# Patient Record
Sex: MALE | Race: BLACK OR AFRICAN AMERICAN | NOT HISPANIC OR LATINO | Employment: OTHER | URBAN - METROPOLITAN AREA
[De-identification: names, ages, dates, MRNs, and addresses within clinical notes are randomized per-mention and may not be internally consistent; named-entity substitution may affect disease eponyms.]

---

## 2019-06-13 ENCOUNTER — TELEPHONE (OUTPATIENT)
Dept: TRANSPLANT | Facility: CLINIC | Age: 69
End: 2019-06-13

## 2019-06-13 DIAGNOSIS — I50.9 CONGESTIVE HEART FAILURE, UNSPECIFIED HF CHRONICITY, UNSPECIFIED HEART FAILURE TYPE: Primary | ICD-10-CM

## 2019-06-13 NOTE — TELEPHONE ENCOUNTER
Records from referring provider scanned into [employer] and sent to be scanned into Epic. Called to schedule consult appointment. No answer. Unable to leave message as mailbox is full.

## 2019-07-11 NOTE — TELEPHONE ENCOUNTER
REFERRAL NOTE:    Ishmael Thrasher has been referred to the pre-heart transplant office for consideration for orthotopic heart transplantation by Gilmar Rowell. Patient's appointments have been scheduled for 08/01/19 as per patient request due to other previously scheduled appointments. Information was provided and questions were answered. AHF/ Transplant Handout, appointment letter and campus map was mailed to the patient. Pleasant. My contact information was given. Call PRN.

## 2019-07-30 ENCOUNTER — TELEPHONE (OUTPATIENT)
Dept: TRANSPLANT | Facility: CLINIC | Age: 69
End: 2019-07-30

## 2019-08-13 ENCOUNTER — TELEPHONE (OUTPATIENT)
Dept: TRANSPLANT | Facility: CLINIC | Age: 69
End: 2019-08-13

## 2019-08-16 ENCOUNTER — CLINICAL SUPPORT (OUTPATIENT)
Dept: TRANSPLANT | Facility: CLINIC | Age: 69
End: 2019-08-16
Payer: MEDICARE

## 2019-08-16 ENCOUNTER — DOCUMENTATION ONLY (OUTPATIENT)
Dept: TRANSPLANT | Facility: CLINIC | Age: 69
End: 2019-08-16

## 2019-08-16 ENCOUNTER — HOSPITAL ENCOUNTER (OUTPATIENT)
Dept: CARDIOLOGY | Facility: CLINIC | Age: 69
Discharge: HOME | End: 2019-08-16
Attending: INTERNAL MEDICINE
Payer: MEDICARE

## 2019-08-16 ENCOUNTER — INITIAL CONSULT (OUTPATIENT)
Dept: TRANSPLANT | Facility: CLINIC | Age: 69
End: 2019-08-16
Payer: MEDICARE

## 2019-08-16 ENCOUNTER — HOSPITAL ENCOUNTER (OUTPATIENT)
Dept: PULMONOLOGY | Facility: CLINIC | Age: 69
Discharge: HOME | End: 2019-08-16
Payer: MEDICARE

## 2019-08-16 VITALS
HEART RATE: 72 BPM | HEIGHT: 76 IN | BODY MASS INDEX: 31.66 KG/M2 | SYSTOLIC BLOOD PRESSURE: 122 MMHG | DIASTOLIC BLOOD PRESSURE: 80 MMHG | WEIGHT: 260 LBS

## 2019-08-16 VITALS
DIASTOLIC BLOOD PRESSURE: 8 MMHG | HEART RATE: 78 BPM | BODY MASS INDEX: 32.57 KG/M2 | SYSTOLIC BLOOD PRESSURE: 148 MMHG | WEIGHT: 267.44 LBS | HEIGHT: 76 IN

## 2019-08-16 VITALS — WEIGHT: 260 LBS | BODY MASS INDEX: 31.66 KG/M2 | HEIGHT: 76 IN

## 2019-08-16 DIAGNOSIS — I50.9 CONGESTIVE HEART FAILURE, UNSPECIFIED HF CHRONICITY, UNSPECIFIED HEART FAILURE TYPE: ICD-10-CM

## 2019-08-16 DIAGNOSIS — I50.9 CONGESTIVE HEART FAILURE, UNSPECIFIED HF CHRONICITY, UNSPECIFIED HEART FAILURE TYPE: Primary | ICD-10-CM

## 2019-08-16 LAB
ASCENDING AORTA: 3.58 CM
AV INDEX (PROSTH): 0.3
AV MEAN GRADIENT: 12 MMHG
AV PEAK GRADIENT: 20 MMHG
AV VALVE AREA: 1.4 CM2
AV VELOCITY RATIO: 0.31
BSA FOR ECHO PROCEDURE: 2.51 M2
CV ECHO LV RWT: 0.25 CM
DOP CALC AO PEAK VEL: 2.22 M/S
DOP CALC AO VTI: 37.65 CM
DOP CALC LVOT AREA: 4.7 CM2
DOP CALC LVOT DIAMETER: 2.44 CM
DOP CALC LVOT PEAK VEL: 0.69 M/S
DOP CALC LVOT STROKE VOLUME: 52.62 CM3
DOP CALCLVOT PEAK VEL VTI: 11.26 CM
E WAVE DECELERATION TIME: 212.97 MSEC
E/A RATIO: 0.38
E/E' RATIO: 10.57 M/S
ECHO LV POSTERIOR WALL: 0.97 CM (ref 0.6–1.1)
FRACTIONAL SHORTENING: 11 % (ref 28–44)
INTERVENTRICULAR SEPTUM: 1.04 CM (ref 0.6–1.1)
LA MAJOR: 7.6 CM
LA MINOR: 7.2 CM
LA WIDTH: 4.59 CM
LEFT ATRIUM SIZE: 5.16 CM
LEFT ATRIUM VOLUME INDEX: 60.1 ML/M2
LEFT ATRIUM VOLUME: 148.87 CM3
LEFT INTERNAL DIMENSION IN SYSTOLE: 6.94 CM (ref 2.1–4)
LEFT VENTRICLE DIASTOLIC VOLUME INDEX: 131.15 ML/M2
LEFT VENTRICLE DIASTOLIC VOLUME: 324.85 ML
LEFT VENTRICLE MASS INDEX: 158 G/M2
LEFT VENTRICLE SYSTOLIC VOLUME INDEX: 101.1 ML/M2
LEFT VENTRICLE SYSTOLIC VOLUME: 250.34 ML
LEFT VENTRICULAR INTERNAL DIMENSION IN DIASTOLE: 7.79 CM (ref 3.5–6)
LEFT VENTRICULAR MASS: 390.36 G
LV LATERAL E/E' RATIO: 9.25 M/S
LV SEPTAL E/E' RATIO: 12.33 M/S
MV PEAK A VEL: 0.98 M/S
MV PEAK E VEL: 0.37 M/S
PISA TR MAX VEL: 2.05 M/S
RA MAJOR: 5.39 CM
RA PRESSURE: 3 MMHG
RA WIDTH: 3.5 CM
RIGHT VENTRICULAR END-DIASTOLIC DIMENSION: 3.54 CM
SINUS: 3.42 CM
STJ: 3.26 CM
TDI LATERAL: 0.04 M/S
TDI SEPTAL: 0.03 M/S
TDI: 0.04 M/S
TR MAX PG: 17 MMHG
TRICUSPID ANNULAR PLANE SYSTOLIC EXCURSION: 1.31 CM
TV REST PULMONARY ARTERY PRESSURE: 20 MMHG

## 2019-08-16 PROCEDURE — 1101F PT FALLS ASSESS-DOCD LE1/YR: CPT | Mod: CPTII,S$GLB,TXP, | Performed by: INTERNAL MEDICINE

## 2019-08-16 PROCEDURE — 99499 RISK ADDL DX/OHS AUDIT: ICD-10-PCS | Mod: S$GLB,TXP,, | Performed by: INTERNAL MEDICINE

## 2019-08-16 PROCEDURE — 1101F PR PT FALLS ASSESS DOC 0-1 FALLS W/OUT INJ PAST YR: ICD-10-PCS | Mod: CPTII,S$GLB,TXP, | Performed by: INTERNAL MEDICINE

## 2019-08-16 PROCEDURE — 93306 TTE W/DOPPLER COMPLETE: CPT | Mod: NTX,S$GLB,, | Performed by: INTERNAL MEDICINE

## 2019-08-16 PROCEDURE — 93306 TRANSTHORACIC ECHO (TTE) COMPLETE (CUPID ONLY): ICD-10-PCS | Mod: NTX,S$GLB,, | Performed by: INTERNAL MEDICINE

## 2019-08-16 PROCEDURE — 99999 PR PBB SHADOW E&M-EST. PATIENT-LVL III: ICD-10-PCS | Mod: PBBFAC,TXP,, | Performed by: INTERNAL MEDICINE

## 2019-08-16 PROCEDURE — 99999 PR PBB SHADOW E&M-EST. PATIENT-LVL III: CPT | Mod: PBBFAC,TXP,, | Performed by: INTERNAL MEDICINE

## 2019-08-16 PROCEDURE — 94618 PULMONARY STRESS TESTING: CPT | Mod: NTX,S$GLB,, | Performed by: INTERNAL MEDICINE

## 2019-08-16 PROCEDURE — 99203 PR OFFICE/OUTPT VISIT, NEW, LEVL III, 30-44 MIN: ICD-10-PCS | Mod: S$GLB,TXP,, | Performed by: INTERNAL MEDICINE

## 2019-08-16 PROCEDURE — 99499 UNLISTED E&M SERVICE: CPT | Mod: S$GLB,TXP,, | Performed by: INTERNAL MEDICINE

## 2019-08-16 PROCEDURE — 99203 OFFICE O/P NEW LOW 30 MIN: CPT | Mod: S$GLB,TXP,, | Performed by: INTERNAL MEDICINE

## 2019-08-16 PROCEDURE — 94618 PULMONARY STRESS TESTING: ICD-10-PCS | Mod: NTX,S$GLB,, | Performed by: INTERNAL MEDICINE

## 2019-08-16 RX ORDER — TAMSULOSIN HYDROCHLORIDE 0.4 MG/1
0.4 CAPSULE ORAL DAILY
COMMUNITY
End: 2020-02-28

## 2019-08-16 RX ORDER — HYDRALAZINE HYDROCHLORIDE 25 MG/1
25 TABLET, FILM COATED ORAL EVERY 8 HOURS
COMMUNITY
End: 2020-02-28

## 2019-08-16 RX ORDER — ISOSORBIDE MONONITRATE 30 MG/1
30 TABLET, EXTENDED RELEASE ORAL DAILY
COMMUNITY
End: 2020-02-28 | Stop reason: ALTCHOICE

## 2019-08-16 RX ORDER — ALLOPURINOL 300 MG/1
150 TABLET ORAL DAILY
Status: ON HOLD | COMMUNITY
End: 2023-01-01

## 2019-08-16 RX ORDER — METOPROLOL SUCCINATE 100 MG/1
100 TABLET, EXTENDED RELEASE ORAL DAILY
Status: ON HOLD | COMMUNITY
End: 2021-03-20 | Stop reason: HOSPADM

## 2019-08-16 RX ORDER — GLIPIZIDE 5 MG/1
5 TABLET ORAL
Status: ON HOLD | COMMUNITY
End: 2023-01-01 | Stop reason: HOSPADM

## 2019-08-16 RX ORDER — MELATONIN 5 MG
CAPSULE ORAL
Status: ON HOLD | COMMUNITY
End: 2023-01-01 | Stop reason: HOSPADM

## 2019-08-16 RX ORDER — SERTRALINE HYDROCHLORIDE 50 MG/1
50 TABLET, FILM COATED ORAL DAILY
Status: ON HOLD | COMMUNITY
End: 2023-01-01 | Stop reason: HOSPADM

## 2019-08-16 RX ORDER — LANOLIN ALCOHOL/MO/W.PET/CERES
400 CREAM (GRAM) TOPICAL
Status: ON HOLD | COMMUNITY
End: 2021-03-20 | Stop reason: HOSPADM

## 2019-08-16 RX ORDER — ATORVASTATIN CALCIUM 80 MG/1
80 TABLET, FILM COATED ORAL DAILY
Status: ON HOLD | COMMUNITY
End: 2023-01-01 | Stop reason: SDUPTHER

## 2019-08-16 RX ORDER — RISPERIDONE 3 MG/1
TABLET ORAL
Status: ON HOLD | COMMUNITY
End: 2023-01-01 | Stop reason: HOSPADM

## 2019-08-16 RX ORDER — MIRTAZAPINE 15 MG/1
15 TABLET, FILM COATED ORAL
COMMUNITY
End: 2020-04-03

## 2019-08-16 RX ORDER — GLUCOSAMINE HCL 500 MG
1 TABLET ORAL
Status: ON HOLD | COMMUNITY
End: 2023-01-01 | Stop reason: HOSPADM

## 2019-08-16 RX ORDER — SPIRONOLACTONE 25 MG/1
0.5 TABLET ORAL DAILY
Status: ON HOLD | COMMUNITY
End: 2021-03-20 | Stop reason: HOSPADM

## 2019-08-16 RX ORDER — QUETIAPINE FUMARATE 400 MG/1
400 TABLET, FILM COATED ORAL NIGHTLY
COMMUNITY
End: 2020-04-03

## 2019-08-16 RX ORDER — ASPIRIN 81 MG/1
81 TABLET ORAL
Status: ON HOLD | COMMUNITY
End: 2023-01-01 | Stop reason: HOSPADM

## 2019-08-16 NOTE — NURSING
Patient identified by 2 identifiers. Denies previous reactions to blood transfusions, allergies reviewed & procedure explained.  22 g IV placed to Lt AC, flushed w/ 10cc NS pre & post contrast administration.  3cc Optison administered per echo tech, echo images obtained.  Pt tolerated procedure well.  IV D/C'ed, preasure dsg applied.  Pt D/C'ed to home.

## 2019-08-16 NOTE — PROGRESS NOTES
Subjective:   Initial evaluation of heart transplant candidacy.    HPI:  Mr. Thrasher is a 69 y.o. year old Black or  male who has presents to be considered for advanced surgical options (LVAD/OHT).  Patient is a retired vet, and already has an evaluation that is pending at [place], prior to this appointment being given. He was turned down for OHT from [place] when evaluated, due to some psych history, concerns for delusion. However is being actively evaluated currently in [facility], therefore states he fulfilled seeing us due to the referral being sent but is not planning on returning if things go well at [facility]. Does have cool extremities, gets leg swelling, for which he is being treated by Dr. Rowell and the [facility] team. Seems NYHA FC IIIb.     TTE 08/16/19:  · Severely decreased left ventricular systolic function. The estimated ejection fraction is 18%  · Eccentric left ventricular hypertrophy.  · Local segmental wall motion abnormalities.  · Grade I (mild) left ventricular diastolic dysfunction consistent with impaired relaxation.  · Normal left atrial pressure.  · Severe left atrial enlargement.  · Severe left ventricular enlargement.  · Low normal right ventricular systolic function.  · Mild right atrial enlargement.  · Moderate aortic valve stenosis.  · Aortic valve area is 1.40 cm2; peak velocity is 2.22 m/s; mean gradient is 12 mmHg.  · Mild mitral sclerosis.  · Mild-to-moderate mitral regurgitation.  · Mild to moderate tricuspid regurgitation.  · Normal central venous pressure (3 mm Hg).  · The estimated PA systolic pressure is 20 mm Hg    Past Medical History:   Diagnosis Date    CAD (coronary artery disease)     Cardiomyopathy     CHF (congestive heart failure)     Edema     GERD (gastroesophageal reflux disease)     HTN (hypertension)     Hyperlipemia      Past Surgical History:   Procedure Laterality Date    CARDIAC DEFIBRILLATOR PLACEMENT      CATHETERIZATION OF  "BOTH LEFT AND RIGHT HEART Bilateral 08/20/2018    CORONARY ARTERY BYPASS GRAFT         History reviewed. No pertinent family history.    Review of Systems   Constitution: Positive for malaise/fatigue and weight gain. Negative for chills, decreased appetite, diaphoresis, fever and weight loss.   HENT: Negative for congestion.    Eyes: Negative for blurred vision and visual disturbance.   Cardiovascular: Positive for dyspnea on exertion, leg swelling, orthopnea and paroxysmal nocturnal dyspnea. Negative for chest pain, irregular heartbeat, near-syncope, palpitations and syncope.   Respiratory: Positive for shortness of breath. Negative for cough, sleep disturbances due to breathing, snoring and wheezing.    Hematologic/Lymphatic: Negative for bleeding problem. Does not bruise/bleed easily.   Skin: Negative for poor wound healing and rash.   Musculoskeletal: Negative for arthritis, joint pain and muscle weakness.   Gastrointestinal: Negative for bloating, abdominal pain, anorexia, constipation, diarrhea, hematemesis, hematochezia, melena, nausea and vomiting.   Genitourinary: Negative for frequency and urgency.   Neurological: Negative for difficulty with concentration, excessive daytime sleepiness, dizziness, headaches, light-headedness and weakness.   Psychiatric/Behavioral: Negative for depression. The patient does not have insomnia and is not nervous/anxious.        Objective:   Blood pressure (!) 148/8, pulse 78, height 6' 4" (1.93 m), weight 121.3 kg (267 lb 6.7 oz).body mass index is 32.55 kg/m².    Physical Exam   Constitutional: He is oriented to person, place, and time. He appears well-developed and well-nourished. No distress.   HENT:   Head: Normocephalic and atraumatic.   Mouth/Throat: Oropharynx is clear and moist.   Eyes: Conjunctivae and EOM are normal. Right eye exhibits no discharge. Left eye exhibits no discharge.   Neck: Normal range of motion. Neck supple. No JVD present.   Cardiovascular: Normal " rate, regular rhythm and intact distal pulses. Exam reveals no gallop and no friction rub.   No murmur heard.  Cap refill sluggish, cool LE extremities knees down   Pulmonary/Chest: Effort normal and breath sounds normal. He has no wheezes. He has no rales. He exhibits no tenderness.   Abdominal: Soft. Bowel sounds are normal. He exhibits no distension and no mass. There is no tenderness. There is no rebound and no guarding.   Musculoskeletal: Normal range of motion. He exhibits edema (1+ pitting edema bilateral lower extremities). He exhibits no tenderness.   Neurological: He is alert and oriented to person, place, and time.   Skin: Skin is warm and dry. No rash noted. He is not diaphoretic. No erythema.   Psychiatric: He has a normal mood and affect. His behavior is normal. Judgment and thought content normal.   Nursing note and vitals reviewed.      Labs:      Chemistry        Component Value Date/Time     08/16/2019 1225    K 4.4 08/16/2019 1225     08/16/2019 1225    CO2 27 08/16/2019 1225    BUN 27 (H) 08/16/2019 1225    CREATININE 1.8 (H) 08/16/2019 1225     08/16/2019 1225        Component Value Date/Time    CALCIUM 9.3 08/16/2019 1225    ALKPHOS 104 08/16/2019 1225    AST 15 08/16/2019 1225    ALT 19 08/16/2019 1225    BILITOT 0.7 08/16/2019 1225    ESTGFRAFRICA 43.7 (A) 08/16/2019 1225    EGFRNONAA 37.8 (A) 08/16/2019 1225          No results found for: MG  Lab Results   Component Value Date    WBC 9.70 08/16/2019    HGB 14.1 08/16/2019    HCT 45.5 08/16/2019    MCV 84 08/16/2019     08/16/2019     BNP   Date Value Ref Range Status   08/16/2019 313 (H) 0 - 99 pg/mL Final     Comment:     Values of less than 100 pg/ml are consistent with non-CHF populations.     No results found for this or any previous visit.    Labs were reviewed with the patient.    Assessment:      1. Congestive heart failure, unspecified HF chronicity, unspecified heart failure type        Plan:   Patient is  not planning on returning at this time, as he is waiting to hear form [facility] next steps for his evaluation process and he [facility]. Will return for further discussion and care if he is not accepted for MCS/OHT there, but he states it is proceeding well. Thank you for allowing us to participate in the cardiovascular management of this patient. Please contact us with any questions or concerns you may have regarding this patient.     Patient is now NYHA III ACC stage D  Recommend 2 gram sodium restriction and 1500cc fluid restriction.  Encourage physical activity with graded exercise program.  Requested patient to weigh themselves daily, and to notify us if their weight increases by more than 3 lbs in 1 day or 5 lbs in 1 week.     Brianda Navas MD

## 2019-08-16 NOTE — LETTER
August 27, 2019        Gilmar Rowell  [address] [place] [address]  Phone: [phone]  Fax: [phone]             [facility] [address] [place] [address]  Phone: [phone]   Patient: Ishmael Thrasher   MR Number: [id number]   YOB: 1950   Date of Visit: 8/16/2019       Dear Dr. Gilmar Rowell    Thank you for referring Ishmael Thrasher to me for evaluation. Attached you will find relevant portions of my assessment and plan of care.    If you have questions, please do not hesitate to call me. I look forward to following Ishmael Thrasher along with you.    Sincerely,    Brianda Navas MD    Enclosure    If you would like to receive this communication electronically, please contact [email or web address] or [phone] to request [email or web address] Link access.    [email or web address] Link is a tool which provides read-only access to select patient information with whom you have a relationship. Its easy to use and provides real time access to review your patients record including encounter summaries, notes, results, and demographic information.    If you feel you have received this communication in error or would no longer like to receive these types of communications, please e-mail [email or web address]

## 2019-08-16 NOTE — PROGRESS NOTES
Per Dr. Navas, hold on initiating VAD education until further told by Patricia, pre heart coordinator. Will follow up with pt when appropriate.

## 2019-08-17 NOTE — PROCEDURES
Ishmael Thrasher is a 69 y.o.  male patient, who presents for a 6 minute walk test ordered by Mg Orosco MD.  The diagnosis is Congestive Heart Failure; Cardiomyopathy.  The patient's BMI is 31.7 kg/m2.  Predicted distance (lower limit of normal) is 337.24 meters.      Test Results:    The test was completed without stopping.  The total time walked was 360 seconds.  During walking, the patient reported:  No complaints.  The patient used no assistive devices during testing.     08/16/2019---------Distance: 365.76 meters (1200 feet)     O2 Sat % Supplemental Oxygen Heart Rate Blood Pressure Antonio Scale   Pre-exercise  (Resting) 96 % Room Air 51 bpm 82/52 mmHg 0   During Exercise 97 % Room Air 97 bpm 134/58 mmHg 0   Post-exercise  (Recovery) 97 % Room Air  42 bpm 112/72 mmHg      Recovery Time:  116 seconds    Performing nurse/tech:  Reagan DAVIS      PREVIOUS STUDY:   The patient has not had a previous study.      CLINICAL INTERPRETATION:  Six minute walk distance is 365.76 meters (1200 feet) with no dyspnea.  During exercise, there was no desaturation while breathing room air.  Both blood pressure and heart rate increased significantly with walking.  Bradycardia and Hypotension were present prior to exercise.  The patient did not report non-pulmonary symptoms during exercise.  No previous study performed.  Based upon age and body mass index, exercise capacity is normal.

## 2019-08-19 ENCOUNTER — DOCUMENTATION ONLY (OUTPATIENT)
Dept: TRANSPLANT | Facility: CLINIC | Age: 69
End: 2019-08-19

## 2020-02-11 ENCOUNTER — TELEPHONE (OUTPATIENT)
Dept: TRANSPLANT | Facility: CLINIC | Age: 70
End: 2020-02-11

## 2020-02-13 ENCOUNTER — TELEPHONE (OUTPATIENT)
Dept: TRANSPLANT | Facility: CLINIC | Age: 70
End: 2020-02-13

## 2020-02-13 DIAGNOSIS — I50.9 CONGESTIVE HEART FAILURE, UNSPECIFIED HF CHRONICITY, UNSPECIFIED HEART FAILURE TYPE: Primary | ICD-10-CM

## 2020-02-13 NOTE — TELEPHONE ENCOUNTER
Spoke to patient. Informed of labs scheduled do be done prior to appointment on 02/28/2020. Understanding verbalized. Call PRN. Appointment letter placed in mail.

## 2020-02-28 ENCOUNTER — OFFICE VISIT (OUTPATIENT)
Dept: TRANSPLANT | Facility: CLINIC | Age: 70
End: 2020-02-28
Payer: MEDICARE

## 2020-02-28 ENCOUNTER — LAB VISIT (OUTPATIENT)
Dept: LAB | Facility: HOSPITAL | Age: 70
End: 2020-02-28
Attending: INTERNAL MEDICINE
Payer: MEDICARE

## 2020-02-28 VITALS
HEIGHT: 76 IN | HEART RATE: 85 BPM | BODY MASS INDEX: 34.98 KG/M2 | SYSTOLIC BLOOD PRESSURE: 112 MMHG | WEIGHT: 287.25 LBS | DIASTOLIC BLOOD PRESSURE: 59 MMHG

## 2020-02-28 DIAGNOSIS — I50.40 COMBINED SYSTOLIC AND DIASTOLIC CONGESTIVE HEART FAILURE, UNSPECIFIED HF CHRONICITY: Primary | ICD-10-CM

## 2020-02-28 DIAGNOSIS — I50.9 CONGESTIVE HEART FAILURE, UNSPECIFIED HF CHRONICITY, UNSPECIFIED HEART FAILURE TYPE: ICD-10-CM

## 2020-02-28 DIAGNOSIS — I50.43 ACUTE ON CHRONIC COMBINED SYSTOLIC AND DIASTOLIC HEART FAILURE: Primary | ICD-10-CM

## 2020-02-28 LAB
ALBUMIN SERPL BCP-MCNC: 3.7 G/DL (ref 3.5–5.2)
ALP SERPL-CCNC: 112 U/L (ref 55–135)
ALT SERPL W/O P-5'-P-CCNC: 13 U/L (ref 10–44)
ANION GAP SERPL CALC-SCNC: 11 MMOL/L (ref 8–16)
AST SERPL-CCNC: 14 U/L (ref 10–40)
BILIRUB SERPL-MCNC: 1 MG/DL (ref 0.1–1)
BNP SERPL-MCNC: 399 PG/ML (ref 0–99)
BUN SERPL-MCNC: 22 MG/DL (ref 8–23)
CALCIUM SERPL-MCNC: 9.3 MG/DL (ref 8.7–10.5)
CHLORIDE SERPL-SCNC: 104 MMOL/L (ref 95–110)
CO2 SERPL-SCNC: 28 MMOL/L (ref 23–29)
CREAT SERPL-MCNC: 1.9 MG/DL (ref 0.5–1.4)
EST. GFR  (AFRICAN AMERICAN): 40.7 ML/MIN/1.73 M^2
EST. GFR  (NON AFRICAN AMERICAN): 35.2 ML/MIN/1.73 M^2
GLUCOSE SERPL-MCNC: 129 MG/DL (ref 70–110)
POTASSIUM SERPL-SCNC: 3.9 MMOL/L (ref 3.5–5.1)
PROT SERPL-MCNC: 7.4 G/DL (ref 6–8.4)
SODIUM SERPL-SCNC: 143 MMOL/L (ref 136–145)

## 2020-02-28 PROCEDURE — 99214 PR OFFICE/OUTPT VISIT, EST, LEVL IV, 30-39 MIN: ICD-10-PCS | Mod: NTX,S$GLB,, | Performed by: INTERNAL MEDICINE

## 2020-02-28 PROCEDURE — 1159F PR MEDICATION LIST DOCUMENTED IN MEDICAL RECORD: ICD-10-PCS | Mod: NTX,S$GLB,, | Performed by: INTERNAL MEDICINE

## 2020-02-28 PROCEDURE — 1101F PR PT FALLS ASSESS DOC 0-1 FALLS W/OUT INJ PAST YR: ICD-10-PCS | Mod: CPTII,NTX,S$GLB, | Performed by: INTERNAL MEDICINE

## 2020-02-28 PROCEDURE — 83880 ASSAY OF NATRIURETIC PEPTIDE: CPT | Mod: TXP

## 2020-02-28 PROCEDURE — 36415 COLL VENOUS BLD VENIPUNCTURE: CPT | Mod: NTX

## 2020-02-28 PROCEDURE — 1159F MED LIST DOCD IN RCRD: CPT | Mod: NTX,S$GLB,, | Performed by: INTERNAL MEDICINE

## 2020-02-28 PROCEDURE — 99999 PR PBB SHADOW E&M-EST. PATIENT-LVL IV: ICD-10-PCS | Mod: PBBFAC,TXP,, | Performed by: INTERNAL MEDICINE

## 2020-02-28 PROCEDURE — 99999 PR PBB SHADOW E&M-EST. PATIENT-LVL IV: CPT | Mod: PBBFAC,TXP,, | Performed by: INTERNAL MEDICINE

## 2020-02-28 PROCEDURE — 1101F PT FALLS ASSESS-DOCD LE1/YR: CPT | Mod: CPTII,NTX,S$GLB, | Performed by: INTERNAL MEDICINE

## 2020-02-28 PROCEDURE — 99214 OFFICE O/P EST MOD 30 MIN: CPT | Mod: NTX,S$GLB,, | Performed by: INTERNAL MEDICINE

## 2020-02-28 PROCEDURE — 80053 COMPREHEN METABOLIC PANEL: CPT | Mod: TXP

## 2020-02-28 RX ORDER — TORSEMIDE 20 MG/1
20 TABLET ORAL
Status: ON HOLD | COMMUNITY
Start: 2019-12-30 | End: 2021-03-20 | Stop reason: HOSPADM

## 2020-02-28 RX ORDER — HYDRALAZINE HYDROCHLORIDE 10 MG/1
10 TABLET, FILM COATED ORAL 3 TIMES DAILY
Qty: 90 TABLET | Refills: 11 | Status: CANCELLED | OUTPATIENT
Start: 2020-02-28 | End: 2021-02-27

## 2020-02-28 RX ORDER — ISOSORBIDE DINITRATE 10 MG/1
10 TABLET ORAL 3 TIMES DAILY
Qty: 90 TABLET | Refills: 11 | Status: SHIPPED | OUTPATIENT
Start: 2020-02-28 | End: 2020-07-06 | Stop reason: SDUPTHER

## 2020-02-28 RX ORDER — AA/PROT/LYSINE/METHIO/VIT C/B6 50-12.5 MG
200 TABLET ORAL DAILY
Status: ON HOLD | COMMUNITY
End: 2023-01-01 | Stop reason: HOSPADM

## 2020-02-28 RX ORDER — TRAMADOL HYDROCHLORIDE 50 MG/1
50 TABLET ORAL DAILY
COMMUNITY
End: 2020-04-03

## 2020-02-28 RX ORDER — POTASSIUM CHLORIDE 750 MG/1
TABLET, EXTENDED RELEASE ORAL
Status: ON HOLD | COMMUNITY
End: 2021-03-20 | Stop reason: HOSPADM

## 2020-02-28 RX ORDER — HYDRALAZINE HYDROCHLORIDE 10 MG/1
10 TABLET, FILM COATED ORAL 3 TIMES DAILY
Qty: 90 TABLET | Refills: 11 | Status: SHIPPED | OUTPATIENT
Start: 2020-02-28 | End: 2020-07-06 | Stop reason: SDUPTHER

## 2020-02-28 RX ORDER — BUMETANIDE 1 MG/1
1 TABLET ORAL DAILY
COMMUNITY
End: 2020-02-28

## 2020-02-28 RX ORDER — ISOSORBIDE DINITRATE 10 MG/1
10 TABLET ORAL 3 TIMES DAILY
Qty: 90 TABLET | Refills: 11 | Status: CANCELLED | OUTPATIENT
Start: 2020-02-28 | End: 2021-02-27

## 2020-02-28 NOTE — LETTER
February 28, 2020        Gilmar Rowell  [address] [place] [address]  Phone: [phone]  Fax: [phone]             [facility] [address] [place] [address]  Phone: [phone]   Patient: Ishmael Thrasher   MR Number: [id number]   YOB: 1950   Date of Visit: 2/28/2020       Dear Dr. Gilmar Rowell    Thank you for referring Ishmael Thrasher to me for evaluation. Attached you will find relevant portions of my assessment and plan of care.    If you have questions, please do not hesitate to call me. I look forward to following Ishmael Thrasher along with you.    Sincerely,    Brianda Navas MD    Enclosure    If you would like to receive this communication electronically, please contact [email or web address] or [phone] to request [employer] Link access.    [employer] Link is a tool which provides read-only access to select patient information with whom you have a relationship. Its easy to use and provides real time access to review your patients record including encounter summaries, notes, results, and demographic information.    If you feel you have received this communication in error or would no longer like to receive these types of communications, please e-mail [email or web address]

## 2020-02-28 NOTE — LETTER
"   [facility]  [address]  [facility] [address]  Phone: [phone]       3/3/20    Re:  Ishmael Thrasher (:  52)      To whom it may concern:      Please be advised that Mr. Ishmael Thrasher (Lee), was seen in the advanced heart failure and transplant clinic for a follow up appointment on 20, by myself and physician fellow, Errol Ramirez MD.    This letter is being sent at the request of and with permission by Mr. Thrasher.    Please feel free to contact our office at [phone], should you have any questions.  Thank you.          Brianda Navas MD  Medical Director, Mechanical Assist Device/Heart Transplant Program  [facility]          "

## 2020-02-28 NOTE — PROGRESS NOTES
Subjective:   Initial evaluation of heart transplant candidacy.    HPI:  Mr. Thrasher is a 70 y.o. year old Black or  male who has presents to be considered for advanced surgical options (LVAD/OHT). Mr. Thrasher has heart failure with reduced ejection fraction in the settings of ischemic cardiomyopathy presents to the clinic today for regular follow up. Patient was turned down for OHT from [place] due to some psych history. He is being actively evaluated in [facility], however, today he states he is not on evaluation in [place] and he would like to transfer his care here at [facility]. From heart failure standpoint, he is doing well. He denies chest pain, sob or palpitations. Denies nausea, vomiting, diarrhea or constipation. No fever, chills or recent infections.        TTE 08/16/19:  · Severely decreased left ventricular systolic function. The estimated ejection fraction is 18%  · Eccentric left ventricular hypertrophy.  · Local segmental wall motion abnormalities.  · Grade I (mild) left ventricular diastolic dysfunction consistent with impaired relaxation.  · Normal left atrial pressure.  · Severe left atrial enlargement.  · Severe left ventricular enlargement.  · Low normal right ventricular systolic function.  · Mild right atrial enlargement.  · Moderate aortic valve stenosis.  · Aortic valve area is 1.40 cm2; peak velocity is 2.22 m/s; mean gradient is 12 mmHg.  · Mild mitral sclerosis.  · Mild-to-moderate mitral regurgitation.  · Mild to moderate tricuspid regurgitation.  · Normal central venous pressure (3 mm Hg).  · The estimated PA systolic pressure is 20 mm Hg    Past Medical History:   Diagnosis Date    CAD (coronary artery disease)     Cardiomyopathy     CHF (congestive heart failure)     Edema     GERD (gastroesophageal reflux disease)     HTN (hypertension)     Hyperlipemia      Past Surgical History:   Procedure Laterality Date    CARDIAC DEFIBRILLATOR PLACEMENT       "CATHETERIZATION OF BOTH LEFT AND RIGHT HEART Bilateral 08/20/2018    CORONARY ARTERY BYPASS GRAFT         History reviewed. No pertinent family history.    Review of Systems   Constitution: Negative for chills, decreased appetite, diaphoresis, fever, malaise/fatigue, weight gain and weight loss.   HENT: Negative for congestion.    Eyes: Negative for blurred vision and visual disturbance.   Cardiovascular: Negative for chest pain, dyspnea on exertion, irregular heartbeat, leg swelling, near-syncope, orthopnea, palpitations, paroxysmal nocturnal dyspnea and syncope.   Respiratory: Negative for cough, shortness of breath, sleep disturbances due to breathing, snoring and wheezing.    Hematologic/Lymphatic: Negative for bleeding problem. Does not bruise/bleed easily.   Skin: Negative for poor wound healing and rash.   Musculoskeletal: Negative for arthritis, joint pain and muscle weakness.   Gastrointestinal: Negative for bloating, abdominal pain, anorexia, constipation, diarrhea, hematemesis, hematochezia, melena, nausea and vomiting.   Genitourinary: Negative for frequency and urgency.   Neurological: Negative for difficulty with concentration, excessive daytime sleepiness, dizziness, headaches, light-headedness and weakness.   Psychiatric/Behavioral: Negative for depression. The patient does not have insomnia and is not nervous/anxious.      Objective:   Blood pressure (!) 112/59, pulse 85, height 6' 4" (1.93 m), weight 130.3 kg (287 lb 4.2 oz).body mass index is 34.97 kg/m².    Physical Exam   Constitutional: He is oriented to person, place, and time. He appears well-developed and well-nourished. No distress.   HENT:   Head: Normocephalic and atraumatic.   Mouth/Throat: Oropharynx is clear and moist.   Eyes: Conjunctivae and EOM are normal. Right eye exhibits no discharge. Left eye exhibits no discharge.   Neck: Normal range of motion. Neck supple. No JVD present.   Cardiovascular: Normal rate, regular rhythm and " intact distal pulses. PMI is displaced. Exam reveals no gallop and no friction rub.   Murmur heard.   Systolic murmur is present with a grade of 2/6.  Pulses:       Carotid pulses are 2+ on the right side, and 2+ on the left side.       Radial pulses are 2+ on the right side, and 2+ on the left side.        Femoral pulses are 2+ on the right side, and 2+ on the left side.       Popliteal pulses are 2+ on the right side, and 2+ on the left side.        Dorsalis pedis pulses are 2+ on the right side, and 2+ on the left side.        Posterior tibial pulses are 2+ on the right side, and 2+ on the left side.   Pulmonary/Chest: Effort normal and breath sounds normal. He has no wheezes. He has no rales. He exhibits no tenderness.   Abdominal: Soft. Bowel sounds are normal. He exhibits no distension and no mass. There is no tenderness. There is no rebound and no guarding.   Musculoskeletal: Normal range of motion. He exhibits edema (1+ pitting edema bilateral lower extremities). He exhibits no tenderness.   Neurological: He is alert and oriented to person, place, and time.   Skin: Skin is warm and dry. No rash noted. He is not diaphoretic. No erythema.   Psychiatric: He has a normal mood and affect. His behavior is normal. Judgment and thought content normal.   Nursing note and vitals reviewed.    Labs:      Chemistry        Component Value Date/Time     02/28/2020 1605    K 3.9 02/28/2020 1605     02/28/2020 1605    CO2 28 02/28/2020 1605    BUN 22 02/28/2020 1605    CREATININE 1.9 (H) 02/28/2020 1605     (H) 02/28/2020 1605        Component Value Date/Time    CALCIUM 9.3 02/28/2020 1605    ALKPHOS 112 02/28/2020 1605    AST 14 02/28/2020 1605    ALT 13 02/28/2020 1605    BILITOT 1.0 02/28/2020 1605    ESTGFRAFRICA 40.7 (A) 02/28/2020 1605    EGFRNONAA 35.2 (A) 02/28/2020 1605          No results found for: MG  Lab Results   Component Value Date    WBC 9.70 08/16/2019    HGB 14.1 08/16/2019    HCT 45.5  08/16/2019    MCV 84 08/16/2019     08/16/2019     BNP   Date Value Ref Range Status   02/28/2020 399 (H) 0 - 99 pg/mL Final     Comment:     Values of less than 100 pg/ml are consistent with non-CHF populations.   08/16/2019 313 (H) 0 - 99 pg/mL Final     Comment:     Values of less than 100 pg/ml are consistent with non-CHF populations.     No results found for this or any previous visit.    Labs were reviewed with the patient.    Assessment:      1. Combined systolic and diastolic congestive heart failure, unspecified HF chronicity        Plan:   1.- Heart failure with reduced ejection fraction  10% in the settings of ischemic cardiomyopathy / CAD s/p CABG in the past. Currently he is doing well. On physical exam, he is euvolemic and well perfused. Inconsistency with medication listed and our records since last visit. Patient states his medications has been changes form different doctors. I discussed about further plan for him. Patient agreed.   - Will continue on ARNI's / Entresto high-dose 97/103 mg.   - Continue on current dose of beta-blockers. Toprol  mg PO QD.   - Added small dose of hydralazine 10 mg and Isosorbide dinitrate 10 mg. Plan to continue optimizing dose in his next visit if blood pressure allow us.   - Continue spironolactone 25 mg PO QD.  - Continue on Torsemide 20 mg TID.  - Patient will benefit of risk stratification. Will perform right heart cath in the next weeks. Ordered for CPX given today. Patient will receive a call from the office to be scheduled.    - Patient is now NYHA III ACC stage D  - Recommend 2 gram sodium restriction and 1500cc fluid restriction.  - Encourage physical activity with graded exercise program.  - Requested patient to weigh themselves daily, and to notify us if their weight increases by more than 3 lbs in 1 day or 5 lbs in 1 week.     Errol Ramirez MD

## 2020-02-28 NOTE — PATIENT INSTRUCTIONS
Start hydralazine 10mg one tablet three times a day.  Start isordil 10mg one tablet three times a day.

## 2020-03-03 ENCOUNTER — TELEPHONE (OUTPATIENT)
Dept: TRANSPLANT | Facility: CLINIC | Age: 70
End: 2020-03-03

## 2020-03-03 NOTE — TELEPHONE ENCOUNTER
Requested letter was faxed to number provided per pt at his request to confirm that he saw a physician in our clinic on 2/28/20.  SHARON was faxed to medical records/main campus as previously signed per pt and left on my desk.  Fax confirmations received.

## 2020-03-04 ENCOUNTER — TELEPHONE (OUTPATIENT)
Dept: TRANSPLANT | Facility: CLINIC | Age: 70
End: 2020-03-04

## 2020-03-04 NOTE — TELEPHONE ENCOUNTER
----- Message from Brianda Navas MD sent at 3/4/2020 11:50 AM CST -----  Sure that's fine with me, thank you,  Brianda    ----- Message -----  From: Patricia Sutton RN  Sent: 3/2/2020   3:29 PM CST  To: Brianda Navas MD, Patricia Sutton RN    Good afternoon,  You saw this pt on the 28th; he is scheduled for CPX and RHC on 3/19/20.  According to the chart, he takes Eliquis 5 mg po bid.  I asked Uriel about holding Eliquis.  His recommendation was holding on morning of procedure as well as morning and night before.  Is this sufficient?  Bina is doing RHC.    Patricia

## 2020-03-04 NOTE — TELEPHONE ENCOUNTER
Spoke with patient and provided instruction to hold Eliquis on the morning and evening prior to RHC on 3/19/20 as well as the morning of 3/19/20 prior to procedure.  Pt advised that he will resume on evening of 3/19/20 after RHC unless otherwise directed.      Pt [facility] on 2/28/20); this was completed and fax confirmation was received.

## 2020-03-12 ENCOUNTER — TELEPHONE (OUTPATIENT)
Dept: NEPHROLOGY | Facility: CLINIC | Age: 70
End: 2020-03-12

## 2020-03-12 NOTE — TELEPHONE ENCOUNTER
----- Message from Mathew Carvalho sent at 3/12/2020  4:26 PM CDT -----  Contact: Patient  Patient called in and wanted to speak with the office regarding an appointment and would like a call back from the office. She can be reached at    [phone]

## 2020-03-13 ENCOUNTER — TELEPHONE (OUTPATIENT)
Dept: NEPHROLOGY | Facility: CLINIC | Age: 70
End: 2020-03-13

## 2020-03-13 ENCOUNTER — DOCUMENTATION ONLY (OUTPATIENT)
Dept: TRANSPLANT | Facility: CLINIC | Age: 70
End: 2020-03-13

## 2020-03-13 NOTE — PROGRESS NOTES
Most recent clinic note from 3/6/20 [facility] through Epic fax (third transmission; first 2 were sent per fax machine with confirmations received.)

## 2020-03-18 ENCOUNTER — TELEPHONE (OUTPATIENT)
Dept: TRANSPLANT | Facility: CLINIC | Age: 70
End: 2020-03-18

## 2020-03-18 NOTE — TELEPHONE ENCOUNTER
Decision made to postpone RHC as pt has been stable clinically and risk of patient rainer Covid-19 by coming to hospital outweighs needs to perform the procedure at this time. I feel the procedure can be safely delayed by at least 30 days and will be re-scheduled after the coronavirus outbreak ends.

## 2020-03-23 ENCOUNTER — TELEPHONE (OUTPATIENT)
Dept: NEPHROLOGY | Facility: CLINIC | Age: 70
End: 2020-03-23

## 2020-03-26 ENCOUNTER — TELEPHONE (OUTPATIENT)
Dept: NEUROSURGERY | Facility: CLINIC | Age: 70
End: 2020-03-26

## 2020-04-01 ENCOUNTER — TELEPHONE (OUTPATIENT)
Dept: NEUROSURGERY | Facility: CLINIC | Age: 70
End: 2020-04-01

## 2020-04-01 ENCOUNTER — OFFICE VISIT (OUTPATIENT)
Dept: NEUROSURGERY | Facility: CLINIC | Age: 70
End: 2020-04-01
Payer: COMMERCIAL

## 2020-04-01 DIAGNOSIS — M47.816 LUMBAR SPONDYLOSIS: Primary | ICD-10-CM

## 2020-04-01 DIAGNOSIS — M48.062 SPINAL STENOSIS OF LUMBAR REGION WITH NEUROGENIC CLAUDICATION: ICD-10-CM

## 2020-04-01 PROCEDURE — 99204 PR OFFICE/OUTPT VISIT, NEW, LEVL IV, 45-59 MIN: ICD-10-PCS | Mod: 95,NTX,, | Performed by: NEUROLOGICAL SURGERY

## 2020-04-01 PROCEDURE — 99204 OFFICE O/P NEW MOD 45 MIN: CPT | Mod: 95,NTX,, | Performed by: NEUROLOGICAL SURGERY

## 2020-04-01 NOTE — PATIENT INSTRUCTIONS
I have personally reviewed the CT of c/a/p with the pt which shows severe degenerative changes at L4-5 and L5-S1 with associated DDD, lumbar stenosis and spondylosis.  Pt is not a candidate for back surgery due to his cardiac hx. Will refer the pt to pain management to resume conservative treatment. Will also refer to physical therapy.    Due to there being some concern of metastases, will order a PET scan and contact the pt to discuss the results.

## 2020-04-01 NOTE — TELEPHONE ENCOUNTER
Called pt. Told him I faxed the PT order to [facility] in [place] (5 mins from his house) and the referral for Pain mgmt to [facility].    ----- Message from Sonia Hanna sent at 4/1/2020  1:36 PM CDT -----  Good Afternoon,     This pt would like to do therapy closer to home in [place], [place]. Can you send orders to a facility closer to him?    Thanks!

## 2020-04-01 NOTE — PROGRESS NOTES
Subjective:   I, Armando Wolff, attest that this documentation has been prepared under the direction and in the presence of Heber Luna MD.     Patient ID: Ishmael Thrasher is a 70 y.o. male     Chief Complaint: No chief complaint on file.  The patient location is: Home  The chief complaint leading to consultation is: r/o mets  Visit type: Virtual visit with synchronous audio and video  Total time spent with patient: 15 minutes  Each patient to whom he or she provides medical services by telemedicine is:  (1) informed of the relationship between the physician and patient and the respective role of any other health care provider with respect to management of the patient; and (2) notified that he or she may decline to receive medical services by telemedicine and may withdraw from such care at any time.        HPI  Mr. Ishmael Thrasher is a pleasant 70 y.o. gentleman with a hx of systolic/diastolic CHF (with EF of 18%), CAD,  s/p CABG and defibrillator placement, HTN, who presents today for consultation. He states he has a several-year hx of back pan that dates back to an injury he sustained while in the . He has worked with pain management and has had ESIs in the past. At this point, pt states he is unable to walk/stand for more than 5 minutes at a time secondary to leg numbness and his leg giving out. He has been treated for sciatica for a long time. He has participated in PT for his knee/ankle which has been postponed due to the coronavirus outbreak. Pt has no known hx of CA. He is currently being worked up for a heart transplant.    Review of Systems   Constitutional: Positive for activity change (Decreased 2/2 back pain). Negative for appetite change, fatigue, fever and unexpected weight change.   HENT: Negative for facial swelling.    Eyes: Negative.    Respiratory: Negative.    Cardiovascular: Negative.    Gastrointestinal: Negative for diarrhea, nausea and vomiting.   Endocrine: Negative.     Genitourinary: Negative.    Musculoskeletal: Positive for back pain (chronic). Negative for joint swelling, myalgias and neck pain.   Neurological: Positive for numbness (intermittent in the BLE). Negative for dizziness, seizures, weakness and headaches.   Psychiatric/Behavioral: Negative.       Past Medical History:   Diagnosis Date    CAD (coronary artery disease)     Cardiomyopathy     CHF (congestive heart failure)     Edema     GERD (gastroesophageal reflux disease)     HTN (hypertension)     Hyperlipemia        Objective:    There were no vitals filed for this visit.   Physical Exam   Constitutional: He is oriented to person, place, and time. He appears well-nourished.   HENT:   Head: Normocephalic and atraumatic.   Neurological: He is alert and oriented to person, place, and time.   Gait: Pt able to stand from sitting position, able to march in place.          IMAGING:  CT C/A/P (outside disc) shows severe degenerative changes at L4-5 and L5-S1 with associated DDD, lumbar stenosis and spondylosis.    I have personally reviewed the images with the pt.      I, Dr. Heber Luna, personally performed the services described in this documentation. All medical record entries made by the scribe, Armando Wolff, were at my direction and in my presence.  I have reviewed the chart and agree that the record reflects my personal performance and is accurate and complete. Heber Luna MD. 04/01/2020    Assessment:       1. Lumbar spondylosis    2. Spinal stenosis of lumbar region with neurogenic claudication         Plan:   I have personally reviewed the CT of c/a/p with the pt which shows severe degenerative changes at L4-5 and L5-S1 with associated DDD, lumbar stenosis and spondylosis.  Pt is not a candidate for back surgery due to his cardiac hx. Will refer the pt to pain management to resume conservative treatment. Will also refer to physical therapy.    Due to there being some concern of metastases, will order a  PET scan and contact the pt to discuss the results.

## 2020-04-02 ENCOUNTER — TELEPHONE (OUTPATIENT)
Dept: NEPHROLOGY | Facility: CLINIC | Age: 70
End: 2020-04-02

## 2020-04-03 ENCOUNTER — OFFICE VISIT (OUTPATIENT)
Dept: PAIN MEDICINE | Facility: CLINIC | Age: 70
End: 2020-04-03
Attending: NEUROLOGICAL SURGERY
Payer: COMMERCIAL

## 2020-04-03 ENCOUNTER — TELEPHONE (OUTPATIENT)
Dept: NEPHROLOGY | Facility: CLINIC | Age: 70
End: 2020-04-03

## 2020-04-03 ENCOUNTER — DOCUMENTATION ONLY (OUTPATIENT)
Dept: NEPHROLOGY | Facility: CLINIC | Age: 70
End: 2020-04-03

## 2020-04-03 DIAGNOSIS — I50.9 CHRONIC CONGESTIVE HEART FAILURE, UNSPECIFIED HEART FAILURE TYPE: ICD-10-CM

## 2020-04-03 DIAGNOSIS — M48.062 SPINAL STENOSIS OF LUMBAR REGION WITH NEUROGENIC CLAUDICATION: ICD-10-CM

## 2020-04-03 DIAGNOSIS — G89.4 CHRONIC PAIN DISORDER: Primary | ICD-10-CM

## 2020-04-03 DIAGNOSIS — M47.816 LUMBAR SPONDYLOSIS: ICD-10-CM

## 2020-04-03 DIAGNOSIS — Z79.01 CHRONIC ANTICOAGULATION: ICD-10-CM

## 2020-04-03 DIAGNOSIS — M54.16 LUMBAR RADICULOPATHY: ICD-10-CM

## 2020-04-03 PROCEDURE — 99204 OFFICE O/P NEW MOD 45 MIN: CPT | Mod: 95,,, | Performed by: ANESTHESIOLOGY

## 2020-04-03 PROCEDURE — 99204 PR OFFICE/OUTPT VISIT, NEW, LEVL IV, 45-59 MIN: ICD-10-PCS | Mod: 95,,, | Performed by: ANESTHESIOLOGY

## 2020-04-03 RX ORDER — TRAMADOL HYDROCHLORIDE 50 MG/1
50 TABLET ORAL 2 TIMES DAILY PRN
Qty: 60 TABLET | Refills: 1 | Status: SHIPPED | OUTPATIENT
Start: 2020-04-03 | End: 2020-06-02

## 2020-04-03 NOTE — TELEPHONE ENCOUNTER
----- Message from Kassy Allen sent at 4/3/2020  4:23 PM CDT -----  Contact: pt  Pt calling    Waited for virtual    Never did get a connect   Pt said he had other virtuals today   Everything thing worked fine      Pt said he did pre check for all the appointments     Please call to reschedule    Ph [phone]    Thanks     Pt called back and apologized and wanted to be rescheduled

## 2020-04-03 NOTE — PROGRESS NOTES
"New patient visit was scheduled for 3 p.m. Today. But pt did not "enter" the virtual clinic room, there was no pre e-check in. Message sent to staff re: this as until patient can check in, unable to start the virtual visit.  "

## 2020-04-03 NOTE — LETTER
April 6, 2020      Heber Luna MD  [address]  [facility] [address]           [facility]  [address]  [place] [address]  Phone: [phone]  Fax: [phone]          Patient: Ishmael Thrasher   MR Number: [id number]   YOB: 1950   Date of Visit: 4/3/2020       Dear Dr. Heber Luna:    Thank you for referring Ishmael Thrasher to me for evaluation. Attached you will find relevant portions of my assessment and plan of care.    If you have questions, please do not hesitate to call me. I look forward to following Ishmael Thrasher along with you.    Sincerely,    Melissa Barbosa MD    Enclosure  CC:  No Recipients    If you would like to receive this communication electronically, please contact [email or web address] or [phone] to request more information on [employer] access.    For providers and/or their staff who would like to refer a patient to [facility], please contact us through our one-stop-shop provider referral line, [facility], at [phone].    If you feel you have received this communication in error or would no longer like to receive these types of communications, please e-mail [email or web address]

## 2020-04-03 NOTE — TELEPHONE ENCOUNTER
----- Message from Ann Mckeon MD sent at 4/3/2020  4:12 PM CDT -----  Nabeel, I logged into the daniel several times there was no activity. He did not pre check in. Without him e-check in first I cannot start visit on my side.  Please let him know. If he wants to reschedule, then would be happy but he will need to e-check in just like he would do for any physical visit.    I kept checking until just a few minutes ago.    If he was really checked in I would get an instant notification which I have not.     ----- Message -----  From: Wilmar Kim MA  Sent: 4/3/2020   4:01 PM CDT  To: Ann Mckeon MD    Good afternoon   This pt asking what happen to the virtual visit at 3pm today.   Please advise   Thanks   ----- Message -----  From: Lata Singleton LPN  Sent: 4/3/2020   3:58 PM CDT  To: Wilmar Kim MA        ----- Message -----  From: Judah Gilmore  Sent: 4/3/2020   3:20 PM CDT  To: [facility] Staff    Pt wants to know about 3 pm virtual vist    [phone]

## 2020-04-03 NOTE — PROGRESS NOTES
Subjective:     Patient ID: Ishmael Thrasher is a 70 y.o. male.    Chief Complaint: Pain    Consulted by: Dr. Luna     Disclaimer: This note was generated using voice recognition software.  There may be typographical errors that were missed during proofreading    The patient location is: Home   The chief complaint leading to consultation is: Low back pain   Visit type: Virtual visit with synchronous audio and video   Total time spent with patient: 55 minuntes   Each patient to whom he or she provides medical services by telemedicine is: (1) informed of the relationship between the physician and patient and the respective role of any other health care provider with respect to management of the patient; and (2) notified that he or she may decline to receive medical services by telemedicine and may withdraw from such care at any time.     HPI:    Ishmael Thrasher is a 70 y.o. male with a history of CHF (EF 10%) who presents today with chronic low back pain. This pain started in 1971 as a result of and injury in the .  He describes a constant, right sided pain that starts in his low back and radiates down the posterior aspect of his right leg to the bottom of his foot.  This is associated with a numbness, tingling sensation.  The pain occurs when he is standing still for longer than 5 minutes.  The pain does not occur when walking unless he is standing first.  He denies heaviness and weakness.  This pain is described in detail below.    Aggravating factors: Standing in place for 5 minutes    Mitigating factors: Walking, medication, rest    Previously seeing: Dr. Davide Wang ([place])    Physical Therapy: Has done for his heart, but not for his back    Non-pharmacologic Treatment:     · Ice/Heat: Heat helps  · TENS: Not tried  · Massage: Not tried  · Chiropractic care: Not tried  · Acupuncture: Helpful  · Other: No brace         Pain Medications:         · Currently taking:  Zoloft 50 mg daily,  risperidone 3  mg QHS, melatonin    · Has tried in the past:    · Opioids: Norco 10/325 mg daily (took himself off), Tramadol 50 mg (sparing use, helpful)  · NSAIDS: Avoids due to Eliquis and CAD  · Tylenol: Excedrin (no help)  · Muscle relaxants:  Cyclobenzaprine (side effects), Baclofen 10 mg 3 times daily,    · TCAs:  Amitriptyline 50 mg  · SNRIs: Not tried  · Anticonvulsants: Gabapentin 800 mg (>15 years ago) no benefit  · topical creams: Back balm helps  · Other: Seroquel 400 mg QHS, Remeron 15 mg,    Blood thinners:  Eliquis 5 mg    Interventional Therapies:   · Bilateral ankle steroid injections Q3 months, last 1.5 months ago: Helpful  · Bilateral knee steroid injections Q3 months, last 1.5 months ago: helpful  · Epidural steroid injection x 2 2018: Helpful for 1-2 months    Relevant Surgeries:   · None, he reports that he couldn't get surgery on his knees, back, and ankle due to his heart    Affecting sleep? It can    Affecting daily activities? Yes    Depressive symptoms? Yes          · SI/HI? No    Work status: Not working    Pain Scale:  Best: 3/10  Worst: 10/10  Average: 5-6/10  Today: 7/10    Prescription Monitoring Program database:  Reviewed and consistent with medication use as prescribed.    No flowsheet data found.    GENERAL:  No weight loss, malaise or fevers.  HEENT:   No recent changes in vision or hearing  NECK:  Negative for lumps, no difficulty with swallowing.  RESPIRATORY:  Negative for cough, wheezing or shortness of breath, patient denies any recent URI.  CARDIOVASCULAR: s/p CABG, AICD on ASA and Eliquis, EF <20%  Negative for chest pain, leg swelling or palpitations.  GI:  Negative for abdominal discomfort, blood in stools or black stools or change in bowel habits.  MUSCULOSKELETAL:  See HPI.  SKIN:  Negative for lesions, rash, and itching.  PSYCH:  Positive for depression, anxiety.    HEMATOLOGY/LYMPHOLOGY: On ASA and Eliquis  Negative for prolonged bleeding, bruising easily or swollen nodes.     ENDO: No history of diabetes or thyroid dysfunction  NEURO:   No history of headaches, syncope, paralysis, seizures or tremors.  All other reviewed and negative other than HPI.          Past Medical History:   Diagnosis Date    CAD (coronary artery disease)     Cardiomyopathy     CHF (congestive heart failure)     Edema     GERD (gastroesophageal reflux disease)     HTN (hypertension)     Hyperlipemia        Past Surgical History:   Procedure Laterality Date    CARDIAC DEFIBRILLATOR PLACEMENT      CATHETERIZATION OF BOTH LEFT AND RIGHT HEART Bilateral 08/20/2018    CORONARY ARTERY BYPASS GRAFT         Review of patient's allergies indicates:   Allergen Reactions    Cyclobenzaprine      Other reaction(s): Swelling  Hallucinations according to patient.      Enalapril maleate      Other reaction(s): Swelling       Current Outpatient Medications   Medication Sig Dispense Refill    allopurinol (ZYLOPRIM) 300 MG tablet Take 300 mg by mouth once daily.      apixaban (ELIQUIS) 5 mg Tab Take 5 mg by mouth 2 (two) times daily.      aspirin (ECOTRIN) 81 MG EC tablet Take 81 mg by mouth.      atorvastatin (LIPITOR) 80 MG tablet Take 80 mg by mouth once daily.      cholecalciferol, vitamin D3, 3,000 unit Tab Take 1 tablet by mouth.      coenzyme Q10 10 mg capsule Take 200 mg by mouth once daily.      glipiZIDE (GLUCOTROL) 5 MG tablet Take 5 mg by mouth 2 (two) times daily before meals.      hydrALAZINE (APRESOLINE) 10 MG tablet Take 1 tablet (10 mg total) by mouth 3 (three) times daily. 90 tablet 11    isosorbide dinitrate (ISORDIL) 10 MG tablet Take 1 tablet (10 mg total) by mouth 3 (three) times daily. 90 tablet 11    magnesium oxide (MAG-OX) 400 mg (241.3 mg magnesium) tablet Take 400 mg by mouth.      melatonin 5 mg Cap Take by mouth.      metoprolol succinate (TOPROL-XL) 100 MG 24 hr tablet Take 100 mg by mouth once daily.       potassium chloride SA (K-DUR,KLOR-CON) 10 MEQ tablet potassium  chloride ER 10 mEq tablet,extended release(part/cryst)      risperiDONE (RISPERDAL) 3 MG Tab Take by mouth.      sacubitril-valsartan (ENTRESTO)  mg per tablet Take 1 tablet by mouth 2 (two) times daily.       sertraline (ZOLOFT) 50 MG tablet Take 50 mg by mouth once daily. TAKE THREE TABLETS BY MOUTH EVERY MORNING TO IMPROVE MOOD       spironolactone (ALDACTONE) 25 MG tablet Take 0.5 mg by mouth once daily.      torsemide (DEMADEX) 20 MG Tab Take 20 mg by mouth. Take 3 tab by mouth every day      traMADoL (ULTRAM) 50 mg tablet Take 1 tablet (50 mg total) by mouth 2 (two) times daily as needed for Pain. 60 tablet 1     No current facility-administered medications for this visit.        No family history on file.    Social History     Socioeconomic History    Marital status:      Spouse name: Not on file    Number of children: Not on file    Years of education: Not on file    Highest education level: Not on file   Occupational History    Not on file   Social Needs    Financial resource strain: Not on file    Food insecurity:     Worry: Not on file     Inability: Not on file    Transportation needs:     Medical: Not on file     Non-medical: Not on file   Tobacco Use    Smoking status: Never Smoker    Smokeless tobacco: Never Used   Substance and Sexual Activity    Alcohol use: Not Currently    Drug use: Not on file    Sexual activity: Not on file   Lifestyle    Physical activity:     Days per week: Not on file     Minutes per session: Not on file    Stress: Not on file   Relationships    Social connections:     Talks on phone: Not on file     Gets together: Not on file     Attends Sikh service: Not on file     Active member of club or organization: Not on file     Attends meetings of clubs or organizations: Not on file     Relationship status: Not on file   Other Topics Concern    Not on file   Social History Narrative    Not on file       Objective:     There were no vitals  filed for this visit.      GEN:  Well developed, well nourished.  No acute distress. No pain behavior.  HEENT:  No trauma.  Mucous membranes moist.  Nares patent bilaterally.  PSYCH: Normal affect. Thought content appropriate.  CHEST:  Breathing symmetric.  No audible wheezing.  SKIN:  Warm, pink, dry.  No rash on exposed areas.    NEURO/MUSCULOSKELETAL:  Fully alert, oriented, and appropriate. Speech normal linh. No cranial nerve deficits.   L-Spine:  Decreased ROM with pain on flexion, extension, contralateral bending causes right sided pain.   Left side:  Negative pain with axial/facet loading.    Right side:  Positive pain with axial/facet loading.    Palpation:  Right side: TTP over lumbar paraspinals        Imaging:      CT chest/abd/pelvis CT reviewed by Dr. Luna, which he reports shows severe degenerative changes at L4-5 and L5-S1 with associated DDD, lumbar stenosis and spondylosis    Assessment:     Encounter Diagnoses   Name Primary?    Chronic pain disorder Yes    Lumbar spondylosis     Spinal stenosis of lumbar region with neurogenic claudication     Lumbar radiculopathy     Chronic congestive heart failure, unspecified heart failure type     Chronic anticoagulation        Plan:     Diagnoses and all orders for this visit:    Chronic pain disorder  -     traMADoL (ULTRAM) 50 mg tablet; Take 1 tablet (50 mg total) by mouth 2 (two) times daily as needed for Pain.  -     Ambulatory referral/consult to Internal Medicine; Future    Lumbar spondylosis  -     Ambulatory referral/consult to Pain Clinic  -     traMADoL (ULTRAM) 50 mg tablet; Take 1 tablet (50 mg total) by mouth 2 (two) times daily as needed for Pain.  -     Ambulatory referral/consult to Internal Medicine; Future    Spinal stenosis of lumbar region with neurogenic claudication  -     Ambulatory referral/consult to Pain Clinic  -     traMADoL (ULTRAM) 50 mg tablet; Take 1 tablet (50 mg total) by mouth 2 (two) times daily as needed for  Pain.  -     Ambulatory referral/consult to Internal Medicine; Future    Lumbar radiculopathy  -     traMADoL (ULTRAM) 50 mg tablet; Take 1 tablet (50 mg total) by mouth 2 (two) times daily as needed for Pain.  -     Ambulatory referral/consult to Internal Medicine; Future    Chronic congestive heart failure, unspecified heart failure type  -     Ambulatory referral/consult to Internal Medicine; Future    Chronic anticoagulation  -     Ambulatory referral/consult to Internal Medicine; Future         Lower back and RLE radicular pain pain is consistent with the above.    We discussed the assessment and recommendations.  All available images were reviewed. We discussed the disease process, prognosis, treatment plan, and risks and benefits. The patient is aware of the risks and benefits of the medications being prescribed, common side effects, and proper usage. The following is the plan we agreed on:     1. Once Restrictions from COVID-19 are lfited can consider Lumbar PÉREZ's vs. Lumbar MBB as patient has had relief with previous injection. Will have to get clearance to hold Eliquis  2. Trial Tramadol 50mg BID PRN. Stay at most comfortable dose.  Patient warned about side effects of opioid medications.  reviewed and consistent with patient history (Previously on Tramadol 50mg PO daily with no side effects).  3. Will not prescribed Gabapentin at this time as patient has EF <20% and fluid overload could result in dangerous cardiovascular side effects.  4. Recommend using Tylenol 1000 mg every 8 hours as needed for pain.  Do not take this with any other medications containing acetaminophen.  Do not exceed 3000 mg of acetaminophen in 24 hours.  5. Continue Baclofen 10mg PO TID PRN  6. Once COVID-19 restrictions are lifted, will refer to PT for chronic lower back pain with claudication symptoms  7. Continue follow up with neurosurgery. Recommend conservative mesaures at this time  8. Information sent to PCP to see if PCP  can prescribe medications as patient lives in [place] and would not have to drive to [place] for refills.  9. Continue Lumbar brace for support.  10. My Chart Video follow up in 4 weeks.      Thank you for allowing me to participate in the care of this patient.   Please do not hesitate to call me at [phone] with any questions or concerns.    Mike Yousif MD     I have seen the patient with the fellow physician.  I have performed my own history and physical exam and we have come up with the above plan.  The patient is in agreement with our plan. I agree with the above note which I have edited where appropriate.     Melissa Barbosa MD  04/03/2020     The above plan and management options were discussed at length with patient. Patient is in agreement with the above and verbalized understanding. It will be communicated with the referring physician via electronic record, fax, or mail.

## 2020-04-07 ENCOUNTER — OFFICE VISIT (OUTPATIENT)
Dept: NEPHROLOGY | Facility: CLINIC | Age: 70
End: 2020-04-07
Payer: MEDICARE

## 2020-04-07 DIAGNOSIS — N28.1 KIDNEY CYST, ACQUIRED: ICD-10-CM

## 2020-04-07 DIAGNOSIS — N18.30 CKD (CHRONIC KIDNEY DISEASE), STAGE III: ICD-10-CM

## 2020-04-07 DIAGNOSIS — I25.5 ISCHEMIC CARDIOMYOPATHY: ICD-10-CM

## 2020-04-07 DIAGNOSIS — I13.0 CARDIORENAL SYNDROME WITH RENAL FAILURE, STAGE 1-4 OR UNSPECIFIED CHRONIC KIDNEY DISEASE, WITH HEART FAILURE: ICD-10-CM

## 2020-04-07 PROBLEM — I13.10 CARDIORENAL SYNDROME: Status: ACTIVE | Noted: 2020-04-07

## 2020-04-07 PROCEDURE — 99205 PR OFFICE/OUTPT VISIT, NEW, LEVL V, 60-74 MIN: ICD-10-PCS | Mod: 95,,, | Performed by: INTERNAL MEDICINE

## 2020-04-07 PROCEDURE — 99205 OFFICE O/P NEW HI 60 MIN: CPT | Mod: 95,,, | Performed by: INTERNAL MEDICINE

## 2020-04-08 NOTE — PROGRESS NOTES
Subjective:   Patient ID: Ishmael Thrasher is a 70 y.o. Black or  male who presents for new evaluation of Chronic Kidney Disease      HPI   was seen in virtual clinic today for new patient evaluation of CKD. Since this was his first visit with me I reviewed available medical chart including documents under media, scanned from outside clinics/ hospitalizations.    The patient location is: home in [place]   The chief complaint leading to consultation is: CKD  Visit type: Virtual visit with synchronous audio and video  Total time spent with patient: total 30 minutes, video interrupted multiple times, audio call continued.   Each patient to whom he or she provides medical services by telemedicine is:  (1) informed of the relationship between the physician and patient and the respective role of any other health care provider with respect to management of the patient; and (2) notified that he or she may decline to receive medical services by telemedicine and may withdraw from such care at any time.    He has ischemic cardiomyopathy, s/p CABG, AICD, hypertension, prior chronic use of NSAID, CKD, prior episodes of PRABHJOT in 2018, hyperlipidemia, spinal stenosis, GERD, BPH and other medical problems. He had hypertension diagnosed since mid 1970's. He has severe ischemic cardiomyopathy. He is being evaluated for advanced options of heart failure including heart transplant.     He has been a non tobacco smoker.     He has been on Entresto on off per his report. He feels since he was placed back on it in mid 2019 he has noticed further drop in GFR. He had prior long term use of NSAID like meloxicam. He has stopped use for a while now. He has been on diuretic torsemide, multiple antihypertensives and Entresto, spironolactone. In 2018 he had PRABHJOT superimposed on CKD. Per outside notes he was treated with vancomycin back then. Not much details found in available outside records. He describes he had a CT scan  in the past which reported one of the kidneys having around 2.5 cm cyst.     Most recently he does not have any acute illness. He denies decreased oral intake/ decreased urine output/ flank pain/ dysuria/ cloudy urine/ dizziness.    His cardiology team has been trying to titrate the dose of Entresto to the maximum his BP can tolerate. He has been also started on isosorbide and hydralazine for afterload reduction. He did not report any recent episode of hypotension. His diuretic regimen is being managed by his cardiologist.     Renal Function:  Lab Results   Component Value Date     (H) 02/28/2020     08/16/2019     02/28/2020     08/16/2019    K 3.9 02/28/2020    K 4.4 08/16/2019     02/28/2020     08/16/2019    CO2 28 02/28/2020    CO2 27 08/16/2019    BUN 22 02/28/2020    BUN 27 (H) 08/16/2019    CALCIUM 9.3 02/28/2020    CALCIUM 9.3 08/16/2019    CREATININE 1.9 (H) 02/28/2020    CREATININE 1.8 (H) 08/16/2019    ALBUMIN 3.7 02/28/2020    ALBUMIN 3.7 08/16/2019    ESTGFRAFRICA 40.7 (A) 02/28/2020    ESTGFRAFRICA 43.7 (A) 08/16/2019    EGFRNONAA 35.2 (A) 02/28/2020    EGFRNONAA 37.8 (A) 08/16/2019       Urinalysis:  No results found for: APPEARANCEUA, PHUR, SPECGRAV, PROTEINUA, GLUCUA, OCCULTUA, NITRITE, LEUKOCYTESUR    Protein/Creatinine Ratio:  No results found for: PROTEINURINE, CREATRANDUR, UTPCR    CBC:  Lab Results   Component Value Date    WBC 9.70 08/16/2019    HGB 14.1 08/16/2019    HCT 45.5 08/16/2019       PTH:  No results found for: PTH    Review of Systems   Constitutional: Negative for activity change, appetite change, chills, fatigue and fever.   HENT: Negative for facial swelling, hearing loss and sore throat.    Eyes: Negative for visual disturbance.   Respiratory: Negative for cough, shortness of breath and wheezing.    Cardiovascular: Positive for leg swelling. Negative for chest pain.   Gastrointestinal: Negative for abdominal pain, diarrhea, nausea and  vomiting.   Endocrine: Negative for polydipsia and polyuria.   Genitourinary: Negative for dysuria, flank pain and frequency.   Musculoskeletal: Positive for arthralgias and back pain.   Skin: Negative for pallor and rash.   Neurological: Negative for dizziness, light-headedness and headaches.   Psychiatric/Behavioral: The patient is not nervous/anxious.        Objective:   Physical Exam   Constitutional: He appears well-developed. No distress.   Eyes: Right eye exhibits no discharge. Left eye exhibits no discharge.   Skin: He is not diaphoretic.       Assessment:     1. CKD (chronic kidney disease), stage III    2. Cardiorenal syndrome with renal failure, stage 1-4 or unspecified chronic kidney disease, with heart failure    3. Ischemic cardiomyopathy    4. Kidney cyst, acquired        Plan:       Problem List Items Addressed This Visit        Cardiac/Vascular    Cardiorenal syndrome    Relevant Orders    CBC auto differential    Hepatitis C Antibody    Hepatitis B Surface Antigen    Immunofixation electrophoresis    Immunoglobulin free LT chains blood    Protein electrophoresis, serum    PTH, intact    Renal function panel    Magnesium    Uric acid    Vitamin D    Urinalysis    Protein / creatinine ratio, urine    US Retroperitoneal Complete (Kidney and    Ischemic cardiomyopathy       Renal/    CKD (chronic kidney disease), stage III    Relevant Orders    CBC auto differential    Hepatitis C Antibody    Hepatitis B Surface Antigen    Immunofixation electrophoresis    Immunoglobulin free LT chains blood    Protein electrophoresis, serum    PTH, intact    Renal function panel    Magnesium    Uric acid    Vitamin D    Urinalysis    Protein / creatinine ratio, urine    US Retroperitoneal Complete (Kidney and    Kidney cyst, acquired    Relevant Orders    US Retroperitoneal Complete (Kidney and        Mr. Thrasher has CKD III, prior episodes of PRABHJOT likely in the context of infection/ nephrotoxic antibiotic use. 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    He has very advanced cardiomyopathy. His risk of worsening of CKD/ RPABHJOT is higher. I had a detailed discussion with patient about his CKD diagnosis, CKD staging, interpretation of serial labs pertaining to his kidneys, potential risk of progression of CKD. Possible etiology of his CKD, need for improved volume status, strict low salt diet, avoiding any NSAID, having periodic monitoring of renal labs to assess and treat any electrolyte disturbance, acid base disorder, to follow progress of CKD with creatinine and eGFR. I stressed to have BP monitoring at home and to bring readings for review with his future visits with MD.     His main concern has been using Entresto which he perceives is causing kidney problem to get worse. I counseled him that Valsartan component is beneficial for RAAS blockade and for CKD management. But cautioned him and advised to discuss any dose related concerns with his cardiologist. Explained that from cardiac standpoint this is likely offering him benefit by lowering mortality and encouraged him to keep follow up in cardiology clinic so that further work up can be undertaken for advanced heart failure options. Explained to him with combination of various medicines he has been on he has higher risk of hypotension which can precipitate an episode of ischemic ATN causing PRABHJOT and he should be protected from severe lowering of BP below his baseline. Also d/w him in detail risk of hyperkalemia with combination of Entresto, K sparing diuretic and CKD along with potential risk of PRABHJOT. He has not been taking K supplements. Would encourage prn correction of hypokalemia if that develops.     Diuretic regimen, antihypertensive regimen otherwise as per his cardiologist.      - periodically monitor renal panel for electrolytes, acid base status, eGFR  -  CKD staging, diagnosis, onset of CKD, potential risk of CKD progression, potential risk of PRABHJOT due to volume depletion/ PAPI/ ATN due to hemodynamic changes d/w patient  - stress to follow low salt diet, fluid restriction, follow low K diet in case of any dyskalemia, nutritional changes d/w patient   - trend PTH levels, vit D, phos, corrected Ca and treat as necessary   - trend urine studies for proteinuria  - obtain screening labs for hep C/B, labs to rule out paraproteinemia   - obtain US kidney for kidney size, rule out mass/ cyst  - avoid NSAID/ bactrim/ IV contrast/ gadolinium/ aminoglycoside/ fleet enema/ PPI where possible  - Pt encouraged to monitor BP at home, goal BP level d/w patient  - trend H/H periodically if CKD stage progresses      Plan, labs, recommendations were discussed with patient, his questions were answered to his satisfaction.   Total time spent was 60 minutes with >50% time spent in face to face evaluation, counseling about possible etiology, work up, monitoring, natural course of illness, recommendations.       RTC 3 months

## 2020-04-28 ENCOUNTER — TELEPHONE (OUTPATIENT)
Dept: PAIN MEDICINE | Facility: CLINIC | Age: 70
End: 2020-04-28

## 2020-04-28 NOTE — TELEPHONE ENCOUNTER
Contacted patient regarding appointment on 05/07/20 with Dr. Barbosa.     Patient was informed  Dr. Barbosa will not be in clinic on 05/07/20 and appointment will have to be rescheduled.     Patient was offered to reschedule to 05/08/2020 via virtual video visit, or he may be seen in person starting on 05/18/2020.     Patient agreed and rescheduled to 05/08/20 via video visit.  Patient acknowledged information given and expressed understanding.

## 2020-04-30 ENCOUNTER — TELEPHONE (OUTPATIENT)
Dept: PULMONOLOGY | Facility: CLINIC | Age: 70
End: 2020-04-30

## 2020-04-30 NOTE — TELEPHONE ENCOUNTER
I spoke to patient to offer a virtual visit with Dr Anderson for appointment scheduled on 5/7/20 at 2:30pm. Patient accepted and appointment changed to virtual visit. Patient verbalized understanding.

## 2020-05-07 ENCOUNTER — TELEPHONE (OUTPATIENT)
Dept: TRANSPLANT | Facility: CLINIC | Age: 70
End: 2020-05-07

## 2020-05-07 DIAGNOSIS — I50.43 ACUTE ON CHRONIC COMBINED SYSTOLIC AND DIASTOLIC HEART FAILURE: Primary | ICD-10-CM

## 2020-05-19 ENCOUNTER — TELEPHONE (OUTPATIENT)
Dept: NEPHROLOGY | Facility: CLINIC | Age: 70
End: 2020-05-19

## 2020-05-19 ENCOUNTER — TELEPHONE (OUTPATIENT)
Dept: TRANSPLANT | Facility: CLINIC | Age: 70
End: 2020-05-19

## 2020-05-19 DIAGNOSIS — I50.40 COMBINED SYSTOLIC AND DIASTOLIC CONGESTIVE HEART FAILURE, UNSPECIFIED HF CHRONICITY: Primary | ICD-10-CM

## 2020-05-19 NOTE — TELEPHONE ENCOUNTER
"Noted that pt was scheduled incorrectly for covid screen prior to RHC/CPX on 6/3    Spoke with pt. He requested to go to [facility] on [address] for his drive thru test.   Spoke with that center and they are unable to schedule past 5/31/20. Spoke with [place] urgent [facility] but they are unable to guarantee results in 48hours "most take 2-3 days".     Per management, OK to have pt do rapid screen day of procedure in [facility].  Stat order entered.  Pt informed.   "

## 2020-05-22 ENCOUNTER — TELEPHONE (OUTPATIENT)
Dept: TRANSPLANT | Facility: CLINIC | Age: 70
End: 2020-05-22

## 2020-05-22 NOTE — TELEPHONE ENCOUNTER
Spoke with pt to inform that covid screen is required again for his cpx.  Local [facility] can not guarantee results within 48hours.   Pt agrees to come to [place] 2 days ahead of time to have his covid screen done at [facility] drive thru.     appt scheduled.  New appt slip mailed.

## 2020-05-29 ENCOUNTER — TELEPHONE (OUTPATIENT)
Dept: TRANSPLANT | Facility: CLINIC | Age: 70
End: 2020-05-29

## 2020-05-29 DIAGNOSIS — I50.40 COMBINED SYSTOLIC AND DIASTOLIC CONGESTIVE HEART FAILURE, UNSPECIFIED HF CHRONICITY: Primary | ICD-10-CM

## 2020-05-29 NOTE — TELEPHONE ENCOUNTER
I called pt to discuss his appts next week.  He says he is not coming because he cannot walk due to gout, and ankles and knees are hurting.  He follows at the [facility].  Needs to R/S all his appts to end of June or July, after his nephrology appts.  I told him we will be in touch with updated schedule.

## 2020-06-19 ENCOUNTER — HOSPITAL ENCOUNTER (OUTPATIENT)
Dept: RADIOLOGY | Facility: HOSPITAL | Age: 70
Discharge: HOME | End: 2020-06-19
Attending: INTERNAL MEDICINE
Payer: COMMERCIAL

## 2020-06-19 ENCOUNTER — LAB VISIT (OUTPATIENT)
Dept: LAB | Facility: HOSPITAL | Age: 70
End: 2020-06-19
Attending: INTERNAL MEDICINE
Payer: MEDICARE

## 2020-06-19 DIAGNOSIS — N18.30 CKD (CHRONIC KIDNEY DISEASE), STAGE III: ICD-10-CM

## 2020-06-19 DIAGNOSIS — I13.0 CARDIORENAL SYNDROME WITH RENAL FAILURE, STAGE 1-4 OR UNSPECIFIED CHRONIC KIDNEY DISEASE, WITH HEART FAILURE: ICD-10-CM

## 2020-06-19 DIAGNOSIS — N28.1 KIDNEY CYST, ACQUIRED: ICD-10-CM

## 2020-06-19 LAB
BILIRUB UR QL STRIP: NEGATIVE
CLARITY UR REFRACT.AUTO: CLEAR
COLOR UR AUTO: YELLOW
GLUCOSE UR QL STRIP: NEGATIVE
HGB UR QL STRIP: NEGATIVE
KETONES UR QL STRIP: NEGATIVE
LEUKOCYTE ESTERASE UR QL STRIP: NEGATIVE
NITRITE UR QL STRIP: NEGATIVE
PH UR STRIP: 5 [PH] (ref 5–8)
PROT UR QL STRIP: NEGATIVE
SP GR UR STRIP: 1.02 (ref 1–1.03)
URN SPEC COLLECT METH UR: NORMAL

## 2020-06-19 PROCEDURE — 84156 ASSAY OF PROTEIN URINE: CPT | Mod: TXP

## 2020-06-19 PROCEDURE — 76770 US EXAM ABDO BACK WALL COMP: CPT | Mod: 26,NTX,, | Performed by: RADIOLOGY

## 2020-06-19 PROCEDURE — 76770 US EXAM ABDO BACK WALL COMP: CPT | Mod: TC,NTX

## 2020-06-19 PROCEDURE — 76770 US RETROPERITONEAL COMPLETE: ICD-10-PCS | Mod: 26,NTX,, | Performed by: RADIOLOGY

## 2020-06-19 PROCEDURE — 81003 URINALYSIS AUTO W/O SCOPE: CPT | Mod: NTX

## 2020-06-20 LAB
CREAT UR-MCNC: 191 MG/DL (ref 23–375)
PROT UR-MCNC: 14 MG/DL (ref 0–15)
PROT/CREAT UR: 0.07 MG/G{CREAT} (ref 0–0.2)

## 2020-06-22 ENCOUNTER — TELEPHONE (OUTPATIENT)
Dept: NEPHROLOGY | Facility: CLINIC | Age: 70
End: 2020-06-22

## 2020-06-22 NOTE — TELEPHONE ENCOUNTER
----- Message from Ann Mckeon MD sent at 6/21/2020  7:42 PM CDT -----  Please inform him  Kidney function close to baseline, no acute decline. He has vit D deficiency, ensure taking vitamin D supplements regularly. Also uric acid levels elevated, please confirm he has been taking allopurinol regularly.

## 2020-06-22 NOTE — TELEPHONE ENCOUNTER
----- Message from Ann Mckeon MD sent at 6/21/2020  7:43 PM CDT -----  Sonogram of kidney shows a cyst on left kidney, continue to follow with urology clinic locally. Thanks.

## 2020-07-02 ENCOUNTER — OFFICE VISIT (OUTPATIENT)
Dept: PAIN MEDICINE | Facility: CLINIC | Age: 70
End: 2020-07-02
Attending: ANESTHESIOLOGY
Payer: COMMERCIAL

## 2020-07-02 ENCOUNTER — OFFICE VISIT (OUTPATIENT)
Dept: NEPHROLOGY | Facility: CLINIC | Age: 70
End: 2020-07-02
Payer: COMMERCIAL

## 2020-07-02 VITALS
DIASTOLIC BLOOD PRESSURE: 100 MMHG | TEMPERATURE: 99 F | WEIGHT: 284.38 LBS | SYSTOLIC BLOOD PRESSURE: 143 MMHG | HEART RATE: 85 BPM | RESPIRATION RATE: 18 BRPM | HEIGHT: 76 IN | BODY MASS INDEX: 34.63 KG/M2

## 2020-07-02 VITALS
HEART RATE: 87 BPM | HEIGHT: 76 IN | OXYGEN SATURATION: 96 % | WEIGHT: 285.06 LBS | BODY MASS INDEX: 34.71 KG/M2 | SYSTOLIC BLOOD PRESSURE: 142 MMHG | DIASTOLIC BLOOD PRESSURE: 82 MMHG

## 2020-07-02 DIAGNOSIS — E79.0 HYPERURICEMIA: ICD-10-CM

## 2020-07-02 DIAGNOSIS — G89.4 CHRONIC PAIN DISORDER: ICD-10-CM

## 2020-07-02 DIAGNOSIS — R16.0 ENLARGED LIVER: ICD-10-CM

## 2020-07-02 DIAGNOSIS — I13.10 CARDIORENAL SYNDROME WITH RENAL FAILURE, STAGE 1-4 OR UNSPECIFIED CHRONIC KIDNEY DISEASE, WITHOUT HEART FAILURE: ICD-10-CM

## 2020-07-02 DIAGNOSIS — I25.5 ISCHEMIC CARDIOMYOPATHY: ICD-10-CM

## 2020-07-02 DIAGNOSIS — N28.1 KIDNEY CYST, ACQUIRED: ICD-10-CM

## 2020-07-02 DIAGNOSIS — M54.16 LUMBAR RADICULOPATHY: Primary | ICD-10-CM

## 2020-07-02 DIAGNOSIS — N18.30 CKD (CHRONIC KIDNEY DISEASE), STAGE III: Primary | ICD-10-CM

## 2020-07-02 DIAGNOSIS — M47.816 LUMBAR SPONDYLOSIS: ICD-10-CM

## 2020-07-02 DIAGNOSIS — M48.062 SPINAL STENOSIS OF LUMBAR REGION WITH NEUROGENIC CLAUDICATION: ICD-10-CM

## 2020-07-02 PROCEDURE — 99999 PR PBB SHADOW E&M-EST. PATIENT-LVL V: ICD-10-PCS | Mod: PBBFAC,,, | Performed by: INTERNAL MEDICINE

## 2020-07-02 PROCEDURE — 99215 PR OFFICE/OUTPT VISIT, EST, LEVL V, 40-54 MIN: ICD-10-PCS | Mod: S$PBB,,, | Performed by: INTERNAL MEDICINE

## 2020-07-02 PROCEDURE — 99215 OFFICE O/P EST HI 40 MIN: CPT | Mod: PBBFAC | Performed by: INTERNAL MEDICINE

## 2020-07-02 PROCEDURE — 99215 OFFICE O/P EST HI 40 MIN: CPT | Mod: S$PBB,,, | Performed by: INTERNAL MEDICINE

## 2020-07-02 PROCEDURE — 99999 PR PBB SHADOW E&M-EST. PATIENT-LVL V: CPT | Mod: PBBFAC,,, | Performed by: INTERNAL MEDICINE

## 2020-07-02 PROCEDURE — 99999 PR PBB SHADOW E&M-EST. PATIENT-LVL IV: ICD-10-PCS | Mod: PBBFAC,,, | Performed by: NURSE PRACTITIONER

## 2020-07-02 PROCEDURE — 99214 OFFICE O/P EST MOD 30 MIN: CPT | Mod: PBBFAC,27 | Performed by: NURSE PRACTITIONER

## 2020-07-02 PROCEDURE — 99213 OFFICE O/P EST LOW 20 MIN: CPT | Mod: S$PBB,,, | Performed by: NURSE PRACTITIONER

## 2020-07-02 PROCEDURE — 99213 PR OFFICE/OUTPT VISIT, EST, LEVL III, 20-29 MIN: ICD-10-PCS | Mod: S$PBB,,, | Performed by: NURSE PRACTITIONER

## 2020-07-02 PROCEDURE — 99999 PR PBB SHADOW E&M-EST. PATIENT-LVL IV: CPT | Mod: PBBFAC,,, | Performed by: NURSE PRACTITIONER

## 2020-07-02 RX ORDER — FEBUXOSTAT 40 MG/1
40 TABLET, FILM COATED ORAL DAILY
Qty: 30 TABLET | Refills: 6 | Status: ON HOLD | OUTPATIENT
Start: 2020-07-02 | End: 2023-01-01 | Stop reason: HOSPADM

## 2020-07-02 NOTE — PROGRESS NOTES
Subjective:     Patient ID: Ishmael Thrasher is a 70 y.o. male.    Chief Complaint: Pain    Consulted by: Dr. Luna     Disclaimer: This note was generated using voice recognition software.  There may be typographical errors that were missed during proofreading      HPI:    Ishmael Thrasher is a 70 y.o. male with a history of CHF (EF 10%) who presents today with chronic low back pain. This pain started in 1971 as a result of and injury in the .  He describes a constant, right sided pain that starts in his low back and radiates down the posterior aspect of his right leg to the bottom of his foot.  This is associated with a numbness, tingling sensation.  The pain occurs when he is standing still for longer than 5 minutes.  The pain does not occur when walking unless he is standing first.  He denies heaviness and weakness.  This pain is described in detail below.    Interval History (7/2/2020):  The patient returns to clinic today for follow up of back pain. He continues to report low back pain that radiates down the posterior aspect of his right leg to the bottom of his foot. He denies any left leg pain. His pain is worse with prolonged standing. He has had injections in the past without relief. He is currently taking Tramadol with limited relief. He denies any other health changes. His pain today is 10/10.    Aggravating factors: Standing in place for 5 minutes    Mitigating factors: Walking, medication, rest    Previously seeing: Dr. Davide Wang ([place])    Physical Therapy: Has done for his heart, but not for his back    Non-pharmacologic Treatment:     · Ice/Heat: Heat helps  · TENS: Not tried  · Massage: Not tried  · Chiropractic care: Not tried  · Acupuncture: Helpful  · Other: No brace         Pain Medications:         · Currently taking:  Zoloft 50 mg daily,  risperidone 3 mg QHS, melatonin    · Has tried in the past:    · Opioids: Norco 10/325 mg daily (took himself off), Tramadol 50 mg (sparing  use, helpful)  · NSAIDS: Avoids due to Eliquis and CAD  · Tylenol: Excedrin (no help)  · Muscle relaxants:  Cyclobenzaprine (side effects), Baclofen 10 mg 3 times daily,    · TCAs:  Amitriptyline 50 mg  · SNRIs: Not tried  · Anticonvulsants: Gabapentin 800 mg (>15 years ago) no benefit  · topical creams: Back balm helps  · Other: Seroquel 400 mg QHS, Remeron 15 mg,    Blood thinners:  Eliquis 5 mg    Interventional Therapies:   · Bilateral ankle steroid injections Q3 months, last 1.5 months ago: Helpful  · Bilateral knee steroid injections Q3 months, last 1.5 months ago: helpful  · Epidural steroid injection x 2 2018: Helpful for 1-2 months    Relevant Surgeries:   · None, he reports that he couldn't get surgery on his knees, back, and ankle due to his heart    Affecting sleep? It can    Affecting daily activities? Yes    Depressive symptoms? Yes          · SI/HI? No    Work status: Not working    Pain Scale:  Best: 3/10  Worst: 10/10  Average: 5-6/10  Today: 7/10    Prescription Monitoring Program database:  Reviewed and consistent with medication use as prescribed.    Last 3 PDI Scores 7/2/2020   Pain Disability Index (PDI) 70       GENERAL:  No weight loss, malaise or fevers.  HEENT:   No recent changes in vision or hearing  NECK:  Negative for lumps, no difficulty with swallowing.  RESPIRATORY:  Negative for cough, wheezing or shortness of breath, patient denies any recent URI.  CARDIOVASCULAR: s/p CABG, AICD on ASA and Eliquis, EF <20%  Negative for chest pain, leg swelling or palpitations.  GI:  Negative for abdominal discomfort, blood in stools or black stools or change in bowel habits.  MUSCULOSKELETAL:  See HPI.  SKIN:  Negative for lesions, rash, and itching.  PSYCH:  Positive for depression, anxiety.    HEMATOLOGY/LYMPHOLOGY: On ASA and Eliquis  Negative for prolonged bleeding, bruising easily or swollen nodes.    ENDO: No history of diabetes or thyroid dysfunction  NEURO:   No history of headaches,  syncope, paralysis, seizures or tremors.  All other reviewed and negative other than HPI.          Past Medical History:   Diagnosis Date    CAD (coronary artery disease)     Cardiomyopathy     CHF (congestive heart failure)     Edema     GERD (gastroesophageal reflux disease)     HTN (hypertension)     Hyperlipemia        Past Surgical History:   Procedure Laterality Date    CARDIAC DEFIBRILLATOR PLACEMENT      CATHETERIZATION OF BOTH LEFT AND RIGHT HEART Bilateral 08/20/2018    CORONARY ARTERY BYPASS GRAFT         Review of patient's allergies indicates:   Allergen Reactions    Cyclobenzaprine      Other reaction(s): Swelling  Hallucinations according to patient.      Enalapril maleate      Other reaction(s): Swelling       Current Outpatient Medications   Medication Sig Dispense Refill    allopurinol (ZYLOPRIM) 300 MG tablet Take 300 mg by mouth once daily.      apixaban (ELIQUIS) 5 mg Tab Take 5 mg by mouth 2 (two) times daily.      aspirin (ECOTRIN) 81 MG EC tablet Take 81 mg by mouth.      atorvastatin (LIPITOR) 80 MG tablet Take 80 mg by mouth once daily.      cholecalciferol, vitamin D3, 3,000 unit Tab Take 1 tablet by mouth.      coenzyme Q10 10 mg capsule Take 200 mg by mouth once daily.      colchicine (COLCRYS) 0.6 mg tablet TK 1 T PO BID PRF GOUT      febuxostat (ULORIC) 40 mg Tab Take 1 tablet (40 mg total) by mouth once daily. 30 tablet 6    glipiZIDE (GLUCOTROL) 5 MG tablet Take 5 mg by mouth 2 (two) times daily before meals.      hydrALAZINE (APRESOLINE) 10 MG tablet Take 1 tablet (10 mg total) by mouth 3 (three) times daily. 90 tablet 11    isosorbide dinitrate (ISORDIL) 10 MG tablet Take 1 tablet (10 mg total) by mouth 3 (three) times daily. 90 tablet 11    magnesium oxide (MAG-OX) 400 mg (241.3 mg magnesium) tablet Take 400 mg by mouth.      melatonin 5 mg Cap Take by mouth.      metoprolol succinate (TOPROL-XL) 100 MG 24 hr tablet Take 100 mg by mouth once daily.        potassium chloride SA (K-DUR,KLOR-CON) 10 MEQ tablet potassium chloride ER 10 mEq tablet,extended release(part/cryst)      risperiDONE (RISPERDAL) 3 MG Tab Take by mouth.      sacubitril-valsartan (ENTRESTO)  mg per tablet Take 1 tablet by mouth 2 (two) times daily.       sertraline (ZOLOFT) 50 MG tablet Take 50 mg by mouth once daily. TAKE THREE TABLETS BY MOUTH EVERY MORNING TO IMPROVE MOOD       spironolactone (ALDACTONE) 25 MG tablet Take 0.5 mg by mouth once daily.      torsemide (DEMADEX) 20 MG Tab Take 20 mg by mouth. Take 3 tab by mouth every day      traMADoL (ULTRAM) 50 mg tablet TK 1 T PO BID PRF PAIN       No current facility-administered medications for this visit.        History reviewed. No pertinent family history.    Social History     Socioeconomic History    Marital status:      Spouse name: Not on file    Number of children: Not on file    Years of education: Not on file    Highest education level: Not on file   Occupational History    Not on file   Social Needs    Financial resource strain: Not on file    Food insecurity     Worry: Not on file     Inability: Not on file    Transportation needs     Medical: Not on file     Non-medical: Not on file   Tobacco Use    Smoking status: Never Smoker    Smokeless tobacco: Never Used   Substance and Sexual Activity    Alcohol use: Not Currently    Drug use: Not on file    Sexual activity: Not on file   Lifestyle    Physical activity     Days per week: Not on file     Minutes per session: Not on file    Stress: Not on file   Relationships    Social connections     Talks on phone: Not on file     Gets together: Not on file     Attends Zoroastrian service: Not on file     Active member of club or organization: Not on file     Attends meetings of clubs or organizations: Not on file     Relationship status: Not on file   Other Topics Concern    Not on file   Social History Narrative    Not on file       Objective:     Vitals:  "   07/02/20 1517   BP: (!) 143/100   Pulse: 85   Resp: 18   Temp: 98.5 °F (36.9 °C)   TempSrc: Oral   Weight: 129 kg (284 lb 6.3 oz)   Height: 6' 4" (1.93 m)   PainSc: 10-Worst pain ever         GEN:  Well developed, well nourished.  No acute distress.   HEENT:  No trauma.  Mucous membranes moist.  Nares patent bilaterally.  PSYCH: Normal affect. Thought content appropriate.  CHEST:  Breathing symmetric.  No audible wheezing.  ABD: Soft, non-distended.  SKIN:  Warm, pink, dry.  No rash on exposed areas.    EXT:  No cyanosis, clubbing, or edema.  No color change or changes in nail or hair growth.  NEURO/MUSCULOSKELETAL:  Fully alert, oriented, and appropriate. Speech normal linh. No cranial nerve deficits.   Gait: Antalgic- ambulates without assistance.   Motor Strength: 5/5 motor strength throughout lower extremities.   Sensory: No sensory deficit in the lower extremities.   Reflexes:  2+ and symmetric throughout.  Downgoing Babinski's bilaterally.  No clonus or spasticity.  L-Spine:  Limited ROM with pain on flexion and extension. Negative SLR bilaterally. Positive facet loading bilaterally.  Left side:  Negative LISS. Negative FADIR  Right side:  Negative LISS. Negative FADIR  Palpation:  Left side: Positive TTP over lumbar paraspinals, lumbar facet joint, and SI joint. No TTP over hip, piriformis muscle, or GTB.    Right side: Positive TTP over lumbar paraspinals, lumbar facet joints, and SI joint. No TTP over hip, piriformis muscle, or GTB.            Imaging:      CT chest/abd/pelvis CT reviewed by Dr. Luna, which he reports shows severe degenerative changes at L4-5 and L5-S1 with associated DDD, lumbar stenosis and spondylosis    Assessment:     Encounter Diagnoses   Name Primary?    Lumbar radiculopathy Yes    Spinal stenosis of lumbar region with neurogenic claudication     Lumbar spondylosis     Chronic pain disorder        Plan:     Ishmael was seen today for low-back pain.    Diagnoses and all " orders for this visit:    Lumbar radiculopathy  -     X-Ray Lumbar Complete With Flex And Ext; Future  -     CT Lumbar Spine Without Contrast; Future    Spinal stenosis of lumbar region with neurogenic claudication  -     X-Ray Lumbar Complete With Flex And Ext; Future  -     CT Lumbar Spine Without Contrast; Future    Lumbar spondylosis  -     X-Ray Lumbar Complete With Flex And Ext; Future  -     CT Lumbar Spine Without Contrast; Future    Chronic pain disorder  -     X-Ray Lumbar Complete With Flex And Ext; Future  -     CT Lumbar Spine Without Contrast; Future         Lower back and RLE radicular pain pain is consistent with the above.    We discussed the assessment and recommendations.  All available images were reviewed. We discussed the disease process, prognosis, treatment plan, and risks and benefits. The patient is aware of the risks and benefits of the medications being prescribed, common side effects, and proper usage. The following is the plan we agreed on:     1. Obtain xray and CT of lumbar spine.   2. Consider PÉREZ versus MBB in the future.  3. [facility]   4. Continue Tramadol 50 mg BID PRN.   5. At next visit, will need to obtain pain contract and UDS.   6. Will not prescribed Gabapentin at this time as patient has EF <20% and fluid overload could result in dangerous cardiovascular side effects.  7. Recommend using Tylenol 1000 mg every 8 hours as needed for pain.  Do not take this with any other medications containing acetaminophen.  Do not exceed 3000 mg of acetaminophen in 24 hours.  8. Continue Baclofen 10mg PO TID PRN  9. Consider formal physical therapy.   10. Continue follow up with neurosurgery. Recommend conservative mesaures at this time  11. Continue Lumbar brace for support.  12. RTC after above imaging. He will see Dr. Parsons to established care, as Dr. Barbosa has left [facility].     The above plan and management options were discussed at length with patient.  Patient is in agreement with the above and verbalized understanding.     Aye Milner NP  07/02/2020

## 2020-07-02 NOTE — LETTER
July 2, 2020      [facility] - Nephrology  [address]  [facility] [address]  Phone: [phone]  Fax: [phone]       Patient: Ishmael Thrasher   YOB: 1950  Date of Visit: 07/02/2020    To Whom It May Concern:    Nguyễn Thrasher  was at [facility] on 07/02/2020. He may return to work on 7/3/20 with no restrictions. If you have any questions or concerns, or if I can be of further assistance, please do not hesitate to contact me.    Sincerely,    Ryanne Marie MA

## 2020-07-02 NOTE — PROGRESS NOTES
Subjective:   Patient ID: Ishmael Thrasher is a 70 y.o. Black or  male who presents for follow up evaluation of Chronic Kidney Disease      HPI   was seen in virtual clinic today for follow up patient evaluation of CKD. He was evaluated in new patient evaluation on 4/7/20 during virtual visit.    Interim, he reports he has been experiencing abdominal distention which is causing discomfort. He has been aware of enlarged liver as seen on one of the prior abdominal imaging studies. He reports he recently saw a liver doctor locally in [place] however he was told he does not have an enlarged liver. He expressed he is continuing to feel discomfort and distention and he states he has read one of his prior imaging studies results stating he has enlarged liver. He has been on Entresto and diuretic regimen per his cardiologist. He denies decreased oral intake/ decreased urine output/ flank pain/ dysuria/ cloudy urine/ dizziness.    He is known to have a cyst on kidney and has been under follow up of a local urologist. He is requesting he gets evaluated by hepatology at [facility].    He has been noted to have hyperuricemia. Medication list includes allopurinol however he reports he takes it only as needed for gout flare as he is worried it can affect his liver also.    He has ischemic cardiomyopathy, s/p CABG, AICD, hypertension, prior chronic use of NSAID, CKD, prior episodes of PRABHJOT in 2018, hyperlipidemia, spinal stenosis, GERD, BPH and other medical problems. He had hypertension diagnosed since mid 1970's. He has severe ischemic cardiomyopathy. He is being evaluated for advanced options of heart failure including heart transplant. He has been a non tobacco smoker.     He has been on Entresto on off per his report. He feels since he was placed back on it in mid 2019 he has noticed further drop in GFR. He had prior long term use of NSAID like meloxicam. He has stopped use for a while now. He has been on  diuretic torsemide, multiple antihypertensives and Entresto, spironolactone. In 2018 he had PRABHJOT superimposed on CKD. Per outside notes he was treated with vancomycin back then. Not much details found in available outside records. He describes he had a CT scan in the past which reported one of the kidneys having around 2.5 cm cyst.     His cardiology team has been trying to titrate the dose of Entresto to the maximum his BP can tolerate. He has been also started on isosorbide and hydralazine for afterload reduction. He did not report any recent episode of hypotension. His diuretic regimen is being managed by his cardiologist.     Renal Function:  Lab Results   Component Value Date     (H) 06/19/2020     (H) 02/28/2020     06/19/2020     02/28/2020    K 3.4 (L) 06/19/2020    K 3.9 02/28/2020     06/19/2020     02/28/2020    CO2 28 06/19/2020    CO2 28 02/28/2020    BUN 23 06/19/2020    BUN 22 02/28/2020    CALCIUM 9.0 06/19/2020    CALCIUM 9.3 02/28/2020    CREATININE 1.5 (H) 06/19/2020    CREATININE 1.9 (H) 02/28/2020    ALBUMIN 3.9 06/19/2020    ALBUMIN 3.7 02/28/2020    PHOS 3.6 06/19/2020    ESTGFRAFRICA 54.1 (A) 06/19/2020    ESTGFRAFRICA 40.7 (A) 02/28/2020    EGFRNONAA 46.8 (A) 06/19/2020    EGFRNONAA 35.2 (A) 02/28/2020       Urinalysis:  Lab Results   Component Value Date    APPEARANCEUA Clear 06/19/2020    PHUR 5.0 06/19/2020    SPECGRAV 1.020 06/19/2020    PROTEINUA Negative 06/19/2020    GLUCUA Negative 06/19/2020    OCCULTUA Negative 06/19/2020    NITRITE Negative 06/19/2020    LEUKOCYTESUR Negative 06/19/2020       Protein/Creatinine Ratio:  Lab Results   Component Value Date    PROTEINURINE 14 06/19/2020    CREATRANDUR 191.0 06/19/2020    UTPCR 0.07 06/19/2020       CBC:  Lab Results   Component Value Date    WBC 9.51 06/19/2020    HGB 13.6 (L) 06/19/2020    HCT 44.4 06/19/2020       PTH:  Lab Results   Component Value Date    .0 (H) 06/19/2020     Vit D  17  Uric acid 11.2  SPEP/ ANAHY negative for paraprotein  Hep C/B screen negative     US kidneys 10.9 and 11.5 cm kidneys, left sided simple kidney cyst     Review of Systems   Constitutional: Negative for activity change, appetite change, chills, fatigue and fever.   HENT: Negative for facial swelling, hearing loss and sore throat.    Eyes: Negative for visual disturbance.   Respiratory: Negative for cough, shortness of breath and wheezing.    Cardiovascular: Positive for leg swelling. Negative for chest pain.   Gastrointestinal: Negative for abdominal pain, diarrhea, nausea and vomiting. positive for abdominal distention  Endocrine: Negative for polydipsia and polyuria.   Genitourinary: Negative for dysuria, flank pain and frequency.   Musculoskeletal: Positive for arthralgias and back pain.   Skin: Negative for pallor and rash.   Neurological: Negative for dizziness, light-headedness and headaches.   Psychiatric/Behavioral: The patient is not nervous/anxious.        Objective:     Physical Exam   Constitutional: he is oriented to person, place, and time. he appears well-developed and well-nourished.   HENT:   Head: Normocephalic.   Neck: supple.  Cardiovascular: Normal rate, regular rhythm.  Pulmonary/Chest: Effort normal and breath sounds normal. No wheezing.  Abdominal: distention +. Abdominal obesity.   Musculoskeletal: edema +. Pitting, bilateral in LE.   Neurological: he is alert and oriented to person, place, and time.   Skin: Skin is warm and dry.         Assessment:     1. CKD (chronic kidney disease), stage III    2. Kidney cyst, acquired    3. Ischemic cardiomyopathy    4. Cardiorenal syndrome with renal failure, stage 1-4 or unspecified chronic kidney disease, without heart failure    5. Enlarged liver    6. Hyperuricemia        Plan:       Problem List Items Addressed This Visit        Cardiac/Vascular    Cardiorenal syndrome    Relevant Orders    Ambulatory referral/consult to Hepatology    CBC auto  differential    Renal function panel    PTH, intact    Vitamin D    Uric acid    Urinalysis    Protein / creatinine ratio, urine    US Retroperitoneal Complete (Kidney and    Ischemic cardiomyopathy       Renal/    CKD (chronic kidney disease), stage III - Primary    Relevant Orders    Ambulatory referral/consult to Hepatology    CBC auto differential    Renal function panel    PTH, intact    Vitamin D    Uric acid    Urinalysis    Protein / creatinine ratio, urine    US Retroperitoneal Complete (Kidney and    Kidney cyst, acquired    Relevant Orders    Renal function panel    US Retroperitoneal Complete (Kidney and       GI    Enlarged liver    Relevant Orders    Ambulatory referral/consult to Hepatology       Orthopedic    Hyperuricemia    Relevant Orders    CBC auto differential    Renal function panel    PTH, intact    Vitamin D    Uric acid    Urinalysis    Protein / creatinine ratio, urine    US Retroperitoneal Complete (Kidney and        Mr. Thrasher has CKD III, prior episodes of PRABHJOT likely in the context of infection/ nephrotoxic antibiotic use. 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    He has very advanced cardiomyopathy. His risk of worsening of CKD/ PRABHJOT is higher. I had a detailed discussion with patient about his CKD diagnosis, CKD staging, interpretation of serial labs pertaining to his kidneys, potential risk of progression of CKD. Possible etiology of his CKD, need for improved volume status, strict low salt diet, avoiding any NSAID, having periodic monitoring of renal labs to assess and treat any electrolyte disturbance, acid base disorder, to follow progress of CKD with creatinine and eGFR. I stressed to have BP monitoring at home and to bring readings for review with his future visits with MD.     His main concern has been using Entresto which he perceives is causing kidney problem  to get worse. I counseled him that Valsartan component is beneficial for RAAS blockade and for CKD management. But cautioned him and advised to discuss any dose related concerns with his cardiologist. Explained that from cardiac standpoint this is likely offering him benefit by lowering mortality and encouraged him to keep follow up in cardiology clinic so that further work up can be undertaken for advanced heart failure options. Explained to him with combination of various medicines he has been on he has higher risk of hypotension which can precipitate an episode of ischemic ATN causing PRABHJOT and he should be protected from severe lowering of BP below his baseline. Also d/w him in detail risk of hyperkalemia with combination of Entresto, K sparing diuretic and CKD along with potential risk of PRABHJOT. He has not been taking K supplements. Would encourage prn correction of hypokalemia if that develops.     Diuretic regimen, antihypertensive regimen otherwise as per his cardiologist.    Given his concerns over, enlarged liver, refer to hepatology at [facility]. He feels reluctant to take allopurinol on a regular basis due to potential for liver toxicity. Hence will start on Uloric. He requested [facility]       - periodically monitor renal panel for electrolytes, acid base status, eGFR  - CKD staging, diagnosis, onset of CKD, potential risk of CKD progression, potential risk of PRABHJOT due to volume depletion/ PAPI/ ATN due to hemodynamic changes d/w patient  - stress to follow low salt diet, fluid restriction, follow low K diet in case of any dyskalemia, nutritional changes d/w patient   - trend PTH levels, vit D, phos, corrected Ca and treat as necessary. Continue oral vit D3 supplements he has been taking  - trend urine studies for proteinuria  - continue follow up with urologist for kidney cyst   - avoid NSAID/ bactrim/ IV contrast/ gadolinium/ aminoglycoside/ fleet enema/ PPI where  possible  - Pt encouraged to monitor BP at home, goal BP level d/w patient  - trend H/H periodically if CKD stage progresses      Plan, labs, recommendations were discussed with patient, his questions were answered to his satisfaction.   Total time spent was 45 minutes with >50% time spent in face to face evaluation, counseling about possible etiology, work up, monitoring, natural course of illness, recommendations.       RTC 3 to 4 months

## 2020-07-06 ENCOUNTER — LAB VISIT (OUTPATIENT)
Dept: LAB | Facility: HOSPITAL | Age: 70
End: 2020-07-06
Payer: COMMERCIAL

## 2020-07-06 ENCOUNTER — OFFICE VISIT (OUTPATIENT)
Dept: TRANSPLANT | Facility: CLINIC | Age: 70
End: 2020-07-06
Payer: MEDICARE

## 2020-07-06 ENCOUNTER — TELEPHONE (OUTPATIENT)
Dept: TRANSPLANT | Facility: CLINIC | Age: 70
End: 2020-07-06

## 2020-07-06 ENCOUNTER — TELEPHONE (OUTPATIENT)
Dept: NEPHROLOGY | Facility: CLINIC | Age: 70
End: 2020-07-06

## 2020-07-06 VITALS
BODY MASS INDEX: 35.22 KG/M2 | DIASTOLIC BLOOD PRESSURE: 94 MMHG | HEIGHT: 76 IN | SYSTOLIC BLOOD PRESSURE: 133 MMHG | HEART RATE: 88 BPM | WEIGHT: 289.25 LBS

## 2020-07-06 DIAGNOSIS — I25.5 ISCHEMIC CARDIOMYOPATHY: ICD-10-CM

## 2020-07-06 DIAGNOSIS — I50.43 ACUTE ON CHRONIC COMBINED SYSTOLIC AND DIASTOLIC HEART FAILURE: ICD-10-CM

## 2020-07-06 DIAGNOSIS — I50.40 COMBINED SYSTOLIC AND DIASTOLIC CONGESTIVE HEART FAILURE, UNSPECIFIED HF CHRONICITY: ICD-10-CM

## 2020-07-06 DIAGNOSIS — R14.0 ABDOMINAL BLOATING: Primary | ICD-10-CM

## 2020-07-06 DIAGNOSIS — N18.30 CKD (CHRONIC KIDNEY DISEASE), STAGE III: ICD-10-CM

## 2020-07-06 DIAGNOSIS — M48.062 SPINAL STENOSIS OF LUMBAR REGION WITH NEUROGENIC CLAUDICATION: ICD-10-CM

## 2020-07-06 DIAGNOSIS — M54.16 LUMBAR RADICULOPATHY: Primary | ICD-10-CM

## 2020-07-06 DIAGNOSIS — G89.4 CHRONIC PAIN DISORDER: ICD-10-CM

## 2020-07-06 DIAGNOSIS — M47.816 LUMBAR SPONDYLOSIS: ICD-10-CM

## 2020-07-06 DIAGNOSIS — I50.40 COMBINED SYSTOLIC AND DIASTOLIC CONGESTIVE HEART FAILURE, UNSPECIFIED HF CHRONICITY: Primary | ICD-10-CM

## 2020-07-06 DIAGNOSIS — R16.0 ENLARGED LIVER: ICD-10-CM

## 2020-07-06 LAB
ALBUMIN SERPL BCP-MCNC: 3.6 G/DL (ref 3.5–5.2)
ALP SERPL-CCNC: 138 U/L (ref 55–135)
ALT SERPL W/O P-5'-P-CCNC: 14 U/L (ref 10–44)
ANION GAP SERPL CALC-SCNC: 9 MMOL/L (ref 8–16)
AST SERPL-CCNC: 16 U/L (ref 10–40)
BILIRUB SERPL-MCNC: 0.9 MG/DL (ref 0.1–1)
BNP SERPL-MCNC: 964 PG/ML (ref 0–99)
BUN SERPL-MCNC: 14 MG/DL (ref 8–23)
CALCIUM SERPL-MCNC: 8.9 MG/DL (ref 8.7–10.5)
CHLORIDE SERPL-SCNC: 105 MMOL/L (ref 95–110)
CO2 SERPL-SCNC: 29 MMOL/L (ref 23–29)
CREAT SERPL-MCNC: 1.5 MG/DL (ref 0.5–1.4)
ERYTHROCYTE [DISTWIDTH] IN BLOOD BY AUTOMATED COUNT: 15.8 % (ref 11.5–14.5)
EST. GFR  (AFRICAN AMERICAN): 54.1 ML/MIN/1.73 M^2
EST. GFR  (NON AFRICAN AMERICAN): 46.8 ML/MIN/1.73 M^2
GLUCOSE SERPL-MCNC: 195 MG/DL (ref 70–110)
HCT VFR BLD AUTO: 41.1 % (ref 40–54)
HGB BLD-MCNC: 13.1 G/DL (ref 14–18)
MCH RBC QN AUTO: 26.8 PG (ref 27–31)
MCHC RBC AUTO-ENTMCNC: 31.9 G/DL (ref 32–36)
MCV RBC AUTO: 84 FL (ref 82–98)
PLATELET # BLD AUTO: 205 K/UL (ref 150–350)
PMV BLD AUTO: 10.5 FL (ref 9.2–12.9)
POTASSIUM SERPL-SCNC: 4.2 MMOL/L (ref 3.5–5.1)
PROT SERPL-MCNC: 7 G/DL (ref 6–8.4)
RBC # BLD AUTO: 4.89 M/UL (ref 4.6–6.2)
SODIUM SERPL-SCNC: 143 MMOL/L (ref 136–145)
WBC # BLD AUTO: 8.71 K/UL (ref 3.9–12.7)

## 2020-07-06 PROCEDURE — 85027 COMPLETE CBC AUTOMATED: CPT | Mod: TXP

## 2020-07-06 PROCEDURE — 83880 ASSAY OF NATRIURETIC PEPTIDE: CPT | Mod: TXP

## 2020-07-06 PROCEDURE — 99214 PR OFFICE/OUTPT VISIT, EST, LEVL IV, 30-39 MIN: ICD-10-PCS | Mod: NTX,S$GLB,, | Performed by: INTERNAL MEDICINE

## 2020-07-06 PROCEDURE — 99215 OFFICE O/P EST HI 40 MIN: CPT | Mod: PBBFAC,TXP | Performed by: INTERNAL MEDICINE

## 2020-07-06 PROCEDURE — 80053 COMPREHEN METABOLIC PANEL: CPT | Mod: TXP

## 2020-07-06 PROCEDURE — 36415 COLL VENOUS BLD VENIPUNCTURE: CPT | Mod: NTX

## 2020-07-06 PROCEDURE — 99999 PR PBB SHADOW E&M-EST. PATIENT-LVL V: ICD-10-PCS | Mod: PBBFAC,TXP,, | Performed by: INTERNAL MEDICINE

## 2020-07-06 PROCEDURE — 99214 OFFICE O/P EST MOD 30 MIN: CPT | Mod: NTX,S$GLB,, | Performed by: INTERNAL MEDICINE

## 2020-07-06 PROCEDURE — 99999 PR PBB SHADOW E&M-EST. PATIENT-LVL V: CPT | Mod: PBBFAC,TXP,, | Performed by: INTERNAL MEDICINE

## 2020-07-06 RX ORDER — TRAMADOL HYDROCHLORIDE 50 MG/1
50 TABLET ORAL EVERY 12 HOURS PRN
Qty: 60 TABLET | Refills: 0 | Status: SHIPPED | OUTPATIENT
Start: 2020-07-06 | End: 2020-08-05

## 2020-07-06 RX ORDER — ISOSORBIDE DINITRATE 20 MG/1
20 TABLET ORAL 3 TIMES DAILY
Qty: 90 TABLET | Refills: 11 | Status: SHIPPED | OUTPATIENT
Start: 2020-07-06 | End: 2022-06-16

## 2020-07-06 NOTE — PROGRESS NOTES
Subjective:   Initial evaluation of heart transplant candidacy.    HPI:  Mr. Thrasher is a 70 y.o. year old Black or  male who has presents to be considered for advanced surgical options (LVAD/OHT). Mr. Thrasher has heart failure with reduced ejection fraction in the settings of ischemic cardiomyopathy presents to the clinic today for regular follow up. Patient was turned down for OHT from [place] due to some psych history. Patient is exclusively going to be seen here at [facility] now. States he feels he is not doing well and asked to be seen today, however on further ROS does not have increased heart failure symptoms but feels he is retaining fluid on his abdomen, is worried his liver is not working properly, feels his treatment teams are not taking his concerns and acting quickly enough. Difficult to assess how short of breath he feels or if he feels worse from heart failure standpoint, does have joint pain that has been worsening.       TTE 08/16/19:  · Severely decreased left ventricular systolic function. The estimated ejection fraction is 18%  · Eccentric left ventricular hypertrophy.  · Local segmental wall motion abnormalities.  · Grade I (mild) left ventricular diastolic dysfunction consistent with impaired relaxation.  · Normal left atrial pressure.  · Severe left atrial enlargement.  · Severe left ventricular enlargement.  · Low normal right ventricular systolic function.  · Mild right atrial enlargement.  · Moderate aortic valve stenosis.  · Aortic valve area is 1.40 cm2; peak velocity is 2.22 m/s; mean gradient is 12 mmHg.  · Mild mitral sclerosis.  · Mild-to-moderate mitral regurgitation.  · Mild to moderate tricuspid regurgitation.  · Normal central venous pressure (3 mm Hg).  · The estimated PA systolic pressure is 20 mm Hg    Past Medical History:   Diagnosis Date    Brain damage     traumatic    CAD (coronary artery disease)     Cardiomyopathy     CHF (congestive heart failure)      "Diabetes mellitus     type 2    Edema     Erectile dysfunction     GERD (gastroesophageal reflux disease)     HTN (hypertension)     Hyperlipemia     Sciatic nerve pain, right     leg     Past Surgical History:   Procedure Laterality Date    CARDIAC DEFIBRILLATOR PLACEMENT      CATHETERIZATION OF BOTH LEFT AND RIGHT HEART Bilateral 08/20/2018    CORONARY ARTERY BYPASS GRAFT      ILIAC ARTERY STENT         History reviewed. No pertinent family history.    Review of Systems   Constitution: Positive for weight gain. Negative for chills, decreased appetite, diaphoresis, fever, malaise/fatigue and weight loss.   HENT: Negative for congestion.    Eyes: Negative for blurred vision and visual disturbance.   Cardiovascular: Positive for dyspnea on exertion. Negative for chest pain, irregular heartbeat, leg swelling, near-syncope, orthopnea, palpitations, paroxysmal nocturnal dyspnea and syncope.   Respiratory: Positive for sleep disturbances due to breathing. Negative for cough, shortness of breath, snoring and wheezing.    Hematologic/Lymphatic: Negative for bleeding problem. Does not bruise/bleed easily.   Skin: Negative for poor wound healing and rash.   Musculoskeletal: Negative for arthritis, joint pain and muscle weakness.   Gastrointestinal: Positive for bloating. Negative for abdominal pain, anorexia, constipation, diarrhea, hematemesis, hematochezia, melena, nausea and vomiting.   Genitourinary: Negative for frequency and urgency.   Neurological: Negative for difficulty with concentration, excessive daytime sleepiness, dizziness, headaches, light-headedness and weakness.   Psychiatric/Behavioral: Negative for depression. The patient does not have insomnia and is not nervous/anxious.      Objective:   Blood pressure (!) 133/94, pulse 88, height 6' 4" (1.93 m), weight 131.2 kg (289 lb 3.9 oz).body mass index is 35.21 kg/m².    Physical Exam  Vitals signs and nursing note reviewed.   Constitutional:       " General: He is not in acute distress.     Appearance: He is well-developed. He is not diaphoretic.   HENT:      Head: Normocephalic and atraumatic.   Eyes:      General:         Right eye: No discharge.         Left eye: No discharge.      Conjunctiva/sclera: Conjunctivae normal.   Neck:      Musculoskeletal: Normal range of motion and neck supple.      Vascular: No JVD.   Cardiovascular:      Rate and Rhythm: Normal rate and regular rhythm.      Chest Wall: PMI is displaced.      Pulses:           Carotid pulses are 2+ on the right side and 2+ on the left side.       Radial pulses are 2+ on the right side and 2+ on the left side.        Femoral pulses are 2+ on the right side and 2+ on the left side.       Popliteal pulses are 2+ on the right side and 2+ on the left side.        Dorsalis pedis pulses are 2+ on the right side and 2+ on the left side.        Posterior tibial pulses are 2+ on the right side and 2+ on the left side.      Heart sounds: Murmur present. Systolic murmur present with a grade of 2/6. No friction rub. No gallop.    Pulmonary:      Effort: Pulmonary effort is normal.      Breath sounds: Normal breath sounds. No wheezing or rales.   Chest:      Chest wall: No tenderness.   Abdominal:      General: Bowel sounds are normal. There is no distension.      Palpations: Abdomen is soft. There is no mass.      Tenderness: There is no abdominal tenderness. There is no guarding or rebound.   Musculoskeletal: Normal range of motion.         General: No tenderness.   Skin:     General: Skin is warm and dry.      Findings: No erythema or rash.   Neurological:      Mental Status: He is alert and oriented to person, place, and time.   Psychiatric:         Behavior: Behavior normal.         Thought Content: Thought content normal.         Judgment: Judgment normal.       Labs:      Chemistry        Component Value Date/Time     07/06/2020 1052    K 4.2 07/06/2020 1052     07/06/2020 1052    CO2 29  07/06/2020 1052    BUN 14 07/06/2020 1052    CREATININE 1.5 (H) 07/06/2020 1052     (H) 07/06/2020 1052        Component Value Date/Time    CALCIUM 8.9 07/06/2020 1052    ALKPHOS 138 (H) 07/06/2020 1052    AST 16 07/06/2020 1052    ALT 14 07/06/2020 1052    BILITOT 0.9 07/06/2020 1052    ESTGFRAFRICA 54.1 (A) 07/06/2020 1052    EGFRNONAA 46.8 (A) 07/06/2020 1052          Magnesium   Date Value Ref Range Status   06/19/2020 1.8 1.6 - 2.6 mg/dL Final     Lab Results   Component Value Date    WBC 8.71 07/06/2020    HGB 13.1 (L) 07/06/2020    HCT 41.1 07/06/2020    MCV 84 07/06/2020     07/06/2020     BNP   Date Value Ref Range Status   07/06/2020 964 (H) 0 - 99 pg/mL Final     Comment:     Values of less than 100 pg/ml are consistent with non-CHF populations.   02/28/2020 399 (H) 0 - 99 pg/mL Final     Comment:     Values of less than 100 pg/ml are consistent with non-CHF populations.   08/16/2019 313 (H) 0 - 99 pg/mL Final     Comment:     Values of less than 100 pg/ml are consistent with non-CHF populations.     No results found for this or any previous visit.    Labs were reviewed with the patient.    Assessment:      1. Abdominal bloating    2. Acute on chronic combined systolic and diastolic heart failure    3. Ischemic cardiomyopathy    4. CKD (chronic kidney disease), stage III    5. Enlarged liver        Plan:   Patient is really appearing to be euvolemic however he does not believe me about his exam or how he is feeling.   Will increase hydralazine to 25mg po TID.  Isordil 10mg po TID.   Patient appears to perhaps not have taken his medications the way it was meant to be taken previously, confusing. Continue all the other medications.   Lastly patient adamant his abdomen has fluid and is swollen. Will go ahead with RHC and CPX, however have concerns about his psych history.  Additionally will proceed with liver/abd u/s today as he had some question of his liver being an issue on previous CT scan.      - Patient is now NYHA III ACC stage D  - Recommend 2 gram sodium restriction and 1500cc fluid restriction.  - Encourage physical activity with graded exercise program.  - Requested patient to weigh themselves daily, and to notify us if their weight increases by more than 3 lbs in 1 day or 5 lbs in 1 week.     Brianda Navas MD

## 2020-07-06 NOTE — PATIENT INSTRUCTIONS
Change your hydralazine and isordil to the following doses:    Hydralazine 25mg (one tablet) three times a day    Isordil 20mg (one tablet) three times a day    Please  your medications from the pharmacy near home.

## 2020-07-06 NOTE — TELEPHONE ENCOUNTER
I spoke with Mr Price, he stated that he saw Hepatology, who said he was normal.  Pt stayed over in [facility] to see if we could see him today.  Dr Navas will see pt, and is scheduled this morning.

## 2020-07-06 NOTE — LETTER
July 14, 2020        Gilmar Rowell  [address] [place] [id number]  Phone: [phone]  Fax: [phone]             [facility] [address] [place] [address]  Phone: [phone]   Patient: Ishmael Thrasher   MR Number: [id number]   YOB: 1950   Date of Visit: 7/6/2020       Dear Dr. Gilmar Rowell    Thank you for referring Ishmael Thrasher to me for evaluation. Attached you will find relevant portions of my assessment and plan of care.    If you have questions, please do not hesitate to call me. I look forward to following Ishmael Thrasher along with you.    Sincerely,    Brianda Navas MD    Enclosure    If you would like to receive this communication electronically, please contact [email or web address] or [phone] to request [employer] access.    [employer] is a tool which provides read-only access to select patient information with whom you have a relationship. Its easy to use and provides real time access to review your patients record including encounter summaries, notes, results, and demographic information.    If you feel you have received this communication in error or would no longer like to receive these types of communications, please e-mail [email or web address]

## 2020-07-06 NOTE — TELEPHONE ENCOUNTER
----- Message from Kaylene Alas MA sent at 7/6/2020  8:07 AM CDT -----  The patient would like to talk to the nurse about his visit with Nephrology and the fluid in his stomach.Please call [phone] .Thank you.

## 2020-07-06 NOTE — TELEPHONE ENCOUNTER
----- Message from Em Smith sent at 7/6/2020  9:49 AM CDT -----  Regarding: pt appointment  Pt called to reschedule appointment that was canceled please advise pt    Pt can be reached at [phone]

## 2020-07-10 PROBLEM — N52.9 ERECTILE DYSFUNCTION: Status: ACTIVE | Noted: 2020-07-10

## 2020-07-15 ENCOUNTER — TELEPHONE (OUTPATIENT)
Dept: HEPATOLOGY | Facility: CLINIC | Age: 70
End: 2020-07-15

## 2020-07-15 ENCOUNTER — DOCUMENTATION ONLY (OUTPATIENT)
Dept: TRANSPLANT | Facility: CLINIC | Age: 70
End: 2020-07-15

## 2020-07-15 NOTE — TELEPHONE ENCOUNTER
----- Message from Randa Gorman sent at 7/15/2020  9:01 AM CDT -----  Pt records reviewed.   Pt will be referred to Hepatology.    Initial referral received  from the workque.   Referring Provider/diagnosis  Bettina Mckeon MD    Enlarged liver       Referral letter sent to patient.

## 2020-07-15 NOTE — PROGRESS NOTES
Pt records reviewed.   Pt will be referred to Hepatology.    Initial referral received  from the workque.   Referring Provider/diagnosis  Bettina Mckeon MD    Enlarged liver       Referral letter sent to patient.

## 2020-07-28 ENCOUNTER — TELEPHONE (OUTPATIENT)
Dept: NEPHROLOGY | Facility: CLINIC | Age: 70
End: 2020-07-28

## 2020-08-04 ENCOUNTER — TELEPHONE (OUTPATIENT)
Dept: HEPATOLOGY | Facility: CLINIC | Age: 70
End: 2020-08-04

## 2020-08-04 NOTE — TELEPHONE ENCOUNTER
----- Message from Eden Hobson sent at 8/4/2020  1:13 PM CDT -----  Regarding: HEPATOLOGY referral  Contact: preservice  I received a call from Lynne with the [phone] requesting the name of the providers at [facility] that  Erika Sr NP bill under, also if the provider is an [employer] provider. This information is needed to determine if the HEPATOLOGY referral will be covered. You can reply back to this message and I will contact Lynne.

## 2020-08-06 ENCOUNTER — HOSPITAL ENCOUNTER (OUTPATIENT)
Dept: RADIOLOGY | Facility: OTHER | Age: 70
Discharge: HOME | End: 2020-08-06
Attending: ANESTHESIOLOGY
Payer: COMMERCIAL

## 2020-08-06 ENCOUNTER — OFFICE VISIT (OUTPATIENT)
Dept: PAIN MEDICINE | Facility: CLINIC | Age: 70
End: 2020-08-06
Payer: COMMERCIAL

## 2020-08-06 ENCOUNTER — OFFICE VISIT (OUTPATIENT)
Dept: PULMONOLOGY | Facility: CLINIC | Age: 70
End: 2020-08-06
Payer: COMMERCIAL

## 2020-08-06 ENCOUNTER — TELEPHONE (OUTPATIENT)
Dept: PAIN MEDICINE | Facility: CLINIC | Age: 70
End: 2020-08-06

## 2020-08-06 ENCOUNTER — OFFICE VISIT (OUTPATIENT)
Dept: HEPATOLOGY | Facility: CLINIC | Age: 70
End: 2020-08-06
Payer: COMMERCIAL

## 2020-08-06 VITALS
RESPIRATION RATE: 20 BRPM | SYSTOLIC BLOOD PRESSURE: 125 MMHG | DIASTOLIC BLOOD PRESSURE: 77 MMHG | BODY MASS INDEX: 34.07 KG/M2 | OXYGEN SATURATION: 98 % | HEART RATE: 77 BPM | WEIGHT: 279.75 LBS | HEIGHT: 76 IN

## 2020-08-06 VITALS
WEIGHT: 277 LBS | BODY MASS INDEX: 33.73 KG/M2 | HEIGHT: 76 IN | DIASTOLIC BLOOD PRESSURE: 69 MMHG | SYSTOLIC BLOOD PRESSURE: 91 MMHG | HEART RATE: 78 BPM

## 2020-08-06 DIAGNOSIS — N18.30 CKD (CHRONIC KIDNEY DISEASE), STAGE III: ICD-10-CM

## 2020-08-06 DIAGNOSIS — Z01.818 ENCOUNTER FOR PRE-TRANSPLANT EVALUATION FOR HEART TRANSPLANT: ICD-10-CM

## 2020-08-06 DIAGNOSIS — I13.10 CARDIORENAL SYNDROME WITH RENAL FAILURE, STAGE 1-4 OR UNSPECIFIED CHRONIC KIDNEY DISEASE, WITHOUT HEART FAILURE: ICD-10-CM

## 2020-08-06 DIAGNOSIS — R91.1 SOLITARY PULMONARY NODULE: ICD-10-CM

## 2020-08-06 DIAGNOSIS — R16.0 ENLARGED LIVER: ICD-10-CM

## 2020-08-06 DIAGNOSIS — G89.4 CHRONIC PAIN DISORDER: ICD-10-CM

## 2020-08-06 DIAGNOSIS — F11.90 CHRONIC, CONTINUOUS USE OF OPIOIDS: ICD-10-CM

## 2020-08-06 DIAGNOSIS — M17.0 PRIMARY OSTEOARTHRITIS OF BOTH KNEES: ICD-10-CM

## 2020-08-06 DIAGNOSIS — I11.0 CARDIOMYOPATHY DUE TO HYPERTENSION, WITH HEART FAILURE: ICD-10-CM

## 2020-08-06 DIAGNOSIS — I43 CARDIOMYOPATHY DUE TO HYPERTENSION, WITH HEART FAILURE: ICD-10-CM

## 2020-08-06 DIAGNOSIS — R91.8 ABNORMAL CT SCAN, LUNG: Primary | ICD-10-CM

## 2020-08-06 DIAGNOSIS — M54.16 LUMBAR RADICULOPATHY: Primary | ICD-10-CM

## 2020-08-06 DIAGNOSIS — Z79.891 ENCOUNTER FOR LONG-TERM OPIATE ANALGESIC USE: ICD-10-CM

## 2020-08-06 DIAGNOSIS — R16.0 HEPATOMEGALY: Primary | ICD-10-CM

## 2020-08-06 PROCEDURE — 99999 PR PBB SHADOW E&M-EST. PATIENT-LVL V: CPT | Mod: PBBFAC,TXP,, | Performed by: NURSE PRACTITIONER

## 2020-08-06 PROCEDURE — 99999 PR PBB SHADOW E&M-EST. PATIENT-LVL I: CPT | Mod: PBBFAC,TXP,, | Performed by: INTERNAL MEDICINE

## 2020-08-06 PROCEDURE — 99214 OFFICE O/P EST MOD 30 MIN: CPT | Mod: S$PBB,,, | Performed by: ANESTHESIOLOGY

## 2020-08-06 PROCEDURE — 99215 OFFICE O/P EST HI 40 MIN: CPT | Mod: PBBFAC,TXP | Performed by: NURSE PRACTITIONER

## 2020-08-06 PROCEDURE — 73562 XR KNEE 3 VIEW BILATERAL: ICD-10-PCS | Mod: 26,50,TXP, | Performed by: RADIOLOGY

## 2020-08-06 PROCEDURE — 99999 PR PBB SHADOW E&M-EST. PATIENT-LVL I: ICD-10-PCS | Mod: PBBFAC,TXP,, | Performed by: INTERNAL MEDICINE

## 2020-08-06 PROCEDURE — 99999 PR PBB SHADOW E&M-EST. PATIENT-LVL IV: ICD-10-PCS | Mod: PBBFAC,,, | Performed by: ANESTHESIOLOGY

## 2020-08-06 PROCEDURE — 99204 OFFICE O/P NEW MOD 45 MIN: CPT | Mod: S$PBB,NTX,, | Performed by: INTERNAL MEDICINE

## 2020-08-06 PROCEDURE — 73562 X-RAY EXAM OF KNEE 3: CPT | Mod: TC,50,FY,NTX

## 2020-08-06 PROCEDURE — 99204 OFFICE O/P NEW MOD 45 MIN: CPT | Mod: S$PBB,NTX,, | Performed by: NURSE PRACTITIONER

## 2020-08-06 PROCEDURE — 80307 DRUG TEST PRSMV CHEM ANLYZR: CPT | Mod: NTX

## 2020-08-06 PROCEDURE — 99211 OFF/OP EST MAY X REQ PHY/QHP: CPT | Mod: PBBFAC,27,NTX,25 | Performed by: INTERNAL MEDICINE

## 2020-08-06 PROCEDURE — 99999 PR PBB SHADOW E&M-EST. PATIENT-LVL IV: CPT | Mod: PBBFAC,,, | Performed by: ANESTHESIOLOGY

## 2020-08-06 PROCEDURE — 73562 X-RAY EXAM OF KNEE 3: CPT | Mod: 26,50,TXP, | Performed by: RADIOLOGY

## 2020-08-06 PROCEDURE — 99214 PR OFFICE/OUTPT VISIT, EST, LEVL IV, 30-39 MIN: ICD-10-PCS | Mod: S$PBB,,, | Performed by: ANESTHESIOLOGY

## 2020-08-06 PROCEDURE — 99204 PR OFFICE/OUTPT VISIT, NEW, LEVL IV, 45-59 MIN: ICD-10-PCS | Mod: S$PBB,NTX,, | Performed by: INTERNAL MEDICINE

## 2020-08-06 PROCEDURE — 99999 PR PBB SHADOW E&M-EST. PATIENT-LVL V: ICD-10-PCS | Mod: PBBFAC,TXP,, | Performed by: NURSE PRACTITIONER

## 2020-08-06 PROCEDURE — 99214 OFFICE O/P EST MOD 30 MIN: CPT | Mod: PBBFAC,25,27,NTX | Performed by: ANESTHESIOLOGY

## 2020-08-06 PROCEDURE — 99204 PR OFFICE/OUTPT VISIT, NEW, LEVL IV, 45-59 MIN: ICD-10-PCS | Mod: S$PBB,NTX,, | Performed by: NURSE PRACTITIONER

## 2020-08-06 RX ORDER — HYDROCODONE BITARTRATE AND ACETAMINOPHEN 5; 325 MG/1; MG/1
1 TABLET ORAL EVERY 12 HOURS PRN
Qty: 60 TABLET | Refills: 0 | Status: SHIPPED | OUTPATIENT
Start: 2020-08-06 | End: 2020-09-05

## 2020-08-06 NOTE — LETTER
August 6, 2020      [facility]  [address] [place] [address]           [facility]  [address]  [facility] [address]  Phone: [phone]          Patient: Ishmael Thrasher   MR Number: [id number]   YOB: 1950   Date of Visit: 8/6/2020       Dear [facility]:    Thank you for referring Ishmael Thrasher to me for evaluation. Attached you will find relevant portions of my assessment and plan of care.    If you have questions, please do not hesitate to call me. I look forward to following Ishmael Thrasher along with you.    Sincerely,    Davide Richard MD    Enclosure  CC:  No Recipients    If you would like to receive this communication electronically, please contact [email or web address] or [phone] to request more information on [employer] Link access.    For providers and/or their staff who would like to refer a patient to [facility], please contact us through our one-stop-shop provider referral line, [facility], at [phone].    If you feel you have received this communication in error or would no longer like to receive these types of communications, please e-mail [email or web address]

## 2020-08-06 NOTE — TELEPHONE ENCOUNTER
Good morning,afternoon Mr./Mrs.    My name is Malathi I am contacting you from [facility] regarding your appointment scheduled for,08/06/20 with, Austin Parsons just confirming you will be able to make it.        Staff left voicemail reminding patient of their appointment

## 2020-08-06 NOTE — LETTER
August 6, 2020      Ann Mckeon MD  [address]  [facility] [address]           [facility] - Hepatology  [address]  [facility] [address]  Phone: [phone]  Fax: [phone]          Patient: Ishmael Thrasher   MR Number: [id number]   YOB: 1950   Date of Visit: 8/6/2020       Dear Dr. Ann Mckeon:    Thank you for referring Ishmael Thrasher to me for evaluation. Attached you will find relevant portions of my assessment and plan of care.    If you have questions, please do not hesitate to call me. I look forward to following Ishmael Thrasher along with you.    Sincerely,    Erika Sr, PRAVIN    Enclosure  CC:  No Recipients    If you would like to receive this communication electronically, please contact [email or web address] or [phone] to request more information on [facility] access.    For providers and/or their staff who would like to refer a patient to [facility], please contact us through our one-stop-shop provider referral line, [facility], at [phone].    If you feel you have received this communication in error or would no longer like to receive these types of communications, please e-mail [email or web address]

## 2020-08-06 NOTE — H&P (VIEW-ONLY)
Chronic patient Established Note (Follow up visit)    Ishmael Thrasher is a 70 y.o. male with a history of CHF (EF 10%) who presents today with chronic low back pain. This pain started in 1971 as a result of and injury in the .  He describes a constant, right sided pain that starts in his low back and radiates down the posterior aspect of his right leg to the bottom of his foot.  This is associated with a numbness, tingling sensation.  The pain occurs when he is standing still for longer than 5 minutes.  The pain does not occur when walking unless he is standing first.  He denies heaviness and weakness.  This pain is described in detail below.     Interval History (7/2/2020):  The patient returns to clinic today for follow up of back pain. He continues to report low back pain that radiates down the posterior aspect of his right leg to the bottom of his foot. He denies any left leg pain. His pain is worse with prolonged standing. He has had injections in the past without relief. He is currently taking Tramadol with limited relief. He denies any other health changes. His pain today is 10/10.     Aggravating factors: Standing in place for 5 minutes     Mitigating factors: Walking, medication, rest     Previously seeing: Dr. Davide Wang ([place])     Physical Therapy: Has done for his heart, but not for his back     Non-pharmacologic Treatment:      · Ice/Heat: Heat helps  · TENS: Not tried  · Massage: Not tried  · Chiropractic care: Not tried  · Acupuncture: Helpful  · Other: No brace         Pain Medications:         · Currently taking:  Zoloft 50 mg daily,  risperidone 3 mg QHS, melatonin     · Has tried in the past:    ? Opioids: Norco 10/325 mg daily (took himself off), Tramadol 50 mg (sparing use, helpful)  ? NSAIDS: Avoids due to Eliquis and CAD  ? Tylenol: Excedrin (no help)  ? Muscle relaxants:  Cyclobenzaprine (side effects), Baclofen 10 mg 3 times daily,    ? TCAs:  Amitriptyline 50 mg  ? SNRIs:  Not tried  ? Anticonvulsants: Gabapentin 800 mg (>15 years ago) no benefit  ? topical creams: Back balm helps  ? Other: Seroquel 400 mg QHS, Remeron 15 mg,     Blood thinners:  Eliquis 5 mg     Interventional Therapies:   · Bilateral ankle steroid injections Q3 months, last 1.5 months ago: Helpful  · Bilateral knee steroid injections Q3 months, last 1.5 months ago: helpful  · Epidural steroid injection x 2 2018: Helpful for 1-2 months     Relevant Surgeries:   · None, he reports that he couldn't get surgery on his knees, back, and ankle due to his heart      Interval History 8/6/2020:    Ishmael Thrasher presents to the clinic for a follow-up appointment for bilateral leg pain. Since the last visit, Ishmael Thrasher states the pain has been worsening. Current pain intensity is 10/10.    Pain Disability Index Review:  Last 3 PDI Scores 8/6/2020 7/2/2020   Pain Disability Index (PDI) 70 70       Pain Medications:    - Opioids: Ultram (Tramadol HCL)    Opioid Contract: yes     report:  Reviewed and consistent with medication use as prescribed.      Physical Therapy/Home Exercise: yes    Imaging:   Narrative & Impression     EXAMINATION:  XR LUMBAR SPINE 5 VIEW WITH FLEX AND EXT     CLINICAL HISTORY:  Back pain or radiculopathy, > 6 wks;  Radiculopathy, lumbar region     TECHNIQUE:  AP, lateral, oblique views of the lumbar spine with spot view of the lumbosacral region and lateral views in flexion and extension.     COMPARISON:  None     FINDINGS:  There is no collapse or malalignment in the lumbar spine.  There is no significant change in alignment between flexion and extension.     There is reduced of the intervertebral disc spaces at L4-L5 and L5-S1 with disc vacuum phenomena and anterior osteophyte formation.  There are mild neural foraminal stenosis at these levels.     There is mild bilateral neural foraminal stenosis.     There are extensive vascular calcifications in the abdominal aorta.  The soft tissues are  otherwise unremarkable.     Impression:     1. Moderate to severe spondylosis and neural foraminal stenosis at L4-L5 and L5-S1.  2. Straightening of lumbar lordosis  3. No changes in alignment in flexion and extension     Electronically signed by resident: Marko Lamas  Date:                                            07/07/2020  Time:                                           15:05     Electronically signed by: Roly Qiu  Date:                                            07/07/2020  Time:                                           15:21        Narrative & Impression     EXAMINATION:  CT LUMBAR SPINE WITHOUT CONTRAST     CLINICAL HISTORY:  Back pain or radiculopathy, > 6 wks;.     COMPARISON:  None.     FINDINGS:  Decrease height of vertebral bodies L4-L5 predominant at L5.  Degenerative changes in the endplates with decrease height intervertebral space L4-L5.  Vacuum phenomenon at L5-S1 indicating remarkable decrease.  Facet hypertrophy and degenerative changes in facet joints L3-L4, bilaterally.     Prominent aortic calcifications.     Left pleural effusion and atelectasis     Impression:     Prominent degenerative changes in the lumbar spine at L4, L5 and S1     Decrease height of intervertebral space L4-L5 with degenerative process and decrease height of vertebral bodies L4-L5 predominant at L5     Remarkable disc disease at L5-S1     Atherosclerosis     Left pleural effusion and atelectasis.  Please see CT of the chest        Electronically signed by: Roly Qiu  Date:                                            07/08/2020  Time:                                           16:00           Allergies:   Review of patient's allergies indicates:   Allergen Reactions    Cyclobenzaprine      Other reaction(s): Swelling  Hallucinations according to patient.         Current Medications:   Current Outpatient Medications   Medication Sig Dispense Refill    allopurinol (ZYLOPRIM) 300 MG tablet Take  300 mg by mouth once daily.      apixaban (ELIQUIS) 5 mg Tab Take 5 mg by mouth 2 (two) times daily.      aspirin (ECOTRIN) 81 MG EC tablet Take 81 mg by mouth.      atorvastatin (LIPITOR) 80 MG tablet Take 80 mg by mouth once daily.      cholecalciferol, vitamin D3, 3,000 unit Tab Take 1 tablet by mouth.      coenzyme Q10 10 mg capsule Take 200 mg by mouth once daily.      colchicine (COLCRYS) 0.6 mg tablet TK 1 T PO BID PRF GOUT      febuxostat (ULORIC) 40 mg Tab Take 1 tablet (40 mg total) by mouth once daily. 30 tablet 6    glipiZIDE (GLUCOTROL) 5 MG tablet Take 5 mg by mouth 2 (two) times daily before meals.      hydrALAZINE (APRESOLINE) 25 MG Tab Take 1 tablet (25 mg total) by mouth 3 (three) times daily. 90 tablet 11    isosorbide dinitrate (ISORDIL) 20 MG tablet Take 1 tablet (20 mg total) by mouth 3 (three) times daily. 90 tablet 11    magnesium oxide (MAG-OX) 400 mg (241.3 mg magnesium) tablet Take 400 mg by mouth.      melatonin 5 mg Cap Take by mouth.      metoprolol succinate (TOPROL-XL) 100 MG 24 hr tablet Take 100 mg by mouth once daily.       potassium chloride SA (K-DUR,KLOR-CON) 10 MEQ tablet potassium chloride ER 10 mEq tablet,extended release(part/cryst)      risperiDONE (RISPERDAL) 3 MG Tab Take by mouth.      sacubitril-valsartan (ENTRESTO)  mg per tablet Take 1 tablet by mouth 2 (two) times daily.       sertraline (ZOLOFT) 50 MG tablet Take 50 mg by mouth once daily. TAKE THREE TABLETS BY MOUTH EVERY MORNING TO IMPROVE MOOD       spironolactone (ALDACTONE) 25 MG tablet Take 0.5 mg by mouth once daily.      torsemide (DEMADEX) 20 MG Tab Take 20 mg by mouth. Take 3 tab by mouth every day      HYDROcodone-acetaminophen (NORCO) 5-325 mg per tablet Take 1 tablet by mouth every 12 (twelve) hours as needed for Pain. 60 tablet 0     No current facility-administered medications for this visit.        REVIEW OF SYSTEMS:    GENERAL:  No weight loss, malaise or fevers.  HEENT:   Negative for frequent or significant headaches.  NECK:  Negative for lumps, goiter, pain and significant neck swelling.  RESPIRATORY:  Negative for cough, wheezing or shortness of breath.  CARDIOVASCULAR:  Negative for chest pain, leg swelling or palpitations. +CAD  GI:  Negative for abdominal discomfort, blood in stools or black stools or change in bowel habits.  MUSCULOSKELETAL:  + Lower back and leg  SKIN:  Negative for lesions, rash, and itching.  PSYCH:  Negative for  mood disorder and recent psychosocial stressors. +sleep disturbance  HEMATOLOGY/LYMPHOLOGY:  Negative for prolonged bleeding, bruising easily or swollen nodes.  NEURO:   No history of headaches, syncope, paralysis, seizures or tremors.  All other reviewed and negative other than HPI.    Past Medical History:  Past Medical History:   Diagnosis Date    Brain damage     traumatic    CAD (coronary artery disease)     Cardiomyopathy     CHF (congestive heart failure)     Diabetes mellitus     type 2    Edema     Erectile dysfunction     GERD (gastroesophageal reflux disease)     HTN (hypertension)     Hyperlipemia     Sciatic nerve pain, right     leg       Past Surgical History:  Past Surgical History:   Procedure Laterality Date    CARDIAC DEFIBRILLATOR PLACEMENT      CATHETERIZATION OF BOTH LEFT AND RIGHT HEART Bilateral 08/20/2018    CORONARY ARTERY BYPASS GRAFT      ILIAC ARTERY STENT         Family History:  No family history on file.    Social History:  Social History     Socioeconomic History    Marital status:      Spouse name: Not on file    Number of children: Not on file    Years of education: Not on file    Highest education level: Not on file   Occupational History    Not on file   Social Needs    Financial resource strain: Not on file    Food insecurity     Worry: Not on file     Inability: Not on file    Transportation needs     Medical: Not on file     Non-medical: Not on file   Tobacco Use    Smoking  "status: Never Smoker    Smokeless tobacco: Never Used   Substance and Sexual Activity    Alcohol use: Not Currently    Drug use: Not Currently    Sexual activity: Not on file   Lifestyle    Physical activity     Days per week: Not on file     Minutes per session: Not on file    Stress: Not on file   Relationships    Social connections     Talks on phone: Not on file     Gets together: Not on file     Attends Christianity service: Not on file     Active member of club or organization: Not on file     Attends meetings of clubs or organizations: Not on file     Relationship status: Not on file   Other Topics Concern    Not on file   Social History Narrative    Not on file       OBJECTIVE:    BP 91/69   Pulse 78   Ht 6' 4" (1.93 m)   Wt 125.6 kg (277 lb)   BMI 33.72 kg/m²     PHYSICAL EXAMINATION:    General appearance: Well appearing, in no acute distress, alert and oriented x3.  Psych:  Mood and affect appropriate.  Skin: Skin color, texture, turgor normal, no rashes or lesions, in both upper and lower body.  Head/face:  Atraumatic, normocephalic. No palpable lymph nodes  Neck: No pain to palpation over the cervical paraspinous muscles. Spurling Negative. No pain with neck flexion, extension, or lateral flexion. .  Cor: RRR  Pulm: CTA  GI: Abdomen soft and non-tender.  Back: Straight leg raising in the sitting and supine positions is negative to radicular pain.  Tenderness to palpation over lumbar paraspinal muscles right more than left.  Limited range of motion of lumbar spine.  Extremities: Peripheral joint ROM is full and pain free without obvious instability or laxity in all four extremities. No deformities, edema, or skin discoloration. Good capillary refill.  Musculoskeletal: Shoulder, hip, sacroiliac and knee provocative maneuvers are negative. Bilateral upper and lower extremity strength is normal and symmetric.  No atrophy or tone abnormalities are noted.  Neuro: Bilateral upper and lower extremity " coordination and muscle stretch reflexes are physiologic and symmetric.  Plantar response are downgoing. No loss of sensation is noted.  Gait: Antalgic.    ASSESSMENT: 70 y.o. year old male with chronic lower back pain consistent with lumbar radiculopathy with past medical history CAD status post heart transplant currently on Xarelto.  Patient is currently on tramadol 50 mg t.i.d. for pain control with minimal relief.  Will discontinue this medication and start him on Norco 5/325 mg b.i.d. as needed.  UDS is ordered today and we will sign opioid agreement.  We will also schedule him for right lumbar transforaminal epidural steroid injection at L4/L5 and L5/S1.  We will need to hold his Xarelto prior to procedure after obtaining approval from his prescribing physician.  He also reports bilateral knee pain consistent with osteoarthritis.  We will order knee x-ray to further evaluate the etiology of his pain.  He has failed intra-articular steroid injection in the past.  We will consider genicular nerve block and possible cooled radiofrequency ablation in the future.      1. Lumbar radiculopathy     2. Primary osteoarthritis of both knees  X-Ray Knee 3 View Bilateral   3. Chronic, continuous use of opioids  [facility] Drug Screen   4. Encounter for long-term opiate analgesic use  Pain Clinic Drug Screen   5. Chronic pain disorder           PLAN:     - I have stressed the importance of physical activity and a home exercise plan to help with pain and improve health.  - Scheduled for right lumbar transforaminal epidural steroid injection at L4/L5 and L5/S1.  - Discontinue Tramadol.  - Start Norco 5/325 mg b.i.d. as needed.  - UDS ordered today and opioid agreement will be side  - Ordered bilateral knee x-ray to further evaluate the etiology of his knee pain.  - We will consider bilateral genicular nerve block and possible cooled radiofrequency ablation in the future.  - RTC in 2 weeks after the procedure.  - Counseled  patient regarding the importance of activity modification and physical therapy.    The above plan and management options were discussed at length with patient. Patient is in agreement with the above and verbalized understanding.    Austin Parsons  08/06/2020

## 2020-08-06 NOTE — PROGRESS NOTES
[facility] [place] [facility] NEW PT NOTE    REFERRING PROVIDER:  Dr. Ann Mckeon    CHIEF COMPLAINT: Hepatomegaly    HPI: Mr. Thrasher is a 70 y.o.  male with PMH noted below, presenting for evaluation of hepatomegaly, noted on recent ultrasound. Per imaging report, the liver is mildly enlarged (measuring 15.1 cm),  with heterogeneity and echotexture, which would be consistent with a history of medical congestive heart failure. There was no ascites present. LFT's have remained normal since at least 2019. Most recent labs in July show normal synthetic function, and normal liver enzymes. Per review of prior clinical notes, he has a history of heart failure and reduced EF, in the setting of ischemic cardiomyopathy, and is currently in the initial stages of be considered for advanced surgical options (LVAD/OHT). He was turned down for OHT from [facility] due to some psych history. Risk factors for the development of fatty liver disease include prior cardiac history (HF, HTN, HLD), DM and obesity (BMI 33).  Today in clinic he is well appearing and denies jaundice, dark urine, abdominal distention, hematemesis, melena, slowed mentation. No abnormal skin rashes or itching.      Review of patient's allergies indicates:   Allergen Reactions    Cyclobenzaprine      Other reaction(s): Swelling  Hallucinations according to patient.       Current Outpatient Medications on File Prior to Visit   Medication Sig Dispense Refill    allopurinol (ZYLOPRIM) 300 MG tablet Take 300 mg by mouth once daily.      apixaban (ELIQUIS) 5 mg Tab Take 5 mg by mouth 2 (two) times daily.      aspirin (ECOTRIN) 81 MG EC tablet Take 81 mg by mouth.      atorvastatin (LIPITOR) 80 MG tablet Take 80 mg by mouth once daily.      cholecalciferol, vitamin D3, 3,000 unit Tab Take 1 tablet by mouth.      coenzyme Q10 10 mg capsule Take 200 mg by mouth once daily.      colchicine (COLCRYS) 0.6 mg tablet TK 1 T PO BID PRF GOUT       febuxostat (ULORIC) 40 mg Tab Take 1 tablet (40 mg total) by mouth once daily. 30 tablet 6    glipiZIDE (GLUCOTROL) 5 MG tablet Take 5 mg by mouth 2 (two) times daily before meals.      hydrALAZINE (APRESOLINE) 25 MG Tab Take 1 tablet (25 mg total) by mouth 3 (three) times daily. 90 tablet 11    isosorbide dinitrate (ISORDIL) 20 MG tablet Take 1 tablet (20 mg total) by mouth 3 (three) times daily. 90 tablet 11    magnesium oxide (MAG-OX) 400 mg (241.3 mg magnesium) tablet Take 400 mg by mouth.      melatonin 5 mg Cap Take by mouth.      metoprolol succinate (TOPROL-XL) 100 MG 24 hr tablet Take 100 mg by mouth once daily.       potassium chloride SA (K-DUR,KLOR-CON) 10 MEQ tablet potassium chloride ER 10 mEq tablet,extended release(part/cryst)      risperiDONE (RISPERDAL) 3 MG Tab Take by mouth.      sacubitril-valsartan (ENTRESTO)  mg per tablet Take 1 tablet by mouth 2 (two) times daily.       sertraline (ZOLOFT) 50 MG tablet Take 50 mg by mouth once daily. TAKE THREE TABLETS BY MOUTH EVERY MORNING TO IMPROVE MOOD       spironolactone (ALDACTONE) 25 MG tablet Take 0.5 mg by mouth once daily.      torsemide (DEMADEX) 20 MG Tab Take 20 mg by mouth. Take 3 tab by mouth every day      [] traMADoL (ULTRAM) 50 mg tablet Take 1 tablet (50 mg total) by mouth every 12 (twelve) hours as needed for Pain. 60 tablet 0     No current facility-administered medications on file prior to visit.      PMHX:  has a past medical history of Brain damage, CAD (coronary artery disease), Cardiomyopathy, CHF (congestive heart failure), Diabetes mellitus, Edema, Erectile dysfunction, GERD (gastroesophageal reflux disease), HTN (hypertension), Hyperlipemia, and Sciatic nerve pain, right.    PSHX:  has a past surgical history that includes Coronary artery bypass graft; Catheterization of both left and right heart (Bilateral, 2018); Cardiac defibrillator placement; and Iliac artery stent.    FAMILY HISTORY:  "Negative for liver disease, reviewed in EPIC    SOCIAL HISTORY:   Social History     Tobacco Use   Smoking Status Never Smoker   Smokeless Tobacco Never Used       Social History     Substance and Sexual Activity   Alcohol Use Not Currently       Social History     Substance and Sexual Activity   Drug Use Not Currently     ROS:   GENERAL: Denies fever, chills, weight loss/gain, fatigue  HEENT: Denies headaches, dizziness, vision/hearing changes  CARDIOVASCULAR: Denies chest pain, palpitations, or edema  RESPIRATORY: Denies dyspnea, cough  GI: Denies abdominal pain, rectal bleeding, nausea, vomiting. No change in bowel pattern or color  : Denies dysuria, hematuria   SKIN: Denies rash, itching   NEURO: Denies confusion, memory loss, or mood changes  PSYCH: Denies depression or anxiety  HEME/LYMPH: Denies easy bruising or bleeding    PHYSICAL EXAM:   Friendly Black or  male, in no acute distress; alert and oriented to person, place and time  VITALS: /77 (BP Location: Right arm, Patient Position: Sitting, BP Method: Large (Automatic))   Pulse 77   Resp 20   Ht 6' 4" (1.93 m)   Wt 126.9 kg (279 lb 12.2 oz)   SpO2 98%   BMI 34.05 kg/m²   HENT: Normocephalic, without obvious abnormality. Oral mucosa pink and moist. Dentition good.  EYES: Sclerae anicteric. No conjunctival pallor.   NECK: Supple. No masses or cervical adenopathy.  CARDIOVASCULAR: Regular rate and rhythm. No murmurs.  RESPIRATORY: Normal respiratory effort. BBS CTA. No wheezes or crackles.  GI: Soft, non-tender, non-distended. No hepatosplenomegaly. No masses palpable. No ascites.  EXTREMITIES:  No clubbing, cyanosis or edema.  SKIN: Warm and dry. No jaundice. No rashes noted to exposed skin. No telangectasias noted. No palmar erythema.  NEURO:  Normal gait. No asterixis.  PSYCH:  Memory intact. Thought and speech pattern appropriate. Behavior normal. No depression or anxiety noted.    DIAGNOSTIC STUDIES:    US LIVER WITH " DOPPLER 7/10/2020:    FINDINGS:  The liver is is on the higher end of normal in terms of size, measuring 15.1 cm in the craniocaudal dimension. The hepatic parenchyma is of heterogeneous in echogenicity and normal contour there is no sonographically evident focal lesion. No intrahepatic biliary dilatation is seen. The main portal vein demonstrates normal hepatopetal flow with a measured velocity of 49.2 cm/s. No perihepatic free fluid is appreciated.     The gallbladder surgically removed.     The common bile duct is 6.3 mm in diameter. No intraluminal abnormality is identified.     The pancreas demonstrates normal echogenicity. No pancreatic duct dilatation is appreciated. No mass or parenchymal calcifications are evident and there is no suggestion of a peripancreatic fluid collection.     Limited views of the right kidney do not show hydronephrosis.  4.1 x 10.85 x 7.26 cm with a volume of 170 mL     No free fluid is seen within the abdomen.     Doppler study:     Main portal vein: Good color-flow. Hepatopetal blood flow. Velocity of 49.2 cm/second.     Right portal vein: Good color flow. Normal hepatopetal blood flow. Velocity 10.4 cm/second.     Left portal vein: Good color-flow. Normal direction of blood flow. Velocity 13 cm/second.     Left hepatic vein: Good color flow. Normal direction of blood flow. Normal waveform.     Middle hepatic vein: Good color-flow. Normal direction of blood flow. Normal waveform.     Right hepatic vein: Good color-flow. Normal waveform. Normal direction of blood flow.     Hepatic artery: Normal waveform. Peak systolic velocity 24.4 cm/second.     Impression:     1. Liver is mildly enlarged with heterogeneity and echotexture, which would be consistent with a history of medical congestive heart failure.  2. No ascites.      ASSESSMENT/EDUCATION:  70 y.o. Black or  male with hepatomegaly and risk factors for the development of fatty liver disease. Reassuringly, his  LFT's have remained normal and ultrasound in July did not show any evidence of advanced fibrosis/cirrhosis. We discussed the manifestations of non-alcoholic fatty liver disease. At this time, there are no FDA approved therapy for non-alcoholic fatty liver disease.The patient and I discussed the importance of diet, exercise, and weight loss for management of NAFLD. We discussed a low fat, low carb/sugar, high fiber diet and a goal of 30 minutes of exercise 5 times per week. We also discussed that fatty liver can progress to steatohepatitis and possibly to cirrhosis. I recommend that he continue to avoid alcohol consumption, as it is known that heavy alcohol use is associated with disease progression among patients with fatty liver disease. Information is unknown at this time of the effects on the liver with alcohol use in moderation.    PLAN:    1. Labs today to monitor liver function and screen for immunity to Hepatitis A and B.   2. Schedule ultrasound elastography to stage liver disease.  3. Recommend aerobic exercise (walking) 30 minutes most days of the week.  4. Recommend good control of your blood sugars, blood pressure and lipids.  5. Recommend weight loss - 25-30 pounds.   6. Return to clinic to be determined based on lab and ultrasound results.     Thank you for allowing me to participate in the care of Haydensameerajuan Price       Hepatology Nurse Practitioner  [facility]  8/6/2020 @ 0106    CC'ed note to:   Ann Mckeon MD  [facility]

## 2020-08-06 NOTE — PROGRESS NOTES
Chronic patient Established Note (Follow up visit)    Ishmael Thrasher is a 70 y.o. male with a history of CHF (EF 10%) who presents today with chronic low back pain. This pain started in 1971 as a result of and injury in the .  He describes a constant, right sided pain that starts in his low back and radiates down the posterior aspect of his right leg to the bottom of his foot.  This is associated with a numbness, tingling sensation.  The pain occurs when he is standing still for longer than 5 minutes.  The pain does not occur when walking unless he is standing first.  He denies heaviness and weakness.  This pain is described in detail below.     Interval History (7/2/2020):  The patient returns to clinic today for follow up of back pain. He continues to report low back pain that radiates down the posterior aspect of his right leg to the bottom of his foot. He denies any left leg pain. His pain is worse with prolonged standing. He has had injections in the past without relief. He is currently taking Tramadol with limited relief. He denies any other health changes. His pain today is 10/10.     Aggravating factors: Standing in place for 5 minutes     Mitigating factors: Walking, medication, rest     Previously seeing: Dr. Davide Wang ([place])     Physical Therapy: Has done for his heart, but not for his back     Non-pharmacologic Treatment:      · Ice/Heat: Heat helps  · TENS: Not tried  · Massage: Not tried  · Chiropractic care: Not tried  · Acupuncture: Helpful  · Other: No brace         Pain Medications:         · Currently taking:  Zoloft 50 mg daily,  risperidone 3 mg QHS, melatonin     · Has tried in the past:    ? Opioids: Norco 10/325 mg daily (took himself off), Tramadol 50 mg (sparing use, helpful)  ? NSAIDS: Avoids due to Eliquis and CAD  ? Tylenol: Excedrin (no help)  ? Muscle relaxants:  Cyclobenzaprine (side effects), Baclofen 10 mg 3 times daily,    ? TCAs:  Amitriptyline 50 mg  ? SNRIs:  Not tried  ? Anticonvulsants: Gabapentin 800 mg (>15 years ago) no benefit  ? topical creams: Back balm helps  ? Other: Seroquel 400 mg QHS, Remeron 15 mg,     Blood thinners:  Eliquis 5 mg     Interventional Therapies:   · Bilateral ankle steroid injections Q3 months, last 1.5 months ago: Helpful  · Bilateral knee steroid injections Q3 months, last 1.5 months ago: helpful  · Epidural steroid injection x 2 2018: Helpful for 1-2 months     Relevant Surgeries:   · None, he reports that he couldn't get surgery on his knees, back, and ankle due to his heart      Interval History 8/6/2020:    Ishmael Thrasher presents to the clinic for a follow-up appointment for bilateral leg pain. Since the last visit, Ishmael Thrasher states the pain has been worsening. Current pain intensity is 10/10.    Pain Disability Index Review:  Last 3 PDI Scores 8/6/2020 7/2/2020   Pain Disability Index (PDI) 70 70       Pain Medications:    - Opioids: Ultram (Tramadol HCL)    Opioid Contract: yes     report:  Reviewed and consistent with medication use as prescribed.      Physical Therapy/Home Exercise: yes    Imaging:   Narrative & Impression     EXAMINATION:  XR LUMBAR SPINE 5 VIEW WITH FLEX AND EXT     CLINICAL HISTORY:  Back pain or radiculopathy, > 6 wks;  Radiculopathy, lumbar region     TECHNIQUE:  AP, lateral, oblique views of the lumbar spine with spot view of the lumbosacral region and lateral views in flexion and extension.     COMPARISON:  None     FINDINGS:  There is no collapse or malalignment in the lumbar spine.  There is no significant change in alignment between flexion and extension.     There is reduced of the intervertebral disc spaces at L4-L5 and L5-S1 with disc vacuum phenomena and anterior osteophyte formation.  There are mild neural foraminal stenosis at these levels.     There is mild bilateral neural foraminal stenosis.     There are extensive vascular calcifications in the abdominal aorta.  The soft tissues are  otherwise unremarkable.     Impression:     1. Moderate to severe spondylosis and neural foraminal stenosis at L4-L5 and L5-S1.  2. Straightening of lumbar lordosis  3. No changes in alignment in flexion and extension     Electronically signed by resident: Marko Lamas  Date:                                            07/07/2020  Time:                                           15:05     Electronically signed by: Roly Qiu  Date:                                            07/07/2020  Time:                                           15:21        Narrative & Impression     EXAMINATION:  CT LUMBAR SPINE WITHOUT CONTRAST     CLINICAL HISTORY:  Back pain or radiculopathy, > 6 wks;.     COMPARISON:  None.     FINDINGS:  Decrease height of vertebral bodies L4-L5 predominant at L5.  Degenerative changes in the endplates with decrease height intervertebral space L4-L5.  Vacuum phenomenon at L5-S1 indicating remarkable decrease.  Facet hypertrophy and degenerative changes in facet joints L3-L4, bilaterally.     Prominent aortic calcifications.     Left pleural effusion and atelectasis     Impression:     Prominent degenerative changes in the lumbar spine at L4, L5 and S1     Decrease height of intervertebral space L4-L5 with degenerative process and decrease height of vertebral bodies L4-L5 predominant at L5     Remarkable disc disease at L5-S1     Atherosclerosis     Left pleural effusion and atelectasis.  Please see CT of the chest        Electronically signed by: Roly Qiu  Date:                                            07/08/2020  Time:                                           16:00           Allergies:   Review of patient's allergies indicates:   Allergen Reactions    Cyclobenzaprine      Other reaction(s): Swelling  Hallucinations according to patient.         Current Medications:   Current Outpatient Medications   Medication Sig Dispense Refill    allopurinol (ZYLOPRIM) 300 MG tablet Take  300 mg by mouth once daily.      apixaban (ELIQUIS) 5 mg Tab Take 5 mg by mouth 2 (two) times daily.      aspirin (ECOTRIN) 81 MG EC tablet Take 81 mg by mouth.      atorvastatin (LIPITOR) 80 MG tablet Take 80 mg by mouth once daily.      cholecalciferol, vitamin D3, 3,000 unit Tab Take 1 tablet by mouth.      coenzyme Q10 10 mg capsule Take 200 mg by mouth once daily.      colchicine (COLCRYS) 0.6 mg tablet TK 1 T PO BID PRF GOUT      febuxostat (ULORIC) 40 mg Tab Take 1 tablet (40 mg total) by mouth once daily. 30 tablet 6    glipiZIDE (GLUCOTROL) 5 MG tablet Take 5 mg by mouth 2 (two) times daily before meals.      hydrALAZINE (APRESOLINE) 25 MG Tab Take 1 tablet (25 mg total) by mouth 3 (three) times daily. 90 tablet 11    isosorbide dinitrate (ISORDIL) 20 MG tablet Take 1 tablet (20 mg total) by mouth 3 (three) times daily. 90 tablet 11    magnesium oxide (MAG-OX) 400 mg (241.3 mg magnesium) tablet Take 400 mg by mouth.      melatonin 5 mg Cap Take by mouth.      metoprolol succinate (TOPROL-XL) 100 MG 24 hr tablet Take 100 mg by mouth once daily.       potassium chloride SA (K-DUR,KLOR-CON) 10 MEQ tablet potassium chloride ER 10 mEq tablet,extended release(part/cryst)      risperiDONE (RISPERDAL) 3 MG Tab Take by mouth.      sacubitril-valsartan (ENTRESTO)  mg per tablet Take 1 tablet by mouth 2 (two) times daily.       sertraline (ZOLOFT) 50 MG tablet Take 50 mg by mouth once daily. TAKE THREE TABLETS BY MOUTH EVERY MORNING TO IMPROVE MOOD       spironolactone (ALDACTONE) 25 MG tablet Take 0.5 mg by mouth once daily.      torsemide (DEMADEX) 20 MG Tab Take 20 mg by mouth. Take 3 tab by mouth every day      HYDROcodone-acetaminophen (NORCO) 5-325 mg per tablet Take 1 tablet by mouth every 12 (twelve) hours as needed for Pain. 60 tablet 0     No current facility-administered medications for this visit.        REVIEW OF SYSTEMS:    GENERAL:  No weight loss, malaise or fevers.  HEENT:   Negative for frequent or significant headaches.  NECK:  Negative for lumps, goiter, pain and significant neck swelling.  RESPIRATORY:  Negative for cough, wheezing or shortness of breath.  CARDIOVASCULAR:  Negative for chest pain, leg swelling or palpitations. +CAD  GI:  Negative for abdominal discomfort, blood in stools or black stools or change in bowel habits.  MUSCULOSKELETAL:  + Lower back and leg  SKIN:  Negative for lesions, rash, and itching.  PSYCH:  Negative for  mood disorder and recent psychosocial stressors. +sleep disturbance  HEMATOLOGY/LYMPHOLOGY:  Negative for prolonged bleeding, bruising easily or swollen nodes.  NEURO:   No history of headaches, syncope, paralysis, seizures or tremors.  All other reviewed and negative other than HPI.    Past Medical History:  Past Medical History:   Diagnosis Date    Brain damage     traumatic    CAD (coronary artery disease)     Cardiomyopathy     CHF (congestive heart failure)     Diabetes mellitus     type 2    Edema     Erectile dysfunction     GERD (gastroesophageal reflux disease)     HTN (hypertension)     Hyperlipemia     Sciatic nerve pain, right     leg       Past Surgical History:  Past Surgical History:   Procedure Laterality Date    CARDIAC DEFIBRILLATOR PLACEMENT      CATHETERIZATION OF BOTH LEFT AND RIGHT HEART Bilateral 08/20/2018    CORONARY ARTERY BYPASS GRAFT      ILIAC ARTERY STENT         Family History:  No family history on file.    Social History:  Social History     Socioeconomic History    Marital status:      Spouse name: Not on file    Number of children: Not on file    Years of education: Not on file    Highest education level: Not on file   Occupational History    Not on file   Social Needs    Financial resource strain: Not on file    Food insecurity     Worry: Not on file     Inability: Not on file    Transportation needs     Medical: Not on file     Non-medical: Not on file   Tobacco Use    Smoking  "status: Never Smoker    Smokeless tobacco: Never Used   Substance and Sexual Activity    Alcohol use: Not Currently    Drug use: Not Currently    Sexual activity: Not on file   Lifestyle    Physical activity     Days per week: Not on file     Minutes per session: Not on file    Stress: Not on file   Relationships    Social connections     Talks on phone: Not on file     Gets together: Not on file     Attends Sikhism service: Not on file     Active member of club or organization: Not on file     Attends meetings of clubs or organizations: Not on file     Relationship status: Not on file   Other Topics Concern    Not on file   Social History Narrative    Not on file       OBJECTIVE:    BP 91/69   Pulse 78   Ht 6' 4" (1.93 m)   Wt 125.6 kg (277 lb)   BMI 33.72 kg/m²     PHYSICAL EXAMINATION:    General appearance: Well appearing, in no acute distress, alert and oriented x3.  Psych:  Mood and affect appropriate.  Skin: Skin color, texture, turgor normal, no rashes or lesions, in both upper and lower body.  Head/face:  Atraumatic, normocephalic. No palpable lymph nodes  Neck: No pain to palpation over the cervical paraspinous muscles. Spurling Negative. No pain with neck flexion, extension, or lateral flexion. .  Cor: RRR  Pulm: CTA  GI: Abdomen soft and non-tender.  Back: Straight leg raising in the sitting and supine positions is negative to radicular pain.  Tenderness to palpation over lumbar paraspinal muscles right more than left.  Limited range of motion of lumbar spine.  Extremities: Peripheral joint ROM is full and pain free without obvious instability or laxity in all four extremities. No deformities, edema, or skin discoloration. Good capillary refill.  Musculoskeletal: Shoulder, hip, sacroiliac and knee provocative maneuvers are negative. Bilateral upper and lower extremity strength is normal and symmetric.  No atrophy or tone abnormalities are noted.  Neuro: Bilateral upper and lower extremity " coordination and muscle stretch reflexes are physiologic and symmetric.  Plantar response are downgoing. No loss of sensation is noted.  Gait: Antalgic.    ASSESSMENT: 70 y.o. year old male with chronic lower back pain consistent with lumbar radiculopathy with past medical history CAD status post heart transplant currently on Xarelto.  Patient is currently on tramadol 50 mg t.i.d. for pain control with minimal relief.  Will discontinue this medication and start him on Norco 5/325 mg b.i.d. as needed.  UDS is ordered today and we will sign opioid agreement.  We will also schedule him for right lumbar transforaminal epidural steroid injection at L4/L5 and L5/S1.  We will need to hold his Xarelto prior to procedure after obtaining approval from his prescribing physician.  He also reports bilateral knee pain consistent with osteoarthritis.  We will order knee x-ray to further evaluate the etiology of his pain.  He has failed intra-articular steroid injection in the past.  We will consider genicular nerve block and possible cooled radiofrequency ablation in the future.      1. Lumbar radiculopathy     2. Primary osteoarthritis of both knees  X-Ray Knee 3 View Bilateral   3. Chronic, continuous use of opioids  Pain Clinic Drug Screen   4. Encounter for long-term opiate analgesic use  Pain Clinic Drug Screen   5. Chronic pain disorder           PLAN:     - I have stressed the importance of physical activity and a home exercise plan to help with pain and improve health.  - Scheduled for right lumbar transforaminal epidural steroid injection at L4/L5 and L5/S1.  - Discontinue Tramadol.  - Start Norco 5/325 mg b.i.d. as needed.  - UDS ordered today and opioid agreement will be side  - Ordered bilateral knee x-ray to further evaluate the etiology of his knee pain.  - We will consider bilateral genicular nerve block and possible cooled radiofrequency ablation in the future.  - RTC in 2 weeks after the procedure.  - Counseled  patient regarding the importance of activity modification and physical therapy.    The above plan and management options were discussed at length with patient. Patient is in agreement with the above and verbalized understanding.    Austin Parsons  08/06/2020

## 2020-08-06 NOTE — PROGRESS NOTES
"Subjective:      Patient ID: Ishmael Thrasher is a 70 y.o. male.    Chief Complaint: Abnormal Ct Scan    HPI  71 yo male non smoker from [facility].  with hx of severe CAD and cardiomyopathy is referred for assessment of a "lung nodule" in the left lower lobe. [facility] [place], has had multiple cardiac stents and has  an implanted defibrillator  He  Has no pulmonary complaints today; No cough, chest wall pain or exertional dyspnea. Served in the [employer] one year in [place] and one year in [place].    He has in his possession 3 disc One from 2018, A CTA on which the density is noted incidentially and the CT of the chest dated 2/3/20   The process on the CT  appears to be a small pleural effusion, and loculated fluid against the chest wall and some acinar filling  defect  Wall. His Peak flow is normal at 550 l/min, no adventious breath sounds and Sa02:99%!!      Have arranged for a CT later today after his appointment in the pain clinic at [facility]. Will call him with the results.   Review of Systems   Constitutional: Negative.    HENT: Negative.    Eyes: Negative.    Respiratory: Negative.         Abnormal Chest CT  [facility]   Cardiovascular: Negative.         S?P CABG   ,multiple stents and defibrillator   Genitourinary: Negative.    Endocrine: Type II diabetes   Musculoskeletal: Negative.    Skin: Negative.    Gastrointestinal: Negative.         Being seen in GI clinic today regarding liver test abnormalities   Neurological: Negative.    Psychiatric/Behavioral: Negative.      Objective:     Physical Exam  Constitutional:       Appearance: He is well-developed.      Comments: Obese: BMI:34   HENT:      Head: Normocephalic and atraumatic.      Right Ear: External ear normal.      Left Ear: External ear normal.   Eyes:      Conjunctiva/sclera: Conjunctivae normal.      Pupils: Pupils are equal, round, and reactive to light.   Neck:      Musculoskeletal: Normal range of motion and neck " supple.   Cardiovascular:      Rate and Rhythm: Normal rate and regular rhythm.      Heart sounds: Normal heart sounds.   Pulmonary:      Effort: Pulmonary effort is normal. No respiratory distress.      Breath sounds: Normal breath sounds. No wheezing or rales.      Comments: Clear without localized wheezes rales or pleural rub.  Chest:      Chest wall: No tenderness.   Abdominal:      General: Bowel sounds are normal.      Palpations: Abdomen is soft.   Musculoskeletal: Normal range of motion.   Skin:     General: Skin is warm and dry.   Neurological:      Mental Status: He is alert and oriented to person, place, and time.      Deep Tendon Reflexes: Reflexes are normal and symmetric.   Psychiatric:         Behavior: Behavior normal.         Thought Content: Thought content normal.         Judgment: Judgment normal.         Assessment:     1. Abnormal CT scan, lung      Outpatient Encounter Medications as of 8/6/2020   Medication Sig Dispense Refill    allopurinol (ZYLOPRIM) 300 MG tablet Take 300 mg by mouth once daily.      apixaban (ELIQUIS) 5 mg Tab Take 5 mg by mouth 2 (two) times daily.      aspirin (ECOTRIN) 81 MG EC tablet Take 81 mg by mouth.      atorvastatin (LIPITOR) 80 MG tablet Take 80 mg by mouth once daily.      cholecalciferol, vitamin D3, 3,000 unit Tab Take 1 tablet by mouth.      coenzyme Q10 10 mg capsule Take 200 mg by mouth once daily.      colchicine (COLCRYS) 0.6 mg tablet TK 1 T PO BID PRF GOUT      febuxostat (ULORIC) 40 mg Tab Take 1 tablet (40 mg total) by mouth once daily. 30 tablet 6    glipiZIDE (GLUCOTROL) 5 MG tablet Take 5 mg by mouth 2 (two) times daily before meals.      hydrALAZINE (APRESOLINE) 25 MG Tab Take 1 tablet (25 mg total) by mouth 3 (three) times daily. 90 tablet 11    isosorbide dinitrate (ISORDIL) 20 MG tablet Take 1 tablet (20 mg total) by mouth 3 (three) times daily. 90 tablet 11    magnesium oxide (MAG-OX) 400 mg (241.3 mg magnesium) tablet Take 400  mg by mouth.      melatonin 5 mg Cap Take by mouth.      metoprolol succinate (TOPROL-XL) 100 MG 24 hr tablet Take 100 mg by mouth once daily.       potassium chloride SA (K-DUR,KLOR-CON) 10 MEQ tablet potassium chloride ER 10 mEq tablet,extended release(part/cryst)      risperiDONE (RISPERDAL) 3 MG Tab Take by mouth.      sacubitril-valsartan (ENTRESTO)  mg per tablet Take 1 tablet by mouth 2 (two) times daily.       sertraline (ZOLOFT) 50 MG tablet Take 50 mg by mouth once daily. TAKE THREE TABLETS BY MOUTH EVERY MORNING TO IMPROVE MOOD       spironolactone (ALDACTONE) 25 MG tablet Take 0.5 mg by mouth once daily.      torsemide (DEMADEX) 20 MG Tab Take 20 mg by mouth. Take 3 tab by mouth every day      [] traMADoL (ULTRAM) 50 mg tablet Take 1 tablet (50 mg total) by mouth every 12 (twelve) hours as needed for Pain. 60 tablet 0     No facility-administered encounter medications on file as of 2020.      No orders of the defined types were placed in this encounter.    Plan:    Get a chest CT later today and then determine the relevance of the prior findings on the CT of 2/3  His insurance would not approve a CT at our facility. His primary service to arrange [facility] and have [facility] to me for review. I would suggest the process in the left lower lobe is better.  Problem List Items Addressed This Visit     None      Visit Diagnoses     Abnormal CT scan, lung    -  Primary

## 2020-08-06 NOTE — PATIENT INSTRUCTIONS
1. Labs today to monitor liver function and screen for immunity to Hepatitis A and B.   2. Schedule ultrasound elastography to stage liver disease.  3. Recommend aerobic exercise (walking) 30 minutes most days of the week.  4. Recommend good control of your blood sugars, blood pressure and lipids.  5. Recommend weight loss - 25-30 pounds.   6. Return to clinic to be determined based on lab and ultrasound results.

## 2020-08-10 LAB
6MAM UR QL: NOT DETECTED
6MAM UR QL: NOT DETECTED
7AMINOCLONAZEPAM UR QL: NOT DETECTED
7AMINOCLONAZEPAM UR QL: NOT DETECTED
A-OH ALPRAZ UR QL: NOT DETECTED
A-OH ALPRAZ UR QL: NOT DETECTED
ALPRAZ UR QL: NOT DETECTED
ALPRAZ UR QL: NOT DETECTED
AMPHET UR QL SCN: NOT DETECTED
AMPHET UR QL SCN: NOT DETECTED
ANNOTATION COMMENT IMP: NORMAL
BARBITURATES UR QL: NOT DETECTED
BARBITURATES UR QL: NOT DETECTED
BUPRENORPHINE UR QL: NOT DETECTED
BUPRENORPHINE UR QL: NOT DETECTED
BZE UR QL: NOT DETECTED
BZE UR QL: NOT DETECTED
CARBOXYTHC UR QL: NOT DETECTED
CARBOXYTHC UR QL: NOT DETECTED
CARISOPRODOL UR QL: NOT DETECTED
CARISOPRODOL UR QL: NOT DETECTED
CLONAZEPAM UR QL: NOT DETECTED
CLONAZEPAM UR QL: NOT DETECTED
CODEINE UR QL: NOT DETECTED
CODEINE UR QL: NOT DETECTED
CREAT UR-MCNC: 255.9 MG/DL (ref 20–400)
CREAT UR-MCNC: 255.9 MG/DL (ref 20–400)
DIAZEPAM UR QL: NOT DETECTED
DIAZEPAM UR QL: NOT DETECTED
ETHYL GLUCURONIDE UR QL: NOT DETECTED
ETHYL GLUCURONIDE UR QL: NOT DETECTED
FENTANYL UR QL: NOT DETECTED
FENTANYL UR QL: NOT DETECTED
HYDROCODONE UR QL: NOT DETECTED
HYDROCODONE UR QL: NOT DETECTED
HYDROMORPHONE UR QL: NOT DETECTED
HYDROMORPHONE UR QL: NOT DETECTED
LORAZEPAM UR QL: NOT DETECTED
LORAZEPAM UR QL: NOT DETECTED
MDA UR QL: NOT DETECTED
MDA UR QL: NOT DETECTED
MDEA UR QL: NOT DETECTED
MDEA UR QL: NOT DETECTED
MDMA UR QL: NOT DETECTED
MDMA UR QL: NOT DETECTED
ME-PHENIDATE UR QL: NOT DETECTED
ME-PHENIDATE UR QL: NOT DETECTED
MEPERIDINE UR QL: NOT DETECTED
MEPERIDINE UR QL: NOT DETECTED
METHADONE UR QL: NOT DETECTED
METHADONE UR QL: NOT DETECTED
METHAMPHET UR QL: NOT DETECTED
METHAMPHET UR QL: NOT DETECTED
MIDAZOLAM UR QL SCN: NOT DETECTED
MIDAZOLAM UR QL SCN: NOT DETECTED
MORPHINE UR QL: NOT DETECTED
MORPHINE UR QL: NOT DETECTED
NORBUPRENORPHINE UR QL CFM: NOT DETECTED
NORBUPRENORPHINE UR QL CFM: NOT DETECTED
NORDIAZEPAM UR QL: NOT DETECTED
NORDIAZEPAM UR QL: NOT DETECTED
NORFENTANYL UR QL: NOT DETECTED
NORFENTANYL UR QL: NOT DETECTED
NORHYDROCODONE UR QL CFM: NOT DETECTED
NORHYDROCODONE UR QL CFM: NOT DETECTED
NOROXYCODONE UR QL CFM: NOT DETECTED
NOROXYCODONE UR QL CFM: NOT DETECTED
NOROXYMORPHONE: NOT DETECTED
NOROXYMORPHONE: NOT DETECTED
OXAZEPAM UR QL: NOT DETECTED
OXAZEPAM UR QL: NOT DETECTED
OXYCODONE UR QL: NOT DETECTED
OXYCODONE UR QL: NOT DETECTED
OXYMORPHONE UR QL: NOT DETECTED
OXYMORPHONE UR QL: NOT DETECTED
PATHOLOGY STUDY: NORMAL
PATHOLOGY STUDY: NORMAL
PCP UR QL: NOT DETECTED
PCP UR QL: NOT DETECTED
PHENTERMINE UR QL: NOT DETECTED
PHENTERMINE UR QL: NOT DETECTED
PROPOXYPH UR QL: NOT DETECTED
PROPOXYPH UR QL: NOT DETECTED
SERVICE CMNT-IMP: NORMAL
SERVICE CMNT-IMP: NORMAL
TAPENTADOL UR QL SCN: NOT DETECTED
TAPENTADOL UR QL SCN: NOT DETECTED
TAPENTADOL-O-SULF: NOT DETECTED
TAPENTADOL-O-SULF: NOT DETECTED
TEMAZEPAM UR QL: NOT DETECTED
TEMAZEPAM UR QL: NOT DETECTED
TRAMADOL UR QL: PRESENT
TRAMADOL UR QL: PRESENT
ZOLPIDEM UR QL: NOT DETECTED
ZOLPIDEM UR QL: NOT DETECTED

## 2020-08-17 ENCOUNTER — HOSPITAL ENCOUNTER (OUTPATIENT)
Facility: HOSPITAL | Age: 70
Discharge: HOME | End: 2020-08-17
Attending: INTERNAL MEDICINE | Admitting: INTERNAL MEDICINE
Payer: COMMERCIAL

## 2020-08-17 VITALS
SYSTOLIC BLOOD PRESSURE: 118 MMHG | HEART RATE: 72 BPM | BODY MASS INDEX: 33.5 KG/M2 | WEIGHT: 275.13 LBS | OXYGEN SATURATION: 97 % | RESPIRATION RATE: 16 BRPM | HEIGHT: 76 IN | DIASTOLIC BLOOD PRESSURE: 69 MMHG | TEMPERATURE: 97 F

## 2020-08-17 DIAGNOSIS — I50.40 COMBINED SYSTOLIC AND DIASTOLIC CONGESTIVE HEART FAILURE, UNSPECIFIED HF CHRONICITY: ICD-10-CM

## 2020-08-17 LAB — POCT GLUCOSE: 226 MG/DL (ref 70–110)

## 2020-08-17 PROCEDURE — 82962 GLUCOSE BLOOD TEST: CPT | Mod: TXP | Performed by: INTERNAL MEDICINE

## 2020-08-17 PROCEDURE — C1751 CATH, INF, PER/CENT/MIDLINE: HCPCS | Mod: TXP | Performed by: INTERNAL MEDICINE

## 2020-08-17 PROCEDURE — C1894 INTRO/SHEATH, NON-LASER: HCPCS | Mod: NTX | Performed by: INTERNAL MEDICINE

## 2020-08-17 PROCEDURE — 93451 PR RIGHT HEART CATH O2 SATURATION & CARDIAC OUTPUT: ICD-10-PCS | Mod: 26,NTX,, | Performed by: INTERNAL MEDICINE

## 2020-08-17 PROCEDURE — 25000003 PHARM REV CODE 250: Mod: TXP | Performed by: INTERNAL MEDICINE

## 2020-08-17 PROCEDURE — 93451 RIGHT HEART CATH: CPT | Mod: 26,NTX,, | Performed by: INTERNAL MEDICINE

## 2020-08-17 PROCEDURE — 93451 RIGHT HEART CATH: CPT | Mod: NTX | Performed by: INTERNAL MEDICINE

## 2020-08-17 RX ORDER — SODIUM CHLORIDE 0.9 G/100ML
IRRIGANT IRRIGATION
Status: DISCONTINUED | OUTPATIENT
Start: 2020-08-17 | End: 2020-08-17 | Stop reason: HOSPADM

## 2020-08-17 RX ORDER — LIDOCAINE HYDROCHLORIDE AND EPINEPHRINE 20; 10 MG/ML; UG/ML
INJECTION, SOLUTION INFILTRATION; PERINEURAL
Status: DISCONTINUED | OUTPATIENT
Start: 2020-08-17 | End: 2020-08-17 | Stop reason: HOSPADM

## 2020-08-17 NOTE — CARE UPDATE
Patient discharged per MD orders. Instructions given on medications, wound care, activity, signs of infection, when to call MD, and follow up appointments. Pt verbalized understanding.  Patient AAOx4, VSS, no c/o pain or discomfort at this time. Patient left unit via wheelchair with transport.

## 2020-08-17 NOTE — CARE UPDATE
Patient arrived to room. Admit assessment completed. Plan of care discussed with patient. Will monitor.

## 2020-08-17 NOTE — Clinical Note
The PA catheter is repositioned to the pulmonary wedge. Hemodynamics performed. O2 saturation measured at 69%.

## 2020-08-17 NOTE — Clinical Note
The PA catheter is repositioned to the pulmonary wedge. Hemodynamics performed. O2 saturation measured at 88%.

## 2020-08-17 NOTE — INTERVAL H&P NOTE
The patient has been examined and the H&P has been reviewed:    I concur with the findings and no changes have occurred since H&P was written.    Anesthesia risks, benefits and alternative options discussed and understood by patient/family. Patient presents for RHC. Did not do cpx for some reason. No significant changes have occurred since this note.     Active Hospital Problems    Diagnosis  POA    Congestive heart failure [I50.9]  Yes      Resolved Hospital Problems   No resolved problems to display.

## 2020-08-17 NOTE — DISCHARGE INSTRUCTIONS

## 2020-08-17 NOTE — CARE UPDATE
Received report from Elisabeth WHITE. Patient s/p [facility], AAOx4. VSS, no c/o pain or discomfort at this time, resp even and unlabored. Gauze/tegaderm dressing to R neck is CDI. No active bleeding. No hematoma noted. Post procedure protocol reviewed with patient. Understanding verbalized. Nurse call bell within reach. Will continue to monitor per post procedure protocol.

## 2020-08-20 ENCOUNTER — TELEPHONE (OUTPATIENT)
Dept: ADMINISTRATIVE | Facility: OTHER | Age: 70
End: 2020-08-20

## 2020-08-21 ENCOUNTER — TELEPHONE (OUTPATIENT)
Dept: HEPATOLOGY | Facility: CLINIC | Age: 70
End: 2020-08-21

## 2020-09-11 ENCOUNTER — HOSPITAL ENCOUNTER (OUTPATIENT)
Facility: OTHER | Age: 70
Discharge: HOME | End: 2020-09-11
Attending: ANESTHESIOLOGY | Admitting: ANESTHESIOLOGY
Payer: COMMERCIAL

## 2020-09-11 VITALS
OXYGEN SATURATION: 95 % | TEMPERATURE: 98 F | HEART RATE: 80 BPM | WEIGHT: 275 LBS | HEIGHT: 76 IN | DIASTOLIC BLOOD PRESSURE: 89 MMHG | BODY MASS INDEX: 33.49 KG/M2 | RESPIRATION RATE: 16 BRPM | SYSTOLIC BLOOD PRESSURE: 151 MMHG

## 2020-09-11 DIAGNOSIS — G89.29 CHRONIC PAIN: ICD-10-CM

## 2020-09-11 DIAGNOSIS — M54.16 LUMBAR RADICULOPATHY: Primary | ICD-10-CM

## 2020-09-11 DIAGNOSIS — M48.062 SPINAL STENOSIS OF LUMBAR REGION WITH NEUROGENIC CLAUDICATION: ICD-10-CM

## 2020-09-11 LAB — POCT GLUCOSE: 94 MG/DL (ref 70–110)

## 2020-09-11 PROCEDURE — 64484 NJX AA&/STRD TFRM EPI L/S EA: CPT | Mod: RT | Performed by: ANESTHESIOLOGY

## 2020-09-11 PROCEDURE — 63600175 PHARM REV CODE 636 W HCPCS: Performed by: ANESTHESIOLOGY

## 2020-09-11 PROCEDURE — 64483 PR EPIDURAL INJ, ANES/STEROID, TRANSFORAMINAL, LUMB/SACR, SNGL LEVL: ICD-10-PCS | Mod: RT,,, | Performed by: ANESTHESIOLOGY

## 2020-09-11 PROCEDURE — 64484 PRA INJECT ANES/STEROID FORAMEN LUMBAR/SACRAL W IMG GUIDE ,EA ADD LEVEL: ICD-10-PCS | Mod: RT,,, | Performed by: ANESTHESIOLOGY

## 2020-09-11 PROCEDURE — 25000003 PHARM REV CODE 250: Performed by: ANESTHESIOLOGY

## 2020-09-11 PROCEDURE — 64483 NJX AA&/STRD TFRM EPI L/S 1: CPT | Mod: RT | Performed by: ANESTHESIOLOGY

## 2020-09-11 PROCEDURE — 64483 NJX AA&/STRD TFRM EPI L/S 1: CPT | Mod: RT,,, | Performed by: ANESTHESIOLOGY

## 2020-09-11 PROCEDURE — 25500020 PHARM REV CODE 255: Performed by: ANESTHESIOLOGY

## 2020-09-11 PROCEDURE — 64484 NJX AA&/STRD TFRM EPI L/S EA: CPT | Mod: RT,,, | Performed by: ANESTHESIOLOGY

## 2020-09-11 RX ORDER — FENTANYL CITRATE 50 UG/ML
INJECTION, SOLUTION INTRAMUSCULAR; INTRAVENOUS
Status: DISCONTINUED | OUTPATIENT
Start: 2020-09-11 | End: 2020-09-11 | Stop reason: HOSPADM

## 2020-09-11 RX ORDER — MIDAZOLAM HYDROCHLORIDE 1 MG/ML
INJECTION INTRAMUSCULAR; INTRAVENOUS
Status: DISCONTINUED | OUTPATIENT
Start: 2020-09-11 | End: 2020-09-11 | Stop reason: HOSPADM

## 2020-09-11 RX ORDER — SODIUM CHLORIDE 9 MG/ML
500 INJECTION, SOLUTION INTRAVENOUS CONTINUOUS
Status: DISCONTINUED | OUTPATIENT
Start: 2020-09-11 | End: 2020-09-11 | Stop reason: HOSPADM

## 2020-09-11 RX ORDER — LIDOCAINE HYDROCHLORIDE 10 MG/ML
INJECTION INFILTRATION; PERINEURAL
Status: DISCONTINUED | OUTPATIENT
Start: 2020-09-11 | End: 2020-09-11 | Stop reason: HOSPADM

## 2020-09-11 RX ORDER — DEXAMETHASONE SODIUM PHOSPHATE 10 MG/ML
INJECTION INTRAMUSCULAR; INTRAVENOUS
Status: DISCONTINUED | OUTPATIENT
Start: 2020-09-11 | End: 2020-09-11 | Stop reason: HOSPADM

## 2020-09-11 RX ORDER — LIDOCAINE HYDROCHLORIDE 5 MG/ML
INJECTION, SOLUTION INFILTRATION; INTRAVENOUS
Status: DISCONTINUED | OUTPATIENT
Start: 2020-09-11 | End: 2020-09-11 | Stop reason: HOSPADM

## 2020-09-11 RX ADMIN — SODIUM CHLORIDE 500 ML: 0.9 INJECTION, SOLUTION INTRAVENOUS at 02:09

## 2020-09-11 NOTE — DISCHARGE SUMMARY
Discharge Note  Short Stay      SUMMARY     Admit Date: 9/11/2020    Attending Physician: Pamela Anglin      Discharge Physician: Pamela Anglin      Discharge Date: 9/11/2020 3:32 PM    Procedure(s) (LRB):  INJECTION, STEROID, EPIDURAL, TRANSFORAMINAL APPROACH, L4-L5 AND L5-S1 clear to hold Eliquis 3 days (Right)    Final Diagnosis: Lumbar radiculopathy [M54.16]    Disposition: Home or self care    Patient Instructions:   Current Discharge Medication List      CONTINUE these medications which have NOT CHANGED    Details   allopurinol (ZYLOPRIM) 300 MG tablet Take 300 mg by mouth once daily.      apixaban (ELIQUIS) 5 mg Tab Take 5 mg by mouth 2 (two) times daily.      aspirin (ECOTRIN) 81 MG EC tablet Take 81 mg by mouth.      atorvastatin (LIPITOR) 80 MG tablet Take 80 mg by mouth once daily.      cholecalciferol, vitamin D3, 3,000 unit Tab Take 1 tablet by mouth.      coenzyme Q10 10 mg capsule Take 200 mg by mouth once daily.      colchicine (COLCRYS) 0.6 mg tablet TK 1 T PO BID PRF GOUT      febuxostat (ULORIC) 40 mg Tab Take 1 tablet (40 mg total) by mouth once daily.  Qty: 30 tablet, Refills: 6    Comments: Underlying enlarged liver, high dose allopurinol not advisable. Please dispense Uloric to control hyperuricemia.      glipiZIDE (GLUCOTROL) 5 MG tablet Take 5 mg by mouth 2 (two) times daily before meals.      hydrALAZINE (APRESOLINE) 25 MG Tab Take 1 tablet (25 mg total) by mouth 3 (three) times daily.  Qty: 90 tablet, Refills: 11    Comments: .  Associated Diagnoses: Acute on chronic combined systolic and diastolic heart failure      isosorbide dinitrate (ISORDIL) 20 MG tablet Take 1 tablet (20 mg total) by mouth 3 (three) times daily.  Qty: 90 tablet, Refills: 11    Associated Diagnoses: Acute on chronic combined systolic and diastolic heart failure      magnesium oxide (MAG-OX) 400 mg (241.3 mg magnesium) tablet Take 400 mg by mouth.      melatonin 5 mg Cap Take by mouth.      metoprolol  succinate (TOPROL-XL) 100 MG 24 hr tablet Take 100 mg by mouth once daily.       potassium chloride SA (K-DUR,KLOR-CON) 10 MEQ tablet potassium chloride ER 10 mEq tablet,extended release(part/cryst)      risperiDONE (RISPERDAL) 3 MG Tab Take by mouth.      sacubitril-valsartan (ENTRESTO)  mg per tablet Take 1 tablet by mouth 2 (two) times daily.       sertraline (ZOLOFT) 50 MG tablet Take 50 mg by mouth once daily. TAKE THREE TABLETS BY MOUTH EVERY MORNING TO IMPROVE MOOD       spironolactone (ALDACTONE) 25 MG tablet Take 0.5 mg by mouth once daily.      torsemide (DEMADEX) 20 MG Tab Take 20 mg by mouth. Take 3 tab by mouth every day                 Discharge Diagnosis: Lumbar radiculopathy [M54.16]  Condition on Discharge: Stable with no complications to procedure   Diet on Discharge: Same as before.  Activity: as per instruction sheet.  Discharge to: Home with a responsible adult.  Follow up: 2-4 weeks       Please call my office or pager at [phone] if experienced any weakness or loss of sensation, fever > 101.5, pain uncontrolled with oral medications, persistent nausea/vomiting/or diarrhea, redness or drainage from the incisions, or any other worrisome concerns. If physician on call was not reached or could not communicate with our office for any reason please go to the nearest emergency department

## 2020-09-11 NOTE — DISCHARGE INSTRUCTIONS

## 2020-09-11 NOTE — OP NOTE
Patient Name: Ishmael Thrasher  MRN: [id number]    INFORMED CONSENT: The procedure, risks, benefits and options were discussed with patient. There are no contraindications to the procedure. The patient expressed understanding and agreed to proceed. The personnel performing the procedure was discussed. I verify that I personally obtained Ishmael's consent prior to the start of the procedure and the signed consent can be found on the patient's chart.    Procedure Date: 09/11/2020    Anesthesia: Topical    Pre Procedure diagnosis: Lumbar radiculopathy [M54.16]  1. Lumbar radiculopathy    2. Spinal stenosis of lumbar region with neurogenic claudication    3. Chronic pain      Post-Procedure diagnosis: SAME      Sedation: Yes - Fentanyl 50 mcg and Midazolam 2 mg  Sedation time: Less than 15 minutes  Conscious sedation ordered by MD. Patient reevaluated and sedation administered by MD and monitored by RN.      PROCEDURE:Right L4/L5 and L5/S1   TRANSFORAMINAL EPIDURAL STEROID INJECTION        DESCRIPTION OF PROCEDURE: The patient was brought to the procedure room. After performing time out IV access was obtained prior to the procedure. The patient was positioned prone on the fluoroscopy table. Continuous hemodynamic monitoring was initiated including blood pressure, EKG, and pulse oximetry. . The skin was prepped with chlorhexidine three times and draped in a sterile fashion. Skin anesthesia was achieved using 3 mL of lidocaine 1% over the respective injection site.     An oblique fluoroscopic view was obtained, with the superior articular process of the inferior vertebral body aligned with the pedicle. The tip of a 22-gauge 5-inch Quincke-type spinal needle was advanced toward the 6 oclock position of the L4 and L5 pedicle under intermittent fluoroscopic guidance. Confirmation of proper needle position was made with AP, oblique, and lateral fluoroscopic views. Negative aspiration for blood or CSF was confirmed. 2 mL of  Omnipaque 300 was injected. Live fluoroscopic imaging revealed a clear outline of the spinal nerve with proximal spread of agent through the neural foramen into the anterior epidural space. A total combination of 3 mL of Bupivacaine 0.25% and 10 mg decadron was injected at each level. Contrast spread was noted from L4 to L5 level. There was no pain on injection. The needle was removed and bleeding was nil.  A sterile dressing was applied. Ishmael was taken back to the recovery room for further observation.     Blood Loss: Nill  Specimen: None    Austin Parsons MD

## 2020-09-11 NOTE — H&P
"HPI  Patient presenting for Procedure(s) (LRB):  INJECTION, STEROID, EPIDURAL, TRANSFORAMINAL APPROACH, L4-L5 AND L5-S1 clear to hold Eliquis 3 days (Right)     Patient on Anti-coagulation Yes Eliquis, held since 9/6/2020 PM    No health changes since previous encounter    Past Medical History:   Diagnosis Date    Brain damage     traumatic    CAD (coronary artery disease)     Cardiomyopathy     CHF (congestive heart failure)     Diabetes mellitus     type 2    Edema     Erectile dysfunction     GERD (gastroesophageal reflux disease)     HTN (hypertension)     Hyperlipemia     Sciatic nerve pain, right     leg     Past Surgical History:   Procedure Laterality Date    CARDIAC DEFIBRILLATOR PLACEMENT      CATHETERIZATION OF BOTH LEFT AND RIGHT HEART Bilateral 08/20/2018    CORONARY ARTERY BYPASS GRAFT      ILIAC ARTERY STENT      RIGHT HEART CATHETERIZATION Right 8/17/2020    Procedure: INSERTION, CATHETER, RIGHT HEART;  Surgeon: Brianda Navas MD;  Location: [facility] CATH LAB;  Service: Cardiology;  Laterality: Right;     Review of patient's allergies indicates:   Allergen Reactions    Vasotec [enalaprilat] Swelling     Neck swelling    Cyclobenzaprine      Other reaction(s): Swelling  Hallucinations according to patient.        Current Facility-Administered Medications   Medication    0.9%  NaCl infusion       PMHx, PSHx, Allergies, Medications reviewed in epic    ROS negative except pain complaints in HPI    OBJECTIVE:    /83   Pulse 82   Temp 98.2 °F (36.8 °C) (Oral)   Resp 16   Ht 6' 4" (1.93 m)   Wt 124.7 kg (275 lb)   SpO2 97%   BMI 33.47 kg/m²     PHYSICAL EXAMINATION:    GENERAL: Well appearing, in no acute distress, alert and oriented x3.  PSYCH:  Mood and affect appropriate.  SKIN: Skin color, texture, turgor normal, no rashes or lesions which will impact the procedure.  CV: RRR with palpation of the radial artery.  PULM: No evidence of respiratory difficulty, symmetric chest " rise. Clear to auscultation.  NEURO: Cranial nerves grossly intact.    Plan:    Proceed with procedure as planned Procedure(s) (LRB):  INJECTION, STEROID, EPIDURAL, TRANSFORAMINAL APPROACH, L4-L5 AND L5-S1 clear to hold Eliquis 3 days (Right)    Pamela Anglin  09/11/2020

## 2020-09-14 DIAGNOSIS — M48.062 SPINAL STENOSIS OF LUMBAR REGION WITH NEUROGENIC CLAUDICATION: ICD-10-CM

## 2020-09-14 DIAGNOSIS — G89.4 CHRONIC PAIN SYNDROME: ICD-10-CM

## 2020-09-14 DIAGNOSIS — M54.16 LUMBAR RADICULOPATHY: Primary | ICD-10-CM

## 2020-09-14 RX ORDER — HYDROCODONE BITARTRATE AND ACETAMINOPHEN 5; 325 MG/1; MG/1
1 TABLET ORAL EVERY 12 HOURS PRN
Qty: 60 TABLET | Refills: 0 | Status: SHIPPED | OUTPATIENT
Start: 2020-09-14 | End: 2020-10-07 | Stop reason: SDUPTHER

## 2020-09-14 NOTE — TELEPHONE ENCOUNTER
----- Message from Matthew Hernandez sent at 9/14/2020 11:19 AM CDT -----  Regarding: Refill  Contact: ALLIE TOLEDO [[id number]]      Can the clinic reply in [facility]: no      Please refill the medication(s) listed below. Please call the patient when the prescription(s) is ready for  at this phone number   [phone]      Medication #1 HYDROcodone-acetaminophen (NORCO) 5-325 mg per tablet (requesting an increase in strength)    [id number] [place] [facility] [address] AT [facility]

## 2020-09-14 NOTE — TELEPHONE ENCOUNTER
Patient requesting refill on norco  Last office visit 08/06/20   shows last refill on 08/06/20  Patient does have a pain contract on file with [facility]  Patient last UDS 08/06/20was consistent with current therapy

## 2020-09-15 ENCOUNTER — TELEPHONE (OUTPATIENT)
Dept: NEPHROLOGY | Facility: CLINIC | Age: 70
End: 2020-09-15

## 2020-09-15 ENCOUNTER — TELEPHONE (OUTPATIENT)
Dept: PAIN MEDICINE | Facility: CLINIC | Age: 70
End: 2020-09-15

## 2020-09-15 NOTE — TELEPHONE ENCOUNTER
----- Message from Gilmar Mac sent at 9/15/2020 10:58 AM CDT -----  Name of Who is Calling: ALLIE TOLEDO  What is the request in detail: Patient is requesting some pain medications due to him still being in pain since his injection procedure, patient would like to speak to someone in the office in regards to this. Please advise  Can the clinic reply by [facility]: No  What Number to Call Back if not in [facility]: [phone]

## 2020-09-15 NOTE — TELEPHONE ENCOUNTER
Open scape expression.   Spoke to two representative whom both stated that no note was left in pt chart in regards to anything.also stated that ericka ochoa is w/ [facility] not Angi FRAUSTO  could not reach.     -will wait for c/b                       ----- Message from Cole Romero sent at 9/15/2020  8:17 AM CDT -----  Contact: Gay merritt/Angi FRAUSTO  Calling to speak with nurse regarding pt appt     Call back: [phone]

## 2020-09-15 NOTE — DISCHARGE SUMMARY
Patient tolerated the procedure well. Please see complete RHC procedure note for full details and results. Stable to return from cath lab to their hospital room.  Procedure: Right heart catheterization   Procedure date: 08/17/20    Discharge date: 08/17/20  Estimated blood loss: less than 10 cc  Complications: none  Condition at discharge: stable  Discharge instructions: no heavy lifting greater than 5 lbs and no bearing down/coughing without holding pressure over incision site.  Activity: as tolerated after 24 hours.

## 2020-09-15 NOTE — TELEPHONE ENCOUNTER
Staff contacted patient informing him medication was sent to pharmacy listed     Patient verbalized understanding and confirmed

## 2020-09-18 ENCOUNTER — TELEPHONE (OUTPATIENT)
Dept: PAIN MEDICINE | Facility: CLINIC | Age: 70
End: 2020-09-18

## 2020-09-18 NOTE — TELEPHONE ENCOUNTER
Staff contacted patient for clarification regarding note from visit      Patient informed staff he needed clearance note that states he was seen be doctor on 09/11/20 for surgery

## 2020-09-18 NOTE — TELEPHONE ENCOUNTER
----- Message from Raya Christianson sent at 9/18/2020  8:58 AM CDT -----  Contact: ALLIE TOLEDO [[id number]]  Name of Who is Calling: ALLIE TOLEDO [[id number]]      What is the request in detail: [phone]      Can the clinic reply by [facility]:no      What Number to Call Back if not in [facility]: [phone] (home)

## 2020-09-21 ENCOUNTER — TELEPHONE (OUTPATIENT)
Dept: NEPHROLOGY | Facility: CLINIC | Age: 70
End: 2020-09-21

## 2020-09-21 NOTE — TELEPHONE ENCOUNTER
Spoke to daughter. She called to report that mother was going to go out of the house and was very short of breath. Denies any weight gain or edema. She called PCP office and has not heard back yet. She wanted to know if she could get albuterol that  gave her many years ago. Advised that I can ask, however, she needs to be seen and her lungs listened to in order to see if this is appropriate. Offered to be seen tomorrow- wanted to know if  could see her today. Advised I would forward message to Mel to check. Apt tentatively made for tomorrow at 4     Spoke to pt informed that schd has not open for provider.  Also called saige FRAUSTO/nurse Mcelroy informed of news as well. Lilibeth v/arnold            ----- Message from Renetta Gay sent at 9/21/2020 12:34 PM CDT -----  Regarding: Appt  Contact: Nya FRAUSTO/nurse Mcelroy  Reason: Calling to schedule f/u with Dr    Communication: [phone] Lilibeth

## 2020-09-28 ENCOUNTER — OFFICE VISIT (OUTPATIENT)
Dept: PAIN MEDICINE | Facility: CLINIC | Age: 70
End: 2020-09-28
Payer: COMMERCIAL

## 2020-09-28 VITALS
DIASTOLIC BLOOD PRESSURE: 71 MMHG | HEIGHT: 76 IN | WEIGHT: 276.44 LBS | BODY MASS INDEX: 33.66 KG/M2 | TEMPERATURE: 98 F | HEART RATE: 74 BPM | SYSTOLIC BLOOD PRESSURE: 107 MMHG

## 2020-09-28 DIAGNOSIS — Z79.891 ENCOUNTER FOR LONG-TERM OPIATE ANALGESIC USE: ICD-10-CM

## 2020-09-28 DIAGNOSIS — M47.816 LUMBAR SPONDYLOSIS: ICD-10-CM

## 2020-09-28 DIAGNOSIS — M54.16 LUMBAR RADICULOPATHY: Primary | ICD-10-CM

## 2020-09-28 DIAGNOSIS — M48.062 SPINAL STENOSIS OF LUMBAR REGION WITH NEUROGENIC CLAUDICATION: ICD-10-CM

## 2020-09-28 DIAGNOSIS — G89.4 CHRONIC PAIN DISORDER: ICD-10-CM

## 2020-09-28 DIAGNOSIS — I50.9 CHRONIC CONGESTIVE HEART FAILURE, UNSPECIFIED HEART FAILURE TYPE: ICD-10-CM

## 2020-09-28 PROCEDURE — 99213 OFFICE O/P EST LOW 20 MIN: CPT | Mod: S$PBB,,, | Performed by: NURSE PRACTITIONER

## 2020-09-28 PROCEDURE — 99213 PR OFFICE/OUTPT VISIT, EST, LEVL III, 20-29 MIN: ICD-10-PCS | Mod: S$PBB,,, | Performed by: NURSE PRACTITIONER

## 2020-09-28 PROCEDURE — 99999 PR PBB SHADOW E&M-EST. PATIENT-LVL IV: CPT | Mod: PBBFAC,,, | Performed by: NURSE PRACTITIONER

## 2020-09-28 PROCEDURE — 99999 PR PBB SHADOW E&M-EST. PATIENT-LVL IV: ICD-10-PCS | Mod: PBBFAC,,, | Performed by: NURSE PRACTITIONER

## 2020-09-28 PROCEDURE — 80307 DRUG TEST PRSMV CHEM ANLYZR: CPT | Mod: NTX

## 2020-09-28 PROCEDURE — 99214 OFFICE O/P EST MOD 30 MIN: CPT | Mod: PBBFAC | Performed by: NURSE PRACTITIONER

## 2020-09-28 NOTE — PROGRESS NOTES
Chronic patient Established Note (Follow up visit)    SRINIVAS Thrasher is a 70 y.o. male with a history of CHF (EF 10%) who presents today with chronic low back pain. This pain started in 1971 as a result of and injury in the .  He describes a constant, right sided pain that starts in his low back and radiates down the posterior aspect of his right leg to the bottom of his foot.  This is associated with a numbness, tingling sensation.  The pain occurs when he is standing still for longer than 5 minutes.  The pain does not occur when walking unless he is standing first.  He denies heaviness and weakness.  This pain is described in detail below.    Aggravating factors: Standing in place for 5 minutes     Mitigating factors: Walking, medication, rest     Previously seeing: Dr. Davide Wang ([place])     Physical Therapy: Has done for his heart, but not for his back     Interval History (7/2/2020):  The patient returns to clinic today for follow up of back pain. He continues to report low back pain that radiates down the posterior aspect of his right leg to the bottom of his foot. He denies any left leg pain. His pain is worse with prolonged standing. He has had injections in the past without relief. He is currently taking Tramadol with limited relief. He denies any other health changes. His pain today is 10/10.    Interval History 8/6/2020:   Ishmael Thrasher presents to the clinic for a follow-up appointment for bilateral leg pain. Since the last visit, Ishmael Thrasher states the pain has been worsening. Current pain intensity is 10/10.    Interval History 9/28/2020:  The patient returns to clinic today for follow up of low back and leg pain. He is s/p right L4/5 and L5/S1 TF PÉREZ on 9/11/2020. He reports no significant relief of his back and leg pain. He continues to report low back pain that radiates down the posterior aspect of his right leg to under his foot. He denies any left leg pain. His pain is  worse with prolonged standing and walking. He is currently taking Norco with limited benefit. He reports that this decreases his pain but does not last. He denies any other health changes. His pain today is 10/10.     Non-pharmacologic Treatment:      · Ice/Heat: Heat helps  · TENS: Not tried  · Massage: Not tried  · Chiropractic care: Not tried  · Acupuncture: Helpful  · Other: No brace         Pain Medications:         · Currently taking:  Zoloft 50 mg daily,  risperidone 3 mg QHS, melatonin, Norco      · Has tried in the past:    ? Opioids: Norco 10/325 mg daily (took himself off), Tramadol 50 mg (sparing use, helpful)  ? NSAIDS: Avoids due to Eliquis and CAD  ? Tylenol: Excedrin (no help)  ? Muscle relaxants:  Cyclobenzaprine (side effects), Baclofen 10 mg 3 times daily,    ? TCAs:  Amitriptyline 50 mg  ? SNRIs: Not tried  ? Anticonvulsants: Gabapentin 800 mg (>15 years ago) no benefit  ? topical creams: Back balm helps  ? Other: Seroquel 400 mg QHS, Remeron 15 mg,     Blood thinners:  Eliquis 5 mg     Interventional Therapies:   · Bilateral ankle steroid injections Q3 months, last 1.5 months ago: Helpful  · Bilateral knee steroid injections Q3 months, last 1.5 months ago: helpful  · Epidural steroid injection x 2 2018: Helpful for 1-2 months     Relevant Surgeries:   · None, he reports that he couldn't get surgery on his knees, back, and ankle due to his heart      Pain Disability Index Review:  Last 3 PDI Scores 9/28/2020 8/6/2020 7/2/2020   Pain Disability Index (PDI) 69 70 70       Pain Medications:  Norco     Opioid Contract: yes     report:  Reviewed and consistent with medication use as prescribed.      Physical Therapy/Home Exercise: yes    Imaging:   Narrative & Impression     EXAMINATION:  XR LUMBAR SPINE 5 VIEW WITH FLEX AND EXT     CLINICAL HISTORY:  Back pain or radiculopathy, > 6 wks;  Radiculopathy, lumbar region     TECHNIQUE:  AP, lateral, oblique views of the lumbar spine with spot view of  the lumbosacral region and lateral views in flexion and extension.     COMPARISON:  None     FINDINGS:  There is no collapse or malalignment in the lumbar spine.  There is no significant change in alignment between flexion and extension.     There is reduced of the intervertebral disc spaces at L4-L5 and L5-S1 with disc vacuum phenomena and anterior osteophyte formation.  There are mild neural foraminal stenosis at these levels.     There is mild bilateral neural foraminal stenosis.     There are extensive vascular calcifications in the abdominal aorta.  The soft tissues are otherwise unremarkable.     Impression:     1. Moderate to severe spondylosis and neural foraminal stenosis at L4-L5 and L5-S1.  2. Straightening of lumbar lordosis  3. No changes in alignment in flexion and extension     Electronically signed by resident: Marko Lamas  Date:                                            07/07/2020  Time:                                           15:05     Electronically signed by: Roly Qiu  Date:                                            07/07/2020  Time:                                           15:21        Narrative & Impression     EXAMINATION:  CT LUMBAR SPINE WITHOUT CONTRAST     CLINICAL HISTORY:  Back pain or radiculopathy, > 6 wks;.     COMPARISON:  None.     FINDINGS:  Decrease height of vertebral bodies L4-L5 predominant at L5.  Degenerative changes in the endplates with decrease height intervertebral space L4-L5.  Vacuum phenomenon at L5-S1 indicating remarkable decrease.  Facet hypertrophy and degenerative changes in facet joints L3-L4, bilaterally.     Prominent aortic calcifications.     Left pleural effusion and atelectasis     Impression:     Prominent degenerative changes in the lumbar spine at L4, L5 and S1     Decrease height of intervertebral space L4-L5 with degenerative process and decrease height of vertebral bodies L4-L5 predominant at L5     Remarkable disc disease at  L5-S1     Atherosclerosis     Left pleural effusion and atelectasis.  Please see CT of the chest        Electronically signed by: Roly Qiu  Date:                                            07/08/2020  Time:                                           16:00           Allergies:   Review of patient's allergies indicates:   Allergen Reactions    Vasotec [enalaprilat] Swelling     Neck swelling    Cyclobenzaprine      Other reaction(s): Swelling  Hallucinations according to patient.         Current Medications:   Current Outpatient Medications   Medication Sig Dispense Refill    allopurinol (ZYLOPRIM) 300 MG tablet Take 300 mg by mouth once daily.      apixaban (ELIQUIS) 5 mg Tab Take 5 mg by mouth 2 (two) times daily.      aspirin (ECOTRIN) 81 MG EC tablet Take 81 mg by mouth.      atorvastatin (LIPITOR) 80 MG tablet Take 80 mg by mouth once daily.      cholecalciferol, vitamin D3, 3,000 unit Tab Take 1 tablet by mouth.      coenzyme Q10 10 mg capsule Take 200 mg by mouth once daily.      colchicine (COLCRYS) 0.6 mg tablet TK 1 T PO BID PRF GOUT      febuxostat (ULORIC) 40 mg Tab Take 1 tablet (40 mg total) by mouth once daily. 30 tablet 6    glipiZIDE (GLUCOTROL) 5 MG tablet Take 5 mg by mouth 2 (two) times daily before meals.      hydrALAZINE (APRESOLINE) 25 MG Tab Take 1 tablet (25 mg total) by mouth 3 (three) times daily. 90 tablet 11    HYDROcodone-acetaminophen (NORCO) 5-325 mg per tablet Take 1 tablet by mouth every 12 (twelve) hours as needed for Pain. 60 tablet 0    isosorbide dinitrate (ISORDIL) 20 MG tablet Take 1 tablet (20 mg total) by mouth 3 (three) times daily. 90 tablet 11    magnesium oxide (MAG-OX) 400 mg (241.3 mg magnesium) tablet Take 400 mg by mouth.      melatonin 5 mg Cap Take by mouth.      metoprolol succinate (TOPROL-XL) 100 MG 24 hr tablet Take 100 mg by mouth once daily.       potassium chloride SA (K-DUR,KLOR-CON) 10 MEQ tablet potassium chloride ER 10 mEq  tablet,extended release(part/cryst)      risperiDONE (RISPERDAL) 3 MG Tab Take by mouth.      sacubitril-valsartan (ENTRESTO)  mg per tablet Take 1 tablet by mouth 2 (two) times daily.       sertraline (ZOLOFT) 50 MG tablet Take 50 mg by mouth once daily. TAKE THREE TABLETS BY MOUTH EVERY MORNING TO IMPROVE MOOD       spironolactone (ALDACTONE) 25 MG tablet Take 0.5 mg by mouth once daily.      torsemide (DEMADEX) 20 MG Tab Take 20 mg by mouth. Take 3 tab by mouth every day       No current facility-administered medications for this visit.        REVIEW OF SYSTEMS:    GENERAL:  No weight loss, malaise or fevers.  HEENT:  Negative for frequent or significant headaches.  NECK:  Negative for lumps, goiter, pain and significant neck swelling.  RESPIRATORY:  Negative for cough, wheezing or shortness of breath.  CARDIOVASCULAR:  Negative for chest pain, leg swelling or palpitations. +CAD  GI:  Negative for abdominal discomfort, blood in stools or black stools or change in bowel habits.  MUSCULOSKELETAL:  See HPI  SKIN:  Negative for lesions, rash, and itching.  PSYCH:  Negative for  mood disorder and recent psychosocial stressors. +sleep disturbance  HEMATOLOGY/LYMPHOLOGY:  Negative for prolonged bleeding, bruising easily or swollen nodes.  NEURO:   No history of headaches, syncope, paralysis, seizures or tremors.  All other reviewed and negative other than HPI.    Past Medical History:  Past Medical History:   Diagnosis Date    Brain damage     traumatic    CAD (coronary artery disease)     Cardiomyopathy     CHF (congestive heart failure)     Diabetes mellitus     type 2    Edema     Erectile dysfunction     GERD (gastroesophageal reflux disease)     HTN (hypertension)     Hyperlipemia     Sciatic nerve pain, right     leg       Past Surgical History:  Past Surgical History:   Procedure Laterality Date    CARDIAC DEFIBRILLATOR PLACEMENT      CATHETERIZATION OF BOTH LEFT AND RIGHT HEART Bilateral  "08/20/2018    CORONARY ARTERY BYPASS GRAFT      ILIAC ARTERY STENT      RIGHT HEART CATHETERIZATION Right 8/17/2020    Procedure: INSERTION, CATHETER, RIGHT HEART;  Surgeon: Brianda Navas MD;  [facility];  Service: Cardiology;  Laterality: Right;    TRANSFORAMINAL EPIDURAL INJECTION OF STEROID Right 9/11/2020    Procedure: INJECTION, STEROID, EPIDURAL, TRANSFORAMINAL APPROACH, L4-L5 AND L5-S1 clear to hold Eliquis 3 days;  Surgeon: Austin Parsons MD;  Location: [facility] PAIN MGT;  Service: Pain Management;  Laterality: Right;       Family History:  History reviewed. No pertinent family history.    Social History:  Social History     Socioeconomic History    Marital status:      Spouse name: Not on file    Number of children: Not on file    Years of education: Not on file    Highest education level: Not on file   Occupational History    Not on file   Social Needs    Financial resource strain: Not on file    Food insecurity     Worry: Not on file     Inability: Not on file    Transportation needs     Medical: Not on file     Non-medical: Not on file   Tobacco Use    Smoking status: Never Smoker    Smokeless tobacco: Never Used   Substance and Sexual Activity    Alcohol use: Not Currently    Drug use: Not Currently    Sexual activity: Not on file   Lifestyle    Physical activity     Days per week: Not on file     Minutes per session: Not on file    Stress: Not on file   Relationships    Social connections     Talks on phone: Not on file     Gets together: Not on file     Attends [employer] service: Not on file     Active member of club or organization: Not on file     Attends meetings of clubs or organizations: Not on file     Relationship status: Not on file   Other Topics Concern    Not on file   Social History Narrative    Not on file       OBJECTIVE:    /71   Pulse 74   Temp 97.8 °F (36.6 °C)   Ht 6' 4" (1.93 m)   Wt 125.4 kg (276 lb 7.3 oz)   BMI 33.65 kg/m² "     PHYSICAL EXAMINATION:    General appearance: Well appearing, in no acute distress, alert and oriented x3.  Psych:  Mood and affect appropriate.  Skin: Skin color, texture, turgor normal, no rashes or lesions, in both upper and lower body.  Head/face:  Atraumatic, normocephalic. No palpable lymph nodes  Cor: RRR  Pulm: CTA  GI: Abdomen soft and non-tender.  Back: Straight leg raising in the sitting position is positive to radicular pain on the right. There is pain to palpation over lumbar paraspinal muscles, right more than left. Limited ROM with pain on flexion and extension. Positive facet loading bilaterally.   Extremities: Peripheral joint ROM is full and pain free without obvious instability or laxity in all four extremities. No deformities, edema, or skin discoloration. Good capillary refill.  Musculoskeletal: Shoulder, hip, sacroiliac and knee provocative maneuvers are negative. Mild pain with palpation over bilateral SI joints. FABERs is negative bilaterally. Bilateral lower extremity strength is normal and symmetric.  No atrophy or tone abnormalities are noted.  Neuro: Bilateral lower extremity coordination and muscle stretch reflexes are physiologic and symmetric.  Plantar response are downgoing. No loss of sensation is noted.  Gait: Antalgic- ambulates without assistance.     ASSESSMENT: 70 y.o. year old male with chronic lower back pain, consistent with the followin. Lumbar radiculopathy     2. Spinal stenosis of lumbar region with neurogenic claudication     3. Lumbar spondylosis     4. Chronic pain disorder     5. Chronic congestive heart failure, unspecified heart failure type     6. Encounter for long-term opiate analgesic use  Pain Clinic Drug Screen         PLAN:     - Previous imaging was reviewed and discussed with the patient today.    - Schedule for right L5/S1 and S1 TF PÉREZ.     - Consider genicular nerve blocks to proceed with RFA for his knee.     - Pain contract signed  8/6/2020.     - Continue Norco 5/325 mg BID PRN.     - The patient is here today for a refill of current pain medications and they believe these provide effective pain control and improvements in quality of life.  The patient notes no serious side effects, and feels the benefits outweigh the risks.  The patient was reminded of the pain contract that they signed previously as well as the risks and benefits of the medication including possible death.  The updated [place] Board  Pharmacy prescription monitoring program was reviewed, and the patient has been found to be compliant with current treatment plan.    - UDS done today.     - I have stressed the importance of physical activity and a home exercise plan to help with pain and improve health.    - RTC in 2 weeks after the procedure.    - Counseled patient regarding the importance of activity modification and physical therapy.    - Dr. Parsons was consulted on the patient and agrees with this plan.    The above plan and management options were discussed at length with patient. Patient is in agreement with the above and verbalized understanding.    Aye Milner NP  09/28/2020

## 2020-09-28 NOTE — H&P (VIEW-ONLY)
Chronic patient Established Note (Follow up visit)    SRINIVAS Thrasher is a 70 y.o. male with a history of CHF (EF 10%) who presents today with chronic low back pain. This pain started in 1971 as a result of and injury in the .  He describes a constant, right sided pain that starts in his low back and radiates down the posterior aspect of his right leg to the bottom of his foot.  This is associated with a numbness, tingling sensation.  The pain occurs when he is standing still for longer than 5 minutes.  The pain does not occur when walking unless he is standing first.  He denies heaviness and weakness.  This pain is described in detail below.    Aggravating factors: Standing in place for 5 minutes     Mitigating factors: Walking, medication, rest     Previously seeing: Dr. Davide Wang ([place])     Physical Therapy: Has done for his heart, but not for his back     Interval History (7/2/2020):  The patient returns to clinic today for follow up of back pain. He continues to report low back pain that radiates down the posterior aspect of his right leg to the bottom of his foot. He denies any left leg pain. His pain is worse with prolonged standing. He has had injections in the past without relief. He is currently taking Tramadol with limited relief. He denies any other health changes. His pain today is 10/10.    Interval History 8/6/2020:   Ishmael Thrasher presents to the clinic for a follow-up appointment for bilateral leg pain. Since the last visit, Ishmael Thrasher states the pain has been worsening. Current pain intensity is 10/10.    Interval History 9/28/2020:  The patient returns to clinic today for follow up of low back and leg pain. He is s/p right L4/5 and L5/S1 TF PÉREZ on 9/11/2020. He reports no significant relief of his back and leg pain. He continues to report low back pain that radiates down the posterior aspect of his right leg to under his foot. He denies any left leg pain. His pain is  worse with prolonged standing and walking. He is currently taking Norco with limited benefit. He reports that this decreases his pain but does not last. He denies any other health changes. His pain today is 10/10.     Non-pharmacologic Treatment:      · Ice/Heat: Heat helps  · TENS: Not tried  · Massage: Not tried  · Chiropractic care: Not tried  · Acupuncture: Helpful  · Other: No brace         Pain Medications:         · Currently taking:  Zoloft 50 mg daily,  risperidone 3 mg QHS, melatonin, Norco      · Has tried in the past:    ? Opioids: Norco 10/325 mg daily (took himself off), Tramadol 50 mg (sparing use, helpful)  ? NSAIDS: Avoids due to Eliquis and CAD  ? Tylenol: Excedrin (no help)  ? Muscle relaxants:  Cyclobenzaprine (side effects), Baclofen 10 mg 3 times daily,    ? TCAs:  Amitriptyline 50 mg  ? SNRIs: Not tried  ? Anticonvulsants: Gabapentin 800 mg (>15 years ago) no benefit  ? topical creams: Back balm helps  ? Other: Seroquel 400 mg QHS, Remeron 15 mg,     Blood thinners:  Eliquis 5 mg     Interventional Therapies:   · Bilateral ankle steroid injections Q3 months, last 1.5 months ago: Helpful  · Bilateral knee steroid injections Q3 months, last 1.5 months ago: helpful  · Epidural steroid injection x 2 2018: Helpful for 1-2 months     Relevant Surgeries:   · None, he reports that he couldn't get surgery on his knees, back, and ankle due to his heart      Pain Disability Index Review:  Last 3 PDI Scores 9/28/2020 8/6/2020 7/2/2020   Pain Disability Index (PDI) 69 70 70       Pain Medications:  Norco     Opioid Contract: yes     report:  Reviewed and consistent with medication use as prescribed.      Physical Therapy/Home Exercise: yes    Imaging:   Narrative & Impression     EXAMINATION:  XR LUMBAR SPINE 5 VIEW WITH FLEX AND EXT     CLINICAL HISTORY:  Back pain or radiculopathy, > 6 wks;  Radiculopathy, lumbar region     TECHNIQUE:  AP, lateral, oblique views of the lumbar spine with spot view of  the lumbosacral region and lateral views in flexion and extension.     COMPARISON:  None     FINDINGS:  There is no collapse or malalignment in the lumbar spine.  There is no significant change in alignment between flexion and extension.     There is reduced of the intervertebral disc spaces at L4-L5 and L5-S1 with disc vacuum phenomena and anterior osteophyte formation.  There are mild neural foraminal stenosis at these levels.     There is mild bilateral neural foraminal stenosis.     There are extensive vascular calcifications in the abdominal aorta.  The soft tissues are otherwise unremarkable.     Impression:     1. Moderate to severe spondylosis and neural foraminal stenosis at L4-L5 and L5-S1.  2. Straightening of lumbar lordosis  3. No changes in alignment in flexion and extension     Electronically signed by resident: Marko Lamas  Date:                                            07/07/2020  Time:                                           15:05     Electronically signed by: Roly Qiu  Date:                                            07/07/2020  Time:                                           15:21        Narrative & Impression     EXAMINATION:  CT LUMBAR SPINE WITHOUT CONTRAST     CLINICAL HISTORY:  Back pain or radiculopathy, > 6 wks;.     COMPARISON:  None.     FINDINGS:  Decrease height of vertebral bodies L4-L5 predominant at L5.  Degenerative changes in the endplates with decrease height intervertebral space L4-L5.  Vacuum phenomenon at L5-S1 indicating remarkable decrease.  Facet hypertrophy and degenerative changes in facet joints L3-L4, bilaterally.     Prominent aortic calcifications.     Left pleural effusion and atelectasis     Impression:     Prominent degenerative changes in the lumbar spine at L4, L5 and S1     Decrease height of intervertebral space L4-L5 with degenerative process and decrease height of vertebral bodies L4-L5 predominant at L5     Remarkable disc disease at  L5-S1     Atherosclerosis     Left pleural effusion and atelectasis.  Please see CT of the chest        Electronically signed by: Roly Qiu  Date:                                            07/08/2020  Time:                                           16:00           Allergies:   Review of patient's allergies indicates:   Allergen Reactions    Vasotec [enalaprilat] Swelling     Neck swelling    Cyclobenzaprine      Other reaction(s): Swelling  Hallucinations according to patient.         Current Medications:   Current Outpatient Medications   Medication Sig Dispense Refill    allopurinol (ZYLOPRIM) 300 MG tablet Take 300 mg by mouth once daily.      apixaban (ELIQUIS) 5 mg Tab Take 5 mg by mouth 2 (two) times daily.      aspirin (ECOTRIN) 81 MG EC tablet Take 81 mg by mouth.      atorvastatin (LIPITOR) 80 MG tablet Take 80 mg by mouth once daily.      cholecalciferol, vitamin D3, 3,000 unit Tab Take 1 tablet by mouth.      coenzyme Q10 10 mg capsule Take 200 mg by mouth once daily.      colchicine (COLCRYS) 0.6 mg tablet TK 1 T PO BID PRF GOUT      febuxostat (ULORIC) 40 mg Tab Take 1 tablet (40 mg total) by mouth once daily. 30 tablet 6    glipiZIDE (GLUCOTROL) 5 MG tablet Take 5 mg by mouth 2 (two) times daily before meals.      hydrALAZINE (APRESOLINE) 25 MG Tab Take 1 tablet (25 mg total) by mouth 3 (three) times daily. 90 tablet 11    HYDROcodone-acetaminophen (NORCO) 5-325 mg per tablet Take 1 tablet by mouth every 12 (twelve) hours as needed for Pain. 60 tablet 0    isosorbide dinitrate (ISORDIL) 20 MG tablet Take 1 tablet (20 mg total) by mouth 3 (three) times daily. 90 tablet 11    magnesium oxide (MAG-OX) 400 mg (241.3 mg magnesium) tablet Take 400 mg by mouth.      melatonin 5 mg Cap Take by mouth.      metoprolol succinate (TOPROL-XL) 100 MG 24 hr tablet Take 100 mg by mouth once daily.       potassium chloride SA (K-DUR,KLOR-CON) 10 MEQ tablet potassium chloride ER 10 mEq  tablet,extended release(part/cryst)      risperiDONE (RISPERDAL) 3 MG Tab Take by mouth.      sacubitril-valsartan (ENTRESTO)  mg per tablet Take 1 tablet by mouth 2 (two) times daily.       sertraline (ZOLOFT) 50 MG tablet Take 50 mg by mouth once daily. TAKE THREE TABLETS BY MOUTH EVERY MORNING TO IMPROVE MOOD       spironolactone (ALDACTONE) 25 MG tablet Take 0.5 mg by mouth once daily.      torsemide (DEMADEX) 20 MG Tab Take 20 mg by mouth. Take 3 tab by mouth every day       No current facility-administered medications for this visit.        REVIEW OF SYSTEMS:    GENERAL:  No weight loss, malaise or fevers.  HEENT:  Negative for frequent or significant headaches.  NECK:  Negative for lumps, goiter, pain and significant neck swelling.  RESPIRATORY:  Negative for cough, wheezing or shortness of breath.  CARDIOVASCULAR:  Negative for chest pain, leg swelling or palpitations. +CAD  GI:  Negative for abdominal discomfort, blood in stools or black stools or change in bowel habits.  MUSCULOSKELETAL:  See HPI  SKIN:  Negative for lesions, rash, and itching.  PSYCH:  Negative for  mood disorder and recent psychosocial stressors. +sleep disturbance  HEMATOLOGY/LYMPHOLOGY:  Negative for prolonged bleeding, bruising easily or swollen nodes.  NEURO:   No history of headaches, syncope, paralysis, seizures or tremors.  All other reviewed and negative other than HPI.    Past Medical History:  Past Medical History:   Diagnosis Date    Brain damage     traumatic    CAD (coronary artery disease)     Cardiomyopathy     CHF (congestive heart failure)     Diabetes mellitus     type 2    Edema     Erectile dysfunction     GERD (gastroesophageal reflux disease)     HTN (hypertension)     Hyperlipemia     Sciatic nerve pain, right     leg       Past Surgical History:  Past Surgical History:   Procedure Laterality Date    CARDIAC DEFIBRILLATOR PLACEMENT      CATHETERIZATION OF BOTH LEFT AND RIGHT HEART Bilateral  "08/20/2018    CORONARY ARTERY BYPASS GRAFT      ILIAC ARTERY STENT      RIGHT HEART CATHETERIZATION Right 8/17/2020    Procedure: INSERTION, CATHETER, RIGHT HEART;  Surgeon: Brianda Navas MD;  [facility] C[facility] LAB;  Service: Cardiology;  Laterality: Right;    TRANSFORAMINAL EPIDURAL INJECTION OF STEROID Right 9/11/2020    Procedure: INJECTION, STEROID, EPIDURAL, TRANSFORAMINAL APPROACH, L4-L5 AND L5-S1 clear to hold Eliquis 3 days;  Surgeon: Austin Parsons MD;  Location: [facility] PAIN MGT;  Service: Pain Management;  Laterality: Right;       Family History:  History reviewed. No pertinent family history.    Social History:  Social History     Socioeconomic History    Marital status:      Spouse name: Not on file    Number of children: Not on file    Years of education: Not on file    Highest education level: Not on file   Occupational History    Not on file   Social Needs    Financial resource strain: Not on file    Food insecurity     Worry: Not on file     Inability: Not on file    Transportation needs     Medical: Not on file     Non-medical: Not on file   Tobacco Use    Smoking status: Never Smoker    Smokeless tobacco: Never Used   Substance and Sexual Activity    Alcohol use: Not Currently    Drug use: Not Currently    Sexual activity: Not on file   Lifestyle    Physical activity     Days per week: Not on file     Minutes per session: Not on file    Stress: Not on file   Relationships    Social connections     Talks on phone: Not on file     Gets together: Not on file     Attends Spiritism service: Not on file     Active member of club or organization: Not on file     Attends meetings of clubs or organizations: Not on file     Relationship status: Not on file   Other Topics Concern    Not on file   Social History Narrative    Not on file       OBJECTIVE:    /71   Pulse 74   Temp 97.8 °F (36.6 °C)   Ht 6' 4" (1.93 m)   Wt 125.4 kg (276 lb 7.3 oz)   BMI 33.65 kg/m² "     PHYSICAL EXAMINATION:    General appearance: Well appearing, in no acute distress, alert and oriented x3.  Psych:  Mood and affect appropriate.  Skin: Skin color, texture, turgor normal, no rashes or lesions, in both upper and lower body.  Head/face:  Atraumatic, normocephalic. No palpable lymph nodes  Cor: RRR  Pulm: CTA  GI: Abdomen soft and non-tender.  Back: Straight leg raising in the sitting position is positive to radicular pain on the right. There is pain to palpation over lumbar paraspinal muscles, right more than left. Limited ROM with pain on flexion and extension. Positive facet loading bilaterally.   Extremities: Peripheral joint ROM is full and pain free without obvious instability or laxity in all four extremities. No deformities, edema, or skin discoloration. Good capillary refill.  Musculoskeletal: Shoulder, hip, sacroiliac and knee provocative maneuvers are negative. Mild pain with palpation over bilateral SI joints. FABERs is negative bilaterally. Bilateral lower extremity strength is normal and symmetric.  No atrophy or tone abnormalities are noted.  Neuro: Bilateral lower extremity coordination and muscle stretch reflexes are physiologic and symmetric.  Plantar response are downgoing. No loss of sensation is noted.  Gait: Antalgic- ambulates without assistance.     ASSESSMENT: 70 y.o. year old male with chronic lower back pain, consistent with the followin. Lumbar radiculopathy     2. Spinal stenosis of lumbar region with neurogenic claudication     3. Lumbar spondylosis     4. Chronic pain disorder     5. Chronic congestive heart failure, unspecified heart failure type     6. Encounter for long-term opiate analgesic use  Pain Clinic Drug Screen         PLAN:     - Previous imaging was reviewed and discussed with the patient today.    - Schedule for right L5/S1 and S1 TF PÉREZ.     - Consider genicular nerve blocks to proceed with RFA for his knee.     - Pain contract signed  8/6/2020.     - Continue Norco 5/325 mg BID PRN.     - The patient is here today for a refill of current pain medications and they believe these provide effective pain control and improvements in quality of life.  The patient notes no serious side effects, and feels the benefits outweigh the risks.  The patient was reminded of the pain contract that they signed previously as well as the risks and benefits of the medication including possible death.  The updated [place] Board  Pharmacy prescription monitoring program was reviewed, and the patient has been found to be compliant with current treatment plan.    - UDS done today.     - I have stressed the importance of physical activity and a home exercise plan to help with pain and improve health.    - RTC in 2 weeks after the procedure.    - Counseled patient regarding the importance of activity modification and physical therapy.    - Dr. Parsons was consulted on the patient and agrees with this plan.    The above plan and management options were discussed at length with patient. Patient is in agreement with the above and verbalized understanding.    Aye Milner NP  09/28/2020

## 2020-09-30 ENCOUNTER — TELEPHONE (OUTPATIENT)
Dept: PAIN MEDICINE | Facility: CLINIC | Age: 70
End: 2020-09-30

## 2020-09-30 NOTE — TELEPHONE ENCOUNTER
----- Message from Waleska Wilson sent at 9/30/2020  9:20 AM CDT -----  Regarding: Call back  Name of Who is Calling: ALLIE TOLEDO  What is the request in detail: pt is requesting a call back concerning he should have an appt for two weeks from now he was seen on Monday and also had  missed call from pain management  Can the clinic reply by [facility]: NO  What Number to Call Back if not in [facility]: [phone]

## 2020-10-02 LAB
6MAM UR QL: NOT DETECTED
7AMINOCLONAZEPAM UR QL: NOT DETECTED
A-OH ALPRAZ UR QL: NOT DETECTED
ALPRAZ UR QL: NOT DETECTED
AMPHET UR QL SCN: NOT DETECTED
ANNOTATION COMMENT IMP: NORMAL
ANNOTATION COMMENT IMP: NORMAL
BARBITURATES UR QL: NOT DETECTED
BUPRENORPHINE UR QL: NOT DETECTED
BZE UR QL: NOT DETECTED
CARBOXYTHC UR QL: NOT DETECTED
CARISOPRODOL UR QL: NOT DETECTED
CLONAZEPAM UR QL: NOT DETECTED
CODEINE UR QL: NOT DETECTED
CREAT UR-MCNC: 103.3 MG/DL (ref 20–400)
DIAZEPAM UR QL: NOT DETECTED
ETHYL GLUCURONIDE UR QL: NOT DETECTED
FENTANYL UR QL: NOT DETECTED
HYDROCODONE UR QL: PRESENT
HYDROMORPHONE UR QL: NOT DETECTED
LORAZEPAM UR QL: NOT DETECTED
MDA UR QL: NOT DETECTED
MDEA UR QL: NOT DETECTED
MDMA UR QL: NOT DETECTED
ME-PHENIDATE UR QL: NOT DETECTED
MEPERIDINE UR QL: NOT DETECTED
METHADONE UR QL: NOT DETECTED
METHAMPHET UR QL: NOT DETECTED
MIDAZOLAM UR QL SCN: NOT DETECTED
MORPHINE UR QL: NOT DETECTED
NORBUPRENORPHINE UR QL CFM: NOT DETECTED
NORDIAZEPAM UR QL: NOT DETECTED
NORFENTANYL UR QL: NOT DETECTED
NORHYDROCODONE UR QL CFM: PRESENT
NOROXYCODONE UR QL CFM: NOT DETECTED
NOROXYMORPHONE: NOT DETECTED
OXAZEPAM UR QL: NOT DETECTED
OXYCODONE UR QL: NOT DETECTED
OXYMORPHONE UR QL: NOT DETECTED
PATHOLOGY STUDY: NORMAL
PCP UR QL: NOT DETECTED
PHENTERMINE UR QL: NOT DETECTED
PROPOXYPH UR QL: NOT DETECTED
SERVICE CMNT-IMP: NORMAL
TAPENTADOL UR QL SCN: NOT DETECTED
TAPENTADOL-O-SULF: NOT DETECTED
TEMAZEPAM UR QL: NOT DETECTED
TRAMADOL UR QL: PRESENT
ZOLPIDEM UR QL: NOT DETECTED

## 2020-10-05 ENCOUNTER — PATIENT MESSAGE (OUTPATIENT)
Dept: RESEARCH | Facility: OTHER | Age: 70
End: 2020-10-05

## 2020-10-07 DIAGNOSIS — G89.4 CHRONIC PAIN SYNDROME: ICD-10-CM

## 2020-10-07 DIAGNOSIS — M54.16 LUMBAR RADICULOPATHY: ICD-10-CM

## 2020-10-07 DIAGNOSIS — M48.062 SPINAL STENOSIS OF LUMBAR REGION WITH NEUROGENIC CLAUDICATION: ICD-10-CM

## 2020-10-07 RX ORDER — HYDROCODONE BITARTRATE AND ACETAMINOPHEN 5; 325 MG/1; MG/1
1 TABLET ORAL EVERY 12 HOURS PRN
Qty: 60 TABLET | Refills: 0 | Status: SHIPPED | OUTPATIENT
Start: 2020-10-07 | End: 2020-11-16 | Stop reason: SDUPTHER

## 2020-10-07 NOTE — TELEPHONE ENCOUNTER
Staff contacted patient regarding pain medication refill     Staff informed patient medication refill is to soon to fill last filled date was 09/14/20    Patient stated on his last visit he informed NP pain medication wasn't helping with his pain and requested a stronger dosage.    Staff informed patient his request will be addressed with doctor but there isn't a gauratnee medication will be increased     Patient  verbalized understanding and confirmed

## 2020-10-07 NOTE — TELEPHONE ENCOUNTER
----- Message from Enedelia Pedraza, Patient Care Assistant sent at 10/7/2020 11:56 AM CDT -----  Can the clinic reply in [facility]: No    Please refill the medication(s) listed below. The patient can be reached at this phone number once it is called into the pharmacy.[phone]    Medication #1HYDROcodone-acetaminophen (NORCO) 5-325 mg per tablet    Medication #2    Preferred Pharmacy: [id number]  [address] AT [facility]  Requesting to up dosage stating medication is not helping pain. Patient states pain has not stop after the procedure.

## 2020-10-07 NOTE — TELEPHONE ENCOUNTER
Phoned patient and dicussed his pain.     We discussed that Dr. Parsons does not wish to increase his medication. We will send a refill with the appropriate date for the current instructions. He verbalized understanding.     He asked if his procedure could be sooner. I will forward this message to the schedulers to see if this is an option. He verbalized understanding.

## 2020-10-08 ENCOUNTER — TELEPHONE (OUTPATIENT)
Dept: PAIN MEDICINE | Facility: CLINIC | Age: 70
End: 2020-10-08

## 2020-10-23 ENCOUNTER — HOSPITAL ENCOUNTER (OUTPATIENT)
Facility: OTHER | Age: 70
Discharge: HOME | End: 2020-10-23
Attending: ANESTHESIOLOGY | Admitting: ANESTHESIOLOGY
Payer: MEDICARE

## 2020-10-23 VITALS
HEART RATE: 68 BPM | TEMPERATURE: 99 F | HEIGHT: 76 IN | BODY MASS INDEX: 33.36 KG/M2 | SYSTOLIC BLOOD PRESSURE: 124 MMHG | RESPIRATION RATE: 16 BRPM | WEIGHT: 274 LBS | DIASTOLIC BLOOD PRESSURE: 103 MMHG | OXYGEN SATURATION: 96 %

## 2020-10-23 DIAGNOSIS — G89.29 CHRONIC PAIN: ICD-10-CM

## 2020-10-23 DIAGNOSIS — M54.16 LUMBAR RADICULOPATHY: Primary | ICD-10-CM

## 2020-10-23 LAB — POCT GLUCOSE: 115 MG/DL (ref 70–110)

## 2020-10-23 PROCEDURE — 64483 NJX AA&/STRD TFRM EPI L/S 1: CPT | Mod: RT | Performed by: ANESTHESIOLOGY

## 2020-10-23 PROCEDURE — 64484 NJX AA&/STRD TFRM EPI L/S EA: CPT | Mod: RT | Performed by: ANESTHESIOLOGY

## 2020-10-23 PROCEDURE — 63600175 PHARM REV CODE 636 W HCPCS: Performed by: ANESTHESIOLOGY

## 2020-10-23 PROCEDURE — 64484 PRA INJECT ANES/STEROID FORAMEN LUMBAR/SACRAL W IMG GUIDE ,EA ADD LEVEL: ICD-10-PCS | Mod: RT,,, | Performed by: ANESTHESIOLOGY

## 2020-10-23 PROCEDURE — 99152 MOD SED SAME PHYS/QHP 5/>YRS: CPT | Mod: ,,, | Performed by: ANESTHESIOLOGY

## 2020-10-23 PROCEDURE — 99152 PR MOD CONSCIOUS SEDATION, SAME PHYS, 5+ YRS, FIRST 15 MIN: ICD-10-PCS | Mod: ,,, | Performed by: ANESTHESIOLOGY

## 2020-10-23 PROCEDURE — 64484 NJX AA&/STRD TFRM EPI L/S EA: CPT | Mod: RT,,, | Performed by: ANESTHESIOLOGY

## 2020-10-23 PROCEDURE — 64483 PR EPIDURAL INJ, ANES/STEROID, TRANSFORAMINAL, LUMB/SACR, SNGL LEVL: ICD-10-PCS | Mod: RT,,, | Performed by: ANESTHESIOLOGY

## 2020-10-23 PROCEDURE — 25500020 PHARM REV CODE 255: Performed by: ANESTHESIOLOGY

## 2020-10-23 PROCEDURE — 99152 MOD SED SAME PHYS/QHP 5/>YRS: CPT | Performed by: ANESTHESIOLOGY

## 2020-10-23 PROCEDURE — 64483 NJX AA&/STRD TFRM EPI L/S 1: CPT | Mod: RT,,, | Performed by: ANESTHESIOLOGY

## 2020-10-23 PROCEDURE — 25000003 PHARM REV CODE 250: Performed by: ANESTHESIOLOGY

## 2020-10-23 RX ORDER — DEXAMETHASONE SODIUM PHOSPHATE 10 MG/ML
INJECTION INTRAMUSCULAR; INTRAVENOUS
Status: DISCONTINUED | OUTPATIENT
Start: 2020-10-23 | End: 2020-10-23 | Stop reason: HOSPADM

## 2020-10-23 RX ORDER — FENTANYL CITRATE 50 UG/ML
INJECTION, SOLUTION INTRAMUSCULAR; INTRAVENOUS
Status: DISCONTINUED | OUTPATIENT
Start: 2020-10-23 | End: 2020-10-23 | Stop reason: HOSPADM

## 2020-10-23 RX ORDER — MIDAZOLAM HYDROCHLORIDE 1 MG/ML
INJECTION INTRAMUSCULAR; INTRAVENOUS
Status: DISCONTINUED | OUTPATIENT
Start: 2020-10-23 | End: 2020-10-23 | Stop reason: HOSPADM

## 2020-10-23 RX ORDER — LIDOCAINE HYDROCHLORIDE 10 MG/ML
INJECTION INFILTRATION; PERINEURAL
Status: DISCONTINUED | OUTPATIENT
Start: 2020-10-23 | End: 2020-10-23 | Stop reason: HOSPADM

## 2020-10-23 RX ORDER — SODIUM CHLORIDE 9 MG/ML
500 INJECTION, SOLUTION INTRAVENOUS CONTINUOUS
Status: DISCONTINUED | OUTPATIENT
Start: 2020-10-23 | End: 2020-10-23 | Stop reason: HOSPADM

## 2020-10-23 RX ORDER — LIDOCAINE HYDROCHLORIDE 5 MG/ML
INJECTION, SOLUTION INFILTRATION; INTRAVENOUS
Status: DISCONTINUED | OUTPATIENT
Start: 2020-10-23 | End: 2020-10-23 | Stop reason: HOSPADM

## 2020-10-23 RX ADMIN — SODIUM CHLORIDE 500 ML: 0.9 INJECTION, SOLUTION INTRAVENOUS at 01:10

## 2020-10-23 NOTE — INTERVAL H&P NOTE
The patient has been examined and the H&P has been reviewed:    I concur with the findings and no changes have occurred since H&P was written.     Patient comes in today for TFESI R L5/S1 and S1.     Anesthesia/Surgery risks, benefits and alternative options discussed and understood by patient/family.          Active Hospital Problems    Diagnosis  POA    Chronic pain [G89.29]  Yes      Resolved Hospital Problems   No resolved problems to display.

## 2020-10-23 NOTE — DISCHARGE INSTRUCTIONS

## 2020-10-23 NOTE — DISCHARGE SUMMARY
Discharge Note  Short Stay      SUMMARY     Admit Date: 10/23/2020    Attending Physician: Austin Parsons      Discharge Physician: Austin Parsons      Discharge Date: 10/23/2020 2:18 PM    Procedure(s) (LRB):  INJECTION, STEROID, EPIDURAL, TRANSFORAMINAL APPROACH (Right)    Final Diagnosis: Lumbar radiculopathy [M54.16]  DDD (degenerative disc disease), lumbar [M51.36]    Disposition: Home or self care    Patient Instructions:   Current Discharge Medication List      CONTINUE these medications which have NOT CHANGED    Details   allopurinol (ZYLOPRIM) 300 MG tablet Take 300 mg by mouth once daily.      apixaban (ELIQUIS) 5 mg Tab Take 5 mg by mouth 2 (two) times daily.      aspirin (ECOTRIN) 81 MG EC tablet Take 81 mg by mouth.      atorvastatin (LIPITOR) 80 MG tablet Take 80 mg by mouth once daily.      cholecalciferol, vitamin D3, 3,000 unit Tab Take 1 tablet by mouth.      coenzyme Q10 10 mg capsule Take 200 mg by mouth once daily.      colchicine (COLCRYS) 0.6 mg tablet TK 1 T PO BID PRF GOUT      febuxostat (ULORIC) 40 mg Tab Take 1 tablet (40 mg total) by mouth once daily.  Qty: 30 tablet, Refills: 6    Comments: Underlying enlarged liver, high dose allopurinol not advisable. Please dispense Uloric to control hyperuricemia.      glipiZIDE (GLUCOTROL) 5 MG tablet Take 5 mg by mouth 2 (two) times daily before meals.      hydrALAZINE (APRESOLINE) 25 MG Tab Take 1 tablet (25 mg total) by mouth 3 (three) times daily.  Qty: 90 tablet, Refills: 11    Comments: .  Associated Diagnoses: Acute on chronic combined systolic and diastolic heart failure      HYDROcodone-acetaminophen (NORCO) 5-325 mg per tablet Take 1 tablet by mouth every 12 (twelve) hours as needed for Pain.  Qty: 60 tablet, Refills: 0    Comments: Quantity prescribed more than 7 day supply? Yes, quantity medically necessary  Associated Diagnoses: Lumbar radiculopathy; Chronic pain syndrome; Spinal stenosis of lumbar region with neurogenic claudication       isosorbide dinitrate (ISORDIL) 20 MG tablet Take 1 tablet (20 mg total) by mouth 3 (three) times daily.  Qty: 90 tablet, Refills: 11    Associated Diagnoses: Acute on chronic combined systolic and diastolic heart failure      magnesium oxide (MAG-OX) 400 mg (241.3 mg magnesium) tablet Take 400 mg by mouth.      melatonin 5 mg Cap Take by mouth.      metoprolol succinate (TOPROL-XL) 100 MG 24 hr tablet Take 100 mg by mouth once daily.       potassium chloride SA (K-DUR,KLOR-CON) 10 MEQ tablet potassium chloride ER 10 mEq tablet,extended release(part/cryst)      risperiDONE (RISPERDAL) 3 MG Tab Take by mouth.      sacubitril-valsartan (ENTRESTO)  mg per tablet Take 1 tablet by mouth 2 (two) times daily.       sertraline (ZOLOFT) 50 MG tablet Take 50 mg by mouth once daily. TAKE THREE TABLETS BY MOUTH EVERY MORNING TO IMPROVE MOOD       spironolactone (ALDACTONE) 25 MG tablet Take 0.5 mg by mouth once daily.      torsemide (DEMADEX) 20 MG Tab Take 20 mg by mouth. Take 3 tab by mouth every day                 Discharge Diagnosis: Lumbar radiculopathy [M54.16]  DDD (degenerative disc disease), lumbar [M51.36]  Condition on Discharge: Stable with no complications to procedure   Diet on Discharge: Same as before.  Activity: as per instruction sheet.  Discharge to: Home with a responsible adult.  Follow up: 2-4 weeks

## 2020-10-23 NOTE — OP NOTE
Patient Name: Ishmael Thrasher  MRN: [id number]    INFORMED CONSENT: The procedure, risks, benefits and options were discussed with patient. There are no contraindications to the procedure. The patient expressed understanding and agreed to proceed. The personnel performing the procedure was discussed. I verify that I personally obtained Ishmael's consent prior to the start of the procedure and the signed consent can be found on the patient's chart.    Procedure Date: 10/23/2020    Anesthesia: Topical    Pre Procedure diagnosis: Lumbar radiculopathy [M54.16]  DDD (degenerative disc disease), lumbar [M51.36]  1. Lumbar radiculopathy    2. Chronic pain      Post-Procedure diagnosis: SAME      Sedation: Yes - Fentanyl 50 mcg and Midazolam 2 mg  Conscious sedation ordered by MD. Patient reevaluated and sedation administered by MD and monitored by RN.  Sedation time: Less than 15 minutes.      PROCEDURE:Right L5/S1 and S1  TRANSFORAMINAL EPIDURAL STEROID INJECTION        DESCRIPTION OF PROCEDURE: The patient was brought to the procedure room. After performing time out IV access was obtained prior to the procedure. The patient was positioned prone on the fluoroscopy table. Continuous hemodynamic monitoring was initiated including blood pressure, EKG, and pulse oximetry. . The skin was prepped with chlorhexidine three times and draped in a sterile fashion. Skin anesthesia was achieved using 3 mL of lidocaine 1% over the respective injection site.     An oblique fluoroscopic view was obtained, with the superior articular process of the inferior vertebral body aligned with the pedicle. The tip of a 22-gauge 5-inch Quincke-type spinal needle was advanced toward the 6 oclock position of the L5 pedicle under intermittent fluoroscopic guidance. Confirmation of proper needle position was made with AP, oblique, and lateral fluoroscopic views. Negative aspiration for blood or CSF was confirmed. 2 mL of Omnipaque 300 was injected.  Live fluoroscopic imaging revealed a clear outline of the spinal nerve with proximal spread of agent through the neural foramen into the anterior epidural space. A total combination of 3 mL of Bupivacaine 0.25% and 10 mg decadron was injected at each level. Contrast spread was noted from L4 to L5 level. Then the right S1 neural foramen was identified fluoroscopically via an oblique view. The skin was anesthetized via 25-gauge 1 needle with approximately 3 cc of bicarbonated 1% lidocaine. At this point, a 22-gauge 5 spinal needle was atraumatically introduced and advanced under fluoroscopic guidance to the lateral, superior aspect of the S1 neural foramen. Needle position at approximately the 6 oclock position relative to the pedicle was confirmed in the AP view. Following negative aspiration for blood and CSF and confirming the absence of paresthesias, injection of approximately 1cc of Omnipaque 300 demonstrated excellent spread along the nerve root into the epidural space.  A total combination of 3 mL of Bupivacaine 0.25% and 10 mg decadron was injected without complication. There was no pain on injection. The needle was removed and bleeding was nil.  A sterile dressing was applied. Ishmael was taken back to the recovery room for further observation.       The patient was followed post procedure and discharged under their own power in excellent condition in the company of a responsible adult.     Blood Loss: Nill  Specimen: None    Austin Parsons MD

## 2020-11-02 ENCOUNTER — TELEPHONE (OUTPATIENT)
Dept: TRANSPLANT | Facility: CLINIC | Age: 70
End: 2020-11-02

## 2020-11-02 DIAGNOSIS — I50.20 SYSTOLIC CONGESTIVE HEART FAILURE, UNSPECIFIED HF CHRONICITY: Primary | ICD-10-CM

## 2020-11-02 NOTE — TELEPHONE ENCOUNTER
----- Message from Lennie Paz sent at 11/2/2020  9:29 AM CST -----  Regarding: Swelling Concerns  Pt says he's retaining fluid in his ankles and legs and when he lay down it fills as if its in his chest area. Please call the pt at [phone].    Thanks

## 2020-11-03 ENCOUNTER — TELEPHONE (OUTPATIENT)
Dept: NEPHROLOGY | Facility: CLINIC | Age: 70
End: 2020-11-03

## 2020-11-03 NOTE — TELEPHONE ENCOUNTER
Spoke to Lulu who Tx me to binta Allison ext. Binta did not ans. Tx back to Lulu , informed that she will have Binta give me a c/b.    -----------------------    Pt on recall for an appt in dec. Spoken to pt before on behalf.                ----- Message from Keyur Dela Cruz LPN sent at 11/3/2020  1:51 PM CST -----  Hey ma'am this pt is already a pt of Dr. Mckeon. Theres no need for referral he just needs to be scheduled for a regular visit.   Let me know if you need anything   Thanks, keyur!   ----- Message -----  From: Anali Brown RN  Sent: 11/3/2020  11:50 AM CST  To: Keyur Dela Cruz LPN      ----- Message -----  From: Delaney Aquino  Sent: 11/3/2020  11:47 AM CST  To: [facility]ph[facility] Clinical Staff    Binta Barger is calling to see is pt was scheduled. Please see referral in system Call back [phone]

## 2020-11-12 ENCOUNTER — TELEPHONE (OUTPATIENT)
Dept: NEPHROLOGY | Facility: CLINIC | Age: 70
End: 2020-11-12

## 2020-11-12 DIAGNOSIS — N18.30 STAGE 3 CHRONIC KIDNEY DISEASE, UNSPECIFIED WHETHER STAGE 3A OR 3B CKD: Primary | ICD-10-CM

## 2020-11-12 NOTE — TELEPHONE ENCOUNTER
tx to ext , no answ.        ----- Message from Teto Guzman sent at 11/12/2020  2:44 PM CST -----  Regarding: call back        The Caller(Amy with [facility]) would like a call back from the nurse to see if you have been connected to [facility] yet to be able to get the Pt scheduled for Dr. Mckeon.     Phone # [phone](ask to be transferred to Amy)       Normal for race

## 2020-11-13 ENCOUNTER — OFFICE VISIT (OUTPATIENT)
Dept: TRANSPLANT | Facility: CLINIC | Age: 70
End: 2020-11-13
Payer: COMMERCIAL

## 2020-11-13 ENCOUNTER — LAB VISIT (OUTPATIENT)
Dept: LAB | Facility: HOSPITAL | Age: 70
End: 2020-11-13
Payer: COMMERCIAL

## 2020-11-13 ENCOUNTER — TELEPHONE (OUTPATIENT)
Dept: TRANSPLANT | Facility: CLINIC | Age: 70
End: 2020-11-13

## 2020-11-13 VITALS
SYSTOLIC BLOOD PRESSURE: 134 MMHG | DIASTOLIC BLOOD PRESSURE: 80 MMHG | HEIGHT: 76 IN | WEIGHT: 278.88 LBS | BODY MASS INDEX: 33.96 KG/M2 | HEART RATE: 98 BPM

## 2020-11-13 DIAGNOSIS — I25.5 ISCHEMIC CARDIOMYOPATHY: ICD-10-CM

## 2020-11-13 DIAGNOSIS — N18.31 STAGE 3A CHRONIC KIDNEY DISEASE: ICD-10-CM

## 2020-11-13 DIAGNOSIS — I50.42 CHRONIC COMBINED SYSTOLIC AND DIASTOLIC CONGESTIVE HEART FAILURE: Primary | ICD-10-CM

## 2020-11-13 DIAGNOSIS — I25.10 CORONARY ARTERY DISEASE INVOLVING NATIVE CORONARY ARTERY OF NATIVE HEART WITHOUT ANGINA PECTORIS: ICD-10-CM

## 2020-11-13 DIAGNOSIS — I50.40 COMBINED SYSTOLIC AND DIASTOLIC CONGESTIVE HEART FAILURE, UNSPECIFIED HF CHRONICITY: ICD-10-CM

## 2020-11-13 LAB
ALBUMIN SERPL BCP-MCNC: 4 G/DL (ref 3.5–5.2)
ALP SERPL-CCNC: 121 U/L (ref 55–135)
ALT SERPL W/O P-5'-P-CCNC: 19 U/L (ref 10–44)
ANION GAP SERPL CALC-SCNC: 10 MMOL/L (ref 8–16)
AST SERPL-CCNC: 20 U/L (ref 10–40)
BILIRUB SERPL-MCNC: 1.2 MG/DL (ref 0.1–1)
BNP SERPL-MCNC: 395 PG/ML (ref 0–99)
BUN SERPL-MCNC: 24 MG/DL (ref 8–23)
CALCIUM SERPL-MCNC: 9 MG/DL (ref 8.7–10.5)
CHLORIDE SERPL-SCNC: 103 MMOL/L (ref 95–110)
CO2 SERPL-SCNC: 26 MMOL/L (ref 23–29)
CREAT SERPL-MCNC: 2 MG/DL (ref 0.5–1.4)
ERYTHROCYTE [DISTWIDTH] IN BLOOD BY AUTOMATED COUNT: 14.2 % (ref 11.5–14.5)
EST. GFR  (AFRICAN AMERICAN): 38.2 ML/MIN/1.73 M^2
EST. GFR  (NON AFRICAN AMERICAN): 33.1 ML/MIN/1.73 M^2
GLUCOSE SERPL-MCNC: 107 MG/DL (ref 70–110)
HCT VFR BLD AUTO: 40.9 % (ref 40–54)
HGB BLD-MCNC: 13.2 G/DL (ref 14–18)
MCH RBC QN AUTO: 27.6 PG (ref 27–31)
MCHC RBC AUTO-ENTMCNC: 32.3 G/DL (ref 32–36)
MCV RBC AUTO: 85 FL (ref 82–98)
PLATELET # BLD AUTO: 204 K/UL (ref 150–350)
PMV BLD AUTO: 10.3 FL (ref 9.2–12.9)
POTASSIUM SERPL-SCNC: 3.5 MMOL/L (ref 3.5–5.1)
PROT SERPL-MCNC: 7.3 G/DL (ref 6–8.4)
RBC # BLD AUTO: 4.79 M/UL (ref 4.6–6.2)
SODIUM SERPL-SCNC: 139 MMOL/L (ref 136–145)
WBC # BLD AUTO: 9.51 K/UL (ref 3.9–12.7)

## 2020-11-13 PROCEDURE — 99999 PR PBB SHADOW E&M-EST. PATIENT-LVL III: ICD-10-PCS | Mod: PBBFAC,TXP,, | Performed by: INTERNAL MEDICINE

## 2020-11-13 PROCEDURE — 80053 COMPREHEN METABOLIC PANEL: CPT | Mod: TXP

## 2020-11-13 PROCEDURE — 36415 COLL VENOUS BLD VENIPUNCTURE: CPT | Mod: NTX

## 2020-11-13 PROCEDURE — 99999 PR PBB SHADOW E&M-EST. PATIENT-LVL III: CPT | Mod: PBBFAC,TXP,, | Performed by: INTERNAL MEDICINE

## 2020-11-13 PROCEDURE — 99214 OFFICE O/P EST MOD 30 MIN: CPT | Mod: NTX,S$GLB,, | Performed by: INTERNAL MEDICINE

## 2020-11-13 PROCEDURE — 85027 COMPLETE CBC AUTOMATED: CPT | Mod: TXP

## 2020-11-13 PROCEDURE — 83880 ASSAY OF NATRIURETIC PEPTIDE: CPT | Mod: NTX

## 2020-11-13 PROCEDURE — 99214 PR OFFICE/OUTPT VISIT, EST, LEVL IV, 30-39 MIN: ICD-10-PCS | Mod: NTX,S$GLB,, | Performed by: INTERNAL MEDICINE

## 2020-11-13 NOTE — TELEPHONE ENCOUNTER
----- Message from Florencia Singleton LPN sent at 11/13/2020  1:51 PM CST -----  Regarding: FW: Appt    ----- Message -----  From: Lennie Paz  Sent: 11/13/2020  12:37 PM CST  To: [facility] Staff  Subject: Appt                                             Pt says he was called to come in for 1, but he's stuck in traffic and he's coming from [place] It will probably be around his original appt time when he gets here.    Thanks

## 2020-11-13 NOTE — LETTER
November 13, 2020        Gilmar Rowell  [address] [place] [address]  Phone: [phone]  Fax: [phone]             [facility]  [address]  [place] [address]  Phone: [phone]   Patient: Ishmael Thrasher   MR Number: [id number]   YOB: 1950   Date of Visit: 11/13/2020       Dear Dr. Gilmar Rowell    Thank you for referring Ishmael Thrasher to me for evaluation. Attached you will find relevant portions of my assessment and plan of care.    If you have questions, please do not hesitate to call me. I look forward to following Ishmael Thrasher along with you.    Sincerely,    Mg Orosco MD    Enclosure    If you would like to receive this communication electronically, please contact [email or web address] or [phone] to request ID[employer]me Link access.    [employer]me Link is a tool which provides read-only access to select patient information with whom you have a relationship. Its easy to use and provides real time access to review your patients record including encounter summaries, notes, results, and demographic information.    If you feel you have received this communication in error or would no longer like to receive these types of communications, please e-mail [email or web address]

## 2020-11-13 NOTE — TELEPHONE ENCOUNTER
Contacted patient as he was asked to come to appt early.  Per pt, he lives ~5 hrs away and is currently at least 1 hr away and advised that he should be here at original appt time.  Asked pt to come straight to clinic, foregoing lab for now, as per Dr. Orosco.  Understanding verbalized.

## 2020-11-16 ENCOUNTER — TELEPHONE (OUTPATIENT)
Dept: NEPHROLOGY | Facility: CLINIC | Age: 70
End: 2020-11-16

## 2020-11-16 ENCOUNTER — OFFICE VISIT (OUTPATIENT)
Dept: PAIN MEDICINE | Facility: CLINIC | Age: 70
End: 2020-11-16
Payer: COMMERCIAL

## 2020-11-16 VITALS
RESPIRATION RATE: 18 BRPM | DIASTOLIC BLOOD PRESSURE: 75 MMHG | SYSTOLIC BLOOD PRESSURE: 117 MMHG | OXYGEN SATURATION: 100 % | HEART RATE: 80 BPM | WEIGHT: 282.19 LBS | BODY MASS INDEX: 34.36 KG/M2 | HEIGHT: 76 IN | TEMPERATURE: 97 F

## 2020-11-16 DIAGNOSIS — M54.16 LUMBAR RADICULOPATHY: Primary | ICD-10-CM

## 2020-11-16 DIAGNOSIS — M48.062 SPINAL STENOSIS OF LUMBAR REGION WITH NEUROGENIC CLAUDICATION: ICD-10-CM

## 2020-11-16 DIAGNOSIS — I50.9 CHRONIC CONGESTIVE HEART FAILURE, UNSPECIFIED HEART FAILURE TYPE: ICD-10-CM

## 2020-11-16 DIAGNOSIS — M54.16 LUMBAR RADICULOPATHY: ICD-10-CM

## 2020-11-16 DIAGNOSIS — Z79.891 ENCOUNTER FOR LONG-TERM OPIATE ANALGESIC USE: ICD-10-CM

## 2020-11-16 DIAGNOSIS — M47.816 LUMBAR SPONDYLOSIS: ICD-10-CM

## 2020-11-16 DIAGNOSIS — G89.4 CHRONIC PAIN SYNDROME: ICD-10-CM

## 2020-11-16 DIAGNOSIS — G89.4 CHRONIC PAIN DISORDER: ICD-10-CM

## 2020-11-16 PROCEDURE — 99999 PR PBB SHADOW E&M-EST. PATIENT-LVL IV: ICD-10-PCS | Mod: PBBFAC,,, | Performed by: NURSE PRACTITIONER

## 2020-11-16 PROCEDURE — 80307 DRUG TEST PRSMV CHEM ANLYZR: CPT | Mod: NTX

## 2020-11-16 PROCEDURE — 99214 OFFICE O/P EST MOD 30 MIN: CPT | Mod: PBBFAC | Performed by: NURSE PRACTITIONER

## 2020-11-16 PROCEDURE — 99213 OFFICE O/P EST LOW 20 MIN: CPT | Mod: S$PBB,,, | Performed by: NURSE PRACTITIONER

## 2020-11-16 PROCEDURE — 99213 PR OFFICE/OUTPT VISIT, EST, LEVL III, 20-29 MIN: ICD-10-PCS | Mod: S$PBB,,, | Performed by: NURSE PRACTITIONER

## 2020-11-16 PROCEDURE — 99999 PR PBB SHADOW E&M-EST. PATIENT-LVL IV: CPT | Mod: PBBFAC,,, | Performed by: NURSE PRACTITIONER

## 2020-11-16 RX ORDER — HYDROCODONE BITARTRATE AND ACETAMINOPHEN 5; 325 MG/1; MG/1
1 TABLET ORAL EVERY 12 HOURS PRN
Qty: 60 TABLET | Refills: 0 | Status: SHIPPED | OUTPATIENT
Start: 2020-11-16 | End: 2020-12-15 | Stop reason: SDUPTHER

## 2020-11-16 NOTE — TELEPHONE ENCOUNTER
Spoke to namita, informed that pt does have an appt c his nephrologist.  namita shultz/arnold          ----- Message from Cole Romero sent at 11/16/2020 11:41 AM CST -----  Contact: Curtis merritt/[facility]  Calling to speak with nurse     Call back: [phone]

## 2020-11-16 NOTE — PROGRESS NOTES
Chronic patient Established Note (Follow up visit)    SRINIVAS Thrasher is a 70 y.o. male with a history of CHF (EF 10%) who presents today with chronic low back pain. This pain started in 1971 as a result of and injury in the .  He describes a constant, right sided pain that starts in his low back and radiates down the posterior aspect of his right leg to the bottom of his foot.  This is associated with a numbness, tingling sensation.  The pain occurs when he is standing still for longer than 5 minutes.  The pain does not occur when walking unless he is standing first.  He denies heaviness and weakness.  This pain is described in detail below.    Aggravating factors: Standing in place for 5 minutes     Mitigating factors: Walking, medication, rest     Previously seeing: Dr. Davide Wang ([place])     Physical Therapy: Has done for his heart, but not for his back     Interval History (7/2/2020):  The patient returns to clinic today for follow up of back pain. He continues to report low back pain that radiates down the posterior aspect of his right leg to the bottom of his foot. He denies any left leg pain. His pain is worse with prolonged standing. He has had injections in the past without relief. He is currently taking Tramadol with limited relief. He denies any other health changes. His pain today is 10/10.    Interval History 8/6/2020:   Ishmael Thrasher presents to the clinic for a follow-up appointment for bilateral leg pain. Since the last visit, Ishmael Thrasher states the pain has been worsening. Current pain intensity is 10/10.    Interval History 9/28/2020:  The patient returns to clinic today for follow up of low back and leg pain. He is s/p right L4/5 and L5/S1 TF PÉREZ on 9/11/2020. He reports no significant relief of his back and leg pain. He continues to report low back pain that radiates down the posterior aspect of his right leg to under his foot. He denies any left leg pain. His pain is  worse with prolonged standing and walking. He is currently taking Norco with limited benefit. He reports that this decreases his pain but does not last. He denies any other health changes. His pain today is 10/10.    Interval History 11/16/2020:  The patient returns to clinic today for follow up of low back pain. He is s/p right L5/S1 and S1 TF PÉREZ on 10/23/2020. He reports 60% relief for about a week. He continues to report low back pain that radiates into the posterior aspect of his right leg to his foot. He denies any left leg pain. His pain is worse with standing and walking. He also reports difficulty sleeping due to pain. He is frustrated with his continued pain. He continues to take Norco but reports that this does not last. He denies any other health changes. His pain today is 10/10.     Non-pharmacologic Treatment:      · Ice/Heat: Heat helps  · TENS: Not tried  · Massage: Not tried  · Chiropractic care: Not tried  · Acupuncture: Helpful  · Other: No brace         Pain Medications:         · Currently taking:  Zoloft 50 mg daily,  risperidone 3 mg QHS, melatonin, Norco      · Has tried in the past:    ? Opioids: Norco 10/325 mg daily (took himself off), Tramadol 50 mg (sparing use, helpful)  ? NSAIDS: Avoids due to Eliquis and CAD  ? Tylenol: Excedrin (no help)  ? Muscle relaxants:  Cyclobenzaprine (side effects), Baclofen 10 mg 3 times daily,    ? TCAs:  Amitriptyline 50 mg  ? SNRIs: Not tried  ? Anticonvulsants: Gabapentin 800 mg (>15 years ago) no benefit  ? topical creams: Back balm helps  ? Other: Seroquel 400 mg QHS, Remeron 15 mg,     Blood thinners:  Eliquis 5 mg     Interventional Therapies:   · Bilateral ankle steroid injections Q3 months, last 1.5 months ago: Helpful  · Bilateral knee steroid injections Q3 months, last 1.5 months ago: helpful  · Epidural steroid injection x 2 2018: Helpful for 1-2 months  · 9/11/2020- Right L4/5 and L5/S1 TF PÉREZ  · 10/23/2020- Right L5/S1 and S1 TF  PÉREZ     Relevant Surgeries:   · None, he reports that he couldn't get surgery on his knees, back, and ankle due to his heart      Pain Disability Index Review:  Last 3 PDI Scores 11/16/2020 9/28/2020 8/6/2020   Pain Disability Index (PDI) 60 69 70       Pain Medications:  Norco     Opioid Contract: yes     report:  Reviewed and consistent with medication use as prescribed.      Physical Therapy/Home Exercise: yes    Imaging:   Narrative & Impression     EXAMINATION:  XR LUMBAR SPINE 5 VIEW WITH FLEX AND EXT     CLINICAL HISTORY:  Back pain or radiculopathy, > 6 wks;  Radiculopathy, lumbar region     TECHNIQUE:  AP, lateral, oblique views of the lumbar spine with spot view of the lumbosacral region and lateral views in flexion and extension.     COMPARISON:  None     FINDINGS:  There is no collapse or malalignment in the lumbar spine.  There is no significant change in alignment between flexion and extension.     There is reduced of the intervertebral disc spaces at L4-L5 and L5-S1 with disc vacuum phenomena and anterior osteophyte formation.  There are mild neural foraminal stenosis at these levels.     There is mild bilateral neural foraminal stenosis.     There are extensive vascular calcifications in the abdominal aorta.  The soft tissues are otherwise unremarkable.     Impression:     1. Moderate to severe spondylosis and neural foraminal stenosis at L4-L5 and L5-S1.  2. Straightening of lumbar lordosis  3. No changes in alignment in flexion and extension     Electronically signed by resident: Marko Lamas  Date:                                            07/07/2020  Time:                                           15:05     Electronically signed by: Roly Qiu  Date:                                            07/07/2020  Time:                                           15:21        Narrative & Impression     EXAMINATION:  CT LUMBAR SPINE WITHOUT CONTRAST     CLINICAL HISTORY:  Back pain or  radiculopathy, > 6 wks;.     COMPARISON:  None.     FINDINGS:  Decrease height of vertebral bodies L4-L5 predominant at L5.  Degenerative changes in the endplates with decrease height intervertebral space L4-L5.  Vacuum phenomenon at L5-S1 indicating remarkable decrease.  Facet hypertrophy and degenerative changes in facet joints L3-L4, bilaterally.     Prominent aortic calcifications.     Left pleural effusion and atelectasis     Impression:     Prominent degenerative changes in the lumbar spine at L4, L5 and S1     Decrease height of intervertebral space L4-L5 with degenerative process and decrease height of vertebral bodies L4-L5 predominant at L5     Remarkable disc disease at L5-S1     Atherosclerosis     Left pleural effusion and atelectasis.  Please see CT of the chest        Electronically signed by: Roly Qiu  Date:                                            07/08/2020  Time:                                           16:00           Allergies:   Review of patient's allergies indicates:   Allergen Reactions    Vasotec [enalaprilat] Swelling     Neck swelling    Cyclobenzaprine      Other reaction(s): Swelling  Hallucinations according to patient.      Amitriptyline Hallucinations    Labetalol     Tramadol Hallucinations and Nausea Only       Current Medications:   Current Outpatient Medications   Medication Sig Dispense Refill    allopurinol (ZYLOPRIM) 300 MG tablet Take 300 mg by mouth once daily.      apixaban (ELIQUIS) 5 mg Tab Take 5 mg by mouth 2 (two) times daily.      aspirin (ECOTRIN) 81 MG EC tablet Take 81 mg by mouth.      atorvastatin (LIPITOR) 80 MG tablet Take 80 mg by mouth once daily.      cholecalciferol, vitamin D3, 3,000 unit Tab Take 1 tablet by mouth.      coenzyme Q10 10 mg capsule Take 200 mg by mouth once daily.      colchicine (COLCRYS) 0.6 mg tablet TK 1 T PO BID PRF GOUT      febuxostat (ULORIC) 40 mg Tab Take 1 tablet (40 mg total) by mouth once daily.  30 tablet 6    glipiZIDE (GLUCOTROL) 5 MG tablet Take 5 mg by mouth 2 (two) times daily before meals.      HYDROcodone-acetaminophen (NORCO) 5-325 mg per tablet Take 1 tablet by mouth every 12 (twelve) hours as needed for Pain. 60 tablet 0    isosorbide dinitrate (ISORDIL) 20 MG tablet Take 1 tablet (20 mg total) by mouth 3 (three) times daily. 90 tablet 11    magnesium oxide (MAG-OX) 400 mg (241.3 mg magnesium) tablet Take 400 mg by mouth.      melatonin 5 mg Cap Take by mouth.      metoprolol succinate (TOPROL-XL) 100 MG 24 hr tablet Take 100 mg by mouth once daily.       potassium chloride SA (K-DUR,KLOR-CON) 10 MEQ tablet potassium chloride ER 10 mEq tablet,extended release(part/cryst)      risperiDONE (RISPERDAL) 3 MG Tab Take by mouth.      sacubitril-valsartan (ENTRESTO)  mg per tablet Take 1 tablet by mouth 2 (two) times daily.       sertraline (ZOLOFT) 50 MG tablet Take 50 mg by mouth once daily. TAKE THREE TABLETS BY MOUTH EVERY MORNING TO IMPROVE MOOD       spironolactone (ALDACTONE) 25 MG tablet Take 0.5 mg by mouth once daily.      torsemide (DEMADEX) 20 MG Tab Take 20 mg by mouth. Take 3 tab by mouth every day       No current facility-administered medications for this visit.        REVIEW OF SYSTEMS:    GENERAL:  No weight loss, malaise or fevers.  HEENT:  Negative for frequent or significant headaches.  NECK:  Negative for lumps, goiter, pain and significant neck swelling.  RESPIRATORY:  Negative for cough, wheezing or shortness of breath.  CARDIOVASCULAR:  Negative for chest pain, leg swelling or palpitations. +CAD, cardiomyopathy, CHF  GI:  Negative for abdominal discomfort, blood in stools or black stools or change in bowel habits.  MUSCULOSKELETAL:  See HPI  SKIN:  Negative for lesions, rash, and itching.  PSYCH:  Negative for  mood disorder and recent psychosocial stressors. +sleep disturbance  HEMATOLOGY/LYMPHOLOGY:  Negative for prolonged bleeding, bruising easily or swollen  nodes.  NEURO:   No history of headaches, syncope, paralysis, seizures or tremors.  ENDO: Diabetes  All other reviewed and negative other than HPI.    Past Medical History:  Past Medical History:   Diagnosis Date    Brain damage     traumatic    CAD (coronary artery disease)     Cardiomyopathy     CHF (congestive heart failure)     Diabetes mellitus     type 2    Edema     Erectile dysfunction     GERD (gastroesophageal reflux disease)     HTN (hypertension)     Hyperlipemia     Sciatic nerve pain, right     leg       Past Surgical History:  Past Surgical History:   Procedure Laterality Date    CARDIAC DEFIBRILLATOR PLACEMENT      CATHETERIZATION OF BOTH LEFT AND RIGHT HEART Bilateral 08/20/2018    CORONARY ARTERY BYPASS GRAFT      ILIAC ARTERY STENT      RIGHT HEART CATHETERIZATION Right 8/17/2020    Procedure: INSERTION, CATHETER, RIGHT HEART;  Surgeon: Brianda Navas MD;  Location: [facility] CATH LAB;  Service: Cardiology;  Laterality: Right;    TRANSFORAMINAL EPIDURAL INJECTION OF STEROID Right 9/11/2020    Procedure: INJECTION, STEROID, EPIDURAL, TRANSFORAMINAL APPROACH, L4-L5 AND L5-S1 clear to hold Eliquis 3 days;  Surgeon: Austin Parsons MD;  Location: [facility] PAIN MGT;  Service: Pain Management;  Laterality: Right;    TRANSFORAMINAL EPIDURAL INJECTION OF STEROID Right 10/23/2020    Procedure: INJECTION, STEROID, EPIDURAL, TRANSFORAMINAL APPROACH;  Surgeon: Austin Parsons MD;  Location: [facility] PAIN MGT;  Service: Pain Management;  Laterality: Right;  RT RFA L5/S1 and S1  Eliquis clearance in Media       Family History:  No family history on file.    Social History:  Social History     Socioeconomic History    Marital status:      Spouse name: Not on file    Number of children: Not on file    Years of education: Not on file    Highest education level: Not on file   Occupational History    Not on file   Social Needs    Financial resource strain: Not on file    Food insecurity      "Worry: Not on file     Inability: Not on file    Transportation needs     Medical: Not on file     Non-medical: Not on file   Tobacco Use    Smoking status: Never Smoker    Smokeless tobacco: Never Used   Substance and Sexual Activity    Alcohol use: Not Currently    Drug use: Not Currently    Sexual activity: Not on file   Lifestyle    Physical activity     Days per week: Not on file     Minutes per session: Not on file    Stress: Not on file   Relationships    Social connections     Talks on phone: Not on file     Gets together: Not on file     Attends Mosque service: Not on file     Active member of club or organization: Not on file     Attends meetings of clubs or organizations: Not on file     Relationship status: Not on file   Other Topics Concern    Not on file   Social History Narrative    Not on file       OBJECTIVE:    /75   Pulse 80   Temp 97 °F (36.1 °C)   Resp 18   Ht 6' 4" (1.93 m)   Wt 128 kg (282 lb 3 oz)   SpO2 100%   BMI 34.35 kg/m²     PHYSICAL EXAMINATION:    General appearance: Well appearing, in no acute distress, alert and oriented x3.  Psych:  Mood and affect appropriate.  Skin: Skin color, texture, turgor normal, no rashes or lesions, in both upper and lower body.  Head/face:  Atraumatic, normocephalic. No palpable lymph nodes  Cor: RRR  Pulm: CTA  GI: Abdomen soft and non-tender.  Back: Straight leg raising in the sitting position is positive to radicular pain on the right, negative on the left. There is pain to palpation over lumbar paraspinal muscles bilaterally. There is pain with palpation over lumbar facet joints. Limited ROM with pain on flexion and extension. Positive facet loading bilaterally.   Extremities: Peripheral joint ROM is full and pain free without obvious instability or laxity in all four extremities. No deformities, edema, or skin discoloration. Good capillary refill.  Musculoskeletal: Shoulder, hip, sacroiliac and knee provocative maneuvers " are negative. Mild pain with palpation over bilateral SI joints. FABERs is negative bilaterally. Bilateral lower extremity strength is normal and symmetric.  No atrophy or tone abnormalities are noted.  Neuro: Bilateral lower extremity coordination and muscle stretch reflexes are physiologic and symmetric.  Plantar response are downgoing. No loss of sensation is noted.  Gait: Antalgic- ambulates without assistance.     ASSESSMENT: 70 y.o. year old male with chronic lower back pain, consistent with the followin. Lumbar radiculopathy     2. Lumbar spondylosis     3. Spinal stenosis of lumbar region with neurogenic claudication     4. Chronic congestive heart failure, unspecified heart failure type     5. Chronic pain disorder     6. Encounter for long-term opiate analgesic use  Pain Clinic Drug Screen         PLAN:     - Previous imaging was reviewed and discussed with the patient today.    - He is s/p right L5/S1 and S1 TF PÉREZ with short term relief.     - Consider lumbar medial branch blocks to target facet mediated pain. He would like to think about this.     - Consider genicular nerve blocks to proceed with RFA for his knee.     - Pain contract signed 2020.     - Continue Norco 5/325 mg BID PRN.     - The patient is here today for a refill of current pain medications and they believe these provide effective pain control and improvements in quality of life.  The patient notes no serious side effects, and feels the benefits outweigh the risks.  The patient was reminded of the pain contract that they signed previously as well as the risks and benefits of the medication including possible death.  The updated [place] Board [employer] prescription monitoring program was reviewed, and the patient has been found to be compliant with current treatment plan.    - UDS from 2020 was positive for hydrocodone and tramadol. Repeat UDS done today.       - I have stressed the importance of physical activity  and a home exercise plan to help with pain and improve health.    - RTC in 1 month.     - Counseled patient regarding the importance of activity modification and physical therapy.    - Dr. Parsons was consulted on the patient and agrees with this plan.    The above plan and management options were discussed at length with patient. Patient is in agreement with the above and verbalized understanding.    Aye Milner NP  11/16/2020

## 2020-11-20 LAB
6MAM UR QL: NOT DETECTED
7AMINOCLONAZEPAM UR QL: NOT DETECTED
A-OH ALPRAZ UR QL: NOT DETECTED
ALPRAZ UR QL: NOT DETECTED
AMPHET UR QL SCN: NOT DETECTED
ANNOTATION COMMENT IMP: NORMAL
ANNOTATION COMMENT IMP: NORMAL
BARBITURATES UR QL: NOT DETECTED
BUPRENORPHINE UR QL: NOT DETECTED
BZE UR QL: NOT DETECTED
CARBOXYTHC UR QL: NOT DETECTED
CARISOPRODOL UR QL: NOT DETECTED
CLONAZEPAM UR QL: NOT DETECTED
CODEINE UR QL: NOT DETECTED
CREAT UR-MCNC: 151.5 MG/DL (ref 20–400)
DIAZEPAM UR QL: NOT DETECTED
ETHYL GLUCURONIDE UR QL: PRESENT
FENTANYL UR QL: NOT DETECTED
HYDROCODONE UR QL: PRESENT
HYDROMORPHONE UR QL: NOT DETECTED
LORAZEPAM UR QL: NOT DETECTED
MDA UR QL: NOT DETECTED
MDEA UR QL: NOT DETECTED
MDMA UR QL: NOT DETECTED
ME-PHENIDATE UR QL: NOT DETECTED
MEPERIDINE UR QL: NOT DETECTED
METHADONE UR QL: NOT DETECTED
METHAMPHET UR QL: NOT DETECTED
MIDAZOLAM UR QL SCN: NOT DETECTED
MORPHINE UR QL: NOT DETECTED
NORBUPRENORPHINE UR QL CFM: NOT DETECTED
NORDIAZEPAM UR QL: NOT DETECTED
NORFENTANYL UR QL: NOT DETECTED
NORHYDROCODONE UR QL CFM: NOT DETECTED
NOROXYCODONE UR QL CFM: NOT DETECTED
NOROXYMORPHONE: NOT DETECTED
OXAZEPAM UR QL: NOT DETECTED
OXYCODONE UR QL: NOT DETECTED
OXYMORPHONE UR QL: NOT DETECTED
PATHOLOGY STUDY: NORMAL
PCP UR QL: NOT DETECTED
PHENTERMINE UR QL: NOT DETECTED
PROPOXYPH UR QL: NOT DETECTED
SERVICE CMNT-IMP: NORMAL
TAPENTADOL UR QL SCN: NOT DETECTED
TAPENTADOL-O-SULF: NOT DETECTED
TEMAZEPAM UR QL: NOT DETECTED
TRAMADOL UR QL: NOT DETECTED
ZOLPIDEM UR QL: NOT DETECTED

## 2020-11-24 ENCOUNTER — DOCUMENTATION ONLY (OUTPATIENT)
Dept: TRANSPLANT | Facility: CLINIC | Age: 70
End: 2020-11-24

## 2020-12-10 ENCOUNTER — OFFICE VISIT (OUTPATIENT)
Dept: NEPHROLOGY | Facility: CLINIC | Age: 70
End: 2020-12-10
Payer: COMMERCIAL

## 2020-12-10 VITALS
DIASTOLIC BLOOD PRESSURE: 72 MMHG | HEIGHT: 76 IN | SYSTOLIC BLOOD PRESSURE: 130 MMHG | HEART RATE: 85 BPM | WEIGHT: 283.75 LBS | OXYGEN SATURATION: 98 % | BODY MASS INDEX: 34.55 KG/M2

## 2020-12-10 DIAGNOSIS — I25.5 ISCHEMIC CARDIOMYOPATHY: ICD-10-CM

## 2020-12-10 DIAGNOSIS — E79.0 HYPERURICEMIA: ICD-10-CM

## 2020-12-10 DIAGNOSIS — N18.30 STAGE 3 CHRONIC KIDNEY DISEASE, UNSPECIFIED WHETHER STAGE 3A OR 3B CKD: Primary | ICD-10-CM

## 2020-12-10 DIAGNOSIS — N28.1 KIDNEY CYST, ACQUIRED: ICD-10-CM

## 2020-12-10 DIAGNOSIS — I13.10 CARDIORENAL SYNDROME WITH RENAL FAILURE, STAGE 1-4 OR UNSPECIFIED CHRONIC KIDNEY DISEASE, WITHOUT HEART FAILURE: ICD-10-CM

## 2020-12-10 PROCEDURE — 99999 PR PBB SHADOW E&M-EST. PATIENT-LVL V: ICD-10-PCS | Mod: PBBFAC,,, | Performed by: INTERNAL MEDICINE

## 2020-12-10 PROCEDURE — 99999 PR PBB SHADOW E&M-EST. PATIENT-LVL V: CPT | Mod: PBBFAC,,, | Performed by: INTERNAL MEDICINE

## 2020-12-10 PROCEDURE — 99214 OFFICE O/P EST MOD 30 MIN: CPT | Mod: S$PBB,,, | Performed by: INTERNAL MEDICINE

## 2020-12-10 PROCEDURE — 99214 PR OFFICE/OUTPT VISIT, EST, LEVL IV, 30-39 MIN: ICD-10-PCS | Mod: S$PBB,,, | Performed by: INTERNAL MEDICINE

## 2020-12-10 PROCEDURE — 99215 OFFICE O/P EST HI 40 MIN: CPT | Mod: PBBFAC | Performed by: INTERNAL MEDICINE

## 2020-12-10 NOTE — PROGRESS NOTES
Subjective:   Patient ID: Ishmael Thrasher is a 70 y.o. Black or  male who presents for follow up evaluation of Chronic Kidney Disease      HPI   was seen in virtual clinic today for follow up patient evaluation of CKD. He was evaluated in new patient evaluation on 4/7/20 during virtual visit. He was last followed on 7/2/20.     Interim he was seen by urology for ED. Pt had a follow up with cardiologist, Dr. Orosco who reduced Torsemide dose due to slight rise in creatinine to 2.0. subsequent labs showing improvement to 1.5. he was evaluated in hepatology clinic for enlarged liver, a follow up US did not show ascites. He was advised to consider weight loss of 25-30 lbs.    He reports he is still feeling some distention of abdomen. He notices leg swelling after sitting for more than couple of hours. He denies any SOB currently.     He has not been taking Uloric every day, he was under the impression he has to take it only as needed. Recent uric acid level has worsened.     He has been on Entresto and diuretic regimen per his cardiologist. He denies decreased oral intake/ decreased urine output/ flank pain/ dysuria/ cloudy urine/ dizziness.    He is known to have a cyst on kidney and has been under follow up of a local urologist.     He has ischemic cardiomyopathy, s/p CABG, AICD, hypertension, prior chronic use of NSAID, CKD, prior episodes of PRABHJOT in 2018, hyperlipidemia, spinal stenosis, GERD, BPH and other medical problems. He had hypertension diagnosed since mid 1970's. He has severe ischemic cardiomyopathy. He is being evaluated for advanced options of heart failure including heart transplant. He has been a non tobacco smoker.     He has been on Entresto on off per his report. He feels since he was placed back on it in mid 2019 he has noticed further drop in GFR. He had prior long term use of NSAID like meloxicam. He has stopped use for a while now. He has been on diuretic torsemide,  multiple antihypertensives and Entresto, spironolactone. In 2018 he had PRABHJOT superimposed on CKD. Per outside notes he was treated with vancomycin back then. Not much details found in available outside records. He describes he had a CT scan in the past which reported one of the kidneys having around 2.5 cm cyst.     His cardiology team has been trying to titrate the dose of Entresto to the maximum his BP can tolerate. He has been also started on isosorbide and hydralazine for afterload reduction. He did not report any recent episode of hypotension. His diuretic regimen is being managed by his cardiologist.     Renal Function:  Lab Results   Component Value Date     (H) 12/03/2020     (H) 12/03/2020     12/03/2020     12/03/2020    K 3.5 12/03/2020    K 3.5 12/03/2020     12/03/2020     12/03/2020    CO2 31 (H) 12/03/2020    CO2 31 (H) 12/03/2020    BUN 16 12/03/2020    BUN 16 12/03/2020    CALCIUM 8.9 12/03/2020    CALCIUM 8.9 12/03/2020    CREATININE 1.50 (H) 12/03/2020    CREATININE 1.50 (H) 12/03/2020    ALBUMIN 3.7 12/03/2020    ALBUMIN 4.0 11/13/2020    PHOS 3.0 12/03/2020    PHOS 3.6 06/19/2020    ESTGFRAFRICA 54.1 (A) 12/03/2020    ESTGFRAFRICA 54.1 (A) 12/03/2020    EGFRNONAA 46.8 (A) 12/03/2020    EGFRNONAA 46.8 (A) 12/03/2020       Urinalysis:  Lab Results   Component Value Date    APPEARANCEUA Clear 12/03/2020    PHUR 6.0 12/03/2020    SPECGRAV 1.015 12/03/2020    PROTEINUA Negative 12/03/2020    GLUCUA Negative 12/03/2020    OCCULTUA Negative 12/03/2020    NITRITE Negative 12/03/2020    LEUKOCYTESUR Negative 12/03/2020       Protein/Creatinine Ratio:  Lab Results   Component Value Date    PROTEINURINE 21 (H) 12/03/2020    CREATRANDUR 160.0 12/03/2020    UTPCR 0.13 12/03/2020       CBC:  Lab Results   Component Value Date    WBC 9.51 11/13/2020    HGB 13.2 (L) 11/13/2020    HCT 40.9 11/13/2020       PTH:  Lab Results   Component Value Date    .9 (H) 12/03/2020      Vit D 19.5  Uric acid 13.3 from 11.2  SPEP/ ANAHY negative for paraprotein  Hep C/B screen negative     US kidneys 10.9 and 11.5 cm kidneys, left sided simple kidney cyst     Review of Systems   Constitutional: Negative for activity change, appetite change, chills, fatigue and fever.   HENT: Negative for facial swelling, hearing loss and sore throat.    Eyes: Negative for visual disturbance.   Respiratory: Negative for cough, shortness of breath and wheezing.    Cardiovascular: Positive for leg swelling. Negative for chest pain.   Gastrointestinal: Negative for abdominal pain, diarrhea, nausea and vomiting. positive for abdominal distention  Endocrine: Negative for polydipsia and polyuria.   Genitourinary: Negative for dysuria, flank pain and frequency.   Musculoskeletal: Positive for arthralgias and back pain.   Skin: Negative for pallor and rash.   Neurological: Negative for dizziness, light-headedness and headaches.   Psychiatric/Behavioral: The patient is not nervous/anxious.        Objective:     Physical Exam   Constitutional: he is oriented to person, place, and time. he appears well-developed and well-nourished.   HENT:   Head: Normocephalic.   Neck: supple.  Cardiovascular: Normal rate, regular rhythm.  Pulmonary/Chest: Effort normal and breath sounds normal. No wheezing.  Abdominal: distention +. Abdominal obesity.   Musculoskeletal: edema +. Pitting, bilateral in LE.   Neurological: he is alert and oriented to person, place, and time.   Skin: Skin is warm and dry.         Assessment:     1. Stage 3 chronic kidney disease, unspecified whether stage 3a or 3b CKD    2. Kidney cyst, acquired    3. Hyperuricemia    4. Ischemic cardiomyopathy    5. Cardiorenal syndrome with renal failure, stage 1-4 or unspecified chronic kidney disease, without heart failure        Plan:       Problem List Items Addressed This Visit        Cardiac/Vascular    Cardiorenal syndrome    Relevant Orders    CBC Auto Differential     PTH, intact    Renal Function Panel    Uric Acid    Vitamin D    Magnesium    Urinalysis    Protein / creatinine ratio, urine    Ischemic cardiomyopathy       Renal/    CKD (chronic kidney disease), stage III - Primary    Relevant Orders    CBC Auto Differential    PTH, intact    Renal Function Panel    Uric Acid    Vitamin D    Magnesium    Urinalysis    Protein / creatinine ratio, urine    Kidney cyst, acquired    Relevant Orders    CBC Auto Differential    PTH, intact    Renal Function Panel    Uric Acid    Vitamin D    Magnesium    Urinalysis    Protein / creatinine ratio, urine       Orthopedic    Hyperuricemia    Relevant Orders    CBC Auto Differential    PTH, intact    Renal Function Panel    Uric Acid    Vitamin D    Magnesium    Urinalysis    Protein / creatinine ratio, urine        Mr. Thrasher has CKD III, prior episodes of PRABHJOT likely in the context of infection/ nephrotoxic antibiotic use. 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    He has very advanced cardiomyopathy. His risk of worsening of CKD/ PRABHJOT is higher. I had a detailed discussion with patient about his CKD diagnosis, CKD staging, interpretation of serial labs pertaining to his kidneys, potential risk of progression of CKD. Possible etiology of his CKD, need for improved volume status, strict low salt diet, avoiding any NSAID, having periodic monitoring of renal labs to assess and treat any electrolyte disturbance, acid base disorder, to follow progress of CKD with creatinine and eGFR. I stressed to have BP monitoring at home and to bring readings for review with his future visits with MD.     His main concern has been using Entresto which he perceives is causing kidney problem to get worse. I counseled him that Valsartan component is beneficial for RAAS blockade and for CKD management. But cautioned him and advised to discuss  any dose related concerns with his cardiologist. Explained that from cardiac standpoint this is likely offering him benefit by lowering mortality and encouraged him to keep follow up in cardiology clinic so that further work up can be undertaken for advanced heart failure options. Explained to him with combination of various medicines he has been on he has higher risk of hypotension which can precipitate an episode of ischemic ATN causing PRABHJOT and he should be protected from severe lowering of BP below his baseline. Also d/w him in detail risk of hyperkalemia with combination of Entresto, K sparing diuretic and CKD along with potential risk of PRABHJOT. He has not been taking K supplements. Would encourage prn correction of hypokalemia if that develops.     Diuretic regimen, antihypertensive regimen otherwise as per his cardiologist.    Continue to follow with hepatology for enlarged liver.  Weight loss by calorie restriction is very advisable to him as severe obesity is a risk factor for CKD progression.    He feels reluctant to take allopurinol on a regular basis due to potential for liver toxicity. Hence started on Uloric. [facility] However it appears he has not been taking it regularly due to his impression of using it as needed. Pt explained of need to take it everyday. Uric acid level has worsened as a result, brought to this attention.    - periodically monitor renal panel for electrolytes, acid base status, eGFR  - CKD staging, diagnosis, onset of CKD, potential risk of CKD progression, potential risk of PRABHJOT due to volume depletion/ PAPI/ ATN due to hemodynamic changes d/w patient  - stress to follow low salt diet, fluid restriction, follow low K diet in case of any dyskalemia, nutritional changes d/w patient   - trend PTH levels, vit D, phos, corrected Ca and treat as necessary. Continue oral vit D3 supplements he has been taking. Yet no indication to treat sec  hyperparathyroidism  - trend urine studies for proteinuria  - continue follow up with urologist for kidney cyst   - avoid NSAID/ bactrim/ IV contrast/ gadolinium/ aminoglycoside/ fleet enema/ PPI where possible  - Pt encouraged to monitor BP at home, goal BP level d/w patient  - trend H/H periodically if CKD stage progresses      Plan, labs, recommendations were discussed with patient, his questions were answered to his satisfaction.   Total time spent was 45 minutes with >50% time spent in face to face evaluation, counseling about possible etiology, work up, monitoring, natural course of illness, recommendations.       RTC 3 to 4 months

## 2020-12-15 DIAGNOSIS — M54.16 LUMBAR RADICULOPATHY: ICD-10-CM

## 2020-12-15 DIAGNOSIS — G89.4 CHRONIC PAIN SYNDROME: ICD-10-CM

## 2020-12-15 DIAGNOSIS — M48.062 SPINAL STENOSIS OF LUMBAR REGION WITH NEUROGENIC CLAUDICATION: ICD-10-CM

## 2020-12-15 RX ORDER — HYDROCODONE BITARTRATE AND ACETAMINOPHEN 5; 325 MG/1; MG/1
1 TABLET ORAL EVERY 12 HOURS PRN
Qty: 60 TABLET | Refills: 0 | Status: SHIPPED | OUTPATIENT
Start: 2020-12-15 | End: 2021-01-14 | Stop reason: SDUPTHER

## 2020-12-15 NOTE — TELEPHONE ENCOUNTER
----- Message from Matthew Hernandez sent at 12/15/2020  4:32 PM CST -----  Regarding: Refill #temp pharm change#  Contact: ALLIE TOLEDO [[id number]]      Can the clinic reply in [facility]: no      Please refill the medication(s) listed below. Please call the patient when the prescription(s) is ready for  at this phone number    [phone]      Medication #1 HYDROcodone-acetaminophen (NORCO) 5-325 mg per tablet    #temp pharm change#  Preferred [facility]acy: [facility] [address] [place] [id number]

## 2020-12-15 NOTE — TELEPHONE ENCOUNTER
Patient requesting refill on Norco  Last office visit 11/16/20   shows last refill on 11/16/20  Patient does have a pain contract on file with [facility]  Patient last UDS 11/16/20 was consistent with current therapy

## 2020-12-21 ENCOUNTER — OFFICE VISIT (OUTPATIENT)
Dept: PAIN MEDICINE | Facility: CLINIC | Age: 70
End: 2020-12-21
Payer: COMMERCIAL

## 2020-12-21 VITALS
HEART RATE: 72 BPM | RESPIRATION RATE: 18 BRPM | WEIGHT: 285.69 LBS | OXYGEN SATURATION: 100 % | HEIGHT: 76 IN | DIASTOLIC BLOOD PRESSURE: 79 MMHG | BODY MASS INDEX: 34.79 KG/M2 | SYSTOLIC BLOOD PRESSURE: 135 MMHG

## 2020-12-21 DIAGNOSIS — M48.062 SPINAL STENOSIS OF LUMBAR REGION WITH NEUROGENIC CLAUDICATION: ICD-10-CM

## 2020-12-21 DIAGNOSIS — F11.90 CHRONIC, CONTINUOUS USE OF OPIOIDS: Primary | ICD-10-CM

## 2020-12-21 DIAGNOSIS — M17.0 PRIMARY OSTEOARTHRITIS OF BOTH KNEES: ICD-10-CM

## 2020-12-21 DIAGNOSIS — M47.816 LUMBAR SPONDYLOSIS: ICD-10-CM

## 2020-12-21 DIAGNOSIS — M54.16 LUMBAR RADICULOPATHY: ICD-10-CM

## 2020-12-21 PROCEDURE — 99214 PR OFFICE/OUTPT VISIT, EST, LEVL IV, 30-39 MIN: ICD-10-PCS | Mod: S$PBB,,, | Performed by: ANESTHESIOLOGY

## 2020-12-21 PROCEDURE — 99214 OFFICE O/P EST MOD 30 MIN: CPT | Mod: S$PBB,,, | Performed by: ANESTHESIOLOGY

## 2020-12-21 PROCEDURE — 80307 DRUG TEST PRSMV CHEM ANLYZR: CPT

## 2020-12-21 PROCEDURE — 99999 PR PBB SHADOW E&M-EST. PATIENT-LVL IV: ICD-10-PCS | Mod: PBBFAC,,, | Performed by: ANESTHESIOLOGY

## 2020-12-21 PROCEDURE — 99214 OFFICE O/P EST MOD 30 MIN: CPT | Mod: PBBFAC | Performed by: ANESTHESIOLOGY

## 2020-12-21 PROCEDURE — 99999 PR PBB SHADOW E&M-EST. PATIENT-LVL IV: CPT | Mod: PBBFAC,,, | Performed by: ANESTHESIOLOGY

## 2020-12-21 NOTE — PROGRESS NOTES
Chronic patient Established Note (Follow up visit)    SRINIVAS Thrasher is a 70 y.o. male with a history of CHF (EF 10%) who presents today with chronic low back pain. This pain started in 1971 as a result of and injury in the .  He describes a constant, right sided pain that starts in his low back and radiates down the posterior aspect of his right leg to the bottom of his foot.  This is associated with a numbness, tingling sensation.  The pain occurs when he is standing still for longer than 5 minutes.  The pain does not occur when walking unless he is standing first.  He denies heaviness and weakness.  This pain is described in detail below.     Aggravating factors: Standing in place for 5 minutes     Mitigating factors: Walking, medication, rest     Previously seeing: Dr. Davide Wang ([place])     Physical Therapy: Has done for his heart, but not for his back     Interval History (7/2/2020):  The patient returns to clinic today for follow up of back pain. He continues to report low back pain that radiates down the posterior aspect of his right leg to the bottom of his foot. He denies any left leg pain. His pain is worse with prolonged standing. He has had injections in the past without relief. He is currently taking Tramadol with limited relief. He denies any other health changes. His pain today is 10/10.     Interval History 8/6/2020:   Ishmael Thrasher presents to the clinic for a follow-up appointment for bilateral leg pain. Since the last visit, Ishmael Thrasher states the pain has been worsening. Current pain intensity is 10/10.     Interval History 9/28/2020:  The patient returns to clinic today for follow up of low back and leg pain. He is s/p right L4/5 and L5/S1 TF PÉREZ on 9/11/2020. He reports no significant relief of his back and leg pain. He continues to report low back pain that radiates down the posterior aspect of his right leg to under his foot. He denies any left leg pain. His pain  is worse with prolonged standing and walking. He is currently taking Norco with limited benefit. He reports that this decreases his pain but does not last. He denies any other health changes. His pain today is 10/10.     Interval History 11/16/2020:  The patient returns to clinic today for follow up of low back pain. He is s/p right L5/S1 and S1 TF PÉREZ on 10/23/2020. He reports 60% relief for about a week. He continues to report low back pain that radiates into the posterior aspect of his right leg to his foot. He denies any left leg pain. His pain is worse with standing and walking. He also reports difficulty sleeping due to pain. He is frustrated with his continued pain. He continues to take Norco but reports that this does not last. He denies any other health changes. His pain today is 10/10.     Interval History 12/21/2020:    Ishmael Thrasher presents to the clinic for a  1 month follow-up appointment. Since the last visit, Ishmael Trhasher states the pain has been stable. Current pain intensity is 10/10.    Pain Disability Index Review:  Last 3 PDI Scores 12/21/2020 11/16/2020 9/28/2020   Pain Disability Index (PDI) 40 60 69       Pain Medications:  Norco      Opioid Contract: yes     report:  Reviewed and consistent with medication use as prescribed.    Pain Procedures:   09/11/2020 - INJECTION, STEROID, EPIDURAL, TRANSFORAMINAL APPROACH, L4-L5 AND L5-S1   10/23/2020 - RT RFA L5/S1 and S1   Physical Therapy/Home Exercise: yes    Imaging:   Narrative & Impression       EXAMINATION:  XR LUMBAR SPINE 5 VIEW WITH FLEX AND EXT     CLINICAL HISTORY:  Back pain or radiculopathy, > 6 wks;  Radiculopathy, lumbar region     TECHNIQUE:  AP, lateral, oblique views of the lumbar spine with spot view of the lumbosacral region and lateral views in flexion and extension.     COMPARISON:  None     FINDINGS:  There is no collapse or malalignment in the lumbar spine.  There is no significant change in alignment between flexion  and extension.     There is reduced of the intervertebral disc spaces at L4-L5 and L5-S1 with disc vacuum phenomena and anterior osteophyte formation.  There are mild neural foraminal stenosis at these levels.     There is mild bilateral neural foraminal stenosis.     There are extensive vascular calcifications in the abdominal aorta.  The soft tissues are otherwise unremarkable.     Impression:     1. Moderate to severe spondylosis and neural foraminal stenosis at L4-L5 and L5-S1.  2. Straightening of lumbar lordosis  3. No changes in alignment in flexion and extension     Electronically signed by resident: Marko Lamas  Date:                                            07/07/2020  Time:                                           15:05     Electronically signed by: Roly Qiu  Date:                                            07/07/2020  Time:                                           15:21          Narrative & Impression       EXAMINATION:  CT LUMBAR SPINE WITHOUT CONTRAST     CLINICAL HISTORY:  Back pain or radiculopathy, > 6 wks;.     COMPARISON:  None.     FINDINGS:  Decrease height of vertebral bodies L4-L5 predominant at L5.  Degenerative changes in the endplates with decrease height intervertebral space L4-L5.  Vacuum phenomenon at L5-S1 indicating remarkable decrease.  Facet hypertrophy and degenerative changes in facet joints L3-L4, bilaterally.     Prominent aortic calcifications.     Left pleural effusion and atelectasis     Impression:     Prominent degenerative changes in the lumbar spine at L4, L5 and S1     Decrease height of intervertebral space L4-L5 with degenerative process and decrease height of vertebral bodies L4-L5 predominant at L5     Remarkable disc disease at L5-S1     Atherosclerosis     Left pleural effusion and atelectasis.  Please see CT of the chest        Electronically signed by: Roly  Uyen  Date:                                            07/08/2020  Time:                                           16:00          Allergies:   Review of patient's allergies indicates:   Allergen Reactions    Vasotec [enalaprilat] Swelling     Neck swelling    Cyclobenzaprine      Other reaction(s): Swelling  Hallucinations according to patient.      Amitriptyline Hallucinations    Labetalol     Tramadol Hallucinations and Nausea Only       Current Medications:   Current Outpatient Medications   Medication Sig Dispense Refill    allopurinol (ZYLOPRIM) 300 MG tablet Take 300 mg by mouth once daily.      apixaban (ELIQUIS) 5 mg Tab Take 5 mg by mouth 2 (two) times daily.      aspirin (ECOTRIN) 81 MG EC tablet Take 81 mg by mouth.      atorvastatin (LIPITOR) 80 MG tablet Take 80 mg by mouth once daily.      cholecalciferol, vitamin D3, 3,000 unit Tab Take 1 tablet by mouth.      coenzyme Q10 10 mg capsule Take 200 mg by mouth once daily.      colchicine (COLCRYS) 0.6 mg tablet TK 1 T PO BID PRF GOUT      febuxostat (ULORIC) 40 mg Tab Take 1 tablet (40 mg total) by mouth once daily. 30 tablet 6    glipiZIDE (GLUCOTROL) 5 MG tablet Take 5 mg by mouth 2 (two) times daily before meals.      HYDROcodone-acetaminophen (NORCO) 5-325 mg per tablet Take 1 tablet by mouth every 12 (twelve) hours as needed for Pain. 60 tablet 0    isosorbide dinitrate (ISORDIL) 20 MG tablet Take 1 tablet (20 mg total) by mouth 3 (three) times daily. 90 tablet 11    magnesium oxide (MAG-OX) 400 mg (241.3 mg magnesium) tablet Take 400 mg by mouth.      melatonin 5 mg Cap Take by mouth.      metoprolol succinate (TOPROL-XL) 100 MG 24 hr tablet Take 100 mg by mouth once daily.       potassium chloride SA (K-DUR,KLOR-CON) 10 MEQ tablet potassium chloride ER 10 mEq tablet,extended release(part/cryst)      risperiDONE (RISPERDAL) 3 MG Tab Take by mouth.      sacubitril-valsartan (ENTRESTO)  mg per tablet Take 1  tablet by mouth 2 (two) times daily.       sertraline (ZOLOFT) 50 MG tablet Take 50 mg by mouth once daily. TAKE THREE TABLETS BY MOUTH EVERY MORNING TO IMPROVE MOOD       spironolactone (ALDACTONE) 25 MG tablet Take 0.5 mg by mouth once daily.      torsemide (DEMADEX) 20 MG Tab Take 20 mg by mouth. Take 3 tab by mouth every day       No current facility-administered medications for this visit.        REVIEW OF SYSTEMS:    GENERAL:  No weight loss, malaise or fevers.  HEENT:  Negative for frequent or significant headaches.  NECK:  Negative for lumps, goiter, pain and significant neck swelling.  RESPIRATORY:  Negative for cough, wheezing or shortness of breath.  CARDIOVASCULAR:  Negative for chest pain, leg swelling or palpitations. +CHF  GI:  Negative for abdominal discomfort, blood in stools or black stools or change in bowel habits.  MUSCULOSKELETAL:  + Lower back and knee pain.  SKIN:  Negative for lesions, rash, and itching.  PSYCH:  Negative for sleep disturbance, mood disorder and recent psychosocial stressors.  HEMATOLOGY/LYMPHOLOGY:  Negative for prolonged bleeding, bruising easily or swollen nodes.  NEURO:   No history of headaches, syncope, paralysis, seizures or tremors.  All other reviewed and negative other than HPI.    Past Medical History:  Past Medical History:   Diagnosis Date    Brain damage     traumatic    CAD (coronary artery disease)     Cardiomyopathy     CHF (congestive heart failure)     Diabetes mellitus     type 2    Edema     Erectile dysfunction     GERD (gastroesophageal reflux disease)     HTN (hypertension)     Hyperlipemia     Sciatic nerve pain, right     leg       Past Surgical History:  Past Surgical History:   Procedure Laterality Date    CARDIAC DEFIBRILLATOR PLACEMENT      CATHETERIZATION OF BOTH LEFT AND RIGHT HEART Bilateral 08/20/2018    CORONARY ARTERY BYPASS GRAFT      ILIAC ARTERY STENT      RIGHT HEART CATHETERIZATION Right 8/17/2020    Procedure:  INSERTION, CATHETER, RIGHT HEART;  Surgeon: Brianda Navas MD;  Location: [facility] CATH LAB;  Service: Cardiology;  Laterality: Right;    TRANSFORAMINAL EPIDURAL INJECTION OF STEROID Right 9/11/2020    Procedure: INJECTION, STEROID, EPIDURAL, TRANSFORAMINAL APPROACH, L4-L5 AND L5-S1 clear to hold Eliquis 3 days;  Surgeon: Austin Parsons MD;  Location: [facility] PAIN MGT;  Service: Pain Management;  Laterality: Right;    TRANSFORAMINAL EPIDURAL INJECTION OF STEROID Right 10/23/2020    Procedure: INJECTION, STEROID, EPIDURAL, TRANSFORAMINAL APPROACH;  Surgeon: uAstin Parsons MD;  Location: [facility] PAIN MGT;  Service: Pain Management;  Laterality: Right;  RT RFA L5/S1 and S1  Eliquis clearance in [place]       Family History:  History reviewed. No pertinent family history.    Social History:  Social History     Socioeconomic History    Marital status:      Spouse name: Not on file    Number of children: Not on file    Years of education: Not on file    Highest education level: Not on file   Occupational History    Not on file   Social Needs    Financial resource strain: Not on file    Food insecurity     Worry: Not on file     Inability: Not on file    Transportation needs     Medical: Not on file     Non-medical: Not on file   Tobacco Use    Smoking status: Never Smoker    Smokeless tobacco: Never Used   Substance and Sexual Activity    Alcohol use: Not Currently    Drug use: Not Currently    Sexual activity: Not on file   Lifestyle    Physical activity     Days per week: Not on file     Minutes per session: Not on file    Stress: Not on file   Relationships    Social connections     Talks on phone: Not on file     Gets together: Not on file     Attends Temple service: Not on file     Active member of club or organization: Not on file     Attends meetings of clubs or organizations: Not on file     Relationship status: Not on file   Other Topics Concern    Not on file   Social History Narrative     "Not on file       OBJECTIVE:    /79   Pulse 72   Resp 18   Ht 6' 4" (1.93 m)   Wt 129.6 kg (285 lb 11.5 oz)   SpO2 100%   BMI 34.78 kg/m²     PHYSICAL EXAMINATION:    General appearance: Well appearing, in no acute distress, alert and oriented x3.  Psych:  Mood and affect appropriate.  Skin: Skin color, texture, turgor normal, no rashes or lesions, in both upper and lower body.  Head/face:  Atraumatic, normocephalic. No palpable lymph nodes  Neck: No pain to palpation over the cervical paraspinous muscles. Spurling Negative. No pain with neck flexion, extension, or lateral flexion. .  Cor: RRR  Pulm: CTA  GI: Abdomen soft and non-tender.  Back: Straight leg raising in the sitting and supine positions is negative to radicular pain. No pain to palpation over the spine or costovertebral angles.  Range of motion lumbar spine with positive facet loading, bilateral.    Extremities: Peripheral joint ROM is full and pain free without obvious instability or laxity in all four extremities limited range of motion of bilateral knee joints. No deformities, edema, or skin discoloration. Good capillary refill.  Musculoskeletal: Shoulder, hip, sacroiliac provocative maneuvers are negative. Bilateral upper and lower extremity strength is normal and symmetric.  No atrophy or tone abnormalities are noted.  Tenderness to palpation over bilateral knee joint lines.  Neuro: Bilateral upper and lower extremity coordination and muscle stretch reflexes are physiologic and symmetric.  Plantar response are downgoing. No loss of sensation is noted.  Gait: Antalgic.    ASSESSMENT: 70 y.o. year old male with chronic lower back and knee pain consistent with lumbar spondylosis, lumbar radiculopathy and bilateral knee osteoarthritis.  Patient reports minimal relief from physical therapy temporary relief from multiple intra-articular steroid injections.  He is interested in genicular nerve RFA.  We will schedule him for bilateral " genicular nerve block.  If he responds appropriately to diagnostic blocks will proceed with bilateral genicular nerve cooled radiofrequency ablation.  Patient's repeat UDS was negative for tramadol at this point but he is positive for hydrocodone and negative for its metabolites.  We will repeat the UDS again today.  If UDS comes back negative for metabolite again, we will be able to prescribe him any opioids.    1. Chronic, continuous use of opioids  Pain Clinic Drug Screen   2. Lumbar spondylosis     3. Lumbar radiculopathy     4. Spinal stenosis of lumbar region with neurogenic claudication     5. Primary osteoarthritis of both knees           PLAN:     - I have stressed the importance of physical activity and a home exercise plan to help with pain and improve health.  - Patient's repeat UDS was negative for tramadol at this point but he is positive for hydrocodone and negative for its metabolites.  We will repeat the UDS again today.  If UDS comes back negative for metabolite again, we will be able to prescribe him any opioids.  - Scheduled for bilateral genicular block.  If patient responds appropriately to diagnostic blocks consider bilateral genicular nerve cooled radiofrequency ablation.  - RTC in 4 weeks.  - Counseled patient regarding the importance of activity modification.    The above plan and management options were discussed at length with patient. Patient is in agreement with the above and verbalized understanding.    Austin Parsons  12/21/2020

## 2020-12-25 LAB
6MAM UR QL: NOT DETECTED
7AMINOCLONAZEPAM UR QL: NOT DETECTED
A-OH ALPRAZ UR QL: NOT DETECTED
ALPRAZ UR QL: NOT DETECTED
AMPHET UR QL SCN: NOT DETECTED
ANNOTATION COMMENT IMP: NORMAL
ANNOTATION COMMENT IMP: NORMAL
BARBITURATES UR QL: NOT DETECTED
BUPRENORPHINE UR QL: NOT DETECTED
BZE UR QL: NOT DETECTED
CARBOXYTHC UR QL: NOT DETECTED
CARISOPRODOL UR QL: NOT DETECTED
CLONAZEPAM UR QL: NOT DETECTED
CODEINE UR QL: NOT DETECTED
CREAT UR-MCNC: 177 MG/DL (ref 20–400)
DIAZEPAM UR QL: NOT DETECTED
ETHYL GLUCURONIDE UR QL: NOT DETECTED
FENTANYL UR QL: NOT DETECTED
HYDROCODONE UR QL: PRESENT
HYDROMORPHONE UR QL: NOT DETECTED
LORAZEPAM UR QL: NOT DETECTED
MDA UR QL: NOT DETECTED
MDEA UR QL: NOT DETECTED
MDMA UR QL: NOT DETECTED
ME-PHENIDATE UR QL: NOT DETECTED
MEPERIDINE UR QL: NOT DETECTED
METHADONE UR QL: NOT DETECTED
METHAMPHET UR QL: NOT DETECTED
MIDAZOLAM UR QL SCN: NOT DETECTED
MORPHINE UR QL: NOT DETECTED
NORBUPRENORPHINE UR QL CFM: NOT DETECTED
NORDIAZEPAM UR QL: NOT DETECTED
NORFENTANYL UR QL: NOT DETECTED
NORHYDROCODONE UR QL CFM: PRESENT
NOROXYCODONE UR QL CFM: NOT DETECTED
NOROXYMORPHONE: NOT DETECTED
OXAZEPAM UR QL: NOT DETECTED
OXYCODONE UR QL: NOT DETECTED
OXYMORPHONE UR QL: NOT DETECTED
PATHOLOGY STUDY: NORMAL
PCP UR QL: NOT DETECTED
PHENTERMINE UR QL: NOT DETECTED
PROPOXYPH UR QL: NOT DETECTED
SERVICE CMNT-IMP: NORMAL
TAPENTADOL UR QL SCN: NOT DETECTED
TAPENTADOL-O-SULF: NOT DETECTED
TEMAZEPAM UR QL: NOT DETECTED
TRAMADOL UR QL: NOT DETECTED
ZOLPIDEM UR QL: NOT DETECTED

## 2021-01-14 ENCOUNTER — TELEPHONE (OUTPATIENT)
Dept: PAIN MEDICINE | Facility: CLINIC | Age: 71
End: 2021-01-14

## 2021-01-14 ENCOUNTER — TELEPHONE (OUTPATIENT)
Dept: ADMINISTRATIVE | Facility: OTHER | Age: 71
End: 2021-01-14

## 2021-01-14 DIAGNOSIS — M48.062 SPINAL STENOSIS OF LUMBAR REGION WITH NEUROGENIC CLAUDICATION: ICD-10-CM

## 2021-01-14 DIAGNOSIS — M54.16 LUMBAR RADICULOPATHY: ICD-10-CM

## 2021-01-14 DIAGNOSIS — G89.4 CHRONIC PAIN SYNDROME: ICD-10-CM

## 2021-01-15 ENCOUNTER — TELEPHONE (OUTPATIENT)
Dept: PAIN MEDICINE | Facility: CLINIC | Age: 71
End: 2021-01-15

## 2021-01-15 RX ORDER — HYDROCODONE BITARTRATE AND ACETAMINOPHEN 5; 325 MG/1; MG/1
1 TABLET ORAL EVERY 12 HOURS PRN
Qty: 60 TABLET | Refills: 0 | Status: SHIPPED | OUTPATIENT
Start: 2021-01-15 | End: 2021-02-18 | Stop reason: SDUPTHER

## 2021-01-27 ENCOUNTER — TELEPHONE (OUTPATIENT)
Dept: PAIN MEDICINE | Facility: CLINIC | Age: 71
End: 2021-01-27

## 2021-01-29 ENCOUNTER — TELEPHONE (OUTPATIENT)
Dept: PAIN MEDICINE | Facility: CLINIC | Age: 71
End: 2021-01-29

## 2021-02-03 ENCOUNTER — TELEPHONE (OUTPATIENT)
Dept: PULMONOLOGY | Facility: CLINIC | Age: 71
End: 2021-02-03

## 2021-02-05 ENCOUNTER — HOSPITAL ENCOUNTER (OUTPATIENT)
Facility: OTHER | Age: 71
Discharge: HOME | End: 2021-02-05
Attending: ANESTHESIOLOGY | Admitting: ANESTHESIOLOGY
Payer: MEDICARE

## 2021-02-05 VITALS
TEMPERATURE: 99 F | OXYGEN SATURATION: 99 % | HEIGHT: 76 IN | WEIGHT: 275 LBS | BODY MASS INDEX: 33.49 KG/M2 | HEART RATE: 112 BPM | RESPIRATION RATE: 18 BRPM | SYSTOLIC BLOOD PRESSURE: 132 MMHG | DIASTOLIC BLOOD PRESSURE: 93 MMHG

## 2021-02-05 DIAGNOSIS — G89.29 CHRONIC PAIN: ICD-10-CM

## 2021-02-05 DIAGNOSIS — M17.10 PRIMARY OSTEOARTHRITIS OF KNEE, UNSPECIFIED LATERALITY: Primary | ICD-10-CM

## 2021-02-05 LAB — POCT GLUCOSE: 130 MG/DL (ref 70–110)

## 2021-02-05 PROCEDURE — 64454 NJX AA&/STRD GNCLR NRV BRNCH: CPT | Mod: 50 | Performed by: ANESTHESIOLOGY

## 2021-02-05 PROCEDURE — 82947 ASSAY GLUCOSE BLOOD QUANT: CPT | Performed by: ANESTHESIOLOGY

## 2021-02-05 PROCEDURE — 64454 NJX AA&/STRD GNCLR NRV BRNCH: CPT | Mod: 50,,, | Performed by: ANESTHESIOLOGY

## 2021-02-05 PROCEDURE — 25000003 PHARM REV CODE 250: Performed by: ANESTHESIOLOGY

## 2021-02-05 PROCEDURE — 64454 PR NERVE BLOCK INJ, ANES/STEROID, GENICULAR NERVE, W/IMG: ICD-10-PCS | Mod: 50,,, | Performed by: ANESTHESIOLOGY

## 2021-02-05 RX ORDER — BUPIVACAINE HYDROCHLORIDE 2.5 MG/ML
INJECTION, SOLUTION EPIDURAL; INFILTRATION; INTRACAUDAL
Status: DISCONTINUED | OUTPATIENT
Start: 2021-02-05 | End: 2021-02-05 | Stop reason: HOSPADM

## 2021-02-05 RX ORDER — LIDOCAINE HYDROCHLORIDE 10 MG/ML
INJECTION INFILTRATION; PERINEURAL
Status: DISCONTINUED | OUTPATIENT
Start: 2021-02-05 | End: 2021-02-05 | Stop reason: HOSPADM

## 2021-02-05 RX ORDER — ALPRAZOLAM 0.5 MG/1
0.5 TABLET ORAL
Status: DISCONTINUED | OUTPATIENT
Start: 2021-02-05 | End: 2021-02-05 | Stop reason: HOSPADM

## 2021-02-10 ENCOUNTER — OFFICE VISIT (OUTPATIENT)
Dept: PULMONOLOGY | Facility: CLINIC | Age: 71
End: 2021-02-10
Payer: OTHER GOVERNMENT

## 2021-02-10 ENCOUNTER — TELEPHONE (OUTPATIENT)
Dept: PAIN MEDICINE | Facility: CLINIC | Age: 71
End: 2021-02-10

## 2021-02-10 VITALS
OXYGEN SATURATION: 96 % | HEIGHT: 76 IN | BODY MASS INDEX: 34.1 KG/M2 | DIASTOLIC BLOOD PRESSURE: 78 MMHG | WEIGHT: 280 LBS | SYSTOLIC BLOOD PRESSURE: 144 MMHG | HEART RATE: 55 BPM

## 2021-02-10 DIAGNOSIS — R91.8 ABNORMAL CT SCAN, LUNG: Primary | ICD-10-CM

## 2021-02-10 DIAGNOSIS — R06.00 DYSPNEA, UNSPECIFIED TYPE: ICD-10-CM

## 2021-02-10 PROCEDURE — 99214 OFFICE O/P EST MOD 30 MIN: CPT | Mod: S$PBB,,, | Performed by: INTERNAL MEDICINE

## 2021-02-10 PROCEDURE — 99999 PR PBB SHADOW E&M-EST. PATIENT-LVL IV: ICD-10-PCS | Mod: PBBFAC,,, | Performed by: INTERNAL MEDICINE

## 2021-02-10 PROCEDURE — 99214 OFFICE O/P EST MOD 30 MIN: CPT | Mod: PBBFAC | Performed by: INTERNAL MEDICINE

## 2021-02-10 PROCEDURE — 99214 PR OFFICE/OUTPT VISIT, EST, LEVL IV, 30-39 MIN: ICD-10-PCS | Mod: S$PBB,,, | Performed by: INTERNAL MEDICINE

## 2021-02-10 PROCEDURE — 99999 PR PBB SHADOW E&M-EST. PATIENT-LVL IV: CPT | Mod: PBBFAC,,, | Performed by: INTERNAL MEDICINE

## 2021-02-11 ENCOUNTER — TELEPHONE (OUTPATIENT)
Dept: PAIN MEDICINE | Facility: CLINIC | Age: 71
End: 2021-02-11

## 2021-02-18 DIAGNOSIS — G89.4 CHRONIC PAIN SYNDROME: ICD-10-CM

## 2021-02-18 DIAGNOSIS — M48.062 SPINAL STENOSIS OF LUMBAR REGION WITH NEUROGENIC CLAUDICATION: ICD-10-CM

## 2021-02-18 DIAGNOSIS — M54.16 LUMBAR RADICULOPATHY: ICD-10-CM

## 2021-02-18 RX ORDER — HYDROCODONE BITARTRATE AND ACETAMINOPHEN 5; 325 MG/1; MG/1
1 TABLET ORAL EVERY 12 HOURS PRN
Qty: 60 TABLET | Refills: 0 | Status: SHIPPED | OUTPATIENT
Start: 2021-02-18 | End: 2021-05-12 | Stop reason: SDUPTHER

## 2021-02-23 ENCOUNTER — TELEPHONE (OUTPATIENT)
Dept: NEPHROLOGY | Facility: CLINIC | Age: 71
End: 2021-02-23

## 2021-02-23 ENCOUNTER — TELEPHONE (OUTPATIENT)
Dept: PAIN MEDICINE | Facility: CLINIC | Age: 71
End: 2021-02-23

## 2021-02-24 ENCOUNTER — TELEPHONE (OUTPATIENT)
Dept: PAIN MEDICINE | Facility: CLINIC | Age: 71
End: 2021-02-24

## 2021-02-26 ENCOUNTER — HOSPITAL ENCOUNTER (OUTPATIENT)
Facility: OTHER | Age: 71
Discharge: HOME | End: 2021-02-26
Attending: ANESTHESIOLOGY | Admitting: ANESTHESIOLOGY
Payer: OTHER GOVERNMENT

## 2021-02-26 VITALS
BODY MASS INDEX: 33.49 KG/M2 | DIASTOLIC BLOOD PRESSURE: 105 MMHG | OXYGEN SATURATION: 99 % | TEMPERATURE: 98 F | RESPIRATION RATE: 18 BRPM | HEART RATE: 113 BPM | WEIGHT: 275 LBS | SYSTOLIC BLOOD PRESSURE: 153 MMHG | HEIGHT: 76 IN

## 2021-02-26 DIAGNOSIS — M17.0 PRIMARY OSTEOARTHRITIS OF BOTH KNEES: Primary | ICD-10-CM

## 2021-02-26 DIAGNOSIS — G89.29 CHRONIC PAIN: ICD-10-CM

## 2021-02-26 LAB — POCT GLUCOSE: 163 MG/DL (ref 70–110)

## 2021-02-26 PROCEDURE — 64624 DSTRJ NULYT AGT GNCLR NRV: CPT | Mod: RT,,, | Performed by: ANESTHESIOLOGY

## 2021-02-26 PROCEDURE — 64624 DSTRJ NULYT AGT GNCLR NRV: CPT | Mod: RT | Performed by: ANESTHESIOLOGY

## 2021-02-26 PROCEDURE — 64624 PR DESTRUCT, NEUROLYTIC AGENT, GENICULAR NERVE, W/IMG: ICD-10-PCS | Mod: RT,,, | Performed by: ANESTHESIOLOGY

## 2021-02-26 PROCEDURE — 25000003 PHARM REV CODE 250: Performed by: ANESTHESIOLOGY

## 2021-02-26 PROCEDURE — 63600175 PHARM REV CODE 636 W HCPCS: Performed by: ANESTHESIOLOGY

## 2021-02-26 PROCEDURE — A4649 SURGICAL SUPPLIES: HCPCS | Performed by: ANESTHESIOLOGY

## 2021-02-26 RX ORDER — LIDOCAINE HYDROCHLORIDE 10 MG/ML
INJECTION INFILTRATION; PERINEURAL
Status: DISCONTINUED | OUTPATIENT
Start: 2021-02-26 | End: 2021-02-26 | Stop reason: HOSPADM

## 2021-02-26 RX ORDER — MIDAZOLAM HYDROCHLORIDE 1 MG/ML
INJECTION INTRAMUSCULAR; INTRAVENOUS
Status: DISCONTINUED | OUTPATIENT
Start: 2021-02-26 | End: 2021-02-26 | Stop reason: HOSPADM

## 2021-02-26 RX ORDER — SODIUM CHLORIDE 9 MG/ML
INJECTION, SOLUTION INTRAVENOUS CONTINUOUS
Status: DISCONTINUED | OUTPATIENT
Start: 2021-02-26 | End: 2021-02-26 | Stop reason: HOSPADM

## 2021-02-26 RX ORDER — BUPIVACAINE HYDROCHLORIDE 2.5 MG/ML
INJECTION, SOLUTION EPIDURAL; INFILTRATION; INTRACAUDAL
Status: DISCONTINUED | OUTPATIENT
Start: 2021-02-26 | End: 2021-02-26 | Stop reason: HOSPADM

## 2021-02-26 RX ORDER — DEXAMETHASONE SODIUM PHOSPHATE 4 MG/ML
INJECTION, SOLUTION INTRA-ARTICULAR; INTRALESIONAL; INTRAMUSCULAR; INTRAVENOUS; SOFT TISSUE
Status: DISCONTINUED | OUTPATIENT
Start: 2021-02-26 | End: 2021-02-26 | Stop reason: HOSPADM

## 2021-02-26 RX ORDER — FENTANYL CITRATE 50 UG/ML
INJECTION, SOLUTION INTRAMUSCULAR; INTRAVENOUS
Status: DISCONTINUED | OUTPATIENT
Start: 2021-02-26 | End: 2021-02-26 | Stop reason: HOSPADM

## 2021-02-26 RX ADMIN — SODIUM CHLORIDE: 0.9 INJECTION, SOLUTION INTRAVENOUS at 10:02

## 2021-03-03 ENCOUNTER — TELEPHONE (OUTPATIENT)
Dept: PAIN MEDICINE | Facility: CLINIC | Age: 71
End: 2021-03-03

## 2021-03-05 ENCOUNTER — TELEPHONE (OUTPATIENT)
Dept: PAIN MEDICINE | Facility: CLINIC | Age: 71
End: 2021-03-05

## 2021-03-05 RX ORDER — HYDROCODONE BITARTRATE AND ACETAMINOPHEN 5; 325 MG/1; MG/1
1 TABLET ORAL EVERY 8 HOURS PRN
Qty: 21 TABLET | Refills: 0 | Status: SHIPPED | OUTPATIENT
Start: 2021-03-05 | End: 2021-03-12

## 2021-03-14 ENCOUNTER — HOSPITAL ENCOUNTER (INPATIENT)
Facility: HOSPITAL | Age: 71
LOS: 6 days | Discharge: HOME | DRG: 291 | End: 2021-03-20
Attending: EMERGENCY MEDICINE | Admitting: INTERNAL MEDICINE
Payer: OTHER GOVERNMENT

## 2021-03-14 DIAGNOSIS — N18.32 STAGE 3B CHRONIC KIDNEY DISEASE: ICD-10-CM

## 2021-03-14 DIAGNOSIS — R06.02 SHORTNESS OF BREATH: ICD-10-CM

## 2021-03-14 DIAGNOSIS — N18.31 STAGE 3A CHRONIC KIDNEY DISEASE: ICD-10-CM

## 2021-03-14 DIAGNOSIS — I50.9 CONGESTIVE HEART FAILURE, UNSPECIFIED HF CHRONICITY, UNSPECIFIED HEART FAILURE TYPE: ICD-10-CM

## 2021-03-14 DIAGNOSIS — I82.501 CHRONIC DEEP VEIN THROMBOSIS (DVT) OF RIGHT LOWER EXTREMITY, UNSPECIFIED VEIN: ICD-10-CM

## 2021-03-14 DIAGNOSIS — I50.9 CHF (CONGESTIVE HEART FAILURE): ICD-10-CM

## 2021-03-14 DIAGNOSIS — G89.4 CHRONIC PAIN DISORDER: ICD-10-CM

## 2021-03-14 DIAGNOSIS — M10.00 IDIOPATHIC GOUT, UNSPECIFIED CHRONICITY, UNSPECIFIED SITE: ICD-10-CM

## 2021-03-14 DIAGNOSIS — I50.42 CHRONIC COMBINED SYSTOLIC AND DIASTOLIC HEART FAILURE: ICD-10-CM

## 2021-03-14 DIAGNOSIS — I50.43 ACUTE ON CHRONIC COMBINED SYSTOLIC AND DIASTOLIC HEART FAILURE: Primary | ICD-10-CM

## 2021-03-14 DIAGNOSIS — I50.22 CHRONIC SYSTOLIC CONGESTIVE HEART FAILURE: ICD-10-CM

## 2021-03-14 DIAGNOSIS — N18.30 STAGE 3 CHRONIC KIDNEY DISEASE, UNSPECIFIED WHETHER STAGE 3A OR 3B CKD: ICD-10-CM

## 2021-03-14 DIAGNOSIS — F43.10 PTSD (POST-TRAUMATIC STRESS DISORDER): Chronic | ICD-10-CM

## 2021-03-14 PROBLEM — M10.9 GOUT: Status: ACTIVE | Noted: 2021-03-14

## 2021-03-14 LAB
ALBUMIN SERPL BCP-MCNC: 3.6 G/DL (ref 3.5–5.2)
ALP SERPL-CCNC: 121 U/L (ref 55–135)
ALT SERPL W/O P-5'-P-CCNC: 25 U/L (ref 10–44)
ANION GAP SERPL CALC-SCNC: 12 MMOL/L (ref 8–16)
AST SERPL-CCNC: 22 U/L (ref 10–40)
BASOPHILS # BLD AUTO: 0.05 K/UL (ref 0–0.2)
BASOPHILS NFR BLD: 0.5 % (ref 0–1.9)
BILIRUB SERPL-MCNC: 1.6 MG/DL (ref 0.1–1)
BNP SERPL-MCNC: 1393 PG/ML (ref 0–99)
BUN SERPL-MCNC: 26 MG/DL (ref 8–23)
CALCIUM SERPL-MCNC: 9.2 MG/DL (ref 8.7–10.5)
CHLORIDE SERPL-SCNC: 104 MMOL/L (ref 95–110)
CO2 SERPL-SCNC: 24 MMOL/L (ref 23–29)
CREAT SERPL-MCNC: 1.6 MG/DL (ref 0.5–1.4)
CTP QC/QA: YES
DIFFERENTIAL METHOD: ABNORMAL
EOSINOPHIL # BLD AUTO: 0.2 K/UL (ref 0–0.5)
EOSINOPHIL NFR BLD: 1.8 % (ref 0–8)
ERYTHROCYTE [DISTWIDTH] IN BLOOD BY AUTOMATED COUNT: 15.8 % (ref 11.5–14.5)
EST. GFR  (AFRICAN AMERICAN): 49.7 ML/MIN/1.73 M^2
EST. GFR  (NON AFRICAN AMERICAN): 43 ML/MIN/1.73 M^2
GLUCOSE SERPL-MCNC: 138 MG/DL (ref 70–110)
HCT VFR BLD AUTO: 38.3 % (ref 40–54)
HGB BLD-MCNC: 12.4 G/DL (ref 14–18)
IMM GRANULOCYTES # BLD AUTO: 0.06 K/UL (ref 0–0.04)
IMM GRANULOCYTES NFR BLD AUTO: 0.5 % (ref 0–0.5)
INR PPP: 1.1 (ref 0.8–1.2)
LACTATE SERPL-SCNC: 1.4 MMOL/L (ref 0.5–2.2)
LYMPHOCYTES # BLD AUTO: 1.8 K/UL (ref 1–4.8)
LYMPHOCYTES NFR BLD: 16.5 % (ref 18–48)
MAGNESIUM SERPL-MCNC: 2 MG/DL (ref 1.6–2.6)
MCH RBC QN AUTO: 25.6 PG (ref 27–31)
MCHC RBC AUTO-ENTMCNC: 32.4 G/DL (ref 32–36)
MCV RBC AUTO: 79 FL (ref 82–98)
MONOCYTES # BLD AUTO: 1.2 K/UL (ref 0.3–1)
MONOCYTES NFR BLD: 11 % (ref 4–15)
NEUTROPHILS # BLD AUTO: 7.6 K/UL (ref 1.8–7.7)
NEUTROPHILS NFR BLD: 69.7 % (ref 38–73)
NRBC BLD-RTO: 0 /100 WBC
PLATELET # BLD AUTO: 259 K/UL (ref 150–350)
PMV BLD AUTO: 10.2 FL (ref 9.2–12.9)
POCT GLUCOSE: 134 MG/DL (ref 70–110)
POTASSIUM SERPL-SCNC: 3.9 MMOL/L (ref 3.5–5.1)
PROT SERPL-MCNC: 7 G/DL (ref 6–8.4)
PROTHROMBIN TIME: 12.4 SEC (ref 9–12.5)
RBC # BLD AUTO: 4.85 M/UL (ref 4.6–6.2)
SARS-COV-2 RDRP RESP QL NAA+PROBE: NEGATIVE
SODIUM SERPL-SCNC: 140 MMOL/L (ref 136–145)
TROPONIN I SERPL DL<=0.01 NG/ML-MCNC: 0.08 NG/ML (ref 0–0.03)
WBC # BLD AUTO: 10.91 K/UL (ref 3.9–12.7)

## 2021-03-14 PROCEDURE — 63600175 PHARM REV CODE 636 W HCPCS: Performed by: STUDENT IN AN ORGANIZED HEALTH CARE EDUCATION/TRAINING PROGRAM

## 2021-03-14 PROCEDURE — 83880 ASSAY OF NATRIURETIC PEPTIDE: CPT | Mod: NTX | Performed by: EMERGENCY MEDICINE

## 2021-03-14 PROCEDURE — 96365 THER/PROPH/DIAG IV INF INIT: CPT

## 2021-03-14 PROCEDURE — 80053 COMPREHEN METABOLIC PANEL: CPT | Mod: NTX | Performed by: EMERGENCY MEDICINE

## 2021-03-14 PROCEDURE — 99285 EMERGENCY DEPT VISIT HI MDM: CPT | Mod: ,,, | Performed by: EMERGENCY MEDICINE

## 2021-03-14 PROCEDURE — G0378 HOSPITAL OBSERVATION PER HR: HCPCS

## 2021-03-14 PROCEDURE — 85610 PROTHROMBIN TIME: CPT | Mod: NTX | Performed by: EMERGENCY MEDICINE

## 2021-03-14 PROCEDURE — 83605 ASSAY OF LACTIC ACID: CPT | Mod: NTX | Performed by: STUDENT IN AN ORGANIZED HEALTH CARE EDUCATION/TRAINING PROGRAM

## 2021-03-14 PROCEDURE — 96375 TX/PRO/DX INJ NEW DRUG ADDON: CPT

## 2021-03-14 PROCEDURE — 86803 HEPATITIS C AB TEST: CPT | Performed by: EMERGENCY MEDICINE

## 2021-03-14 PROCEDURE — 85025 COMPLETE CBC W/AUTO DIFF WBC: CPT | Mod: NTX | Performed by: EMERGENCY MEDICINE

## 2021-03-14 PROCEDURE — 93010 EKG 12-LEAD: ICD-10-PCS | Mod: ,,, | Performed by: INTERNAL MEDICINE

## 2021-03-14 PROCEDURE — U0002 COVID-19 LAB TEST NON-CDC: HCPCS | Performed by: STUDENT IN AN ORGANIZED HEALTH CARE EDUCATION/TRAINING PROGRAM

## 2021-03-14 PROCEDURE — 93010 ELECTROCARDIOGRAM REPORT: CPT | Mod: ,,, | Performed by: INTERNAL MEDICINE

## 2021-03-14 PROCEDURE — 25000003 PHARM REV CODE 250: Performed by: STUDENT IN AN ORGANIZED HEALTH CARE EDUCATION/TRAINING PROGRAM

## 2021-03-14 PROCEDURE — 93005 ELECTROCARDIOGRAM TRACING: CPT

## 2021-03-14 PROCEDURE — 83735 ASSAY OF MAGNESIUM: CPT | Mod: NTX | Performed by: EMERGENCY MEDICINE

## 2021-03-14 PROCEDURE — 84484 ASSAY OF TROPONIN QUANT: CPT | Mod: NTX | Performed by: EMERGENCY MEDICINE

## 2021-03-14 PROCEDURE — 99285 EMERGENCY DEPT VISIT HI MDM: CPT | Mod: 25

## 2021-03-14 PROCEDURE — 99285 PR EMERGENCY DEPT VISIT,LEVEL V: ICD-10-PCS | Mod: ,,, | Performed by: EMERGENCY MEDICINE

## 2021-03-14 RX ORDER — ALLOPURINOL 300 MG/1
300 TABLET ORAL DAILY
Status: DISCONTINUED | OUTPATIENT
Start: 2021-03-15 | End: 2021-03-20 | Stop reason: HOSPADM

## 2021-03-14 RX ORDER — METOPROLOL SUCCINATE 100 MG/1
100 TABLET, EXTENDED RELEASE ORAL DAILY
Status: DISCONTINUED | OUTPATIENT
Start: 2021-03-15 | End: 2021-03-15

## 2021-03-14 RX ORDER — ACETAMINOPHEN 325 MG/1
650 TABLET ORAL EVERY 4 HOURS PRN
Status: DISCONTINUED | OUTPATIENT
Start: 2021-03-14 | End: 2021-03-20 | Stop reason: HOSPADM

## 2021-03-14 RX ORDER — FUROSEMIDE 10 MG/ML
80 INJECTION INTRAMUSCULAR; INTRAVENOUS ONCE
Status: COMPLETED | OUTPATIENT
Start: 2021-03-14 | End: 2021-03-14

## 2021-03-14 RX ORDER — FEBUXOSTAT 40 MG/1
40 TABLET, FILM COATED ORAL DAILY
Status: DISCONTINUED | OUTPATIENT
Start: 2021-03-15 | End: 2021-03-20 | Stop reason: HOSPADM

## 2021-03-14 RX ORDER — HYDROCODONE BITARTRATE AND ACETAMINOPHEN 5; 325 MG/1; MG/1
1 TABLET ORAL EVERY 12 HOURS PRN
Status: DISCONTINUED | OUTPATIENT
Start: 2021-03-14 | End: 2021-03-18

## 2021-03-14 RX ORDER — SERTRALINE HYDROCHLORIDE 50 MG/1
50 TABLET, FILM COATED ORAL DAILY
Status: DISCONTINUED | OUTPATIENT
Start: 2021-03-15 | End: 2021-03-18

## 2021-03-14 RX ORDER — SPIRONOLACTONE 25 MG/1
0.5 TABLET ORAL DAILY
Status: DISCONTINUED | OUTPATIENT
Start: 2021-03-15 | End: 2021-03-14

## 2021-03-14 RX ORDER — COLCHICINE 0.6 MG/1
0.6 TABLET, FILM COATED ORAL 2 TIMES DAILY
Status: DISCONTINUED | OUTPATIENT
Start: 2021-03-14 | End: 2021-03-20 | Stop reason: HOSPADM

## 2021-03-14 RX ORDER — GLUCAGON 1 MG
1 KIT INJECTION
Status: DISCONTINUED | OUTPATIENT
Start: 2021-03-14 | End: 2021-03-20 | Stop reason: HOSPADM

## 2021-03-14 RX ORDER — INSULIN ASPART 100 [IU]/ML
1-10 INJECTION, SOLUTION INTRAVENOUS; SUBCUTANEOUS
Status: DISCONTINUED | OUTPATIENT
Start: 2021-03-14 | End: 2021-03-20 | Stop reason: HOSPADM

## 2021-03-14 RX ORDER — ASPIRIN 81 MG/1
81 TABLET ORAL DAILY
Status: DISCONTINUED | OUTPATIENT
Start: 2021-03-15 | End: 2021-03-20 | Stop reason: HOSPADM

## 2021-03-14 RX ORDER — ATORVASTATIN CALCIUM 20 MG/1
80 TABLET, FILM COATED ORAL DAILY
Status: DISCONTINUED | OUTPATIENT
Start: 2021-03-15 | End: 2021-03-20 | Stop reason: HOSPADM

## 2021-03-14 RX ORDER — POLYETHYLENE GLYCOL 3350 17 G/17G
17 POWDER, FOR SOLUTION ORAL DAILY
Status: DISCONTINUED | OUTPATIENT
Start: 2021-03-15 | End: 2021-03-20 | Stop reason: HOSPADM

## 2021-03-14 RX ORDER — POTASSIUM CHLORIDE 750 MG/1
10 CAPSULE, EXTENDED RELEASE ORAL ONCE
Status: COMPLETED | OUTPATIENT
Start: 2021-03-14 | End: 2021-03-14

## 2021-03-14 RX ORDER — TALC
6 POWDER (GRAM) TOPICAL NIGHTLY
Status: DISCONTINUED | OUTPATIENT
Start: 2021-03-14 | End: 2021-03-20 | Stop reason: HOSPADM

## 2021-03-14 RX ORDER — SODIUM CHLORIDE 0.9 % (FLUSH) 0.9 %
10 SYRINGE (ML) INJECTION
Status: DISCONTINUED | OUTPATIENT
Start: 2021-03-14 | End: 2021-03-20 | Stop reason: HOSPADM

## 2021-03-14 RX ORDER — ISOSORBIDE DINITRATE 20 MG/1
20 TABLET ORAL 3 TIMES DAILY
Status: DISCONTINUED | OUTPATIENT
Start: 2021-03-14 | End: 2021-03-16

## 2021-03-14 RX ORDER — LANOLIN ALCOHOL/MO/W.PET/CERES
400 CREAM (GRAM) TOPICAL DAILY
Status: DISCONTINUED | OUTPATIENT
Start: 2021-03-15 | End: 2021-03-15

## 2021-03-14 RX ADMIN — FUROSEMIDE 20 MG/HR: 10 INJECTION, SOLUTION INTRAMUSCULAR; INTRAVENOUS at 11:03

## 2021-03-14 RX ADMIN — MELATONIN TAB 3 MG 6 MG: 3 TAB at 10:03

## 2021-03-14 RX ADMIN — HYDROCODONE BITARTRATE AND ACETAMINOPHEN 1 TABLET: 5; 325 TABLET ORAL at 11:03

## 2021-03-14 RX ADMIN — SACUBITRIL AND VALSARTAN 1 TABLET: 97; 103 TABLET, FILM COATED ORAL at 10:03

## 2021-03-14 RX ADMIN — COLCHICINE 0.6 MG: 0.6 TABLET, FILM COATED ORAL at 10:03

## 2021-03-14 RX ADMIN — APIXABAN 5 MG: 5 TABLET, FILM COATED ORAL at 10:03

## 2021-03-14 RX ADMIN — FUROSEMIDE 80 MG: 10 INJECTION, SOLUTION INTRAMUSCULAR; INTRAVENOUS at 09:03

## 2021-03-14 RX ADMIN — POTASSIUM CHLORIDE 10 MEQ: 10 CAPSULE, COATED, EXTENDED RELEASE ORAL at 10:03

## 2021-03-14 RX ADMIN — ISOSORBIDE DINITRATE 20 MG: 20 TABLET ORAL at 11:03

## 2021-03-15 ENCOUNTER — TELEPHONE (OUTPATIENT)
Dept: TRANSPLANT | Facility: CLINIC | Age: 71
End: 2021-03-15

## 2021-03-15 PROBLEM — I50.9 CHF (CONGESTIVE HEART FAILURE): Status: RESOLVED | Noted: 2021-03-14 | Resolved: 2021-03-15

## 2021-03-15 PROBLEM — I82.501 CHRONIC DEEP VEIN THROMBOSIS (DVT) OF RIGHT LOWER EXTREMITY: Status: ACTIVE | Noted: 2021-03-15

## 2021-03-15 LAB
ABO + RH BLD: NORMAL
ALBUMIN SERPL BCP-MCNC: 3.3 G/DL (ref 3.5–5.2)
ALP SERPL-CCNC: 106 U/L (ref 55–135)
ALT SERPL W/O P-5'-P-CCNC: 23 U/L (ref 10–44)
ANION GAP SERPL CALC-SCNC: 11 MMOL/L (ref 8–16)
ASCENDING AORTA: 3.6 CM
AST SERPL-CCNC: 19 U/L (ref 10–40)
AV INDEX (PROSTH): 0.23
AV MEAN GRADIENT: 14 MMHG
AV PEAK GRADIENT: 20 MMHG
AV VALVE AREA: 1.09 CM2
AV VELOCITY RATIO: 0.22
BASOPHILS # BLD AUTO: 0.05 K/UL (ref 0–0.2)
BASOPHILS NFR BLD: 0.5 % (ref 0–1.9)
BILIRUB DIRECT SERPL-MCNC: 0.7 MG/DL (ref 0.1–0.3)
BILIRUB SERPL-MCNC: 1.9 MG/DL (ref 0.1–1)
BLD GP AB SCN CELLS X3 SERPL QL: NORMAL
BSA FOR ECHO PROCEDURE: 2.54 M2
BUN SERPL-MCNC: 24 MG/DL (ref 8–23)
CALCIUM SERPL-MCNC: 8.7 MG/DL (ref 8.7–10.5)
CHLORIDE SERPL-SCNC: 105 MMOL/L (ref 95–110)
CO2 SERPL-SCNC: 26 MMOL/L (ref 23–29)
CREAT SERPL-MCNC: 1.3 MG/DL (ref 0.5–1.4)
CV ECHO LV RWT: 0.36 CM
DIFFERENTIAL METHOD: ABNORMAL
DOP CALC AO PEAK VEL: 2.22 M/S
DOP CALC AO VTI: 41.61 CM
DOP CALC LVOT AREA: 4.6 CM2
DOP CALC LVOT DIAMETER: 2.43 CM
DOP CALC LVOT PEAK VEL: 0.48 M/S
DOP CALC LVOT STROKE VOLUME: 45.29 CM3
DOP CALCLVOT PEAK VEL VTI: 9.77 CM
E WAVE DECELERATION TIME: 165.24 MSEC
E/A RATIO: 5.74
E/E' RATIO: 19.82 M/S
ECHO LV POSTERIOR WALL: 1.23 CM (ref 0.6–1.1)
EOSINOPHIL # BLD AUTO: 0.2 K/UL (ref 0–0.5)
EOSINOPHIL NFR BLD: 2.2 % (ref 0–8)
ERYTHROCYTE [DISTWIDTH] IN BLOOD BY AUTOMATED COUNT: 15.9 % (ref 11.5–14.5)
EST. GFR  (AFRICAN AMERICAN): >60 ML/MIN/1.73 M^2
EST. GFR  (NON AFRICAN AMERICAN): 55.3 ML/MIN/1.73 M^2
ESTIMATED AVG GLUCOSE: 163 MG/DL (ref 68–131)
FRACTIONAL SHORTENING: 3 % (ref 28–44)
GLUCOSE SERPL-MCNC: 120 MG/DL (ref 70–110)
HBA1C MFR BLD: 7.3 % (ref 4–5.6)
HCT VFR BLD AUTO: 36.5 % (ref 40–54)
HCV AB SERPL QL IA: NEGATIVE
HGB BLD-MCNC: 11.8 G/DL (ref 14–18)
IMM GRANULOCYTES # BLD AUTO: 0.05 K/UL (ref 0–0.04)
IMM GRANULOCYTES NFR BLD AUTO: 0.5 % (ref 0–0.5)
INTERVENTRICULAR SEPTUM: 1.23 CM (ref 0.6–1.1)
LA MAJOR: 6.97 CM
LA MINOR: 6.19 CM
LA WIDTH: 4.83 CM
LEFT ATRIUM SIZE: 5.44 CM
LEFT ATRIUM VOLUME INDEX MOD: 40 ML/M2
LEFT ATRIUM VOLUME INDEX: 58.6 ML/M2
LEFT ATRIUM VOLUME MOD: 100.12 CM3
LEFT ATRIUM VOLUME: 146.44 CM3
LEFT INTERNAL DIMENSION IN SYSTOLE: 6.65 CM (ref 2.1–4)
LEFT VENTRICLE DIASTOLIC VOLUME INDEX: 98.1 ML/M2
LEFT VENTRICLE DIASTOLIC VOLUME: 245.24 ML
LEFT VENTRICLE MASS INDEX: 163 G/M2
LEFT VENTRICLE SYSTOLIC VOLUME INDEX: 90.9 ML/M2
LEFT VENTRICLE SYSTOLIC VOLUME: 227.21 ML
LEFT VENTRICULAR INTERNAL DIMENSION IN DIASTOLE: 6.87 CM (ref 3.5–6)
LEFT VENTRICULAR MASS: 406.55 G
LV LATERAL E/E' RATIO: 18.17 M/S
LV SEPTAL E/E' RATIO: 21.8 M/S
LYMPHOCYTES # BLD AUTO: 1.5 K/UL (ref 1–4.8)
LYMPHOCYTES NFR BLD: 14.7 % (ref 18–48)
MAGNESIUM SERPL-MCNC: 1.9 MG/DL (ref 1.6–2.6)
MCH RBC QN AUTO: 25.5 PG (ref 27–31)
MCHC RBC AUTO-ENTMCNC: 32.3 G/DL (ref 32–36)
MCV RBC AUTO: 79 FL (ref 82–98)
MONOCYTES # BLD AUTO: 1 K/UL (ref 0.3–1)
MONOCYTES NFR BLD: 10.1 % (ref 4–15)
MV PEAK A VEL: 0.19 M/S
MV PEAK E VEL: 1.09 M/S
MV STENOSIS PRESSURE HALF TIME: 47.92 MS
MV VALVE AREA P 1/2 METHOD: 4.59 CM2
NEUTROPHILS # BLD AUTO: 7.4 K/UL (ref 1.8–7.7)
NEUTROPHILS NFR BLD: 72 % (ref 38–73)
NRBC BLD-RTO: 0 /100 WBC
PHOSPHATE SERPL-MCNC: 3.7 MG/DL (ref 2.7–4.5)
PISA TR MAX VEL: 2.71 M/S
PLATELET # BLD AUTO: 242 K/UL (ref 150–350)
PMV BLD AUTO: 10.5 FL (ref 9.2–12.9)
POCT GLUCOSE: 127 MG/DL (ref 70–110)
POCT GLUCOSE: 145 MG/DL (ref 70–110)
POCT GLUCOSE: 158 MG/DL (ref 70–110)
POCT GLUCOSE: 173 MG/DL (ref 70–110)
POTASSIUM SERPL-SCNC: 3.4 MMOL/L (ref 3.5–5.1)
PROT SERPL-MCNC: 6.4 G/DL (ref 6–8.4)
RA MAJOR: 5.83 CM
RA PRESSURE: 3 MMHG
RA WIDTH: 3.91 CM
RBC # BLD AUTO: 4.63 M/UL (ref 4.6–6.2)
RIGHT VENTRICULAR END-DIASTOLIC DIMENSION: 4.65 CM
RV TISSUE DOPPLER FREE WALL SYSTOLIC VELOCITY 1 (APICAL 4 CHAMBER VIEW): 6.51 CM/S
SINUS: 3.13 CM
SODIUM SERPL-SCNC: 142 MMOL/L (ref 136–145)
STJ: 2.66 CM
TDI LATERAL: 0.06 M/S
TDI SEPTAL: 0.05 M/S
TDI: 0.06 M/S
TR MAX PG: 29 MMHG
TRICUSPID ANNULAR PLANE SYSTOLIC EXCURSION: 1.56 CM
TV REST PULMONARY ARTERY PRESSURE: 32 MMHG
WBC # BLD AUTO: 10.22 K/UL (ref 3.9–12.7)

## 2021-03-15 PROCEDURE — 99233 PR SUBSEQUENT HOSPITAL CARE,LEVL III: ICD-10-PCS | Mod: NTX,,, | Performed by: NURSE PRACTITIONER

## 2021-03-15 PROCEDURE — 25500020 PHARM REV CODE 255: Mod: NTX | Performed by: INTERNAL MEDICINE

## 2021-03-15 PROCEDURE — 25000003 PHARM REV CODE 250: Performed by: NURSE PRACTITIONER

## 2021-03-15 PROCEDURE — 86900 BLOOD TYPING SEROLOGIC ABO: CPT | Mod: NTX | Performed by: NURSE PRACTITIONER

## 2021-03-15 PROCEDURE — 80048 BASIC METABOLIC PNL TOTAL CA: CPT | Mod: NTX | Performed by: STUDENT IN AN ORGANIZED HEALTH CARE EDUCATION/TRAINING PROGRAM

## 2021-03-15 PROCEDURE — 63600175 PHARM REV CODE 636 W HCPCS: Mod: NTX | Performed by: STUDENT IN AN ORGANIZED HEALTH CARE EDUCATION/TRAINING PROGRAM

## 2021-03-15 PROCEDURE — 97165 OT EVAL LOW COMPLEX 30 MIN: CPT | Mod: NTX

## 2021-03-15 PROCEDURE — 84100 ASSAY OF PHOSPHORUS: CPT | Mod: NTX | Performed by: STUDENT IN AN ORGANIZED HEALTH CARE EDUCATION/TRAINING PROGRAM

## 2021-03-15 PROCEDURE — 20600001 HC STEP DOWN PRIVATE ROOM: Mod: NTX

## 2021-03-15 PROCEDURE — 36415 COLL VENOUS BLD VENIPUNCTURE: CPT | Mod: NTX | Performed by: NURSE PRACTITIONER

## 2021-03-15 PROCEDURE — 96366 THER/PROPH/DIAG IV INF ADDON: CPT

## 2021-03-15 PROCEDURE — 99233 PR SUBSEQUENT HOSPITAL CARE,LEVL III: ICD-10-PCS | Mod: NTX,,, | Performed by: INTERNAL MEDICINE

## 2021-03-15 PROCEDURE — 36415 COLL VENOUS BLD VENIPUNCTURE: CPT | Mod: NTX | Performed by: STUDENT IN AN ORGANIZED HEALTH CARE EDUCATION/TRAINING PROGRAM

## 2021-03-15 PROCEDURE — 97161 PT EVAL LOW COMPLEX 20 MIN: CPT | Mod: NTX

## 2021-03-15 PROCEDURE — 83036 HEMOGLOBIN GLYCOSYLATED A1C: CPT | Mod: NTX | Performed by: STUDENT IN AN ORGANIZED HEALTH CARE EDUCATION/TRAINING PROGRAM

## 2021-03-15 PROCEDURE — 99233 SBSQ HOSP IP/OBS HIGH 50: CPT | Mod: NTX,,, | Performed by: NURSE PRACTITIONER

## 2021-03-15 PROCEDURE — 85025 COMPLETE CBC W/AUTO DIFF WBC: CPT | Mod: NTX | Performed by: STUDENT IN AN ORGANIZED HEALTH CARE EDUCATION/TRAINING PROGRAM

## 2021-03-15 PROCEDURE — 97535 SELF CARE MNGMENT TRAINING: CPT | Mod: NTX

## 2021-03-15 PROCEDURE — 99233 SBSQ HOSP IP/OBS HIGH 50: CPT | Mod: NTX,,, | Performed by: INTERNAL MEDICINE

## 2021-03-15 PROCEDURE — 83735 ASSAY OF MAGNESIUM: CPT | Mod: NTX | Performed by: STUDENT IN AN ORGANIZED HEALTH CARE EDUCATION/TRAINING PROGRAM

## 2021-03-15 PROCEDURE — 80076 HEPATIC FUNCTION PANEL: CPT | Mod: NTX | Performed by: STUDENT IN AN ORGANIZED HEALTH CARE EDUCATION/TRAINING PROGRAM

## 2021-03-15 PROCEDURE — 25000003 PHARM REV CODE 250: Performed by: STUDENT IN AN ORGANIZED HEALTH CARE EDUCATION/TRAINING PROGRAM

## 2021-03-15 PROCEDURE — 97110 THERAPEUTIC EXERCISES: CPT | Mod: NTX

## 2021-03-15 RX ORDER — SPIRONOLACTONE 25 MG/1
25 TABLET ORAL DAILY
Status: DISCONTINUED | OUTPATIENT
Start: 2021-03-15 | End: 2021-03-20 | Stop reason: HOSPADM

## 2021-03-15 RX ORDER — HYDRALAZINE HYDROCHLORIDE 25 MG/1
25 TABLET, FILM COATED ORAL EVERY 8 HOURS
Status: DISCONTINUED | OUTPATIENT
Start: 2021-03-15 | End: 2021-03-16

## 2021-03-15 RX ORDER — POTASSIUM CHLORIDE 20 MEQ/1
40 TABLET, EXTENDED RELEASE ORAL 2 TIMES DAILY
Status: DISCONTINUED | OUTPATIENT
Start: 2021-03-15 | End: 2021-03-18

## 2021-03-15 RX ORDER — LANOLIN ALCOHOL/MO/W.PET/CERES
400 CREAM (GRAM) TOPICAL 2 TIMES DAILY
Status: DISCONTINUED | OUTPATIENT
Start: 2021-03-15 | End: 2021-03-18

## 2021-03-15 RX ORDER — POTASSIUM CHLORIDE 20 MEQ/1
60 TABLET, EXTENDED RELEASE ORAL ONCE
Status: COMPLETED | OUTPATIENT
Start: 2021-03-15 | End: 2021-03-15

## 2021-03-15 RX ORDER — AMOXICILLIN 250 MG
2 CAPSULE ORAL 2 TIMES DAILY
Status: DISCONTINUED | OUTPATIENT
Start: 2021-03-15 | End: 2021-03-20 | Stop reason: HOSPADM

## 2021-03-15 RX ADMIN — FEBUXOSTAT 40 MG: 40 TABLET, FILM COATED ORAL at 08:03

## 2021-03-15 RX ADMIN — HUMAN ALBUMIN MICROSPHERES AND PERFLUTREN 0.11 MG: 10; .22 INJECTION, SOLUTION INTRAVENOUS at 10:03

## 2021-03-15 RX ADMIN — Medication 400 MG: at 08:03

## 2021-03-15 RX ADMIN — ALLOPURINOL 300 MG: 300 TABLET ORAL at 08:03

## 2021-03-15 RX ADMIN — APIXABAN 5 MG: 5 TABLET, FILM COATED ORAL at 08:03

## 2021-03-15 RX ADMIN — ISOSORBIDE DINITRATE 20 MG: 20 TABLET ORAL at 09:03

## 2021-03-15 RX ADMIN — FUROSEMIDE 20 MG/HR: 10 INJECTION, SOLUTION INTRAMUSCULAR; INTRAVENOUS at 04:03

## 2021-03-15 RX ADMIN — Medication 400 MG: at 09:03

## 2021-03-15 RX ADMIN — COLCHICINE 0.6 MG: 0.6 TABLET, FILM COATED ORAL at 09:03

## 2021-03-15 RX ADMIN — SACUBITRIL AND VALSARTAN 1 TABLET: 97; 103 TABLET, FILM COATED ORAL at 09:03

## 2021-03-15 RX ADMIN — HYDRALAZINE HYDROCHLORIDE 25 MG: 25 TABLET, FILM COATED ORAL at 02:03

## 2021-03-15 RX ADMIN — ATORVASTATIN CALCIUM 80 MG: 20 TABLET, FILM COATED ORAL at 08:03

## 2021-03-15 RX ADMIN — POTASSIUM CHLORIDE 60 MEQ: 1500 TABLET, EXTENDED RELEASE ORAL at 08:03

## 2021-03-15 RX ADMIN — COLCHICINE 0.6 MG: 0.6 TABLET, FILM COATED ORAL at 08:03

## 2021-03-15 RX ADMIN — ISOSORBIDE DINITRATE 20 MG: 20 TABLET ORAL at 02:03

## 2021-03-15 RX ADMIN — MELATONIN TAB 3 MG 6 MG: 3 TAB at 09:03

## 2021-03-15 RX ADMIN — POTASSIUM CHLORIDE 40 MEQ: 1500 TABLET, EXTENDED RELEASE ORAL at 09:03

## 2021-03-15 RX ADMIN — POLYETHYLENE GLYCOL 3350 17 G: 17 POWDER, FOR SOLUTION ORAL at 08:03

## 2021-03-15 RX ADMIN — HYDRALAZINE HYDROCHLORIDE 25 MG: 25 TABLET, FILM COATED ORAL at 09:03

## 2021-03-15 RX ADMIN — ISOSORBIDE DINITRATE 20 MG: 20 TABLET ORAL at 08:03

## 2021-03-15 RX ADMIN — FUROSEMIDE 20 MG/HR: 10 INJECTION, SOLUTION INTRAMUSCULAR; INTRAVENOUS at 08:03

## 2021-03-15 RX ADMIN — APIXABAN 5 MG: 5 TABLET, FILM COATED ORAL at 09:03

## 2021-03-15 RX ADMIN — ASPIRIN 81 MG: 81 TABLET, COATED ORAL at 08:03

## 2021-03-15 RX ADMIN — SACUBITRIL AND VALSARTAN 1 TABLET: 97; 103 TABLET, FILM COATED ORAL at 08:03

## 2021-03-15 RX ADMIN — SPIRONOLACTONE 25 MG: 25 TABLET, FILM COATED ORAL at 08:03

## 2021-03-15 RX ADMIN — METOPROLOL SUCCINATE 100 MG: 100 TABLET, EXTENDED RELEASE ORAL at 08:03

## 2021-03-16 PROBLEM — F43.10 PTSD (POST-TRAUMATIC STRESS DISORDER): Chronic | Status: ACTIVE | Noted: 2021-03-16

## 2021-03-16 LAB
ANION GAP SERPL CALC-SCNC: 10 MMOL/L (ref 8–16)
BASOPHILS # BLD AUTO: 0.05 K/UL (ref 0–0.2)
BASOPHILS NFR BLD: 0.4 % (ref 0–1.9)
BNP SERPL-MCNC: 871 PG/ML (ref 0–99)
BUN SERPL-MCNC: 24 MG/DL (ref 8–23)
CALCIUM SERPL-MCNC: 9.2 MG/DL (ref 8.7–10.5)
CHLORIDE SERPL-SCNC: 107 MMOL/L (ref 95–110)
CO2 SERPL-SCNC: 24 MMOL/L (ref 23–29)
CREAT SERPL-MCNC: 1.5 MG/DL (ref 0.5–1.4)
DIFFERENTIAL METHOD: ABNORMAL
EOSINOPHIL # BLD AUTO: 0.3 K/UL (ref 0–0.5)
EOSINOPHIL NFR BLD: 2.3 % (ref 0–8)
ERYTHROCYTE [DISTWIDTH] IN BLOOD BY AUTOMATED COUNT: 16.4 % (ref 11.5–14.5)
EST. GFR  (AFRICAN AMERICAN): 53.8 ML/MIN/1.73 M^2
EST. GFR  (NON AFRICAN AMERICAN): 46.5 ML/MIN/1.73 M^2
GLUCOSE SERPL-MCNC: 123 MG/DL (ref 70–110)
HCT VFR BLD AUTO: 42.2 % (ref 40–54)
HGB BLD-MCNC: 13.3 G/DL (ref 14–18)
IMM GRANULOCYTES # BLD AUTO: 0.05 K/UL (ref 0–0.04)
IMM GRANULOCYTES NFR BLD AUTO: 0.4 % (ref 0–0.5)
LYMPHOCYTES # BLD AUTO: 1.9 K/UL (ref 1–4.8)
LYMPHOCYTES NFR BLD: 16.3 % (ref 18–48)
MAGNESIUM SERPL-MCNC: 2 MG/DL (ref 1.6–2.6)
MCH RBC QN AUTO: 25.3 PG (ref 27–31)
MCHC RBC AUTO-ENTMCNC: 31.5 G/DL (ref 32–36)
MCV RBC AUTO: 80 FL (ref 82–98)
MONOCYTES # BLD AUTO: 1.2 K/UL (ref 0.3–1)
MONOCYTES NFR BLD: 10.9 % (ref 4–15)
NEUTROPHILS # BLD AUTO: 7.9 K/UL (ref 1.8–7.7)
NEUTROPHILS NFR BLD: 69.7 % (ref 38–73)
NRBC BLD-RTO: 0 /100 WBC
PLATELET # BLD AUTO: 299 K/UL (ref 150–350)
PMV BLD AUTO: 10.2 FL (ref 9.2–12.9)
POCT GLUCOSE: 119 MG/DL (ref 70–110)
POCT GLUCOSE: 129 MG/DL (ref 70–110)
POCT GLUCOSE: 154 MG/DL (ref 70–110)
POCT GLUCOSE: 171 MG/DL (ref 70–110)
POTASSIUM SERPL-SCNC: 3.9 MMOL/L (ref 3.5–5.1)
RBC # BLD AUTO: 5.25 M/UL (ref 4.6–6.2)
SODIUM SERPL-SCNC: 141 MMOL/L (ref 136–145)
WBC # BLD AUTO: 11.37 K/UL (ref 3.9–12.7)

## 2021-03-16 PROCEDURE — 83735 ASSAY OF MAGNESIUM: CPT | Mod: NTX | Performed by: PHYSICIAN ASSISTANT

## 2021-03-16 PROCEDURE — 36415 COLL VENOUS BLD VENIPUNCTURE: CPT | Mod: NTX | Performed by: PHYSICIAN ASSISTANT

## 2021-03-16 PROCEDURE — 85025 COMPLETE CBC W/AUTO DIFF WBC: CPT | Mod: NTX | Performed by: PHYSICIAN ASSISTANT

## 2021-03-16 PROCEDURE — 83880 ASSAY OF NATRIURETIC PEPTIDE: CPT | Mod: NTX | Performed by: PHYSICIAN ASSISTANT

## 2021-03-16 PROCEDURE — 25000003 PHARM REV CODE 250: Mod: NTX | Performed by: PHYSICIAN ASSISTANT

## 2021-03-16 PROCEDURE — 99233 PR SUBSEQUENT HOSPITAL CARE,LEVL III: ICD-10-PCS | Mod: NTX,,, | Performed by: INTERNAL MEDICINE

## 2021-03-16 PROCEDURE — 63600175 PHARM REV CODE 636 W HCPCS: Mod: NTX | Performed by: PHYSICIAN ASSISTANT

## 2021-03-16 PROCEDURE — 99223 PR INITIAL HOSPITAL CARE,LEVL III: ICD-10-PCS | Mod: NTX,,, | Performed by: NURSE PRACTITIONER

## 2021-03-16 PROCEDURE — 25000003 PHARM REV CODE 250: Mod: NTX | Performed by: STUDENT IN AN ORGANIZED HEALTH CARE EDUCATION/TRAINING PROGRAM

## 2021-03-16 PROCEDURE — 99223 1ST HOSP IP/OBS HIGH 75: CPT | Mod: NTX,,, | Performed by: NURSE PRACTITIONER

## 2021-03-16 PROCEDURE — 63600175 PHARM REV CODE 636 W HCPCS: Mod: NTX | Performed by: STUDENT IN AN ORGANIZED HEALTH CARE EDUCATION/TRAINING PROGRAM

## 2021-03-16 PROCEDURE — 99233 SBSQ HOSP IP/OBS HIGH 50: CPT | Mod: NTX,,, | Performed by: INTERNAL MEDICINE

## 2021-03-16 PROCEDURE — 25000003 PHARM REV CODE 250: Mod: NTX | Performed by: NURSE PRACTITIONER

## 2021-03-16 PROCEDURE — 20600001 HC STEP DOWN PRIVATE ROOM: Mod: NTX

## 2021-03-16 PROCEDURE — 80048 BASIC METABOLIC PNL TOTAL CA: CPT | Mod: NTX | Performed by: PHYSICIAN ASSISTANT

## 2021-03-16 RX ORDER — HYDRALAZINE HYDROCHLORIDE 10 MG/1
10 TABLET, FILM COATED ORAL EVERY 8 HOURS
Status: DISCONTINUED | OUTPATIENT
Start: 2021-03-16 | End: 2021-03-17

## 2021-03-16 RX ORDER — ISOSORBIDE DINITRATE 20 MG/1
20 TABLET ORAL 3 TIMES DAILY
Status: DISCONTINUED | OUTPATIENT
Start: 2021-03-16 | End: 2021-03-20 | Stop reason: HOSPADM

## 2021-03-16 RX ORDER — ISOSORBIDE DINITRATE 10 MG/1
10 TABLET ORAL 3 TIMES DAILY
Status: DISCONTINUED | OUTPATIENT
Start: 2021-03-16 | End: 2021-03-16

## 2021-03-16 RX ADMIN — POTASSIUM CHLORIDE 40 MEQ: 1500 TABLET, EXTENDED RELEASE ORAL at 08:03

## 2021-03-16 RX ADMIN — COLCHICINE 0.6 MG: 0.6 TABLET, FILM COATED ORAL at 08:03

## 2021-03-16 RX ADMIN — FEBUXOSTAT 40 MG: 40 TABLET, FILM COATED ORAL at 08:03

## 2021-03-16 RX ADMIN — ALLOPURINOL 300 MG: 300 TABLET ORAL at 08:03

## 2021-03-16 RX ADMIN — SACUBITRIL AND VALSARTAN 1 TABLET: 97; 103 TABLET, FILM COATED ORAL at 08:03

## 2021-03-16 RX ADMIN — HYDRALAZINE HYDROCHLORIDE 25 MG: 25 TABLET, FILM COATED ORAL at 05:03

## 2021-03-16 RX ADMIN — APIXABAN 5 MG: 5 TABLET, FILM COATED ORAL at 08:03

## 2021-03-16 RX ADMIN — ATORVASTATIN CALCIUM 80 MG: 20 TABLET, FILM COATED ORAL at 08:03

## 2021-03-16 RX ADMIN — Medication 400 MG: at 08:03

## 2021-03-16 RX ADMIN — ISOSORBIDE DINITRATE 20 MG: 20 TABLET ORAL at 03:03

## 2021-03-16 RX ADMIN — HYDRALAZINE HYDROCHLORIDE 10 MG: 10 TABLET ORAL at 10:03

## 2021-03-16 RX ADMIN — ISOSORBIDE DINITRATE 20 MG: 20 TABLET ORAL at 08:03

## 2021-03-16 RX ADMIN — FUROSEMIDE 20 MG/HR: 10 INJECTION, SOLUTION INTRAMUSCULAR; INTRAVENOUS at 01:03

## 2021-03-16 RX ADMIN — SPIRONOLACTONE 25 MG: 25 TABLET, FILM COATED ORAL at 08:03

## 2021-03-16 RX ADMIN — MELATONIN TAB 3 MG 6 MG: 3 TAB at 08:03

## 2021-03-16 RX ADMIN — INSULIN ASPART 1 UNITS: 100 INJECTION, SOLUTION INTRAVENOUS; SUBCUTANEOUS at 09:03

## 2021-03-16 RX ADMIN — DOCUSATE SODIUM 50MG AND SENNOSIDES 8.6MG 2 TABLET: 8.6; 5 TABLET, FILM COATED ORAL at 08:03

## 2021-03-16 RX ADMIN — HYDRALAZINE HYDROCHLORIDE 10 MG: 10 TABLET ORAL at 01:03

## 2021-03-16 RX ADMIN — ASPIRIN 81 MG: 81 TABLET, COATED ORAL at 08:03

## 2021-03-17 LAB
ALBUMIN SERPL BCP-MCNC: 3.5 G/DL (ref 3.5–5.2)
ALP SERPL-CCNC: 118 U/L (ref 55–135)
ALT SERPL W/O P-5'-P-CCNC: 18 U/L (ref 10–44)
ANION GAP SERPL CALC-SCNC: 13 MMOL/L (ref 8–16)
AST SERPL-CCNC: 13 U/L (ref 10–40)
BASOPHILS # BLD AUTO: 0.06 K/UL (ref 0–0.2)
BASOPHILS NFR BLD: 0.6 % (ref 0–1.9)
BILIRUB DIRECT SERPL-MCNC: 0.5 MG/DL (ref 0.1–0.3)
BILIRUB SERPL-MCNC: 1.3 MG/DL (ref 0.1–1)
BNP SERPL-MCNC: 392 PG/ML (ref 0–99)
BUN SERPL-MCNC: 26 MG/DL (ref 8–23)
CALCIUM SERPL-MCNC: 9.3 MG/DL (ref 8.7–10.5)
CHLORIDE SERPL-SCNC: 108 MMOL/L (ref 95–110)
CO2 SERPL-SCNC: 23 MMOL/L (ref 23–29)
CREAT SERPL-MCNC: 1.4 MG/DL (ref 0.5–1.4)
DIFFERENTIAL METHOD: ABNORMAL
EOSINOPHIL # BLD AUTO: 0.3 K/UL (ref 0–0.5)
EOSINOPHIL NFR BLD: 2.9 % (ref 0–8)
ERYTHROCYTE [DISTWIDTH] IN BLOOD BY AUTOMATED COUNT: 16.5 % (ref 11.5–14.5)
EST. GFR  (AFRICAN AMERICAN): 58.4 ML/MIN/1.73 M^2
EST. GFR  (NON AFRICAN AMERICAN): 50.5 ML/MIN/1.73 M^2
GLUCOSE SERPL-MCNC: 105 MG/DL (ref 70–110)
HCT VFR BLD AUTO: 42.6 % (ref 40–54)
HGB BLD-MCNC: 13.3 G/DL (ref 14–18)
IMM GRANULOCYTES # BLD AUTO: 0.04 K/UL (ref 0–0.04)
IMM GRANULOCYTES NFR BLD AUTO: 0.4 % (ref 0–0.5)
LYMPHOCYTES # BLD AUTO: 1.8 K/UL (ref 1–4.8)
LYMPHOCYTES NFR BLD: 19.1 % (ref 18–48)
MAGNESIUM SERPL-MCNC: 2.1 MG/DL (ref 1.6–2.6)
MCH RBC QN AUTO: 25.3 PG (ref 27–31)
MCHC RBC AUTO-ENTMCNC: 31.2 G/DL (ref 32–36)
MCV RBC AUTO: 81 FL (ref 82–98)
MONOCYTES # BLD AUTO: 0.9 K/UL (ref 0.3–1)
MONOCYTES NFR BLD: 9.4 % (ref 4–15)
NEUTROPHILS # BLD AUTO: 6.3 K/UL (ref 1.8–7.7)
NEUTROPHILS NFR BLD: 67.6 % (ref 38–73)
NRBC BLD-RTO: 0 /100 WBC
PLATELET # BLD AUTO: 326 K/UL (ref 150–350)
PMV BLD AUTO: 10.2 FL (ref 9.2–12.9)
POCT GLUCOSE: 116 MG/DL (ref 70–110)
POCT GLUCOSE: 145 MG/DL (ref 70–110)
POCT GLUCOSE: 150 MG/DL (ref 70–110)
POTASSIUM SERPL-SCNC: 4.2 MMOL/L (ref 3.5–5.1)
PROT SERPL-MCNC: 7.1 G/DL (ref 6–8.4)
RBC # BLD AUTO: 5.26 M/UL (ref 4.6–6.2)
SODIUM SERPL-SCNC: 144 MMOL/L (ref 136–145)
WBC # BLD AUTO: 9.26 K/UL (ref 3.9–12.7)

## 2021-03-17 PROCEDURE — 80048 BASIC METABOLIC PNL TOTAL CA: CPT | Mod: NTX | Performed by: PHYSICIAN ASSISTANT

## 2021-03-17 PROCEDURE — 99233 SBSQ HOSP IP/OBS HIGH 50: CPT | Mod: NTX,,, | Performed by: INTERNAL MEDICINE

## 2021-03-17 PROCEDURE — 63600175 PHARM REV CODE 636 W HCPCS: Mod: NTX | Performed by: PHYSICIAN ASSISTANT

## 2021-03-17 PROCEDURE — 99233 PR SUBSEQUENT HOSPITAL CARE,LEVL III: ICD-10-PCS | Mod: NTX,,, | Performed by: PHYSICIAN ASSISTANT

## 2021-03-17 PROCEDURE — 83880 ASSAY OF NATRIURETIC PEPTIDE: CPT | Mod: NTX | Performed by: PHYSICIAN ASSISTANT

## 2021-03-17 PROCEDURE — 99233 SBSQ HOSP IP/OBS HIGH 50: CPT | Mod: NTX,,, | Performed by: PHYSICIAN ASSISTANT

## 2021-03-17 PROCEDURE — 85025 COMPLETE CBC W/AUTO DIFF WBC: CPT | Mod: NTX | Performed by: PHYSICIAN ASSISTANT

## 2021-03-17 PROCEDURE — 20600001 HC STEP DOWN PRIVATE ROOM: Mod: NTX

## 2021-03-17 PROCEDURE — 25000003 PHARM REV CODE 250: Mod: NTX | Performed by: STUDENT IN AN ORGANIZED HEALTH CARE EDUCATION/TRAINING PROGRAM

## 2021-03-17 PROCEDURE — 99233 PR SUBSEQUENT HOSPITAL CARE,LEVL III: ICD-10-PCS | Mod: NTX,,, | Performed by: INTERNAL MEDICINE

## 2021-03-17 PROCEDURE — 97535 SELF CARE MNGMENT TRAINING: CPT | Mod: NTX

## 2021-03-17 PROCEDURE — 80076 HEPATIC FUNCTION PANEL: CPT | Mod: NTX | Performed by: STUDENT IN AN ORGANIZED HEALTH CARE EDUCATION/TRAINING PROGRAM

## 2021-03-17 PROCEDURE — 25000003 PHARM REV CODE 250: Mod: NTX | Performed by: PHYSICIAN ASSISTANT

## 2021-03-17 PROCEDURE — 83735 ASSAY OF MAGNESIUM: CPT | Mod: NTX | Performed by: PHYSICIAN ASSISTANT

## 2021-03-17 PROCEDURE — 97530 THERAPEUTIC ACTIVITIES: CPT | Mod: NTX

## 2021-03-17 PROCEDURE — 36415 COLL VENOUS BLD VENIPUNCTURE: CPT | Mod: NTX | Performed by: STUDENT IN AN ORGANIZED HEALTH CARE EDUCATION/TRAINING PROGRAM

## 2021-03-17 PROCEDURE — 25000003 PHARM REV CODE 250: Mod: NTX | Performed by: NURSE PRACTITIONER

## 2021-03-17 RX ORDER — HYDRALAZINE HYDROCHLORIDE 10 MG/1
10 TABLET, FILM COATED ORAL EVERY 8 HOURS
Status: DISCONTINUED | OUTPATIENT
Start: 2021-03-17 | End: 2021-03-20

## 2021-03-17 RX ORDER — FUROSEMIDE 10 MG/ML
80 INJECTION INTRAMUSCULAR; INTRAVENOUS 3 TIMES DAILY
Status: DISCONTINUED | OUTPATIENT
Start: 2021-03-17 | End: 2021-03-18

## 2021-03-17 RX ORDER — HYDRALAZINE HYDROCHLORIDE 25 MG/1
25 TABLET, FILM COATED ORAL EVERY 8 HOURS
Status: DISCONTINUED | OUTPATIENT
Start: 2021-03-17 | End: 2021-03-17

## 2021-03-17 RX ADMIN — Medication 400 MG: at 09:03

## 2021-03-17 RX ADMIN — COLCHICINE 0.6 MG: 0.6 TABLET, FILM COATED ORAL at 09:03

## 2021-03-17 RX ADMIN — ISOSORBIDE DINITRATE 20 MG: 20 TABLET ORAL at 09:03

## 2021-03-17 RX ADMIN — SPIRONOLACTONE 25 MG: 25 TABLET, FILM COATED ORAL at 09:03

## 2021-03-17 RX ADMIN — HYDRALAZINE HYDROCHLORIDE 10 MG: 10 TABLET, FILM COATED ORAL at 02:03

## 2021-03-17 RX ADMIN — SACUBITRIL AND VALSARTAN 1 TABLET: 97; 103 TABLET, FILM COATED ORAL at 09:03

## 2021-03-17 RX ADMIN — FUROSEMIDE 80 MG: 10 INJECTION, SOLUTION INTRAMUSCULAR; INTRAVENOUS at 02:03

## 2021-03-17 RX ADMIN — HYDRALAZINE HYDROCHLORIDE 10 MG: 10 TABLET, FILM COATED ORAL at 09:03

## 2021-03-17 RX ADMIN — MELATONIN TAB 3 MG 6 MG: 3 TAB at 09:03

## 2021-03-17 RX ADMIN — POTASSIUM CHLORIDE 40 MEQ: 1500 TABLET, EXTENDED RELEASE ORAL at 09:03

## 2021-03-17 RX ADMIN — FEBUXOSTAT 40 MG: 40 TABLET, FILM COATED ORAL at 09:03

## 2021-03-17 RX ADMIN — DOCUSATE SODIUM 50MG AND SENNOSIDES 8.6MG 2 TABLET: 8.6; 5 TABLET, FILM COATED ORAL at 09:03

## 2021-03-17 RX ADMIN — APIXABAN 5 MG: 5 TABLET, FILM COATED ORAL at 09:03

## 2021-03-17 RX ADMIN — ALLOPURINOL 300 MG: 300 TABLET ORAL at 09:03

## 2021-03-17 RX ADMIN — HYDRALAZINE HYDROCHLORIDE 10 MG: 10 TABLET ORAL at 06:03

## 2021-03-17 RX ADMIN — ASPIRIN 81 MG: 81 TABLET, COATED ORAL at 09:03

## 2021-03-17 RX ADMIN — ISOSORBIDE DINITRATE 20 MG: 20 TABLET ORAL at 02:03

## 2021-03-17 RX ADMIN — FUROSEMIDE 20 MG/HR: 10 INJECTION, SOLUTION INTRAMUSCULAR; INTRAVENOUS at 10:03

## 2021-03-17 RX ADMIN — ATORVASTATIN CALCIUM 80 MG: 20 TABLET, FILM COATED ORAL at 09:03

## 2021-03-17 RX ADMIN — FUROSEMIDE 80 MG: 10 INJECTION, SOLUTION INTRAMUSCULAR; INTRAVENOUS at 09:03

## 2021-03-18 LAB
ANION GAP SERPL CALC-SCNC: 10 MMOL/L (ref 8–16)
BASOPHILS # BLD AUTO: 0.05 K/UL (ref 0–0.2)
BASOPHILS NFR BLD: 0.5 % (ref 0–1.9)
BUN SERPL-MCNC: 32 MG/DL (ref 8–23)
CALCIUM SERPL-MCNC: 8.8 MG/DL (ref 8.7–10.5)
CHLORIDE SERPL-SCNC: 108 MMOL/L (ref 95–110)
CO2 SERPL-SCNC: 25 MMOL/L (ref 23–29)
CREAT SERPL-MCNC: 1.7 MG/DL (ref 0.5–1.4)
DIFFERENTIAL METHOD: ABNORMAL
EOSINOPHIL # BLD AUTO: 0.3 K/UL (ref 0–0.5)
EOSINOPHIL NFR BLD: 3 % (ref 0–8)
ERYTHROCYTE [DISTWIDTH] IN BLOOD BY AUTOMATED COUNT: 16.6 % (ref 11.5–14.5)
EST. GFR  (AFRICAN AMERICAN): 46.2 ML/MIN/1.73 M^2
EST. GFR  (NON AFRICAN AMERICAN): 40 ML/MIN/1.73 M^2
GLUCOSE SERPL-MCNC: 114 MG/DL (ref 70–110)
HCT VFR BLD AUTO: 42.3 % (ref 40–54)
HGB BLD-MCNC: 13.2 G/DL (ref 14–18)
IMM GRANULOCYTES # BLD AUTO: 0.04 K/UL (ref 0–0.04)
IMM GRANULOCYTES NFR BLD AUTO: 0.4 % (ref 0–0.5)
LYMPHOCYTES # BLD AUTO: 1.9 K/UL (ref 1–4.8)
LYMPHOCYTES NFR BLD: 19.9 % (ref 18–48)
MAGNESIUM SERPL-MCNC: 2.2 MG/DL (ref 1.6–2.6)
MCH RBC QN AUTO: 25 PG (ref 27–31)
MCHC RBC AUTO-ENTMCNC: 31.2 G/DL (ref 32–36)
MCV RBC AUTO: 80 FL (ref 82–98)
MONOCYTES # BLD AUTO: 0.9 K/UL (ref 0.3–1)
MONOCYTES NFR BLD: 9.7 % (ref 4–15)
NEUTROPHILS # BLD AUTO: 6.4 K/UL (ref 1.8–7.7)
NEUTROPHILS NFR BLD: 66.5 % (ref 38–73)
NRBC BLD-RTO: 0 /100 WBC
PLATELET # BLD AUTO: 312 K/UL (ref 150–350)
PMV BLD AUTO: 9.8 FL (ref 9.2–12.9)
POCT GLUCOSE: 117 MG/DL (ref 70–110)
POCT GLUCOSE: 140 MG/DL (ref 70–110)
POCT GLUCOSE: 201 MG/DL (ref 70–110)
POTASSIUM SERPL-SCNC: 4.4 MMOL/L (ref 3.5–5.1)
RBC # BLD AUTO: 5.27 M/UL (ref 4.6–6.2)
SODIUM SERPL-SCNC: 143 MMOL/L (ref 136–145)
WBC # BLD AUTO: 9.68 K/UL (ref 3.9–12.7)

## 2021-03-18 PROCEDURE — 25000003 PHARM REV CODE 250: Mod: NTX | Performed by: NURSE PRACTITIONER

## 2021-03-18 PROCEDURE — 20600001 HC STEP DOWN PRIVATE ROOM: Mod: NTX

## 2021-03-18 PROCEDURE — 94761 N-INVAS EAR/PLS OXIMETRY MLT: CPT | Mod: NTX

## 2021-03-18 PROCEDURE — 90792 PR PSYCHIATRIC DIAGNOSTIC EVALUATION W/MEDICAL SERVICES: ICD-10-PCS | Mod: NTX,,, | Performed by: PSYCHIATRY & NEUROLOGY

## 2021-03-18 PROCEDURE — 90792 PSYCH DIAG EVAL W/MED SRVCS: CPT | Mod: NTX,,, | Performed by: PSYCHIATRY & NEUROLOGY

## 2021-03-18 PROCEDURE — 99233 PR SUBSEQUENT HOSPITAL CARE,LEVL III: ICD-10-PCS | Mod: NTX,,, | Performed by: NURSE PRACTITIONER

## 2021-03-18 PROCEDURE — 25000003 PHARM REV CODE 250: Mod: NTX | Performed by: PHYSICIAN ASSISTANT

## 2021-03-18 PROCEDURE — 99233 PR SUBSEQUENT HOSPITAL CARE,LEVL III: ICD-10-PCS | Mod: NTX,,, | Performed by: INTERNAL MEDICINE

## 2021-03-18 PROCEDURE — 85025 COMPLETE CBC W/AUTO DIFF WBC: CPT | Mod: NTX | Performed by: PHYSICIAN ASSISTANT

## 2021-03-18 PROCEDURE — 99233 SBSQ HOSP IP/OBS HIGH 50: CPT | Mod: NTX,,, | Performed by: INTERNAL MEDICINE

## 2021-03-18 PROCEDURE — 97116 GAIT TRAINING THERAPY: CPT | Mod: NTX

## 2021-03-18 PROCEDURE — 25000003 PHARM REV CODE 250: Mod: NTX | Performed by: STUDENT IN AN ORGANIZED HEALTH CARE EDUCATION/TRAINING PROGRAM

## 2021-03-18 PROCEDURE — 83735 ASSAY OF MAGNESIUM: CPT | Mod: NTX | Performed by: PHYSICIAN ASSISTANT

## 2021-03-18 PROCEDURE — 80048 BASIC METABOLIC PNL TOTAL CA: CPT | Mod: NTX | Performed by: PHYSICIAN ASSISTANT

## 2021-03-18 PROCEDURE — 99233 SBSQ HOSP IP/OBS HIGH 50: CPT | Mod: NTX,,, | Performed by: NURSE PRACTITIONER

## 2021-03-18 PROCEDURE — 63600175 PHARM REV CODE 636 W HCPCS: Mod: NTX | Performed by: STUDENT IN AN ORGANIZED HEALTH CARE EDUCATION/TRAINING PROGRAM

## 2021-03-18 PROCEDURE — 36415 COLL VENOUS BLD VENIPUNCTURE: CPT | Mod: NTX | Performed by: PHYSICIAN ASSISTANT

## 2021-03-18 RX ORDER — TORSEMIDE 20 MG/1
40 TABLET ORAL 2 TIMES DAILY
Status: DISCONTINUED | OUTPATIENT
Start: 2021-03-18 | End: 2021-03-20 | Stop reason: HOSPADM

## 2021-03-18 RX ORDER — POTASSIUM CHLORIDE 20 MEQ/1
20 TABLET, EXTENDED RELEASE ORAL 2 TIMES DAILY
Status: DISCONTINUED | OUTPATIENT
Start: 2021-03-18 | End: 2021-03-20 | Stop reason: HOSPADM

## 2021-03-18 RX ORDER — MIRTAZAPINE 15 MG/1
15 TABLET, ORALLY DISINTEGRATING ORAL NIGHTLY PRN
Status: DISCONTINUED | OUTPATIENT
Start: 2021-03-18 | End: 2021-03-20 | Stop reason: HOSPADM

## 2021-03-18 RX ORDER — LANOLIN ALCOHOL/MO/W.PET/CERES
400 CREAM (GRAM) TOPICAL 2 TIMES DAILY
Status: DISCONTINUED | OUTPATIENT
Start: 2021-03-18 | End: 2021-03-20 | Stop reason: HOSPADM

## 2021-03-18 RX ORDER — HYDROCODONE BITARTRATE AND ACETAMINOPHEN 5; 325 MG/1; MG/1
1 TABLET ORAL EVERY 8 HOURS PRN
Status: DISCONTINUED | OUTPATIENT
Start: 2021-03-18 | End: 2021-03-20 | Stop reason: HOSPADM

## 2021-03-18 RX ORDER — RISPERIDONE 1 MG/1
1 TABLET ORAL NIGHTLY PRN
Status: DISCONTINUED | OUTPATIENT
Start: 2021-03-18 | End: 2021-03-19

## 2021-03-18 RX ADMIN — DOCUSATE SODIUM 50MG AND SENNOSIDES 8.6MG 2 TABLET: 8.6; 5 TABLET, FILM COATED ORAL at 09:03

## 2021-03-18 RX ADMIN — ASPIRIN 81 MG: 81 TABLET, COATED ORAL at 09:03

## 2021-03-18 RX ADMIN — ISOSORBIDE DINITRATE 20 MG: 20 TABLET ORAL at 04:03

## 2021-03-18 RX ADMIN — FEBUXOSTAT 40 MG: 40 TABLET, FILM COATED ORAL at 09:03

## 2021-03-18 RX ADMIN — HYDRALAZINE HYDROCHLORIDE 10 MG: 10 TABLET, FILM COATED ORAL at 03:03

## 2021-03-18 RX ADMIN — ALLOPURINOL 300 MG: 300 TABLET ORAL at 09:03

## 2021-03-18 RX ADMIN — SACUBITRIL AND VALSARTAN 1 TABLET: 97; 103 TABLET, FILM COATED ORAL at 09:03

## 2021-03-18 RX ADMIN — SACUBITRIL AND VALSARTAN 1 TABLET: 97; 103 TABLET, FILM COATED ORAL at 10:03

## 2021-03-18 RX ADMIN — INSULIN ASPART 4 UNITS: 100 INJECTION, SOLUTION INTRAVENOUS; SUBCUTANEOUS at 05:03

## 2021-03-18 RX ADMIN — Medication 400 MG: at 09:03

## 2021-03-18 RX ADMIN — POTASSIUM CHLORIDE 20 MEQ: 1500 TABLET, EXTENDED RELEASE ORAL at 09:03

## 2021-03-18 RX ADMIN — ISOSORBIDE DINITRATE 20 MG: 20 TABLET ORAL at 09:03

## 2021-03-18 RX ADMIN — ATORVASTATIN CALCIUM 80 MG: 20 TABLET, FILM COATED ORAL at 09:03

## 2021-03-18 RX ADMIN — COLCHICINE 0.6 MG: 0.6 TABLET, FILM COATED ORAL at 09:03

## 2021-03-18 RX ADMIN — SPIRONOLACTONE 25 MG: 25 TABLET, FILM COATED ORAL at 09:03

## 2021-03-18 RX ADMIN — HYDROCODONE BITARTRATE AND ACETAMINOPHEN 1 TABLET: 5; 325 TABLET ORAL at 03:03

## 2021-03-18 RX ADMIN — HYDROCODONE BITARTRATE AND ACETAMINOPHEN 1 TABLET: 5; 325 TABLET ORAL at 04:03

## 2021-03-18 RX ADMIN — ACETAMINOPHEN 650 MG: 325 TABLET ORAL at 09:03

## 2021-03-18 RX ADMIN — MELATONIN TAB 3 MG 6 MG: 3 TAB at 09:03

## 2021-03-18 RX ADMIN — APIXABAN 5 MG: 5 TABLET, FILM COATED ORAL at 09:03

## 2021-03-18 RX ADMIN — HYDRALAZINE HYDROCHLORIDE 10 MG: 10 TABLET, FILM COATED ORAL at 05:03

## 2021-03-19 ENCOUNTER — DOCUMENTATION ONLY (OUTPATIENT)
Dept: TRANSPLANT | Facility: CLINIC | Age: 71
End: 2021-03-19

## 2021-03-19 DIAGNOSIS — R53.81 DEBILITY: Primary | ICD-10-CM

## 2021-03-19 LAB
ALBUMIN SERPL BCP-MCNC: 3 G/DL (ref 3.5–5.2)
ALP SERPL-CCNC: 120 U/L (ref 55–135)
ALT SERPL W/O P-5'-P-CCNC: 13 U/L (ref 10–44)
ANION GAP SERPL CALC-SCNC: 9 MMOL/L (ref 8–16)
AST SERPL-CCNC: 16 U/L (ref 10–40)
BASOPHILS # BLD AUTO: 0.04 K/UL (ref 0–0.2)
BASOPHILS NFR BLD: 0.6 % (ref 0–1.9)
BILIRUB SERPL-MCNC: 0.6 MG/DL (ref 0.1–1)
BNP SERPL-MCNC: 324 PG/ML (ref 0–99)
BUN SERPL-MCNC: 36 MG/DL (ref 8–23)
CALCIUM SERPL-MCNC: 8.8 MG/DL (ref 8.7–10.5)
CHLORIDE SERPL-SCNC: 106 MMOL/L (ref 95–110)
CO2 SERPL-SCNC: 22 MMOL/L (ref 23–29)
CREAT SERPL-MCNC: 1.5 MG/DL (ref 0.5–1.4)
DIFFERENTIAL METHOD: ABNORMAL
EOSINOPHIL # BLD AUTO: 0.4 K/UL (ref 0–0.5)
EOSINOPHIL NFR BLD: 4.9 % (ref 0–8)
ERYTHROCYTE [DISTWIDTH] IN BLOOD BY AUTOMATED COUNT: 16.6 % (ref 11.5–14.5)
EST. GFR  (AFRICAN AMERICAN): 53.8 ML/MIN/1.73 M^2
EST. GFR  (NON AFRICAN AMERICAN): 46.5 ML/MIN/1.73 M^2
GLUCOSE SERPL-MCNC: 118 MG/DL (ref 70–110)
HCT VFR BLD AUTO: 41.5 % (ref 40–54)
HGB BLD-MCNC: 13 G/DL (ref 14–18)
IMM GRANULOCYTES # BLD AUTO: 0.03 K/UL (ref 0–0.04)
IMM GRANULOCYTES NFR BLD AUTO: 0.4 % (ref 0–0.5)
LYMPHOCYTES # BLD AUTO: 1.7 K/UL (ref 1–4.8)
LYMPHOCYTES NFR BLD: 23.2 % (ref 18–48)
MAGNESIUM SERPL-MCNC: 2.1 MG/DL (ref 1.6–2.6)
MCH RBC QN AUTO: 25.3 PG (ref 27–31)
MCHC RBC AUTO-ENTMCNC: 31.3 G/DL (ref 32–36)
MCV RBC AUTO: 81 FL (ref 82–98)
MONOCYTES # BLD AUTO: 0.7 K/UL (ref 0.3–1)
MONOCYTES NFR BLD: 10 % (ref 4–15)
NEUTROPHILS # BLD AUTO: 4.4 K/UL (ref 1.8–7.7)
NEUTROPHILS NFR BLD: 60.9 % (ref 38–73)
NRBC BLD-RTO: 0 /100 WBC
PLATELET # BLD AUTO: 287 K/UL (ref 150–350)
PMV BLD AUTO: 9.5 FL (ref 9.2–12.9)
POCT GLUCOSE: 115 MG/DL (ref 70–110)
POCT GLUCOSE: 116 MG/DL (ref 70–110)
POCT GLUCOSE: 116 MG/DL (ref 70–110)
POCT GLUCOSE: 136 MG/DL (ref 70–110)
POTASSIUM SERPL-SCNC: 4.2 MMOL/L (ref 3.5–5.1)
PROT SERPL-MCNC: 6.5 G/DL (ref 6–8.4)
RBC # BLD AUTO: 5.14 M/UL (ref 4.6–6.2)
SODIUM SERPL-SCNC: 137 MMOL/L (ref 136–145)
WBC # BLD AUTO: 7.21 K/UL (ref 3.9–12.7)

## 2021-03-19 PROCEDURE — 20600001 HC STEP DOWN PRIVATE ROOM

## 2021-03-19 PROCEDURE — 83735 ASSAY OF MAGNESIUM: CPT | Performed by: NURSE PRACTITIONER

## 2021-03-19 PROCEDURE — 85025 COMPLETE CBC W/AUTO DIFF WBC: CPT | Performed by: NURSE PRACTITIONER

## 2021-03-19 PROCEDURE — 99233 PR SUBSEQUENT HOSPITAL CARE,LEVL III: ICD-10-PCS | Mod: ,,, | Performed by: PSYCHIATRY & NEUROLOGY

## 2021-03-19 PROCEDURE — 99233 SBSQ HOSP IP/OBS HIGH 50: CPT | Mod: ,,, | Performed by: PSYCHIATRY & NEUROLOGY

## 2021-03-19 PROCEDURE — 80053 COMPREHEN METABOLIC PANEL: CPT | Performed by: NURSE PRACTITIONER

## 2021-03-19 PROCEDURE — 25000003 PHARM REV CODE 250: Mod: NTX | Performed by: PHYSICIAN ASSISTANT

## 2021-03-19 PROCEDURE — 36415 COLL VENOUS BLD VENIPUNCTURE: CPT | Performed by: NURSE PRACTITIONER

## 2021-03-19 PROCEDURE — 99233 SBSQ HOSP IP/OBS HIGH 50: CPT | Mod: NTX,,, | Performed by: NURSE PRACTITIONER

## 2021-03-19 PROCEDURE — 99233 PR SUBSEQUENT HOSPITAL CARE,LEVL III: ICD-10-PCS | Mod: NTX,,, | Performed by: NURSE PRACTITIONER

## 2021-03-19 PROCEDURE — 25000003 PHARM REV CODE 250: Mod: NTX | Performed by: NURSE PRACTITIONER

## 2021-03-19 PROCEDURE — 83880 ASSAY OF NATRIURETIC PEPTIDE: CPT | Performed by: NURSE PRACTITIONER

## 2021-03-19 PROCEDURE — 99233 PR SUBSEQUENT HOSPITAL CARE,LEVL III: ICD-10-PCS | Mod: NTX,,, | Performed by: INTERNAL MEDICINE

## 2021-03-19 PROCEDURE — 25000003 PHARM REV CODE 250: Mod: NTX | Performed by: STUDENT IN AN ORGANIZED HEALTH CARE EDUCATION/TRAINING PROGRAM

## 2021-03-19 PROCEDURE — 99233 SBSQ HOSP IP/OBS HIGH 50: CPT | Mod: NTX,,, | Performed by: INTERNAL MEDICINE

## 2021-03-19 RX ADMIN — HYDRALAZINE HYDROCHLORIDE 10 MG: 10 TABLET, FILM COATED ORAL at 05:03

## 2021-03-19 RX ADMIN — COLCHICINE 0.6 MG: 0.6 TABLET, FILM COATED ORAL at 09:03

## 2021-03-19 RX ADMIN — Medication 400 MG: at 08:03

## 2021-03-19 RX ADMIN — COLCHICINE 0.6 MG: 0.6 TABLET, FILM COATED ORAL at 08:03

## 2021-03-19 RX ADMIN — ISOSORBIDE DINITRATE 20 MG: 20 TABLET ORAL at 03:03

## 2021-03-19 RX ADMIN — SACUBITRIL AND VALSARTAN 1 TABLET: 97; 103 TABLET, FILM COATED ORAL at 08:03

## 2021-03-19 RX ADMIN — ISOSORBIDE DINITRATE 20 MG: 20 TABLET ORAL at 08:03

## 2021-03-19 RX ADMIN — HYDRALAZINE HYDROCHLORIDE 10 MG: 10 TABLET, FILM COATED ORAL at 01:03

## 2021-03-19 RX ADMIN — TORSEMIDE 40 MG: 20 TABLET ORAL at 08:03

## 2021-03-19 RX ADMIN — ATORVASTATIN CALCIUM 80 MG: 20 TABLET, FILM COATED ORAL at 08:03

## 2021-03-19 RX ADMIN — FEBUXOSTAT 40 MG: 40 TABLET, FILM COATED ORAL at 08:03

## 2021-03-19 RX ADMIN — POTASSIUM CHLORIDE 20 MEQ: 1500 TABLET, EXTENDED RELEASE ORAL at 09:03

## 2021-03-19 RX ADMIN — ALLOPURINOL 300 MG: 300 TABLET ORAL at 08:03

## 2021-03-19 RX ADMIN — ASPIRIN 81 MG: 81 TABLET, COATED ORAL at 08:03

## 2021-03-19 RX ADMIN — HYDROCODONE BITARTRATE AND ACETAMINOPHEN 1 TABLET: 5; 325 TABLET ORAL at 01:03

## 2021-03-19 RX ADMIN — APIXABAN 5 MG: 5 TABLET, FILM COATED ORAL at 09:03

## 2021-03-19 RX ADMIN — SPIRONOLACTONE 25 MG: 25 TABLET, FILM COATED ORAL at 08:03

## 2021-03-19 RX ADMIN — POTASSIUM CHLORIDE 20 MEQ: 1500 TABLET, EXTENDED RELEASE ORAL at 08:03

## 2021-03-19 RX ADMIN — TORSEMIDE 40 MG: 20 TABLET ORAL at 09:03

## 2021-03-19 RX ADMIN — HYDROCODONE BITARTRATE AND ACETAMINOPHEN 1 TABLET: 5; 325 TABLET ORAL at 09:03

## 2021-03-19 RX ADMIN — SACUBITRIL AND VALSARTAN 1 TABLET: 97; 103 TABLET, FILM COATED ORAL at 09:03

## 2021-03-19 RX ADMIN — Medication 400 MG: at 09:03

## 2021-03-19 RX ADMIN — MELATONIN TAB 3 MG 6 MG: 3 TAB at 09:03

## 2021-03-19 RX ADMIN — HYDROCODONE BITARTRATE AND ACETAMINOPHEN 1 TABLET: 5; 325 TABLET ORAL at 08:03

## 2021-03-19 RX ADMIN — APIXABAN 5 MG: 5 TABLET, FILM COATED ORAL at 08:03

## 2021-03-20 VITALS
HEIGHT: 76 IN | SYSTOLIC BLOOD PRESSURE: 100 MMHG | BODY MASS INDEX: 31.22 KG/M2 | WEIGHT: 256.38 LBS | HEART RATE: 100 BPM | RESPIRATION RATE: 20 BRPM | OXYGEN SATURATION: 99 % | TEMPERATURE: 98 F | DIASTOLIC BLOOD PRESSURE: 63 MMHG

## 2021-03-20 LAB
ANION GAP SERPL CALC-SCNC: 10 MMOL/L (ref 8–16)
BASOPHILS # BLD AUTO: 0.05 K/UL (ref 0–0.2)
BASOPHILS NFR BLD: 0.7 % (ref 0–1.9)
BUN SERPL-MCNC: 36 MG/DL (ref 8–23)
CALCIUM SERPL-MCNC: 8.3 MG/DL (ref 8.7–10.5)
CHLORIDE SERPL-SCNC: 109 MMOL/L (ref 95–110)
CO2 SERPL-SCNC: 20 MMOL/L (ref 23–29)
CREAT SERPL-MCNC: 1.5 MG/DL (ref 0.5–1.4)
DIFFERENTIAL METHOD: ABNORMAL
EOSINOPHIL # BLD AUTO: 0.5 K/UL (ref 0–0.5)
EOSINOPHIL NFR BLD: 6.1 % (ref 0–8)
ERYTHROCYTE [DISTWIDTH] IN BLOOD BY AUTOMATED COUNT: 15.9 % (ref 11.5–14.5)
EST. GFR  (AFRICAN AMERICAN): 53.8 ML/MIN/1.73 M^2
EST. GFR  (NON AFRICAN AMERICAN): 46.5 ML/MIN/1.73 M^2
GLUCOSE SERPL-MCNC: 99 MG/DL (ref 70–110)
HCT VFR BLD AUTO: 38.3 % (ref 40–54)
HGB BLD-MCNC: 12.2 G/DL (ref 14–18)
IMM GRANULOCYTES # BLD AUTO: 0.04 K/UL (ref 0–0.04)
IMM GRANULOCYTES NFR BLD AUTO: 0.5 % (ref 0–0.5)
LYMPHOCYTES # BLD AUTO: 2.1 K/UL (ref 1–4.8)
LYMPHOCYTES NFR BLD: 27.2 % (ref 18–48)
MAGNESIUM SERPL-MCNC: 2.1 MG/DL (ref 1.6–2.6)
MAGNESIUM SERPL-MCNC: 2.1 MG/DL (ref 1.6–2.6)
MCH RBC QN AUTO: 25.8 PG (ref 27–31)
MCHC RBC AUTO-ENTMCNC: 31.9 G/DL (ref 32–36)
MCV RBC AUTO: 81 FL (ref 82–98)
MONOCYTES # BLD AUTO: 0.9 K/UL (ref 0.3–1)
MONOCYTES NFR BLD: 11.4 % (ref 4–15)
NEUTROPHILS # BLD AUTO: 4.1 K/UL (ref 1.8–7.7)
NEUTROPHILS NFR BLD: 54.1 % (ref 38–73)
NRBC BLD-RTO: 0 /100 WBC
PLATELET # BLD AUTO: 298 K/UL (ref 150–350)
PMV BLD AUTO: 10 FL (ref 9.2–12.9)
POCT GLUCOSE: 112 MG/DL (ref 70–110)
POCT GLUCOSE: 113 MG/DL (ref 70–110)
POCT GLUCOSE: 130 MG/DL (ref 70–110)
POTASSIUM SERPL-SCNC: 4.5 MMOL/L (ref 3.5–5.1)
POTASSIUM SERPL-SCNC: 4.5 MMOL/L (ref 3.5–5.1)
RBC # BLD AUTO: 4.73 M/UL (ref 4.6–6.2)
SODIUM SERPL-SCNC: 139 MMOL/L (ref 136–145)
WBC # BLD AUTO: 7.53 K/UL (ref 3.9–12.7)

## 2021-03-20 PROCEDURE — 99233 PR SUBSEQUENT HOSPITAL CARE,LEVL III: ICD-10-PCS | Mod: ,,, | Performed by: INTERNAL MEDICINE

## 2021-03-20 PROCEDURE — 83735 ASSAY OF MAGNESIUM: CPT | Mod: 91 | Performed by: INTERNAL MEDICINE

## 2021-03-20 PROCEDURE — 25000003 PHARM REV CODE 250: Performed by: PHYSICIAN ASSISTANT

## 2021-03-20 PROCEDURE — 94761 N-INVAS EAR/PLS OXIMETRY MLT: CPT

## 2021-03-20 PROCEDURE — 25000003 PHARM REV CODE 250: Performed by: NURSE PRACTITIONER

## 2021-03-20 PROCEDURE — 84132 ASSAY OF SERUM POTASSIUM: CPT | Performed by: INTERNAL MEDICINE

## 2021-03-20 PROCEDURE — 25000003 PHARM REV CODE 250: Performed by: STUDENT IN AN ORGANIZED HEALTH CARE EDUCATION/TRAINING PROGRAM

## 2021-03-20 PROCEDURE — 99233 SBSQ HOSP IP/OBS HIGH 50: CPT | Mod: ,,, | Performed by: INTERNAL MEDICINE

## 2021-03-20 PROCEDURE — 85025 COMPLETE CBC W/AUTO DIFF WBC: CPT | Performed by: PHYSICIAN ASSISTANT

## 2021-03-20 PROCEDURE — 80048 BASIC METABOLIC PNL TOTAL CA: CPT | Performed by: PHYSICIAN ASSISTANT

## 2021-03-20 PROCEDURE — 36415 COLL VENOUS BLD VENIPUNCTURE: CPT | Performed by: INTERNAL MEDICINE

## 2021-03-20 PROCEDURE — 83735 ASSAY OF MAGNESIUM: CPT | Performed by: PHYSICIAN ASSISTANT

## 2021-03-20 RX ORDER — LANOLIN ALCOHOL/MO/W.PET/CERES
400 CREAM (GRAM) TOPICAL 2 TIMES DAILY
Refills: 0 | COMMUNITY
Start: 2021-03-20 | End: 2021-03-24 | Stop reason: SDUPTHER

## 2021-03-20 RX ORDER — POTASSIUM CHLORIDE 20 MEQ/1
20 TABLET, EXTENDED RELEASE ORAL 2 TIMES DAILY
Qty: 30 TABLET | Refills: 0 | Status: SHIPPED | OUTPATIENT
Start: 2021-03-20 | End: 2021-03-24 | Stop reason: SDUPTHER

## 2021-03-20 RX ORDER — TORSEMIDE 20 MG/1
40 TABLET ORAL 2 TIMES DAILY
Qty: 120 TABLET | Refills: 11 | Status: SHIPPED | OUTPATIENT
Start: 2021-03-20 | End: 2021-03-24 | Stop reason: SDUPTHER

## 2021-03-20 RX ORDER — SPIRONOLACTONE 25 MG/1
25 TABLET ORAL DAILY
Qty: 30 TABLET | Refills: 11 | Status: SHIPPED | OUTPATIENT
Start: 2021-03-21 | End: 2021-03-24 | Stop reason: SDUPTHER

## 2021-03-20 RX ADMIN — Medication 400 MG: at 09:03

## 2021-03-20 RX ADMIN — ALLOPURINOL 300 MG: 300 TABLET ORAL at 09:03

## 2021-03-20 RX ADMIN — ISOSORBIDE DINITRATE 20 MG: 20 TABLET ORAL at 09:03

## 2021-03-20 RX ADMIN — COLCHICINE 0.6 MG: 0.6 TABLET, FILM COATED ORAL at 09:03

## 2021-03-20 RX ADMIN — TORSEMIDE 40 MG: 20 TABLET ORAL at 05:03

## 2021-03-20 RX ADMIN — SACUBITRIL AND VALSARTAN 1 TABLET: 97; 103 TABLET, FILM COATED ORAL at 05:03

## 2021-03-20 RX ADMIN — HYDROCODONE BITARTRATE AND ACETAMINOPHEN 1 TABLET: 5; 325 TABLET ORAL at 09:03

## 2021-03-20 RX ADMIN — TORSEMIDE 40 MG: 20 TABLET ORAL at 09:03

## 2021-03-20 RX ADMIN — COLCHICINE 0.6 MG: 0.6 TABLET, FILM COATED ORAL at 05:03

## 2021-03-20 RX ADMIN — ISOSORBIDE DINITRATE 20 MG: 20 TABLET ORAL at 03:03

## 2021-03-20 RX ADMIN — ASPIRIN 81 MG: 81 TABLET, COATED ORAL at 09:03

## 2021-03-20 RX ADMIN — POTASSIUM CHLORIDE 20 MEQ: 1500 TABLET, EXTENDED RELEASE ORAL at 05:03

## 2021-03-20 RX ADMIN — SACUBITRIL AND VALSARTAN 1 TABLET: 97; 103 TABLET, FILM COATED ORAL at 09:03

## 2021-03-20 RX ADMIN — SPIRONOLACTONE 25 MG: 25 TABLET, FILM COATED ORAL at 09:03

## 2021-03-20 RX ADMIN — APIXABAN 5 MG: 5 TABLET, FILM COATED ORAL at 09:03

## 2021-03-20 RX ADMIN — HYDROCODONE BITARTRATE AND ACETAMINOPHEN 1 TABLET: 5; 325 TABLET ORAL at 05:03

## 2021-03-20 RX ADMIN — APIXABAN 5 MG: 5 TABLET, FILM COATED ORAL at 05:03

## 2021-03-20 RX ADMIN — POTASSIUM CHLORIDE 20 MEQ: 1500 TABLET, EXTENDED RELEASE ORAL at 09:03

## 2021-03-20 RX ADMIN — ATORVASTATIN CALCIUM 80 MG: 20 TABLET, FILM COATED ORAL at 09:03

## 2021-03-20 RX ADMIN — Medication 400 MG: at 05:03

## 2021-03-20 RX ADMIN — HYDRALAZINE HYDROCHLORIDE 10 MG: 10 TABLET, FILM COATED ORAL at 05:03

## 2021-03-20 RX ADMIN — FEBUXOSTAT 40 MG: 40 TABLET, FILM COATED ORAL at 09:03

## 2021-03-22 ENCOUNTER — TELEPHONE (OUTPATIENT)
Dept: TRANSPLANT | Facility: CLINIC | Age: 71
End: 2021-03-22

## 2021-03-22 ENCOUNTER — TELEPHONE (OUTPATIENT)
Dept: PAIN MEDICINE | Facility: CLINIC | Age: 71
End: 2021-03-22

## 2021-03-22 ENCOUNTER — DOCUMENTATION ONLY (OUTPATIENT)
Dept: TRANSPLANT | Facility: CLINIC | Age: 71
End: 2021-03-22

## 2021-03-22 DIAGNOSIS — G89.4 CHRONIC PAIN SYNDROME: ICD-10-CM

## 2021-03-22 DIAGNOSIS — M54.16 LUMBAR RADICULOPATHY: ICD-10-CM

## 2021-03-22 DIAGNOSIS — M48.062 SPINAL STENOSIS OF LUMBAR REGION WITH NEUROGENIC CLAUDICATION: ICD-10-CM

## 2021-03-23 ENCOUNTER — TELEPHONE (OUTPATIENT)
Dept: TRANSPLANT | Facility: CLINIC | Age: 71
End: 2021-03-23

## 2021-03-24 RX ORDER — POTASSIUM CHLORIDE 20 MEQ/1
20 TABLET, EXTENDED RELEASE ORAL 2 TIMES DAILY
Qty: 30 TABLET | Refills: 0 | Status: CANCELLED | OUTPATIENT
Start: 2021-03-24

## 2021-03-24 RX ORDER — LANOLIN ALCOHOL/MO/W.PET/CERES
400 CREAM (GRAM) TOPICAL 2 TIMES DAILY
Qty: 180 TABLET | Refills: 0 | Status: SHIPPED | OUTPATIENT
Start: 2021-03-24 | End: 2021-03-31 | Stop reason: SDUPTHER

## 2021-03-24 RX ORDER — POTASSIUM CHLORIDE 20 MEQ/1
20 TABLET, EXTENDED RELEASE ORAL 2 TIMES DAILY
Qty: 180 TABLET | Refills: 3 | Status: ON HOLD | OUTPATIENT
Start: 2021-03-24 | End: 2023-01-01 | Stop reason: HOSPADM

## 2021-03-24 RX ORDER — LANOLIN ALCOHOL/MO/W.PET/CERES
400 CREAM (GRAM) TOPICAL 2 TIMES DAILY
Refills: 0 | Status: CANCELLED | COMMUNITY
Start: 2021-03-24

## 2021-03-24 RX ORDER — TORSEMIDE 20 MG/1
40 TABLET ORAL 2 TIMES DAILY
Qty: 360 TABLET | Refills: 3 | Status: ON HOLD | OUTPATIENT
Start: 2021-03-24 | End: 2023-01-01

## 2021-03-24 RX ORDER — SPIRONOLACTONE 25 MG/1
25 TABLET ORAL DAILY
Qty: 90 TABLET | Refills: 3 | Status: ON HOLD | OUTPATIENT
Start: 2021-03-24 | End: 2023-01-01 | Stop reason: HOSPADM

## 2021-03-25 ENCOUNTER — TELEPHONE (OUTPATIENT)
Dept: PAIN MEDICINE | Facility: CLINIC | Age: 71
End: 2021-03-25

## 2021-03-31 ENCOUNTER — OFFICE VISIT (OUTPATIENT)
Dept: PAIN MEDICINE | Facility: CLINIC | Age: 71
End: 2021-03-31
Payer: OTHER GOVERNMENT

## 2021-03-31 ENCOUNTER — TELEPHONE (OUTPATIENT)
Dept: PAIN MEDICINE | Facility: CLINIC | Age: 71
End: 2021-03-31

## 2021-03-31 ENCOUNTER — OFFICE VISIT (OUTPATIENT)
Dept: TRANSPLANT | Facility: CLINIC | Age: 71
End: 2021-03-31
Payer: OTHER GOVERNMENT

## 2021-03-31 VITALS
HEART RATE: 113 BPM | WEIGHT: 264.63 LBS | TEMPERATURE: 98 F | HEIGHT: 76 IN | BODY MASS INDEX: 32.22 KG/M2 | SYSTOLIC BLOOD PRESSURE: 121 MMHG | DIASTOLIC BLOOD PRESSURE: 83 MMHG | RESPIRATION RATE: 18 BRPM

## 2021-03-31 VITALS
BODY MASS INDEX: 32.51 KG/M2 | SYSTOLIC BLOOD PRESSURE: 117 MMHG | WEIGHT: 267 LBS | HEIGHT: 76 IN | DIASTOLIC BLOOD PRESSURE: 79 MMHG | HEART RATE: 119 BPM

## 2021-03-31 DIAGNOSIS — I25.5 ISCHEMIC CARDIOMYOPATHY: Primary | ICD-10-CM

## 2021-03-31 DIAGNOSIS — M47.816 LUMBAR SPONDYLOSIS: ICD-10-CM

## 2021-03-31 DIAGNOSIS — I13.0 CARDIORENAL SYNDROME WITH RENAL FAILURE, STAGE 1-4 OR UNSPECIFIED CHRONIC KIDNEY DISEASE, WITH HEART FAILURE: ICD-10-CM

## 2021-03-31 DIAGNOSIS — M17.0 PRIMARY OSTEOARTHRITIS OF BOTH KNEES: ICD-10-CM

## 2021-03-31 DIAGNOSIS — I50.43 ACUTE ON CHRONIC COMBINED SYSTOLIC AND DIASTOLIC HEART FAILURE: ICD-10-CM

## 2021-03-31 DIAGNOSIS — N18.30 STAGE 3 CHRONIC KIDNEY DISEASE, UNSPECIFIED WHETHER STAGE 3A OR 3B CKD: ICD-10-CM

## 2021-03-31 DIAGNOSIS — G89.4 CHRONIC PAIN SYNDROME: Primary | ICD-10-CM

## 2021-03-31 PROCEDURE — 99214 OFFICE O/P EST MOD 30 MIN: CPT | Mod: S$PBB,,, | Performed by: ANESTHESIOLOGY

## 2021-03-31 PROCEDURE — 99999 PR PBB SHADOW E&M-EST. PATIENT-LVL IV: CPT | Mod: PBBFAC,,, | Performed by: ANESTHESIOLOGY

## 2021-03-31 PROCEDURE — 99999 PR PBB SHADOW E&M-EST. PATIENT-LVL II: CPT | Mod: PBBFAC,,, | Performed by: INTERNAL MEDICINE

## 2021-03-31 PROCEDURE — 99999 PR PBB SHADOW E&M-EST. PATIENT-LVL II: ICD-10-PCS | Mod: PBBFAC,,, | Performed by: INTERNAL MEDICINE

## 2021-03-31 PROCEDURE — 99214 PR OFFICE/OUTPT VISIT, EST, LEVL IV, 30-39 MIN: ICD-10-PCS | Mod: S$PBB,,, | Performed by: ANESTHESIOLOGY

## 2021-03-31 PROCEDURE — 99215 PR OFFICE/OUTPT VISIT, EST, LEVL V, 40-54 MIN: ICD-10-PCS | Mod: S$PBB,,, | Performed by: INTERNAL MEDICINE

## 2021-03-31 PROCEDURE — 99212 OFFICE O/P EST SF 10 MIN: CPT | Mod: PBBFAC | Performed by: INTERNAL MEDICINE

## 2021-03-31 PROCEDURE — 99214 OFFICE O/P EST MOD 30 MIN: CPT | Mod: PBBFAC,27 | Performed by: ANESTHESIOLOGY

## 2021-03-31 PROCEDURE — 99215 OFFICE O/P EST HI 40 MIN: CPT | Mod: S$PBB,,, | Performed by: INTERNAL MEDICINE

## 2021-03-31 PROCEDURE — 99999 PR PBB SHADOW E&M-EST. PATIENT-LVL IV: ICD-10-PCS | Mod: PBBFAC,,, | Performed by: ANESTHESIOLOGY

## 2021-03-31 RX ORDER — HYDROCODONE BITARTRATE AND ACETAMINOPHEN 5; 325 MG/1; MG/1
1 TABLET ORAL EVERY 8 HOURS PRN
Qty: 90 TABLET | Refills: 0 | Status: SHIPPED | OUTPATIENT
Start: 2021-03-31 | End: 2021-04-30

## 2021-03-31 RX ORDER — LANOLIN ALCOHOL/MO/W.PET/CERES
400 CREAM (GRAM) TOPICAL 2 TIMES DAILY
Qty: 180 TABLET | Refills: 0 | Status: ON HOLD | OUTPATIENT
Start: 2021-03-31 | End: 2022-06-16 | Stop reason: HOSPADM

## 2021-04-14 ENCOUNTER — LAB VISIT (OUTPATIENT)
Dept: LAB | Facility: HOSPITAL | Age: 71
End: 2021-04-14
Attending: INTERNAL MEDICINE
Payer: OTHER GOVERNMENT

## 2021-04-14 DIAGNOSIS — I50.40 COMBINED SYSTOLIC AND DIASTOLIC CONGESTIVE HEART FAILURE, UNSPECIFIED HF CHRONICITY: ICD-10-CM

## 2021-04-14 LAB
ALBUMIN SERPL BCP-MCNC: 3.7 G/DL (ref 3.5–5.2)
ALP SERPL-CCNC: 163 U/L (ref 55–135)
ALT SERPL W/O P-5'-P-CCNC: 10 U/L (ref 10–44)
ANION GAP SERPL CALC-SCNC: 10 MMOL/L (ref 8–16)
AST SERPL-CCNC: 14 U/L (ref 10–40)
BILIRUB SERPL-MCNC: 1.4 MG/DL (ref 0.1–1)
BUN SERPL-MCNC: 21 MG/DL (ref 8–23)
CALCIUM SERPL-MCNC: 8.9 MG/DL (ref 8.7–10.5)
CHLORIDE SERPL-SCNC: 101 MMOL/L (ref 95–110)
CO2 SERPL-SCNC: 29 MMOL/L (ref 23–29)
CREAT SERPL-MCNC: 1.5 MG/DL (ref 0.5–1.4)
EST. GFR  (AFRICAN AMERICAN): 53.8 ML/MIN/1.73 M^2
EST. GFR  (NON AFRICAN AMERICAN): 46.5 ML/MIN/1.73 M^2
GLUCOSE SERPL-MCNC: 167 MG/DL (ref 70–110)
POTASSIUM SERPL-SCNC: 3.8 MMOL/L (ref 3.5–5.1)
PROT SERPL-MCNC: 7.2 G/DL (ref 6–8.4)
SODIUM SERPL-SCNC: 140 MMOL/L (ref 136–145)

## 2021-04-14 PROCEDURE — 80053 COMPREHEN METABOLIC PANEL: CPT | Performed by: INTERNAL MEDICINE

## 2021-04-14 PROCEDURE — 36415 COLL VENOUS BLD VENIPUNCTURE: CPT | Performed by: INTERNAL MEDICINE

## 2021-04-29 ENCOUNTER — TELEPHONE (OUTPATIENT)
Dept: PAIN MEDICINE | Facility: CLINIC | Age: 71
End: 2021-04-29

## 2021-05-12 DIAGNOSIS — M48.062 SPINAL STENOSIS OF LUMBAR REGION WITH NEUROGENIC CLAUDICATION: ICD-10-CM

## 2021-05-12 DIAGNOSIS — M54.16 LUMBAR RADICULOPATHY: ICD-10-CM

## 2021-05-12 DIAGNOSIS — G89.4 CHRONIC PAIN SYNDROME: Primary | ICD-10-CM

## 2021-05-12 RX ORDER — HYDROCODONE BITARTRATE AND ACETAMINOPHEN 5; 325 MG/1; MG/1
1 TABLET ORAL EVERY 12 HOURS PRN
Qty: 60 TABLET | Refills: 0 | Status: SHIPPED | OUTPATIENT
Start: 2021-05-12 | End: 2021-05-12 | Stop reason: SDUPTHER

## 2021-05-13 RX ORDER — HYDROCODONE BITARTRATE AND ACETAMINOPHEN 5; 325 MG/1; MG/1
1 TABLET ORAL EVERY 12 HOURS PRN
Qty: 60 TABLET | Refills: 0 | Status: SHIPPED | OUTPATIENT
Start: 2021-05-13 | End: 2021-06-07 | Stop reason: SDUPTHER

## 2021-06-07 DIAGNOSIS — M48.062 SPINAL STENOSIS OF LUMBAR REGION WITH NEUROGENIC CLAUDICATION: ICD-10-CM

## 2021-06-07 DIAGNOSIS — M54.16 LUMBAR RADICULOPATHY: ICD-10-CM

## 2021-06-07 DIAGNOSIS — G89.4 CHRONIC PAIN SYNDROME: ICD-10-CM

## 2021-06-08 RX ORDER — HYDROCODONE BITARTRATE AND ACETAMINOPHEN 5; 325 MG/1; MG/1
1 TABLET ORAL EVERY 12 HOURS PRN
Qty: 60 TABLET | Refills: 0 | Status: SHIPPED | OUTPATIENT
Start: 2021-06-12 | End: 2021-07-27 | Stop reason: SDUPTHER

## 2021-07-08 ENCOUNTER — TELEPHONE (OUTPATIENT)
Dept: PAIN MEDICINE | Facility: CLINIC | Age: 71
End: 2021-07-08

## 2021-07-13 ENCOUNTER — TELEPHONE (OUTPATIENT)
Dept: PAIN MEDICINE | Facility: CLINIC | Age: 71
End: 2021-07-13

## 2021-07-26 ENCOUNTER — TELEPHONE (OUTPATIENT)
Dept: PAIN MEDICINE | Facility: CLINIC | Age: 71
End: 2021-07-26

## 2021-07-27 ENCOUNTER — OFFICE VISIT (OUTPATIENT)
Dept: PAIN MEDICINE | Facility: CLINIC | Age: 71
End: 2021-07-27
Payer: OTHER GOVERNMENT

## 2021-07-27 VITALS
HEART RATE: 89 BPM | WEIGHT: 262.81 LBS | RESPIRATION RATE: 18 BRPM | HEIGHT: 76 IN | BODY MASS INDEX: 32 KG/M2 | DIASTOLIC BLOOD PRESSURE: 65 MMHG | SYSTOLIC BLOOD PRESSURE: 99 MMHG | TEMPERATURE: 98 F

## 2021-07-27 DIAGNOSIS — G89.29 CHRONIC PAIN OF RIGHT HIP: ICD-10-CM

## 2021-07-27 DIAGNOSIS — M48.062 SPINAL STENOSIS OF LUMBAR REGION WITH NEUROGENIC CLAUDICATION: ICD-10-CM

## 2021-07-27 DIAGNOSIS — M51.36 DDD (DEGENERATIVE DISC DISEASE), LUMBAR: ICD-10-CM

## 2021-07-27 DIAGNOSIS — M25.551 CHRONIC PAIN OF RIGHT HIP: ICD-10-CM

## 2021-07-27 DIAGNOSIS — M54.16 LUMBAR RADICULOPATHY: ICD-10-CM

## 2021-07-27 DIAGNOSIS — G89.4 CHRONIC PAIN SYNDROME: ICD-10-CM

## 2021-07-27 DIAGNOSIS — M53.3 SACROILIAC JOINT PAIN: ICD-10-CM

## 2021-07-27 DIAGNOSIS — M47.816 LUMBAR SPONDYLOSIS: ICD-10-CM

## 2021-07-27 DIAGNOSIS — G89.4 CHRONIC PAIN SYNDROME: Primary | ICD-10-CM

## 2021-07-27 PROCEDURE — 99214 PR OFFICE/OUTPT VISIT, EST, LEVL IV, 30-39 MIN: ICD-10-PCS | Mod: S$PBB,,, | Performed by: NURSE PRACTITIONER

## 2021-07-27 PROCEDURE — 99214 OFFICE O/P EST MOD 30 MIN: CPT | Mod: S$PBB,,, | Performed by: NURSE PRACTITIONER

## 2021-07-27 PROCEDURE — 99214 OFFICE O/P EST MOD 30 MIN: CPT | Mod: PBBFAC | Performed by: NURSE PRACTITIONER

## 2021-07-27 PROCEDURE — 99999 PR PBB SHADOW E&M-EST. PATIENT-LVL IV: CPT | Mod: PBBFAC,,, | Performed by: NURSE PRACTITIONER

## 2021-07-27 PROCEDURE — 99999 PR PBB SHADOW E&M-EST. PATIENT-LVL IV: ICD-10-PCS | Mod: PBBFAC,,, | Performed by: NURSE PRACTITIONER

## 2021-07-27 RX ORDER — HYDROCODONE BITARTRATE AND ACETAMINOPHEN 5; 325 MG/1; MG/1
1 TABLET ORAL EVERY 12 HOURS PRN
Qty: 60 TABLET | Refills: 0 | Status: SHIPPED | OUTPATIENT
Start: 2021-07-27 | End: 2021-07-28 | Stop reason: SDUPTHER

## 2021-07-28 ENCOUNTER — TELEPHONE (OUTPATIENT)
Dept: PAIN MEDICINE | Facility: CLINIC | Age: 71
End: 2021-07-28

## 2021-07-28 DIAGNOSIS — M48.062 SPINAL STENOSIS OF LUMBAR REGION WITH NEUROGENIC CLAUDICATION: ICD-10-CM

## 2021-07-28 DIAGNOSIS — G89.4 CHRONIC PAIN SYNDROME: ICD-10-CM

## 2021-07-28 DIAGNOSIS — M54.16 LUMBAR RADICULOPATHY: ICD-10-CM

## 2021-07-28 RX ORDER — HYDROCODONE BITARTRATE AND ACETAMINOPHEN 5; 325 MG/1; MG/1
1 TABLET ORAL EVERY 12 HOURS PRN
Qty: 60 TABLET | Refills: 0 | Status: SHIPPED | OUTPATIENT
Start: 2021-07-28 | End: 2021-09-08 | Stop reason: SDUPTHER

## 2021-07-29 ENCOUNTER — TELEPHONE (OUTPATIENT)
Dept: PAIN MEDICINE | Facility: CLINIC | Age: 71
End: 2021-07-29

## 2021-08-06 ENCOUNTER — HOSPITAL ENCOUNTER (OUTPATIENT)
Dept: RADIOLOGY | Facility: OTHER | Age: 71
Discharge: HOME | End: 2021-08-06
Attending: NURSE PRACTITIONER
Payer: OTHER GOVERNMENT

## 2021-08-06 DIAGNOSIS — G89.29 CHRONIC PAIN OF RIGHT HIP: ICD-10-CM

## 2021-08-06 DIAGNOSIS — M25.551 CHRONIC PAIN OF RIGHT HIP: ICD-10-CM

## 2021-08-06 PROCEDURE — 73521 X-RAY EXAM HIPS BI 2 VIEWS: CPT | Mod: 26,,, | Performed by: RADIOLOGY

## 2021-08-06 PROCEDURE — 73521 X-RAY EXAM HIPS BI 2 VIEWS: CPT | Mod: TC,FY

## 2021-08-06 PROCEDURE — 73521 XR HIPS BILATERAL 2 VIEW INCL AP PELVIS: ICD-10-PCS | Mod: 26,,, | Performed by: RADIOLOGY

## 2021-08-09 ENCOUNTER — TELEPHONE (OUTPATIENT)
Dept: PAIN MEDICINE | Facility: CLINIC | Age: 71
End: 2021-08-09

## 2021-08-12 ENCOUNTER — TELEPHONE (OUTPATIENT)
Dept: PAIN MEDICINE | Facility: CLINIC | Age: 71
End: 2021-08-12

## 2021-08-13 ENCOUNTER — HOSPITAL ENCOUNTER (OUTPATIENT)
Facility: OTHER | Age: 71
Discharge: HOME | End: 2021-08-13
Attending: ANESTHESIOLOGY | Admitting: ANESTHESIOLOGY
Payer: OTHER GOVERNMENT

## 2021-08-13 VITALS
OXYGEN SATURATION: 95 % | DIASTOLIC BLOOD PRESSURE: 69 MMHG | HEART RATE: 86 BPM | BODY MASS INDEX: 31.05 KG/M2 | TEMPERATURE: 99 F | RESPIRATION RATE: 16 BRPM | HEIGHT: 76 IN | WEIGHT: 255 LBS | SYSTOLIC BLOOD PRESSURE: 120 MMHG

## 2021-08-13 DIAGNOSIS — G89.29 CHRONIC PAIN: ICD-10-CM

## 2021-08-13 DIAGNOSIS — M46.1 SACROILIITIS: Primary | ICD-10-CM

## 2021-08-13 LAB — POCT GLUCOSE: 77 MG/DL (ref 70–110)

## 2021-08-13 PROCEDURE — 27096 PR INJECTION,SACROILIAC JOINT: ICD-10-PCS | Mod: RT,,, | Performed by: ANESTHESIOLOGY

## 2021-08-13 PROCEDURE — 82947 ASSAY GLUCOSE BLOOD QUANT: CPT | Performed by: ANESTHESIOLOGY

## 2021-08-13 PROCEDURE — 25000003 PHARM REV CODE 250: Performed by: ANESTHESIOLOGY

## 2021-08-13 PROCEDURE — 25500020 PHARM REV CODE 255: Performed by: ANESTHESIOLOGY

## 2021-08-13 PROCEDURE — 63600175 PHARM REV CODE 636 W HCPCS: Performed by: ANESTHESIOLOGY

## 2021-08-13 PROCEDURE — 27096 INJECT SACROILIAC JOINT: CPT | Mod: RT,,, | Performed by: ANESTHESIOLOGY

## 2021-08-13 PROCEDURE — 27096 INJECT SACROILIAC JOINT: CPT | Mod: RT | Performed by: ANESTHESIOLOGY

## 2021-08-13 RX ORDER — BUPIVACAINE HYDROCHLORIDE 2.5 MG/ML
INJECTION, SOLUTION EPIDURAL; INFILTRATION; INTRACAUDAL
Status: DISCONTINUED | OUTPATIENT
Start: 2021-08-13 | End: 2021-08-13 | Stop reason: HOSPADM

## 2021-08-13 RX ORDER — LIDOCAINE HYDROCHLORIDE 10 MG/ML
INJECTION INFILTRATION; PERINEURAL
Status: DISCONTINUED | OUTPATIENT
Start: 2021-08-13 | End: 2021-08-13 | Stop reason: HOSPADM

## 2021-08-13 RX ORDER — ALPRAZOLAM 0.5 MG/1
0.5 TABLET ORAL
Status: DISCONTINUED | OUTPATIENT
Start: 2021-08-13 | End: 2021-08-13 | Stop reason: HOSPADM

## 2021-08-13 RX ORDER — TRIAMCINOLONE ACETONIDE 40 MG/ML
INJECTION, SUSPENSION INTRA-ARTICULAR; INTRAMUSCULAR
Status: DISCONTINUED | OUTPATIENT
Start: 2021-08-13 | End: 2021-08-13 | Stop reason: HOSPADM

## 2021-09-04 ENCOUNTER — NURSE TRIAGE (OUTPATIENT)
Dept: ADMINISTRATIVE | Facility: CLINIC | Age: 71
End: 2021-09-04

## 2021-09-08 DIAGNOSIS — M54.16 LUMBAR RADICULOPATHY: ICD-10-CM

## 2021-09-08 DIAGNOSIS — M48.062 SPINAL STENOSIS OF LUMBAR REGION WITH NEUROGENIC CLAUDICATION: ICD-10-CM

## 2021-09-08 DIAGNOSIS — G89.4 CHRONIC PAIN SYNDROME: ICD-10-CM

## 2021-09-08 RX ORDER — HYDROCODONE BITARTRATE AND ACETAMINOPHEN 5; 325 MG/1; MG/1
1 TABLET ORAL EVERY 12 HOURS PRN
Qty: 60 TABLET | Refills: 0 | Status: SHIPPED | OUTPATIENT
Start: 2021-09-08 | End: 2021-10-11 | Stop reason: SDUPTHER

## 2021-09-21 ENCOUNTER — TELEPHONE (OUTPATIENT)
Dept: PAIN MEDICINE | Facility: CLINIC | Age: 71
End: 2021-09-21

## 2021-09-30 ENCOUNTER — TELEPHONE (OUTPATIENT)
Dept: PAIN MEDICINE | Facility: CLINIC | Age: 71
End: 2021-09-30

## 2021-10-11 DIAGNOSIS — M54.16 LUMBAR RADICULOPATHY: ICD-10-CM

## 2021-10-11 DIAGNOSIS — G89.4 CHRONIC PAIN SYNDROME: ICD-10-CM

## 2021-10-11 DIAGNOSIS — M48.062 SPINAL STENOSIS OF LUMBAR REGION WITH NEUROGENIC CLAUDICATION: ICD-10-CM

## 2021-10-11 RX ORDER — HYDROCODONE BITARTRATE AND ACETAMINOPHEN 5; 325 MG/1; MG/1
1 TABLET ORAL EVERY 12 HOURS PRN
Qty: 60 TABLET | Refills: 0 | Status: SHIPPED | OUTPATIENT
Start: 2021-10-11 | End: 2021-10-27

## 2021-10-27 ENCOUNTER — OFFICE VISIT (OUTPATIENT)
Dept: PAIN MEDICINE | Facility: CLINIC | Age: 71
End: 2021-10-27
Payer: OTHER GOVERNMENT

## 2021-10-27 VITALS
HEART RATE: 67 BPM | TEMPERATURE: 98 F | HEIGHT: 76 IN | RESPIRATION RATE: 18 BRPM | DIASTOLIC BLOOD PRESSURE: 73 MMHG | OXYGEN SATURATION: 100 % | SYSTOLIC BLOOD PRESSURE: 120 MMHG | BODY MASS INDEX: 31.05 KG/M2 | WEIGHT: 255 LBS

## 2021-10-27 DIAGNOSIS — M51.36 DDD (DEGENERATIVE DISC DISEASE), LUMBAR: ICD-10-CM

## 2021-10-27 DIAGNOSIS — M47.816 LUMBAR SPONDYLOSIS: ICD-10-CM

## 2021-10-27 DIAGNOSIS — G89.4 CHRONIC PAIN SYNDROME: Primary | ICD-10-CM

## 2021-10-27 DIAGNOSIS — M54.16 LUMBAR RADICULOPATHY: ICD-10-CM

## 2021-10-27 DIAGNOSIS — M17.0 PRIMARY OSTEOARTHRITIS OF BOTH KNEES: ICD-10-CM

## 2021-10-27 DIAGNOSIS — M48.062 SPINAL STENOSIS OF LUMBAR REGION WITH NEUROGENIC CLAUDICATION: ICD-10-CM

## 2021-10-27 PROCEDURE — 99214 OFFICE O/P EST MOD 30 MIN: CPT | Mod: PBBFAC | Performed by: NURSE PRACTITIONER

## 2021-10-27 PROCEDURE — 99999 PR PBB SHADOW E&M-EST. PATIENT-LVL IV: CPT | Mod: PBBFAC,,, | Performed by: NURSE PRACTITIONER

## 2021-10-27 PROCEDURE — 99214 PR OFFICE/OUTPT VISIT, EST, LEVL IV, 30-39 MIN: ICD-10-PCS | Mod: S$PBB,,, | Performed by: NURSE PRACTITIONER

## 2021-10-27 PROCEDURE — 99214 OFFICE O/P EST MOD 30 MIN: CPT | Mod: S$PBB,,, | Performed by: NURSE PRACTITIONER

## 2021-10-27 PROCEDURE — 99999 PR PBB SHADOW E&M-EST. PATIENT-LVL IV: ICD-10-PCS | Mod: PBBFAC,,, | Performed by: NURSE PRACTITIONER

## 2021-10-27 RX ORDER — TRAMADOL HYDROCHLORIDE 50 MG/1
50 TABLET ORAL EVERY 12 HOURS PRN
Qty: 60 TABLET | Refills: 0 | Status: SHIPPED | OUTPATIENT
Start: 2021-10-27 | End: 2021-11-26

## 2021-11-09 ENCOUNTER — TELEPHONE (OUTPATIENT)
Dept: PAIN MEDICINE | Facility: CLINIC | Age: 71
End: 2021-11-09
Payer: OTHER GOVERNMENT

## 2021-11-12 ENCOUNTER — HOSPITAL ENCOUNTER (OUTPATIENT)
Facility: OTHER | Age: 71
Discharge: HOME | End: 2021-11-12
Attending: ANESTHESIOLOGY | Admitting: ANESTHESIOLOGY
Payer: OTHER GOVERNMENT

## 2021-11-12 ENCOUNTER — TELEPHONE (OUTPATIENT)
Dept: PAIN MEDICINE | Facility: CLINIC | Age: 71
End: 2021-11-12
Payer: OTHER GOVERNMENT

## 2021-11-12 VITALS
RESPIRATION RATE: 16 BRPM | OXYGEN SATURATION: 94 % | WEIGHT: 258 LBS | TEMPERATURE: 98 F | HEART RATE: 90 BPM | SYSTOLIC BLOOD PRESSURE: 109 MMHG | HEIGHT: 76 IN | DIASTOLIC BLOOD PRESSURE: 79 MMHG | BODY MASS INDEX: 31.42 KG/M2

## 2021-11-12 DIAGNOSIS — G89.29 CHRONIC PAIN: ICD-10-CM

## 2021-11-12 DIAGNOSIS — M25.569 KNEE PAIN, UNSPECIFIED CHRONICITY, UNSPECIFIED LATERALITY: Primary | ICD-10-CM

## 2021-11-12 LAB — POCT GLUCOSE: 88 MG/DL (ref 70–110)

## 2021-11-12 PROCEDURE — 25000003 PHARM REV CODE 250: Performed by: ANESTHESIOLOGY

## 2021-11-12 PROCEDURE — 99152 MOD SED SAME PHYS/QHP 5/>YRS: CPT | Mod: ,,, | Performed by: ANESTHESIOLOGY

## 2021-11-12 PROCEDURE — 64624 DSTRJ NULYT AGT GNCLR NRV: CPT | Mod: RT,,, | Performed by: ANESTHESIOLOGY

## 2021-11-12 PROCEDURE — 99152 MOD SED SAME PHYS/QHP 5/>YRS: CPT | Performed by: ANESTHESIOLOGY

## 2021-11-12 PROCEDURE — 63600175 PHARM REV CODE 636 W HCPCS: Performed by: ANESTHESIOLOGY

## 2021-11-12 PROCEDURE — 64624 PR DESTRUCT, NEUROLYTIC AGENT, GENICULAR NERVE, W/IMG: ICD-10-PCS | Mod: RT,,, | Performed by: ANESTHESIOLOGY

## 2021-11-12 PROCEDURE — A4649 SURGICAL SUPPLIES: HCPCS | Performed by: ANESTHESIOLOGY

## 2021-11-12 PROCEDURE — 64624 DSTRJ NULYT AGT GNCLR NRV: CPT | Mod: RT | Performed by: ANESTHESIOLOGY

## 2021-11-12 PROCEDURE — 25000003 PHARM REV CODE 250: Performed by: STUDENT IN AN ORGANIZED HEALTH CARE EDUCATION/TRAINING PROGRAM

## 2021-11-12 PROCEDURE — 99152 PR MOD CONSCIOUS SEDATION, SAME PHYS, 5+ YRS, FIRST 15 MIN: ICD-10-PCS | Mod: ,,, | Performed by: ANESTHESIOLOGY

## 2021-11-12 RX ORDER — LIDOCAINE HYDROCHLORIDE 20 MG/ML
INJECTION, SOLUTION INFILTRATION; PERINEURAL
Status: DISCONTINUED | OUTPATIENT
Start: 2021-11-12 | End: 2021-11-12 | Stop reason: HOSPADM

## 2021-11-12 RX ORDER — MIDAZOLAM HYDROCHLORIDE 1 MG/ML
INJECTION INTRAMUSCULAR; INTRAVENOUS
Status: DISCONTINUED | OUTPATIENT
Start: 2021-11-12 | End: 2021-11-12 | Stop reason: HOSPADM

## 2021-11-12 RX ORDER — FENTANYL CITRATE 50 UG/ML
INJECTION, SOLUTION INTRAMUSCULAR; INTRAVENOUS
Status: DISCONTINUED | OUTPATIENT
Start: 2021-11-12 | End: 2021-11-12 | Stop reason: HOSPADM

## 2021-11-12 RX ORDER — SODIUM CHLORIDE 9 MG/ML
INJECTION, SOLUTION INTRAVENOUS CONTINUOUS
Status: DISCONTINUED | OUTPATIENT
Start: 2021-11-12 | End: 2021-11-12 | Stop reason: HOSPADM

## 2021-11-12 RX ORDER — TRIAMCINOLONE ACETONIDE 40 MG/ML
INJECTION, SUSPENSION INTRA-ARTICULAR; INTRAMUSCULAR
Status: DISCONTINUED | OUTPATIENT
Start: 2021-11-12 | End: 2021-11-12 | Stop reason: HOSPADM

## 2021-11-12 RX ORDER — BUPIVACAINE HYDROCHLORIDE 2.5 MG/ML
INJECTION, SOLUTION EPIDURAL; INFILTRATION; INTRACAUDAL
Status: DISCONTINUED | OUTPATIENT
Start: 2021-11-12 | End: 2021-11-12 | Stop reason: HOSPADM

## 2021-11-14 ENCOUNTER — HOSPITAL ENCOUNTER (EMERGENCY)
Facility: HOSPITAL | Age: 71
Discharge: HOME | End: 2021-11-14
Attending: EMERGENCY MEDICINE
Payer: OTHER GOVERNMENT

## 2021-11-14 VITALS
SYSTOLIC BLOOD PRESSURE: 120 MMHG | DIASTOLIC BLOOD PRESSURE: 68 MMHG | OXYGEN SATURATION: 98 % | BODY MASS INDEX: 31.42 KG/M2 | TEMPERATURE: 98 F | HEART RATE: 90 BPM | WEIGHT: 258 LBS | RESPIRATION RATE: 16 BRPM | HEIGHT: 76 IN

## 2021-11-14 DIAGNOSIS — M25.529 ELBOW PAIN: ICD-10-CM

## 2021-11-14 DIAGNOSIS — M79.89 ARM SWELLING: Primary | ICD-10-CM

## 2021-11-14 PROCEDURE — 99285 EMERGENCY DEPT VISIT HI MDM: CPT | Mod: 25

## 2021-11-14 PROCEDURE — 63600175 PHARM REV CODE 636 W HCPCS: Performed by: EMERGENCY MEDICINE

## 2021-11-14 PROCEDURE — 96372 THER/PROPH/DIAG INJ SC/IM: CPT

## 2021-11-14 RX ORDER — DIPHENHYDRAMINE HCL 25 MG
25 CAPSULE ORAL EVERY 6 HOURS PRN
Qty: 20 CAPSULE | Refills: 0 | Status: SHIPPED | OUTPATIENT
Start: 2021-11-14 | End: 2021-11-24

## 2021-11-14 RX ORDER — DIPHENHYDRAMINE HYDROCHLORIDE 50 MG/ML
25 INJECTION INTRAMUSCULAR; INTRAVENOUS
Status: COMPLETED | OUTPATIENT
Start: 2021-11-14 | End: 2021-11-14

## 2021-11-14 RX ORDER — DIPHENHYDRAMINE HYDROCHLORIDE 50 MG/ML
50 INJECTION INTRAMUSCULAR; INTRAVENOUS
Status: DISCONTINUED | OUTPATIENT
Start: 2021-11-14 | End: 2021-11-14

## 2021-11-14 RX ADMIN — DIPHENHYDRAMINE HYDROCHLORIDE 25 MG: 50 INJECTION INTRAMUSCULAR; INTRAVENOUS at 07:11

## 2021-11-16 ENCOUNTER — TELEPHONE (OUTPATIENT)
Dept: PAIN MEDICINE | Facility: CLINIC | Age: 71
End: 2021-11-16
Payer: OTHER GOVERNMENT

## 2021-12-02 ENCOUNTER — TELEPHONE (OUTPATIENT)
Dept: PAIN MEDICINE | Facility: CLINIC | Age: 71
End: 2021-12-02
Payer: OTHER GOVERNMENT

## 2021-12-02 ENCOUNTER — OFFICE VISIT (OUTPATIENT)
Dept: PAIN MEDICINE | Facility: CLINIC | Age: 71
End: 2021-12-02
Payer: OTHER GOVERNMENT

## 2021-12-02 VITALS
HEIGHT: 76 IN | BODY MASS INDEX: 32.83 KG/M2 | WEIGHT: 269.63 LBS | OXYGEN SATURATION: 100 % | SYSTOLIC BLOOD PRESSURE: 131 MMHG | DIASTOLIC BLOOD PRESSURE: 78 MMHG | HEART RATE: 96 BPM

## 2021-12-02 DIAGNOSIS — F11.90 CHRONIC, CONTINUOUS USE OF OPIOIDS: ICD-10-CM

## 2021-12-02 DIAGNOSIS — Z51.81 ENCOUNTER FOR THERAPEUTIC DRUG MONITORING: Primary | ICD-10-CM

## 2021-12-02 DIAGNOSIS — M70.61 GREATER TROCHANTERIC BURSITIS OF RIGHT HIP: ICD-10-CM

## 2021-12-02 DIAGNOSIS — M53.3 SACROILIAC JOINT PAIN: ICD-10-CM

## 2021-12-02 DIAGNOSIS — M17.0 PRIMARY OSTEOARTHRITIS OF BOTH KNEES: ICD-10-CM

## 2021-12-02 PROCEDURE — 80307 DRUG TEST PRSMV CHEM ANLYZR: CPT | Performed by: NURSE PRACTITIONER

## 2021-12-02 PROCEDURE — 99999 PR PBB SHADOW E&M-EST. PATIENT-LVL IV: ICD-10-PCS | Mod: PBBFAC,,, | Performed by: NURSE PRACTITIONER

## 2021-12-02 PROCEDURE — 99213 PR OFFICE/OUTPT VISIT, EST, LEVL III, 20-29 MIN: ICD-10-PCS | Mod: S$PBB,,, | Performed by: NURSE PRACTITIONER

## 2021-12-02 PROCEDURE — 99214 OFFICE O/P EST MOD 30 MIN: CPT | Mod: PBBFAC | Performed by: NURSE PRACTITIONER

## 2021-12-02 PROCEDURE — 99999 PR PBB SHADOW E&M-EST. PATIENT-LVL IV: CPT | Mod: PBBFAC,,, | Performed by: NURSE PRACTITIONER

## 2021-12-02 PROCEDURE — 99213 OFFICE O/P EST LOW 20 MIN: CPT | Mod: S$PBB,,, | Performed by: NURSE PRACTITIONER

## 2021-12-03 DIAGNOSIS — M70.61 GREATER TROCHANTERIC BURSITIS OF RIGHT HIP: Primary | ICD-10-CM

## 2021-12-03 DIAGNOSIS — G89.4 CHRONIC PAIN SYNDROME: ICD-10-CM

## 2021-12-03 RX ORDER — HYDROCODONE BITARTRATE AND ACETAMINOPHEN 5; 325 MG/1; MG/1
1 TABLET ORAL EVERY 12 HOURS PRN
Qty: 60 TABLET | Refills: 0 | Status: CANCELLED | OUTPATIENT
Start: 2021-12-03 | End: 2022-01-02

## 2021-12-03 RX ORDER — HYDROCODONE BITARTRATE AND ACETAMINOPHEN 5; 325 MG/1; MG/1
1 TABLET ORAL EVERY 12 HOURS PRN
Qty: 60 TABLET | Refills: 0 | Status: SHIPPED | OUTPATIENT
Start: 2021-12-03 | End: 2022-01-02

## 2021-12-09 LAB
6MAM UR QL: NOT DETECTED
7AMINOCLONAZEPAM UR QL: NOT DETECTED
A-OH ALPRAZ UR QL: NOT DETECTED
ALPHA-OH-MIDAZOLAM: NOT DETECTED
ALPRAZ UR QL: NOT DETECTED
AMPHET UR QL SCN: NOT DETECTED
ANNOTATION COMMENT IMP: NORMAL
ANNOTATION COMMENT IMP: NORMAL
BARBITURATES UR QL: NOT DETECTED
BUPRENORPHINE UR QL: NOT DETECTED
BZE UR QL: NOT DETECTED
CARBOXYTHC UR QL: NOT DETECTED
CARISOPRODOL UR QL: NOT DETECTED
CLONAZEPAM UR QL: NOT DETECTED
CODEINE UR QL: NOT DETECTED
CREAT UR-MCNC: 76.4 MG/DL (ref 20–400)
DIAZEPAM UR QL: NOT DETECTED
ETHYL GLUCURONIDE UR QL: NOT DETECTED
FENTANYL UR QL: NOT DETECTED
GABAPENTIN: PRESENT
HYDROCODONE UR QL: NOT DETECTED
HYDROMORPHONE UR QL: NOT DETECTED
LORAZEPAM UR QL: NOT DETECTED
MDA UR QL: NOT DETECTED
MDEA UR QL: NOT DETECTED
MDMA UR QL: NOT DETECTED
ME-PHENIDATE UR QL: NOT DETECTED
METHADONE UR QL: NOT DETECTED
METHAMPHET UR QL: NOT DETECTED
MIDAZOLAM UR QL SCN: NOT DETECTED
MORPHINE UR QL: NOT DETECTED
NALOXONE: NOT DETECTED
NORBUPRENORPHINE UR QL CFM: NOT DETECTED
NORDIAZEPAM UR QL: NOT DETECTED
NORFENTANYL UR QL: NOT DETECTED
NORHYDROCODONE UR QL CFM: NOT DETECTED
NORMEPERIDINE UR QL CFM: NOT DETECTED
NOROXYCODONE UR QL CFM: NOT DETECTED
NOROXYMORPHONE UR QL SCN: NOT DETECTED
OXAZEPAM UR QL: NOT DETECTED
OXYCODONE UR QL: NOT DETECTED
OXYMORPHONE UR QL: NOT DETECTED
PATHOLOGY STUDY: NORMAL
PCP UR QL: NOT DETECTED
PHENTERMINE UR QL: NOT DETECTED
PREGABALIN: NOT DETECTED
SERVICE CMNT-IMP: NORMAL
TAPENTADOL UR QL SCN: NOT DETECTED
TAPENTADOL UR QL SCN: NOT DETECTED
TEMAZEPAM UR QL: NOT DETECTED
TRAMADOL UR QL: PRESENT
ZOLPIDEM METABOLITE: NOT DETECTED
ZOLPIDEM UR QL: NOT DETECTED

## 2021-12-17 ENCOUNTER — HOSPITAL ENCOUNTER (OUTPATIENT)
Facility: OTHER | Age: 71
Discharge: HOME | End: 2021-12-17
Attending: ANESTHESIOLOGY | Admitting: ANESTHESIOLOGY
Payer: OTHER GOVERNMENT

## 2021-12-17 VITALS
OXYGEN SATURATION: 97 % | RESPIRATION RATE: 16 BRPM | DIASTOLIC BLOOD PRESSURE: 82 MMHG | TEMPERATURE: 99 F | SYSTOLIC BLOOD PRESSURE: 130 MMHG | BODY MASS INDEX: 32.27 KG/M2 | WEIGHT: 265 LBS | HEART RATE: 98 BPM | HEIGHT: 76 IN

## 2021-12-17 DIAGNOSIS — G89.29 CHRONIC PAIN: ICD-10-CM

## 2021-12-17 DIAGNOSIS — M70.60 GREATER TROCHANTERIC BURSITIS, UNSPECIFIED LATERALITY: Primary | ICD-10-CM

## 2021-12-17 LAB — POCT GLUCOSE: 104 MG/DL (ref 70–110)

## 2021-12-17 PROCEDURE — 63600175 PHARM REV CODE 636 W HCPCS: Performed by: ANESTHESIOLOGY

## 2021-12-17 PROCEDURE — 20610 DRAIN/INJ JOINT/BURSA W/O US: CPT | Mod: RT | Performed by: ANESTHESIOLOGY

## 2021-12-17 PROCEDURE — 20610 PR DRAIN/INJECT LARGE JOINT/BURSA: ICD-10-PCS | Mod: RT,,, | Performed by: ANESTHESIOLOGY

## 2021-12-17 PROCEDURE — 77002 NEEDLE LOCALIZATION BY XRAY: CPT | Mod: 26 | Performed by: ANESTHESIOLOGY

## 2021-12-17 PROCEDURE — 25500020 PHARM REV CODE 255: Performed by: ANESTHESIOLOGY

## 2021-12-17 PROCEDURE — 77002 PR FLUOROSCOPIC GUIDANCE NEEDLE PLACEMENT: ICD-10-PCS | Mod: 26,,, | Performed by: ANESTHESIOLOGY

## 2021-12-17 PROCEDURE — 82947 ASSAY GLUCOSE BLOOD QUANT: CPT | Performed by: ANESTHESIOLOGY

## 2021-12-17 PROCEDURE — 25000003 PHARM REV CODE 250: Performed by: ANESTHESIOLOGY

## 2021-12-17 PROCEDURE — 20610 DRAIN/INJ JOINT/BURSA W/O US: CPT | Mod: RT,,, | Performed by: ANESTHESIOLOGY

## 2021-12-17 PROCEDURE — 77002 NEEDLE LOCALIZATION BY XRAY: CPT | Mod: 26,,, | Performed by: ANESTHESIOLOGY

## 2021-12-17 RX ORDER — LIDOCAINE HYDROCHLORIDE 20 MG/ML
INJECTION, SOLUTION INFILTRATION; PERINEURAL
Status: DISCONTINUED | OUTPATIENT
Start: 2021-12-17 | End: 2021-12-17 | Stop reason: HOSPADM

## 2021-12-17 RX ORDER — TRIAMCINOLONE ACETONIDE 40 MG/ML
INJECTION, SUSPENSION INTRA-ARTICULAR; INTRAMUSCULAR
Status: DISCONTINUED | OUTPATIENT
Start: 2021-12-17 | End: 2021-12-17 | Stop reason: HOSPADM

## 2021-12-17 RX ORDER — BUPIVACAINE HYDROCHLORIDE 2.5 MG/ML
INJECTION, SOLUTION EPIDURAL; INFILTRATION; INTRACAUDAL
Status: DISCONTINUED | OUTPATIENT
Start: 2021-12-17 | End: 2021-12-17 | Stop reason: HOSPADM

## 2021-12-17 RX ORDER — SODIUM CHLORIDE 9 MG/ML
INJECTION, SOLUTION INTRAVENOUS CONTINUOUS
Status: DISCONTINUED | OUTPATIENT
Start: 2021-12-17 | End: 2021-12-17 | Stop reason: HOSPADM

## 2021-12-30 ENCOUNTER — TELEPHONE (OUTPATIENT)
Dept: PAIN MEDICINE | Facility: CLINIC | Age: 71
End: 2021-12-30
Payer: OTHER GOVERNMENT

## 2022-01-01 ENCOUNTER — TELEPHONE (OUTPATIENT)
Dept: PAIN MEDICINE | Facility: CLINIC | Age: 72
End: 2022-01-01
Payer: OTHER GOVERNMENT

## 2022-01-01 ENCOUNTER — OFFICE VISIT (OUTPATIENT)
Dept: PAIN MEDICINE | Facility: CLINIC | Age: 72
End: 2022-01-01
Payer: OTHER GOVERNMENT

## 2022-01-01 VITALS
BODY MASS INDEX: 29.96 KG/M2 | HEART RATE: 78 BPM | WEIGHT: 246 LBS | SYSTOLIC BLOOD PRESSURE: 116 MMHG | HEIGHT: 76 IN | TEMPERATURE: 98 F | RESPIRATION RATE: 18 BRPM | DIASTOLIC BLOOD PRESSURE: 79 MMHG

## 2022-01-01 VITALS
TEMPERATURE: 98 F | WEIGHT: 268 LBS | SYSTOLIC BLOOD PRESSURE: 122 MMHG | HEIGHT: 76 IN | HEART RATE: 92 BPM | BODY MASS INDEX: 32.63 KG/M2 | DIASTOLIC BLOOD PRESSURE: 81 MMHG | RESPIRATION RATE: 18 BRPM

## 2022-01-01 DIAGNOSIS — F11.90 CHRONIC, CONTINUOUS USE OF OPIOIDS: ICD-10-CM

## 2022-01-01 DIAGNOSIS — M54.42 CHRONIC RIGHT-SIDED LOW BACK PAIN WITH BILATERAL SCIATICA: ICD-10-CM

## 2022-01-01 DIAGNOSIS — G89.4 CHRONIC PAIN SYNDROME: ICD-10-CM

## 2022-01-01 DIAGNOSIS — M17.10 PRIMARY OSTEOARTHRITIS OF KNEE, UNSPECIFIED LATERALITY: ICD-10-CM

## 2022-01-01 DIAGNOSIS — M54.41 CHRONIC RIGHT-SIDED LOW BACK PAIN WITH BILATERAL SCIATICA: ICD-10-CM

## 2022-01-01 DIAGNOSIS — G89.29 CHRONIC RIGHT-SIDED LOW BACK PAIN WITH BILATERAL SCIATICA: ICD-10-CM

## 2022-01-01 DIAGNOSIS — M47.816 LUMBAR SPONDYLOSIS: ICD-10-CM

## 2022-01-01 DIAGNOSIS — M53.3 SACROILIAC JOINT PAIN: ICD-10-CM

## 2022-01-01 DIAGNOSIS — G89.4 CHRONIC PAIN DISORDER: ICD-10-CM

## 2022-01-01 DIAGNOSIS — M47.816 LUMBAR SPONDYLOSIS: Primary | ICD-10-CM

## 2022-01-01 DIAGNOSIS — M54.9 DORSALGIA, UNSPECIFIED: Primary | ICD-10-CM

## 2022-01-01 DIAGNOSIS — M25.569 KNEE PAIN, UNSPECIFIED CHRONICITY, UNSPECIFIED LATERALITY: Primary | ICD-10-CM

## 2022-01-01 LAB
6MAM UR QL: NOT DETECTED
7AMINOCLONAZEPAM UR QL: NOT DETECTED
A-OH ALPRAZ UR QL: NOT DETECTED
ALPHA-OH-MIDAZOLAM: NOT DETECTED
ALPRAZ UR QL: NOT DETECTED
AMPHET UR QL SCN: NOT DETECTED
ANNOTATION COMMENT IMP: NORMAL
ANNOTATION COMMENT IMP: NORMAL
BARBITURATES UR QL: NOT DETECTED
BUPRENORPHINE UR QL: NOT DETECTED
BZE UR QL: NOT DETECTED
CARBOXYTHC UR QL: NOT DETECTED
CARISOPRODOL UR QL: NOT DETECTED
CLONAZEPAM UR QL: NOT DETECTED
CODEINE UR QL: NOT DETECTED
CREAT UR-MCNC: 99.5 MG/DL (ref 20–400)
DIAZEPAM UR QL: NOT DETECTED
ETHYL GLUCURONIDE UR QL: NOT DETECTED
FENTANYL UR QL: NOT DETECTED
GABAPENTIN: PRESENT
HYDROCODONE UR QL: PRESENT
HYDROMORPHONE UR QL: PRESENT
LORAZEPAM UR QL: NOT DETECTED
MDA UR QL: NOT DETECTED
MDEA UR QL: NOT DETECTED
MDMA UR QL: NOT DETECTED
ME-PHENIDATE UR QL: NOT DETECTED
METHADONE UR QL: NOT DETECTED
METHAMPHET UR QL: NOT DETECTED
MIDAZOLAM UR QL SCN: NOT DETECTED
MORPHINE UR QL: NOT DETECTED
NALOXONE: NOT DETECTED
NORBUPRENORPHINE UR QL CFM: NOT DETECTED
NORDIAZEPAM UR QL: NOT DETECTED
NORFENTANYL UR QL: NOT DETECTED
NORHYDROCODONE UR QL CFM: PRESENT
NORMEPERIDINE UR QL CFM: NOT DETECTED
NOROXYCODONE UR QL CFM: NOT DETECTED
NOROXYMORPHONE UR QL SCN: NOT DETECTED
OXAZEPAM UR QL: NOT DETECTED
OXYCODONE UR QL: NOT DETECTED
OXYMORPHONE UR QL: NOT DETECTED
PATHOLOGY STUDY: NORMAL
PCP UR QL: NOT DETECTED
PHENTERMINE UR QL: NOT DETECTED
PREGABALIN: NOT DETECTED
SERVICE CMNT-IMP: NORMAL
TAPENTADOL UR QL SCN: NOT DETECTED
TAPENTADOL UR QL SCN: NOT DETECTED
TEMAZEPAM UR QL: NOT DETECTED
TRAMADOL UR QL: NOT DETECTED
ZOLPIDEM METABOLITE: NOT DETECTED
ZOLPIDEM UR QL: NOT DETECTED

## 2022-01-01 PROCEDURE — 80326 AMPHETAMINES 5 OR MORE: CPT | Performed by: ANESTHESIOLOGY

## 2022-01-01 PROCEDURE — 99214 OFFICE O/P EST MOD 30 MIN: CPT | Mod: S$PBB,,, | Performed by: NURSE PRACTITIONER

## 2022-01-01 PROCEDURE — 99999 PR PBB SHADOW E&M-EST. PATIENT-LVL V: CPT | Mod: PBBFAC,,, | Performed by: ANESTHESIOLOGY

## 2022-01-01 PROCEDURE — 99215 OFFICE O/P EST HI 40 MIN: CPT | Mod: PBBFAC | Performed by: NURSE PRACTITIONER

## 2022-01-01 PROCEDURE — 99215 OFFICE O/P EST HI 40 MIN: CPT | Mod: PBBFAC | Performed by: ANESTHESIOLOGY

## 2022-01-01 PROCEDURE — 99999 PR PBB SHADOW E&M-EST. PATIENT-LVL V: CPT | Mod: PBBFAC,,, | Performed by: NURSE PRACTITIONER

## 2022-01-01 PROCEDURE — 99999 PR PBB SHADOW E&M-EST. PATIENT-LVL V: ICD-10-PCS | Mod: PBBFAC,,, | Performed by: ANESTHESIOLOGY

## 2022-01-01 PROCEDURE — 99214 PR OFFICE/OUTPT VISIT, EST, LEVL IV, 30-39 MIN: ICD-10-PCS | Mod: S$PBB,,, | Performed by: NURSE PRACTITIONER

## 2022-01-01 PROCEDURE — 99214 PR OFFICE/OUTPT VISIT, EST, LEVL IV, 30-39 MIN: ICD-10-PCS | Mod: S$PBB,,, | Performed by: ANESTHESIOLOGY

## 2022-01-01 PROCEDURE — 99214 OFFICE O/P EST MOD 30 MIN: CPT | Mod: S$PBB,,, | Performed by: ANESTHESIOLOGY

## 2022-01-01 PROCEDURE — 99999 PR PBB SHADOW E&M-EST. PATIENT-LVL V: ICD-10-PCS | Mod: PBBFAC,,, | Performed by: NURSE PRACTITIONER

## 2022-01-01 RX ORDER — HYDROCODONE BITARTRATE AND ACETAMINOPHEN 5; 325 MG/1; MG/1
1 TABLET ORAL EVERY 6 HOURS PRN
Qty: 60 TABLET | Refills: 0 | Status: SHIPPED | OUTPATIENT
Start: 2022-01-01 | End: 2023-01-01 | Stop reason: SDUPTHER

## 2022-01-01 RX ORDER — HYDROCODONE BITARTRATE AND ACETAMINOPHEN 5; 325 MG/1; MG/1
1 TABLET ORAL EVERY 12 HOURS PRN
Qty: 60 TABLET | Refills: 0 | Status: SHIPPED | OUTPATIENT
Start: 2022-01-01 | End: 2022-01-01 | Stop reason: SDUPTHER

## 2022-01-01 RX ORDER — HYDROCODONE BITARTRATE AND ACETAMINOPHEN 5; 325 MG/1; MG/1
1 TABLET ORAL EVERY 12 HOURS PRN
Qty: 60 TABLET | Refills: 0 | Status: SHIPPED | OUTPATIENT
Start: 2022-01-01 | End: 2022-01-01

## 2022-01-03 DIAGNOSIS — G89.4 CHRONIC PAIN SYNDROME: Primary | ICD-10-CM

## 2022-01-03 RX ORDER — HYDROCODONE BITARTRATE AND ACETAMINOPHEN 5; 325 MG/1; MG/1
1 TABLET ORAL EVERY 12 HOURS PRN
Qty: 60 TABLET | Refills: 0 | Status: CANCELLED | OUTPATIENT
Start: 2022-01-03 | End: 2022-02-02

## 2022-01-03 RX ORDER — HYDROCODONE BITARTRATE AND ACETAMINOPHEN 5; 325 MG/1; MG/1
1 TABLET ORAL EVERY 12 HOURS PRN
Qty: 60 TABLET | Refills: 0 | Status: SHIPPED | OUTPATIENT
Start: 2022-01-03 | End: 2022-01-05 | Stop reason: SDUPTHER

## 2022-01-03 NOTE — TELEPHONE ENCOUNTER
Patient requesting refill on hydrocodone 5-325 mg   Last office visit 12/02/21    shows last refill on 12/04/21  [facility]  Patient last UDS 12/02/21 was consistent with current therapy    CODEINE  Not Detected  Not Detected   Not Detected  Not Detected  Not Detected  Not Detected    MORPHINE  Not Detected  Not Detected   Not Detected  Not Detected  Not Detected  Not Detected    6-ACETYLMORPHINE  Not Detected  Not Detected   Not Detected  Not Detected  Not Detected  Not Detected    OXYCODONE  Not Detected  Not Detected   Not Detected  Not Detected  Not Detected  Not Detected    NOROYXCODONE  Not Detected  Not Detected   Not Detected  Not Detected  Not Detected  Not Detected    OXYMORPHONE  Not Detected  Not Detected   Not Detected  Not Detected  Not Detected  Not Detected    NOROXYMORPHONE  Not Detected  Not Detected   Not Detected  Not Detected  Not Detected  Not Detected    HYDROCODONE  Not Detected  Present   Present  Present  Not Detected  Not Detected    NORHYDROCODONE  Not Detected  Present   Not Detected  Present  Not Detected  Not Detected    HYDROMORPHONE  Not Detected  Not Detected   Not Detected  Not Detected  Not Detected  Not Detected    BUPRENORPHINE  Not Detected  Not Detected   Not Detected  Not Detected  Not Detected  Not Detected    NORUBPRENORPHINE  Not Detected  Not Detected   Not Detected  Not Detected  Not Detected  Not Detected    FENTANYL  Not Detected  Not Detected   Not Detected  Not Detected  Not Detected  Not Detected    NORFENTANYL  Not Detected  Not Detected   Not Detected  Not Detected  Not Detected  Not Detected    MEPERIDINE METABOLITE  Not Detected  Not Detected   Not Detected  Not Detected  Not Detected  Not Detected    TAPENTADOL  Not Detected  Not Detected   Not Detected  Not Detected  Not Detected  Not Detected    TAPENTADOL-O-SULF  Not Detected  Not Detected   Not Detected  Not Detected  Not  Detected  Not Detected    METHADONE  Not Detected  Not Detected   Not Detected  Not Detected  Not Detected  Not Detected    TRAMADOL  Present  Not Detected   Not Detected  Present  Present  Present    AMPHETAMINE  Not Detected  Not Detected   Not Detected  Not Detected  Not Detected  Not Detected    METHAMPHETAMINE  Not Detected  Not Detected   Not Detected  Not Detected  Not Detected  Not Detected    MDMA- ECSTASY  Not Detected  Not Detected   Not Detected  Not Detected  Not Detected  Not Detected    MDA  Not Detected  Not Detected   Not Detected  Not Detected  Not Detected  Not Detected    MDEA- Audelia  Not Detected  Not Detected   Not Detected  Not Detected  Not Detected  Not Detected    METHYLPHENIDATE  Not Detected  Not Detected   Not Detected  Not Detected  Not Detected  Not Detected    PHENTERMINE  Not Detected  Not Detected   Not Detected  Not Detected  Not Detected  Not Detected    BENZOYLECGONINE  Not Detected  Not Detected   Not Detected  Not Detected  Not Detected  Not Detected    ALPRAZOLAM  Not Detected  Not Detected   Not Detected  Not Detected  Not Detected  Not Detected    ALPHA-OH-ALPRAZOLAM  Not Detected  Not Detected   Not Detected  Not Detected  Not Detected  Not Detected    CLONAZEPAM  Not Detected  Not Detected   Not Detected  Not Detected  Not Detected  Not Detected    7-AMINOCLONAZEPAM  Not Detected  Not Detected   Not Detected  Not Detected  Not Detected  Not Detected    DIAZEPAM  Not Detected  Not Detected   Not Detected  Not Detected  Not Detected  Not Detected    NORDIAZEPAM  Not Detected  Not Detected   Not Detected  Not Detected  Not Detected  Not Detected    OXAZEPAM  Not Detected  Not Detected   Not Detected  Not Detected  Not Detected  Not Detected    TEMAZEPAM  Not Detected  Not Detected   Not Detected  Not Detected  Not Detected  Not Detected    Lorazepam  Not Detected  Not Detected   Not Detected  Not Detected  Not Detected  Not Detected    MIDAZOLAM  Not Detected  Not Detected    Not Detected  Not Detected  Not Detected  Not Detected    ZOLPIDEM  Not Detected  Not Detected   Not Detected  Not Detected  Not Detected  Not Detected    BARBITURATES  Not Detected  Not Detected   Not Detected  Not Detected  Not Detected  Not Detected    Creatinine, Urine 20.0 - 400.0 mg/dL 76.4  177.0  160.0 R, CM  151.5  103.3  255.9  255.9    ETHYL GLUCURONIDE  Not Detected  Not Detected   Present  Not Detected  Not Detected  Not Detected    MARIJUANA METABOLITE  Not Detected  Not Detected   Not Detected  Not Detected  Not Detected  Not Detected    PCP  Not Detected  Not Detected   Not Detected  Not Detected  Not Detected  Not Detected    CARISOPRODOL  Not Detected  Not Detected CM   Not Detected CM  Not Detected CM  Not Detected CM  Not Detected CM    Comment: The carisoprodol immunoassay has cross-reactivity to   carisoprodol and meprobamate.    Naloxone  Not Detected          Gabapentin  Present          Pregabalin  Not Detected          Alpha-OH-Midazolam  Not Detected          Zolpidem Metabolite  Not Detected          PAIN MANAGEMENT DRUG PANEL  See Below  See Below CM   See Below CM  See Below CM  See Below CM  See Below

## 2022-01-03 NOTE — TELEPHONE ENCOUNTER
----- Message from Rosamaria Valles sent at 1/3/2022  4:24 PM CST -----  Regarding: Refill  Can the clinic reply in [facility]: NO           Please refill the medication(s) listed below. Please call the patient when the prescription(s) is ready for  at this phone number   [phone]           Medication #1 HYDROcodone-acetaminophen (NORCO) 5-325 mg per tablet         Medication #2            [id number] - [place]  [address] AT [facility]

## 2022-01-05 DIAGNOSIS — G89.4 CHRONIC PAIN SYNDROME: Primary | ICD-10-CM

## 2022-01-05 DIAGNOSIS — M70.60 GREATER TROCHANTERIC BURSITIS, UNSPECIFIED LATERALITY: ICD-10-CM

## 2022-01-05 RX ORDER — HYDROCODONE BITARTRATE AND ACETAMINOPHEN 5; 325 MG/1; MG/1
1 TABLET ORAL EVERY 12 HOURS PRN
Qty: 60 TABLET | Refills: 0 | Status: SHIPPED | OUTPATIENT
Start: 2022-01-05 | End: 2022-02-03 | Stop reason: SDUPTHER

## 2022-01-05 NOTE — TELEPHONE ENCOUNTER
----- Message from Jia Laws sent at 1/5/2022 10:10 AM CST -----  Regarding: Patient call back  Who called:ALLIE TOLEDO [[id number]]    What is the request in detail: Patient is requesting a call back. Patient states he requested his script be sent to [id number] - [place], [place] - [address] AT [facility] instead of the one in [place]. He would like to further discuss.   Please advise.    Can the clinic reply by [facility]? No    Best call back number: [phone]    Additional Information: N/A

## 2022-01-07 ENCOUNTER — TELEPHONE (OUTPATIENT)
Dept: PAIN MEDICINE | Facility: CLINIC | Age: 72
End: 2022-01-07
Payer: OTHER GOVERNMENT

## 2022-01-07 NOTE — TELEPHONE ENCOUNTER
This message is for patient in regards to his/her appointment 01/10/22 at 3:00 p       [facility] Policy: For the safety of all patients and staff members.     Patient Visitor policy: Due to social distancing and limited seating staff are requesting patient to arrive to their schedule appointments on time. During this visit we're asking all patients to only have one visitor over the age of 18yrs old to accompany to be seen by Mike Syed NP. If patient do not required assistance with their visit, we're asking all visitors to remain outside the waiting area.    Upon arriving to your schedule appointment; patients are required to wear a face mask, if patient do not have a face mask one will be provided entering the facility. We ask patients to contact clinical staff at this number [phone] to notify staff that they have arrived or they may do so by utilizing the Mobile checked in [place](if patient have patient portal; clinical staff will send a message through there letting them know it's okay to proceed to their visit). If you have any questions or concerns please contact [phone]    Staff spoke with pt. Confirming appt. SG

## 2022-01-10 ENCOUNTER — OFFICE VISIT (OUTPATIENT)
Dept: PAIN MEDICINE | Facility: CLINIC | Age: 72
End: 2022-01-10
Payer: OTHER GOVERNMENT

## 2022-01-10 VITALS
WEIGHT: 264 LBS | HEART RATE: 89 BPM | HEIGHT: 76 IN | SYSTOLIC BLOOD PRESSURE: 129 MMHG | TEMPERATURE: 98 F | DIASTOLIC BLOOD PRESSURE: 81 MMHG | RESPIRATION RATE: 18 BRPM | BODY MASS INDEX: 32.15 KG/M2

## 2022-01-10 DIAGNOSIS — M54.16 LUMBAR RADICULOPATHY: Primary | ICD-10-CM

## 2022-01-10 PROCEDURE — 99999 PR PBB SHADOW E&M-EST. PATIENT-LVL V: CPT | Mod: PBBFAC,,, | Performed by: NURSE PRACTITIONER

## 2022-01-10 PROCEDURE — 99215 OFFICE O/P EST HI 40 MIN: CPT | Mod: PBBFAC | Performed by: NURSE PRACTITIONER

## 2022-01-10 PROCEDURE — 99999 PR PBB SHADOW E&M-EST. PATIENT-LVL V: ICD-10-PCS | Mod: PBBFAC,,, | Performed by: NURSE PRACTITIONER

## 2022-01-10 PROCEDURE — 99213 OFFICE O/P EST LOW 20 MIN: CPT | Mod: S$PBB,,, | Performed by: NURSE PRACTITIONER

## 2022-01-10 PROCEDURE — 99213 PR OFFICE/OUTPT VISIT, EST, LEVL III, 20-29 MIN: ICD-10-PCS | Mod: S$PBB,,, | Performed by: NURSE PRACTITIONER

## 2022-01-10 NOTE — PROGRESS NOTES
Chronic patient Established Note (Follow up visit)    SRINIVAS Thrasher is a 70 y.o. male with a history of CHF (EF 10%) who presents today with chronic low back pain. This pain started in 1971 as a result of and injury in the .  He describes a constant, right sided pain that starts in his low back and radiates down the posterior aspect of his right leg to the bottom of his foot.  This is associated with a numbness, tingling sensation.  The pain occurs when he is standing still for longer than 5 minutes.  The pain does not occur when walking unless he is standing first.  He denies heaviness and weakness.  This pain is described in detail below.     Aggravating factors: Standing in place for 5 minutes     Mitigating factors: Walking, medication, rest     Previously seeing: Dr. Davide Wang ([place])     Physical Therapy: Has done for his heart, but not for his back     Interval History (7/2/2020):  The patient returns to clinic today for follow up of back pain. He continues to report low back pain that radiates down the posterior aspect of his right leg to the bottom of his foot. He denies any left leg pain. His pain is worse with prolonged standing. He has had injections in the past without relief. He is currently taking Tramadol with limited relief. He denies any other health changes. His pain today is 10/10.     Interval History 8/6/2020:   Ishmael Thrasher presents to the clinic for a follow-up appointment for bilateral leg pain. Since the last visit, Ishmael Thrasher states the pain has been worsening. Current pain intensity is 10/10.     Interval History 9/28/2020:  The patient returns to clinic today for follow up of low back and leg pain. He is s/p right L4/5 and L5/S1 TF PÉREZ on 9/11/2020. He reports no significant relief of his back and leg pain. He continues to report low back pain that radiates down the posterior aspect of his right leg to under his foot. He denies any left leg pain. His pain  is worse with prolonged standing and walking. He is currently taking Norco with limited benefit. He reports that this decreases his pain but does not last. He denies any other health changes. His pain today is 10/10.     Interval History 11/16/2020:  The patient returns to clinic today for follow up of low back pain. He is s/p right L5/S1 and S1 TF PÉREZ on 10/23/2020. He reports 60% relief for about a week. He continues to report low back pain that radiates into the posterior aspect of his right leg to his foot. He denies any left leg pain. His pain is worse with standing and walking. He also reports difficulty sleeping due to pain. He is frustrated with his continued pain. He continues to take Norco but reports that this does not last. He denies any other health changes. His pain today is 10/10.     Interval History 12/21/2020:  Ishmael Thrasher presents to the clinic for a  1 month follow-up appointment. Since the last visit, Ishmael Thrasher states the pain has been stable. Current pain intensity is 10/10.    Interval History 3/31/2021  Patient complains of bilateral knee pain, usually uses walker for assistance but was unable to bring with them. Patient is s/p right cooled RFA on 2/26/21 with 60% relief of symptoms that has since returned. He reports he was admitted in the hospital for acute decompensated heart failure following receiving the second dose of the COVID-19 Moderna vaccine. He states he was admitted to [facility] twice and then discharged and re-admitted [facility] (discharged on 3/20/21) the pain started on 5 days ago while there and he reported it while admitted. He reports his pain level is 10/10. Its effecting his ability ambulate as he has not been able to use his legs since being in the hospital. He reports the pain has been this level the last 5 days since worsening. He is taking the Hydrocodone 5/325 TID, which is not helping. Also taking 325mg ASA with moderate  "relief - his heart doctor told him not to continue taking ASA at this dose. During hospital admission, patient complained of left elbow and left knee pain that was attributed to the gouty arthritis that has since resolved. Left knee pain now feels like his "usual" knee pain. Patient states he was able to move around on a daily basis with PT while admitted in the hospital. Feel more depressed and less motivated since receiving 2/16/21. Patient also complains of bilateral back pain radiating down his right leg, which is unchanged from his baseline chronic pain.    Interval History 7/27/2021:  The patient returns to clinic today for follow up of low back and knee pain. He continues to report right knee pain. He also reports increased low back and right hip pain. He reports intermittent radiating pain into his right lateral thigh. His pain is worse with standing and walking. He denies any left leg pain. He continues to perform a home exercise routine. He continues to take Norco as needed. He denies any other health changes. His pain today is 10/10.    Interval History 10/27/2021:  The patient returns to clinic today for follow up of low back and knee pain. He is s/p right SI joint injection on 8/13/2021. He reports 40% relief of his right hip and buttock pain. He continues to reports low back pain that radiates into the posterior aspect of his right leg to his calf. He denies any left leg pain. He reports increased right knee pain. He did have a recent fall which triggered this pain. His pain is worse with standing and walking. He continues to take Norco, although this provides limited relief. He would like to go back to Tramadol. He denies any other health changes. His pain today is 10/10.    Interval History 12/2/2021:  Mr Thrasher presents for routine medication follow up. He stats prior R knee genicular RFA did well. He continues to have diffuse pain complaints one which is R lateral hip pain and inability to lay on " that site due to focal pain. He states tramadol has not benefit when compared to Norco and would like to rotate back to Norco 5/325mg BID. Denies new ban of pain or neurological changes.     Interval History 1/10/2022:  Mr Thrasher presents for follow up of lower back pain and radicular pain down posterior leg going to bottom of calf. He is s/p focal R GTB TTP and states approx 2 weeks benefit. He states rotation from Tramadol to Norco 5/325mg with some mild benefit.     Pain Disability Index Review:  Last 3 PDI Scores 12/2/2021 10/27/2021 3/31/2021   Pain Disability Index (PDI) 50 17 59       Pain Medications:  Norco 5mg  ASA 325mg  Topical lidocaine      Opioid Contract: yes     report:  Reviewed and consistent with medication use as prescribed.    Pain Procedures:   · 9/11/2020- Right L4/5 and L5/S1 TF PÉREZ  · 10/23/2020- Right L5/S1 and S1 TF PÉREZ  · 2/5/2021- Bilateral genicular nerve block  · 2/26/2021- Right genicular cooled RFA  · 11/12/2021 Right knee genicular RFA     Physical Therapy/Home Exercise: Set up from PT since discharge from hospital, but has not arranged.    Imaging:   Narrative & Impression       EXAMINATION:  XR LUMBAR SPINE 5 VIEW WITH FLEX AND EXT     CLINICAL HISTORY:  Back pain or radiculopathy, > 6 wks;  Radiculopathy, lumbar region     TECHNIQUE:  AP, lateral, oblique views of the lumbar spine with spot view of the lumbosacral region and lateral views in flexion and extension.     COMPARISON:  None     FINDINGS:  There is no collapse or malalignment in the lumbar spine.  There is no significant change in alignment between flexion and extension.     There is reduced of the intervertebral disc spaces at L4-L5 and L5-S1 with disc vacuum phenomena and anterior osteophyte formation.  There are mild neural foraminal stenosis at these levels.     There is mild bilateral neural foraminal stenosis.     There are extensive vascular calcifications in the abdominal aorta.  The soft tissues are  otherwise unremarkable.     Impression:     1. Moderate to severe spondylosis and neural foraminal stenosis at L4-L5 and L5-S1.  2. Straightening of lumbar lordosis  3. No changes in alignment in flexion and extension     Electronically signed by resident: Marko Lamas  Date:                                            07/07/2020  Time:                                           15:05     Electronically signed by: Roly Qiu  Date:                                            07/07/2020  Time:                                           15:21          Narrative & Impression       EXAMINATION:  CT LUMBAR SPINE WITHOUT CONTRAST     CLINICAL HISTORY:  Back pain or radiculopathy, > 6 wks;.     COMPARISON:  None.     FINDINGS:  Decrease height of vertebral bodies L4-L5 predominant at L5.  Degenerative changes in the endplates with decrease height intervertebral space L4-L5.  Vacuum phenomenon at L5-S1 indicating remarkable decrease.  Facet hypertrophy and degenerative changes in facet joints L3-L4, bilaterally.     Prominent aortic calcifications.     Left pleural effusion and atelectasis     Impression:     Prominent degenerative changes in the lumbar spine at L4, L5 and S1     Decrease height of intervertebral space L4-L5 with degenerative process and decrease height of vertebral bodies L4-L5 predominant at L5     Remarkable disc disease at L5-S1     Atherosclerosis     Left pleural effusion and atelectasis.  Please see CT of the chest        Electronically signed by: Roly Qiu  Date:                                            07/08/2020  Time:                                           16:00          Allergies:   Review of patient's allergies indicates:   Allergen Reactions    Vasotec [enalaprilat] Swelling     Neck swelling    Zoloft [sertraline] Other (See Comments)     Patient states it put him to sleep in the hospital he could not talk nor move    Cyclobenzaprine Hallucinations     Other  reaction(s): Swelling  Hallucinations according to patient.    Other reaction(s): Lymphadenopathy, Restlessness, Sensation of being cold, Restlessness, Sensation of being cold    Amitriptyline Hallucinations    Labetalol      Other reaction(s): Pharyngeal swelling    Lisinopril      Other reaction(s): Pharyngeal swelling    Tramadol Nausea Only and Hallucinations       Current Medications:   Current Outpatient Medications   Medication Sig Dispense Refill    allopurinol (ZYLOPRIM) 300 MG tablet Take 300 mg by mouth once daily.      apixaban (ELIQUIS) 5 mg Tab Take 5 mg by mouth 2 (two) times daily.      aspirin (ECOTRIN) 81 MG EC tablet Take 81 mg by mouth.      atorvastatin (LIPITOR) 80 MG tablet Take 80 mg by mouth once daily.      cholecalciferol, vitamin D3, 3,000 unit Tab Take 1 tablet by mouth.      coenzyme Q10 10 mg capsule Take 200 mg by mouth once daily.      colchicine (COLCRYS) 0.6 mg tablet TK 1 T PO BID PRF GOUT      febuxostat (ULORIC) 40 mg Tab Take 1 tablet (40 mg total) by mouth once daily. 30 tablet 6    glipiZIDE (GLUCOTROL) 5 MG tablet Take 5 mg by mouth 2 (two) times daily before meals.      HYDROcodone-acetaminophen (NORCO) 5-325 mg per tablet Take 1 tablet by mouth every 12 (twelve) hours as needed for Pain. 60 tablet 0    isosorbide dinitrate (ISORDIL) 20 MG tablet Take 1 tablet (20 mg total) by mouth 3 (three) times daily. 90 tablet 11    magnesium oxide (MAG-OX) 400 mg (241.3 mg magnesium) tablet Take 1 tablet (400 mg total) by mouth 2 (two) times daily. 180 tablet 0    melatonin 5 mg Cap Take by mouth.      potassium chloride SA (K-DUR,KLOR-CON) 20 MEQ tablet Take 1 tablet (20 mEq total) by mouth 2 (two) times daily. 180 tablet 3    risperiDONE (RISPERDAL) 3 MG Tab Take by mouth.      sacubitriL-valsartan (ENTRESTO)  mg per tablet Take 1 tablet by mouth 2 (two) times daily.       sertraline (ZOLOFT) 50 MG tablet Take 50 mg by mouth once daily. TAKE THREE TABLETS  BY MOUTH EVERY MORNING TO IMPROVE MOOD      spironolactone (ALDACTONE) 25 MG tablet Take 1 tablet (25 mg total) by mouth once daily. 90 tablet 3    torsemide (DEMADEX) 20 MG Tab Take 2 tablets (40 mg total) by mouth 2 (two) times daily. 360 tablet 3     No current facility-administered medications for this visit.       REVIEW OF SYSTEMS:    GENERAL:  No weight loss, malaise or fevers.  HEENT:  Negative for frequent or significant headaches.  NECK:  Negative for lumps, goiter, pain and significant neck swelling.  RESPIRATORY:  Negative for cough, wheezing or shortness of breath.  CARDIOVASCULAR:  Negative for chest pain, leg swelling or palpitations. +CHF, CAD, HTN  GI:  Negative for abdominal discomfort, blood in stools or black stools or change in bowel habits. GERD  MUSCULOSKELETAL:  See HPI  SKIN:  Negative for lesions, rash, and itching.  PSYCH:  Negative for sleep disturbance, mood disorder and recent psychosocial stressors.  HEMATOLOGY/LYMPHOLOGY:  Negative for prolonged bleeding, bruising easily or swollen nodes.  NEURO:   No history of headaches, syncope, paralysis, seizures or tremors.  ENDO: Diabetes  All other reviewed and negative other than HPI.    Past Medical History:  Past Medical History:   Diagnosis Date    Brain damage     traumatic    CAD (coronary artery disease)     Cardiomyopathy     CHF (congestive heart failure)     Diabetes mellitus     type 2    Edema     Erectile dysfunction     GERD (gastroesophageal reflux disease)     HTN (hypertension)     Hyperlipemia     Sciatic nerve pain, right     leg       Past Surgical History:  Past Surgical History:   Procedure Laterality Date    CARDIAC DEFIBRILLATOR PLACEMENT      CATHETERIZATION OF BOTH LEFT AND RIGHT HEART Bilateral 08/20/2018    CORONARY ARTERY BYPASS GRAFT      ILIAC ARTERY STENT      INJECTION OF JOINT Right 8/13/2021    Procedure: INJECTION, JOINT, SACROILIAC (SI);  Surgeon: Austin Parsons MD;  Location: [facility] PAIN  MGT;  Service: Pain Management;  Laterality: Right;    RADIOFREQUENCY ABLATION Right 2/26/2021    Procedure: RADIOFREQUENCY ABLATION GENICULAR NERVES, COOLED;  Surgeon: Austin Parsons MD;  Location: [facility] PAIN MGT;  Service: Pain Management;  Laterality: Right;  1 of 2  consent needed  eliquis clearance requested    RADIOFREQUENCY ABLATION Right 11/12/2021    Procedure: RADIOFREQUENCY ABLATION, GENICULAR COOLED  NEED CONSENT/ clear to hold  ELIQUIS;  Surgeon: Austin Parsons MD;  Location: [facility] PAIN MGT;  Service: Pain Management;  Laterality: Right;    RIGHT HEART CATHETERIZATION Right 8/17/2020    Procedure: INSERTION, CATHETER, RIGHT HEART;  Surgeon: Brianda Navas MD;  Location: [facility] CATH LAB;  Service: Cardiology;  Laterality: Right;    TRANSFORAMINAL EPIDURAL INJECTION OF STEROID Right 9/11/2020    Procedure: INJECTION, STEROID, EPIDURAL, TRANSFORAMINAL APPROACH, L4-L5 AND L5-S1 clear to hold Eliquis 3 days;  Surgeon: Austin Parsons MD;  Location: [facility] PAIN MGT;  Service: Pain Management;  Laterality: Right;    TRANSFORAMINAL EPIDURAL INJECTION OF STEROID Right 10/23/2020    Procedure: INJECTION, STEROID, EPIDURAL, TRANSFORAMINAL APPROACH;  Surgeon: Austin Parsons MD;  Location: [facility] PAIN MGT;  Service: Pain Management;  Laterality: Right;  RT RFA L5/S1 and S1  Eliquis clearance in [place]       Family History:  No family history on file.    Social History:  Social History     Socioeconomic History    Marital status:    Tobacco Use    Smoking status: Never Smoker    Smokeless tobacco: Never Used   Substance and Sexual Activity    Alcohol use: Not Currently    Drug use: Not Currently       OBJECTIVE:    There were no vitals taken for this visit.    PHYSICAL EXAMINATION: 12/2/2021  Voice normal.  Normal affect without evidence of distress.  Musculoskeletal: Focal TTP to R GTB  Gait: antalgic, aided with cane   Prior   PHYSICAL EXAMINATION:    General appearance: Well appearing, in no acute  distress, alert and oriented x3.  Psych:  Mood and affect appropriate.  Skin: Skin color, texture, turgor normal, no rashes or lesions, in both upper and lower body.  Head/face:  Atraumatic, normocephalic. No palpable lymph nodes  Cor: RRR  Pulm: Symmetric chest rise, no respiratory distress noted.   Back: Straight leg raising in the sitting position is negative to radicular pain bilaterally. There is pain with palpation over lumbar facet joints bilaterally. Limited ROM with pain on flexion and extension. Positive facet loading bilaterally.   Extremities: No deformities or skin discoloration. Good capillary refill.   Musculoskeletal:  There is pain with palpation over right SI joint. FABERs is positive on the right. There is pain with palpation over right GTB. There is pain with palpation over medial and lateral joint line of bilateral knees, right greater than left. Full ROM of right knee with extension. Crepitus noted to right knee. Bilateral lower extremity strength is normal and symmetric.  No atrophy or tone abnormalities are noted.   Neuro: Bilateral lower extremity coordination and muscle stretch reflexes are physiologic and symmetric. Plantar response are downgoing. No loss of sensation is noted.  Gait: Antalgic- ambulates without assistance.     ASSESSMENT: 72 y.o. year old male with pain, consistent with the following:     No diagnosis found.      PLAN:     - Previous imaging was reviewed and discussed with the patient today.    - s/p R knee genicular RFA with benefit    - will s/p focal Right GTB injection with only 2 day benefit    - Chart review and prior procedures along with symptoms consistent with pain where he had benefit in past from Right L5/S1&S1 TFESI    - Pain contract signed 8/6/2020.    - Restarting 5/325mg BID #60 states only mild benefit but filled 5 days ago.     - UDS done today 12/2/2021 consistent     - The patient is here today for a refill of current pain medications and they believe  these provide effective pain control and improvements in quality of life.  The patient notes no serious side effects, and feels the benefits outweigh the risks.  The patient was reminded of the pain contract that they signed previously as well as the risks and benefits of the medication including possible death.  The updated [place] Board  Pharmacy prescription monitoring program was reviewed, and the patient has been found to be compliant with current treatment plan.  - Continue home exercise routine.     The above plan and management options were discussed at length with patient. Patient is in agreement with the above and verbalized understanding.    - RTC 2 weeks after PÉREZ    Mike Daley NP  01/10/2022

## 2022-01-11 ENCOUNTER — TELEPHONE (OUTPATIENT)
Dept: PAIN MEDICINE | Facility: OTHER | Age: 72
End: 2022-01-11
Payer: OTHER GOVERNMENT

## 2022-01-11 DIAGNOSIS — M54.16 LUMBAR RADICULOPATHY: Primary | ICD-10-CM

## 2022-01-25 ENCOUNTER — TELEPHONE (OUTPATIENT)
Dept: PAIN MEDICINE | Facility: OTHER | Age: 72
End: 2022-01-25
Payer: OTHER GOVERNMENT

## 2022-01-27 ENCOUNTER — TELEPHONE (OUTPATIENT)
Dept: PAIN MEDICINE | Facility: CLINIC | Age: 72
End: 2022-01-27
Payer: OTHER GOVERNMENT

## 2022-01-27 NOTE — TELEPHONE ENCOUNTER
----- Message from Gilmar Mac sent at 1/27/2022  9:48 AM CST -----  Name of Who is Calling: ALLIE TOLEDO          What is the request in detail: The patient is calling to r.s his procedure. Please advise          Can the clinic reply by [facility]: No         [facility] [phone]

## 2022-01-27 NOTE — TELEPHONE ENCOUNTER
----- Message from Nargis Mayorga RN sent at 1/27/2022  2:02 PM CST -----  Pt called to cancel procedure d/t being ill. He needs to be called to reschedule.

## 2022-02-03 DIAGNOSIS — G89.4 CHRONIC PAIN SYNDROME: ICD-10-CM

## 2022-02-03 DIAGNOSIS — M70.60 GREATER TROCHANTERIC BURSITIS, UNSPECIFIED LATERALITY: ICD-10-CM

## 2022-02-03 RX ORDER — HYDROCODONE BITARTRATE AND ACETAMINOPHEN 5; 325 MG/1; MG/1
1 TABLET ORAL EVERY 12 HOURS PRN
Qty: 60 TABLET | Refills: 0 | Status: SHIPPED | OUTPATIENT
Start: 2022-02-04 | End: 2022-03-04 | Stop reason: SDUPTHER

## 2022-02-03 NOTE — TELEPHONE ENCOUNTER
----- Message from Matthew Hernandez sent at 2/3/2022  3:37 PM CST -----  Regarding: Refill  Contact: ALLIE TOLEDO [[id number]]      Can the clinic reply in [facility]: no      Please refill the medication(s) listed below. Please call the patient when the prescription(s) is ready for  at this phone number     [phone]      Medication #1 HYDROcodone-acetaminophen (NORCO) 5-325 mg per tablet    [id number] - [place] - [address] AT [facility]

## 2022-02-03 NOTE — TELEPHONE ENCOUNTER
Patient requesting refill on Norco 5/325mg  Last office visit 01.10.22   shows last refill on 01.05.22  Patient does have a pain contract on file with [facility] department  Patient last UDS 12.02.21 was consistent with current therapy    CODEINE  Not Detected  Not Detected   Not Detected  Not Detected  Not Detected  Not Detected    MORPHINE  Not Detected  Not Detected   Not Detected  Not Detected  Not Detected  Not Detected    6-ACETYLMORPHINE  Not Detected  Not Detected   Not Detected  Not Detected  Not Detected  Not Detected    OXYCODONE  Not Detected  Not Detected   Not Detected  Not Detected  Not Detected  Not Detected    NOROYXCODONE  Not Detected  Not Detected   Not Detected  Not Detected  Not Detected  Not Detected    OXYMORPHONE  Not Detected  Not Detected   Not Detected  Not Detected  Not Detected  Not Detected    NOROXYMORPHONE  Not Detected  Not Detected   Not Detected  Not Detected  Not Detected  Not Detected    HYDROCODONE  Not Detected  Present   Present  Present  Not Detected  Not Detected    NORHYDROCODONE  Not Detected  Present   Not Detected  Present  Not Detected  Not Detected    HYDROMORPHONE  Not Detected  Not Detected   Not Detected  Not Detected  Not Detected  Not Detected    BUPRENORPHINE  Not Detected  Not Detected   Not Detected  Not Detected  Not Detected  Not Detected    NORUBPRENORPHINE  Not Detected  Not Detected   Not Detected  Not Detected  Not Detected  Not Detected    FENTANYL  Not Detected  Not Detected   Not Detected  Not Detected  Not Detected  Not Detected    NORFENTANYL  Not Detected  Not Detected   Not Detected  Not Detected  Not Detected  Not Detected    MEPERIDINE METABOLITE  Not Detected  Not Detected   Not Detected  Not Detected  Not Detected  Not Detected    TAPENTADOL  Not Detected  Not Detected   Not Detected  Not Detected  Not Detected  Not Detected    TAPENTADOL-O-SULF  Not Detected  Not Detected   Not Detected  Not Detected  Not Detected  Not  Detected    METHADONE  Not Detected  Not Detected   Not Detected  Not Detected  Not Detected  Not Detected    TRAMADOL  Present  Not Detected   Not Detected  Present  Present  Present    AMPHETAMINE  Not Detected  Not Detected   Not Detected  Not Detected  Not Detected  Not Detected    METHAMPHETAMINE  Not Detected  Not Detected   Not Detected  Not Detected  Not Detected  Not Detected    MDMA- ECSTASY  Not Detected  Not Detected   Not Detected  Not Detected  Not Detected  Not Detected    MDA  Not Detected  Not Detected   Not Detected  Not Detected  Not Detected  Not Detected    MDEA- Audelia  Not Detected  Not Detected   Not Detected  Not Detected  Not Detected  Not Detected    METHYLPHENIDATE  Not Detected  Not Detected   Not Detected  Not Detected  Not Detected  Not Detected    PHENTERMINE  Not Detected  Not Detected   Not Detected  Not Detected  Not Detected  Not Detected    BENZOYLECGONINE  Not Detected  Not Detected   Not Detected  Not Detected  Not Detected  Not Detected    ALPRAZOLAM  Not Detected  Not Detected   Not Detected  Not Detected  Not Detected  Not Detected    ALPHA-OH-ALPRAZOLAM  Not Detected  Not Detected   Not Detected  Not Detected  Not Detected  Not Detected    CLONAZEPAM  Not Detected  Not Detected   Not Detected  Not Detected  Not Detected  Not Detected    7-AMINOCLONAZEPAM  Not Detected  Not Detected   Not Detected  Not Detected  Not Detected  Not Detected    DIAZEPAM  Not Detected  Not Detected   Not Detected  Not Detected  Not Detected  Not Detected    NORDIAZEPAM  Not Detected  Not Detected   Not Detected  Not Detected  Not Detected  Not Detected    OXAZEPAM  Not Detected  Not Detected   Not Detected  Not Detected  Not Detected  Not Detected    TEMAZEPAM  Not Detected  Not Detected   Not Detected  Not Detected  Not Detected  Not Detected    Lorazepam  Not Detected  Not Detected   Not Detected  Not Detected  Not Detected  Not Detected    MIDAZOLAM  Not Detected  Not Detected   Not Detected   Not Detected  Not Detected  Not Detected    ZOLPIDEM  Not Detected  Not Detected   Not Detected  Not Detected  Not Detected  Not Detected    BARBITURATES  Not Detected  Not Detected   Not Detected  Not Detected  Not Detected  Not Detected    Creatinine, Urine 20.0 - 400.0 mg/dL 76.4  177.0  160.0 R, CM  151.5  103.3  255.9  255.9    ETHYL GLUCURONIDE  Not Detected  Not Detected   Present  Not Detected  Not Detected  Not Detected    MARIJUANA METABOLITE  Not Detected  Not Detected   Not Detected  Not Detected  Not Detected  Not Detected    PCP  Not Detected  Not Detected   Not Detected  Not Detected  Not Detected  Not Detected    CARISOPRODOL  Not Detected  Not Detected CM   Not Detected CM  Not Detected CM  Not Detected CM  Not Detected CM    Comment: The carisoprodol immunoassay has cross-reactivity to   carisoprodol and meprobamate.    Naloxone  Not Detected          Gabapentin  Present          Pregabalin  Not Detected          Alpha-OH-Midazolam  Not Detected          Zolpidem Metabolite  Not Detected

## 2022-02-11 ENCOUNTER — TELEPHONE (OUTPATIENT)
Dept: PAIN MEDICINE | Facility: OTHER | Age: 72
End: 2022-02-11
Payer: OTHER GOVERNMENT

## 2022-02-11 ENCOUNTER — TELEPHONE (OUTPATIENT)
Dept: PAIN MEDICINE | Facility: CLINIC | Age: 72
End: 2022-02-11
Payer: OTHER GOVERNMENT

## 2022-02-11 NOTE — TELEPHONE ENCOUNTER
----- Message from Eden Salinas LPN sent at 2/10/2022  3:37 PM CST -----  Regarding: RE: PROCEDURE  AUTH STATUS  We are probably going to have to reschedule him AGAIN....    ----- Message -----  From: Kelly Daley  Sent: 2/10/2022   3:34 PM CST  To: Eden Salinas LPN  Subject: RE: PROCEDURE  AUTH STATUS                   Kodi Eden,    I sent a message on yesterday advising that the doc has to submit a [facility]. Previously auth .  ----- Message -----  From: Eden Salinas LPN  Sent: 2/10/2022   3:02 PM CST  To: Kelly Salas, #  Subject: PROCEDURE  AUTH STATUS                       Good afternoon,    Can we please get an Auth Status on patients procedure for tomorrow?    Thanks, DW

## 2022-02-11 NOTE — TELEPHONE ENCOUNTER
----- Message from Lyndsey Latham sent at 2/11/2022  9:43 AM CST -----  Regarding: reschedule  Name of Who is Calling: Ishmael           What is the request in detail: Patient is requesting a call back to cancel and reschedule procedure.           Can the clinic reply by [facility]: No           What [facility]: [phone]

## 2022-03-04 DIAGNOSIS — G89.4 CHRONIC PAIN SYNDROME: ICD-10-CM

## 2022-03-04 DIAGNOSIS — M70.60 GREATER TROCHANTERIC BURSITIS, UNSPECIFIED LATERALITY: ICD-10-CM

## 2022-03-04 RX ORDER — HYDROCODONE BITARTRATE AND ACETAMINOPHEN 5; 325 MG/1; MG/1
1 TABLET ORAL EVERY 12 HOURS PRN
Qty: 60 TABLET | Refills: 0 | Status: SHIPPED | OUTPATIENT
Start: 2022-03-04 | End: 2022-04-03

## 2022-03-04 NOTE — TELEPHONE ENCOUNTER
----- Message from Camila Cuba sent at 3/4/2022  9:01 AM CST -----  Regarding: Medication Refill  Type:  RX Refill Request    Who Called: pt    Refill or New Rx: refill    RX Name and Strength: HYDROcodone-acetaminophen (NORCO) 5-325 mg per tablet    Preferred Pharmacy with phone number: [facility] #[id number] [place], [place] - [address]  [address] [address]  Phone: [phone]     Would the patient rather a call back or a response via [facility]? Call    Best Call Back Number: [phone]

## 2022-03-04 NOTE — TELEPHONE ENCOUNTER
Patient requesting refill on Norco 5/325mg  Last office visit 01.10.22   shows last refill on 02.04.22  Patient does have a pain contract on file with [facility] department  Patient last UDS 12.02.21 was consistent with current therapy    CODEINE  Not Detected  Not Detected   Not Detected  Not Detected  Not Detected  Not Detected    MORPHINE  Not Detected  Not Detected   Not Detected  Not Detected  Not Detected  Not Detected    6-ACETYLMORPHINE  Not Detected  Not Detected   Not Detected  Not Detected  Not Detected  Not Detected    OXYCODONE  Not Detected  Not Detected   Not Detected  Not Detected  Not Detected  Not Detected    NOROYXCODONE  Not Detected  Not Detected   Not Detected  Not Detected  Not Detected  Not Detected    OXYMORPHONE  Not Detected  Not Detected   Not Detected  Not Detected  Not Detected  Not Detected    NOROXYMORPHONE  Not Detected  Not Detected   Not Detected  Not Detected  Not Detected  Not Detected    HYDROCODONE  Not Detected  Present   Present  Present  Not Detected  Not Detected    NORHYDROCODONE  Not Detected  Present   Not Detected  Present  Not Detected  Not Detected    HYDROMORPHONE  Not Detected  Not Detected   Not Detected  Not Detected  Not Detected  Not Detected    BUPRENORPHINE  Not Detected  Not Detected   Not Detected  Not Detected  Not Detected  Not Detected    NORUBPRENORPHINE  Not Detected  Not Detected   Not Detected  Not Detected  Not Detected  Not Detected    FENTANYL  Not Detected  Not Detected   Not Detected  Not Detected  Not Detected  Not Detected    NORFENTANYL  Not Detected  Not Detected   Not Detected  Not Detected  Not Detected  Not Detected    MEPERIDINE METABOLITE  Not Detected  Not Detected   Not Detected  Not Detected  Not Detected  Not Detected    TAPENTADOL  Not Detected  Not Detected   Not Detected  Not Detected  Not Detected  Not Detected    TAPENTADOL-O-SULF  Not Detected  Not Detected   Not Detected  Not Detected  Not Detected  Not  Detected    METHADONE  Not Detected  Not Detected   Not Detected  Not Detected  Not Detected  Not Detected    TRAMADOL  Present  Not Detected   Not Detected  Present  Present  Present    AMPHETAMINE  Not Detected  Not Detected   Not Detected  Not Detected  Not Detected  Not Detected    METHAMPHETAMINE  Not Detected  Not Detected   Not Detected  Not Detected  Not Detected  Not Detected    MDMA- ECSTASY  Not Detected  Not Detected   Not Detected  Not Detected  Not Detected  Not Detected    MDA  Not Detected  Not Detected   Not Detected  Not Detected  Not Detected  Not Detected    MDEA- Audelia  Not Detected  Not Detected   Not Detected  Not Detected  Not Detected  Not Detected    METHYLPHENIDATE  Not Detected  Not Detected   Not Detected  Not Detected  Not Detected  Not Detected    PHENTERMINE  Not Detected  Not Detected   Not Detected  Not Detected  Not Detected  Not Detected    BENZOYLECGONINE  Not Detected  Not Detected   Not Detected  Not Detected  Not Detected  Not Detected    ALPRAZOLAM  Not Detected  Not Detected   Not Detected  Not Detected  Not Detected  Not Detected    ALPHA-OH-ALPRAZOLAM  Not Detected  Not Detected   Not Detected  Not Detected  Not Detected  Not Detected    CLONAZEPAM  Not Detected  Not Detected   Not Detected  Not Detected  Not Detected  Not Detected    7-AMINOCLONAZEPAM  Not Detected  Not Detected   Not Detected  Not Detected  Not Detected  Not Detected    DIAZEPAM  Not Detected  Not Detected   Not Detected  Not Detected  Not Detected  Not Detected    NORDIAZEPAM  Not Detected  Not Detected   Not Detected  Not Detected  Not Detected  Not Detected    OXAZEPAM  Not Detected  Not Detected   Not Detected  Not Detected  Not Detected  Not Detected    TEMAZEPAM  Not Detected  Not Detected   Not Detected  Not Detected  Not Detected  Not Detected    Lorazepam  Not Detected  Not Detected   Not Detected  Not Detected  Not Detected  Not Detected    MIDAZOLAM  Not Detected  Not Detected   Not Detected   Not Detected  Not Detected  Not Detected    ZOLPIDEM  Not Detected  Not Detected   Not Detected  Not Detected  Not Detected  Not Detected    BARBITURATES  Not Detected  Not Detected   Not Detected  Not Detected  Not Detected  Not Detected    Creatinine, Urine 20.0 - 400.0 mg/dL 76.4  177.0  160.0 R, CM  151.5  103.3  255.9  255.9    ETHYL GLUCURONIDE  Not Detected  Not Detected   Present  Not Detected  Not Detected  Not Detected    MARIJUANA METABOLITE  Not Detected  Not Detected   Not Detected  Not Detected  Not Detected  Not Detected    PCP  Not Detected  Not Detected   Not Detected  Not Detected  Not Detected  Not Detected    CARISOPRODOL  Not Detected  Not Detected CM   Not Detected CM  Not Detected CM  Not Detected CM  Not Detected CM    Comment: The carisoprodol immunoassay has cross-reactivity to   carisoprodol and meprobamate.    Naloxone  Not Detected          Gabapentin  Present          Pregabalin  Not Detected          Alpha-OH-Midazolam  Not Detected          Zolpidem Metabolite  Not Detected

## 2022-03-07 ENCOUNTER — TELEPHONE (OUTPATIENT)
Dept: PAIN MEDICINE | Facility: CLINIC | Age: 72
End: 2022-03-07
Payer: OTHER GOVERNMENT

## 2022-03-07 NOTE — TELEPHONE ENCOUNTER
----- Message from Gilmar Mac sent at 3/7/2022  9:18 AM CST -----  Name of Who is Calling: ALLIE TOLEDO          What is the request in detail: The patient is calling to speak to the nurse in regards to his medication. Please advise          Can the clinic reply by [facility]: No         What Number to Call Back if not in [facility]: [phone]

## 2022-03-07 NOTE — TELEPHONE ENCOUNTER
----- Message from Gilmar Mac sent at 3/7/2022  9:18 AM CST -----  Name of Who is Calling: ALLIE TOLEDO          What is the request in detail: The patient is calling to speak to the nurse in regards to his medication. Please advise          Can the clinic reply by [facility]: No         What Number to Call Back if not in [facility]: [phone]

## 2022-04-06 ENCOUNTER — TELEPHONE (OUTPATIENT)
Dept: PAIN MEDICINE | Facility: CLINIC | Age: 72
End: 2022-04-06
Payer: OTHER GOVERNMENT

## 2022-04-06 DIAGNOSIS — M54.16 LUMBAR RADICULOPATHY: Primary | ICD-10-CM

## 2022-04-06 RX ORDER — HYDROCODONE BITARTRATE AND ACETAMINOPHEN 7.5; 325 MG/1; MG/1
1 TABLET ORAL EVERY 6 HOURS PRN
Qty: 60 TABLET | Refills: 0 | Status: SHIPPED | OUTPATIENT
Start: 2022-04-06 | End: 2022-05-04 | Stop reason: SDUPTHER

## 2022-04-06 NOTE — TELEPHONE ENCOUNTER
Staff contact  back in regards of message for the patient.    Voicemail was left. Informing to contact the office back.

## 2022-04-06 NOTE — TELEPHONE ENCOUNTER
Patient requesting refill on Hydrocodone 5 325mg  Last office visit 01/10/22   shows last refill on 03/05/22  [facility]  Patient last UDS 12/02/21 was consistent with current therapy  CODEINE  Not Detected  Not Detected   Not Detected  Not Detected  Not Detected  Not Detected    MORPHINE  Not Detected  Not Detected   Not Detected  Not Detected  Not Detected  Not Detected    6-ACETYLMORPHINE  Not Detected  Not Detected   Not Detected  Not Detected  Not Detected  Not Detected    OXYCODONE  Not Detected  Not Detected   Not Detected  Not Detected  Not Detected  Not Detected    NOROYXCODONE  Not Detected  Not Detected   Not Detected  Not Detected  Not Detected  Not Detected    OXYMORPHONE  Not Detected  Not Detected   Not Detected  Not Detected  Not Detected  Not Detected    NOROXYMORPHONE  Not Detected  Not Detected   Not Detected  Not Detected  Not Detected  Not Detected    HYDROCODONE  Not Detected  Present   Present  Present  Not Detected  Not Detected    NORHYDROCODONE  Not Detected  Present   Not Detected  Present  Not Detected  Not Detected    HYDROMORPHONE  Not Detected  Not Detected   Not Detected  Not Detected  Not Detected  Not Detected    BUPRENORPHINE  Not Detected  Not Detected   Not Detected  Not Detected  Not Detected  Not Detected    NORUBPRENORPHINE  Not Detected  Not Detected   Not Detected  Not Detected  Not Detected  Not Detected    FENTANYL  Not Detected  Not Detected   Not Detected  Not Detected  Not Detected  Not Detected    NORFENTANYL  Not Detected  Not Detected   Not Detected  Not Detected  Not Detected  Not Detected    MEPERIDINE METABOLITE  Not Detected  Not Detected   Not Detected  Not Detected  Not Detected  Not Detected    TAPENTADOL  Not Detected  Not Detected   Not Detected  Not Detected  Not Detected  Not Detected    TAPENTADOL-O-SULF  Not Detected  Not Detected   Not Detected  Not Detected  Not Detected   Not Detected    METHADONE  Not Detected  Not Detected   Not Detected  Not Detected  Not Detected  Not Detected    TRAMADOL  Present  Not Detected   Not Detected  Present  Present  Present    AMPHETAMINE  Not Detected  Not Detected   Not Detected  Not Detected  Not Detected  Not Detected    METHAMPHETAMINE  Not Detected  Not Detected   Not Detected  Not Detected  Not Detected  Not Detected    MDMA- ECSTASY  Not Detected  Not Detected   Not Detected  Not Detected  Not Detected  Not Detected    MDA  Not Detected  Not Detected   Not Detected  Not Detected  Not Detected  Not Detected    MDEA- Audelia  Not Detected  Not Detected   Not Detected  Not Detected  Not Detected  Not Detected    METHYLPHENIDATE  Not Detected  Not Detected   Not Detected  Not Detected  Not Detected  Not Detected    PHENTERMINE  Not Detected  Not Detected   Not Detected  Not Detected  Not Detected  Not Detected    BENZOYLECGONINE  Not Detected  Not Detected   Not Detected  Not Detected  Not Detected  Not Detected    ALPRAZOLAM  Not Detected  Not Detected   Not Detected  Not Detected  Not Detected  Not Detected    ALPHA-OH-ALPRAZOLAM  Not Detected  Not Detected   Not Detected  Not Detected  Not Detected  Not Detected    CLONAZEPAM  Not Detected  Not Detected   Not Detected  Not Detected  Not Detected  Not Detected    7-AMINOCLONAZEPAM  Not Detected  Not Detected   Not Detected  Not Detected  Not Detected  Not Detected    DIAZEPAM  Not Detected  Not Detected   Not Detected  Not Detected  Not Detected  Not Detected    NORDIAZEPAM  Not Detected  Not Detected   Not Detected  Not Detected  Not Detected  Not Detected    OXAZEPAM  Not Detected  Not Detected   Not Detected  Not Detected  Not Detected  Not Detected    TEMAZEPAM  Not Detected  Not Detected   Not Detected  Not Detected  Not Detected  Not Detected    Lorazepam  Not Detected  Not Detected   Not Detected  Not Detected  Not Detected  Not Detected    MIDAZOLAM  Not Detected  Not Detected   Not Detected   Not Detected  Not Detected  Not Detected    ZOLPIDEM  Not Detected  Not Detected   Not Detected  Not Detected  Not Detected  Not Detected    BARBITURATES  Not Detected  Not Detected   Not Detected  Not Detected  Not Detected  Not Detected    Creatinine, Urine 20.0 - 400.0 mg/dL 76.4  177.0  160.0 R, CM  151.5  103.3  255.9  255.9    ETHYL GLUCURONIDE  Not Detected  Not Detected   Present  Not Detected  Not Detected  Not Detected    MARIJUANA METABOLITE  Not Detected  Not Detected   Not Detected  Not Detected  Not Detected  Not Detected    PCP  Not Detected  Not Detected   Not Detected  Not Detected  Not Detected  Not Detected    CARISOPRODOL  Not Detected  Not Detected CM   Not Detected CM  Not Detected CM  Not Detected CM  Not Detected CM    Comment: The carisoprodol immunoassay has cross-reactivity to   carisoprodol and meprobamate.    Naloxone  Not Detected          Gabapentin  Present          Pregabalin  Not Detected          Alpha-OH-Midazolam  Not Detected          Zolpidem Metabolite  Not Detected          [facility] DRUG PANEL  See Below  See Below CM   See Below CM  See Below CM  See Below CM  See Below

## 2022-04-06 NOTE — TELEPHONE ENCOUNTER
----- Message from Lyndsey Latham sent at 4/6/2022  9:28 AM CDT -----  Regarding: referral status  Name of Who is Calling: [facility]           What is the request in detail: Michelle is requesting a call back to get the status of the referral.           Can the clinic reply by [facility]: No           What Number to Call Back if not in [facility]: [phone]

## 2022-04-06 NOTE — TELEPHONE ENCOUNTER
----- Message from Lin Estrella sent at 4/5/2022  4:54 PM CDT -----  Regarding: Refill  Contact: ALLIE TOLEDO [[id number]]  Type:  RX Refill Request    Who Called: ALLIE TOLEDO [[id number]]    Refill or New Rx:Refill    RX Name and Strength:HYDROcodone-acetaminophen (NORCO) 5-325 mg per tablet    How is the patient currently taking it? (ex. 1XDay):2 x day    Is this a 30 day or 90 day RX:30 day    Preferred Pharmacy with phone number:[facility] #[id number] - [place], [place] - [address] AT [facility]    Local or Mail Order:    Ordering Provider:Dr. Parsons    Would the patient rather a call back or a response via [facility]? Call back    Best Call Back Number:[phone]    Additional Information:

## 2022-04-12 ENCOUNTER — TELEPHONE (OUTPATIENT)
Dept: PAIN MEDICINE | Facility: CLINIC | Age: 72
End: 2022-04-12
Payer: OTHER GOVERNMENT

## 2022-04-12 NOTE — TELEPHONE ENCOUNTER
----- Message from Lin Estrella sent at 4/12/2022  1:13 PM CDT -----  Contact: Michelle ([facility])  Name of Who is Calling:Michelle ([facility])          What is the request in detail:Michelle ([facility]) calling in regards to patient follow up appointment.Please advise           Can the clinic reply by [facility]:no          What Number to Call Back if not in [facility]:[phone]

## 2022-04-12 NOTE — TELEPHONE ENCOUNTER
Staff returned call to Michelle ([facility]) in regards to her message about patient follow up appointment.        Staff was unable to reach Michelle [phone] to retrieve more information regarding appointment because patient is not actually schedule for a follow up with Pain Management.

## 2022-05-04 DIAGNOSIS — M54.16 LUMBAR RADICULOPATHY: ICD-10-CM

## 2022-05-04 RX ORDER — HYDROCODONE BITARTRATE AND ACETAMINOPHEN 7.5; 325 MG/1; MG/1
1 TABLET ORAL EVERY 6 HOURS PRN
Qty: 60 TABLET | Refills: 0 | Status: SHIPPED | OUTPATIENT
Start: 2022-05-05 | End: 2022-06-14

## 2022-05-04 NOTE — TELEPHONE ENCOUNTER
----- Message from Alyssa Daley sent at 5/4/2022  1:05 PM CDT -----  Regarding: refill  Can the clinic reply in [facility]:       Please refill the medication(s) listed below. Please call the patient when the prescription(s) is ready for  at this phone number   [phone]      Medication #1 HYDROcodone-acetaminophen (NORCO) 7.5-325 mg per tablet    Medication #2       Preferred Pharmacy:  [facility] #[id number]  [address] AT [facility]

## 2022-05-04 NOTE — TELEPHONE ENCOUNTER
Patient requesting refill on Hydrocodone 7.5 325mg  Last office visit 01/10/22   shows last refill on 04/06/22  [facility]  Patient last UDS 12/02/21 was consistent with current therapy    CODEINE  Not Detected  Not Detected   Not Detected  Not Detected  Not Detected  Not Detected    MORPHINE  Not Detected  Not Detected   Not Detected  Not Detected  Not Detected  Not Detected    6-ACETYLMORPHINE  Not Detected  Not Detected   Not Detected  Not Detected  Not Detected  Not Detected    OXYCODONE  Not Detected  Not Detected   Not Detected  Not Detected  Not Detected  Not Detected    NOROYXCODONE  Not Detected  Not Detected   Not Detected  Not Detected  Not Detected  Not Detected    OXYMORPHONE  Not Detected  Not Detected   Not Detected  Not Detected  Not Detected  Not Detected    NOROXYMORPHONE  Not Detected  Not Detected   Not Detected  Not Detected  Not Detected  Not Detected    HYDROCODONE  Not Detected  Present   Present  Present  Not Detected  Not Detected    NORHYDROCODONE  Not Detected  Present   Not Detected  Present  Not Detected  Not Detected    HYDROMORPHONE  Not Detected  Not Detected   Not Detected  Not Detected  Not Detected  Not Detected    BUPRENORPHINE  Not Detected  Not Detected   Not Detected  Not Detected  Not Detected  Not Detected    NORUBPRENORPHINE  Not Detected  Not Detected   Not Detected  Not Detected  Not Detected  Not Detected    FENTANYL  Not Detected  Not Detected   Not Detected  Not Detected  Not Detected  Not Detected    NORFENTANYL  Not Detected  Not Detected   Not Detected  Not Detected  Not Detected  Not Detected    MEPERIDINE METABOLITE  Not Detected  Not Detected   Not Detected  Not Detected  Not Detected  Not Detected    TAPENTADOL  Not Detected  Not Detected   Not Detected  Not Detected  Not Detected  Not Detected    TAPENTADOL-O-SULF  Not Detected  Not Detected   Not Detected  Not Detected  Not  Detected  Not Detected    METHADONE  Not Detected  Not Detected   Not Detected  Not Detected  Not Detected  Not Detected    TRAMADOL  Present  Not Detected   Not Detected  Present  Present  Present    AMPHETAMINE  Not Detected  Not Detected   Not Detected  Not Detected  Not Detected  Not Detected    METHAMPHETAMINE  Not Detected  Not Detected   Not Detected  Not Detected  Not Detected  Not Detected    MDMA- ECSTASY  Not Detected  Not Detected   Not Detected  Not Detected  Not Detected  Not Detected    MDA  Not Detected  Not Detected   Not Detected  Not Detected  Not Detected  Not Detected    MDEA- Audelia  Not Detected  Not Detected   Not Detected  Not Detected  Not Detected  Not Detected    METHYLPHENIDATE  Not Detected  Not Detected   Not Detected  Not Detected  Not Detected  Not Detected    PHENTERMINE  Not Detected  Not Detected   Not Detected  Not Detected  Not Detected  Not Detected    BENZOYLECGONINE  Not Detected  Not Detected   Not Detected  Not Detected  Not Detected  Not Detected    ALPRAZOLAM  Not Detected  Not Detected   Not Detected  Not Detected  Not Detected  Not Detected    ALPHA-OH-ALPRAZOLAM  Not Detected  Not Detected   Not Detected  Not Detected  Not Detected  Not Detected    CLONAZEPAM  Not Detected  Not Detected   Not Detected  Not Detected  Not Detected  Not Detected    7-AMINOCLONAZEPAM  Not Detected  Not Detected   Not Detected  Not Detected  Not Detected  Not Detected    DIAZEPAM  Not Detected  Not Detected   Not Detected  Not Detected  Not Detected  Not Detected    NORDIAZEPAM  Not Detected  Not Detected   Not Detected  Not Detected  Not Detected  Not Detected    OXAZEPAM  Not Detected  Not Detected   Not Detected  Not Detected  Not Detected  Not Detected    TEMAZEPAM  Not Detected  Not Detected   Not Detected  Not Detected  Not Detected  Not Detected    Lorazepam  Not Detected  Not Detected   Not Detected  Not Detected  Not Detected  Not Detected    MIDAZOLAM  Not Detected  Not Detected    Not Detected  Not Detected  Not Detected  Not Detected    ZOLPIDEM  Not Detected  Not Detected   Not Detected  Not Detected  Not Detected  Not Detected    BARBITURATES  Not Detected  Not Detected   Not Detected  Not Detected  Not Detected  Not Detected    Creatinine, Urine 20.0 - 400.0 mg/dL 76.4  177.0  160.0 R, CM  151.5  103.3  255.9  255.9    ETHYL GLUCURONIDE  Not Detected  Not Detected   Present  Not Detected  Not Detected  Not Detected    MARIJUANA METABOLITE  Not Detected  Not Detected   Not Detected  Not Detected  Not Detected  Not Detected    PCP  Not Detected  Not Detected   Not Detected  Not Detected  Not Detected  Not Detected    CARISOPRODOL  Not Detected  Not Detected CM   Not Detected CM  Not Detected CM  Not Detected CM  Not Detected CM    Comment: The carisoprodol immunoassay has cross-reactivity to   carisoprodol and meprobamate.    Naloxone  Not Detected          Gabapentin  Present          Pregabalin  Not Detected          Alpha-OH-Midazolam  Not Detected          Zolpidem Metabolite  Not Detected

## 2022-05-05 ENCOUNTER — TELEPHONE (OUTPATIENT)
Dept: PAIN MEDICINE | Facility: CLINIC | Age: 72
End: 2022-05-05
Payer: OTHER GOVERNMENT

## 2022-05-05 NOTE — TELEPHONE ENCOUNTER
----- Message from Mike Daley NP sent at 5/5/2022 11:44 AM CDT -----  Contact: ALLIE TOLEDO [[id number]]  Appears someone pended this to you wrong. Last actual visit with refill was in January and last inperson visit was Feb.     ----- Message -----  From: Austin Parsons MD  Sent: 5/5/2022  11:28 AM CDT  To: Mike Daley NP, ASTER Gonzaelz saw the patient in the last visit but  shows 7.5 BID not q6 hrs in the last refill.    Mike,    Would you please check this?      ----- Message -----  From: Kinjal Ochoa MA  Sent: 5/5/2022  11:23 AM CDT  To: Austin Parsons MD    Good Morning ,     I have a question regarding the following patient.     Jenn submitted a refill request for this patient. Norco 7.5/325 one tab by mouth every 6 hours. In the past this patient has received Norco 5/325 one tab every 12 hours.     Was there a change?     Patient is calling the office inquiring about receiving a 15 day supply instead of 30 day.     I didn't see any notes in his chart where you made changes. Did you send Jenn a message regarding a change?     Kinjal   ----- Message -----  From: Lin Estrella  Sent: 5/5/2022  10:59 AM CDT  To: Jaqueline Avila Staff    Name of Who is Calling:ALLIE TOLEDO [[id number]]          What is the request in detail:Patient calling in regards to why prescription was for a 14 day supply instead of a 30 day. Please advise           Can the clinic reply by [facility]:no          What Number to Call Back if not in [facility]:[phone]

## 2022-05-31 ENCOUNTER — TELEPHONE (OUTPATIENT)
Dept: PAIN MEDICINE | Facility: CLINIC | Age: 72
End: 2022-05-31
Payer: OTHER GOVERNMENT

## 2022-05-31 NOTE — TELEPHONE ENCOUNTER
Staff tried contacting patient in regards of refill request.    Patient voicemail was not setup. Patient last visit was on 01/10/22 patient needs an appointment before getting an refill.

## 2022-05-31 NOTE — TELEPHONE ENCOUNTER
----- Message from Yeison Romero sent at 5/31/2022 10:58 AM CDT -----  Type:  RX Refill Request    Who Called: Patient  Refill or New Rx:Refill  RX Name and Strength:HYDROcodone-acetaminophen (NORCO) 7.5-325 mg per tablet  How is the patient currently taking it? (ex. 1XDay):4 x day  Is this a 30 day or 90 day RX:15 day  Preferred Pharmacy with phone number:[facility] [phone]  Local or Mail Order:Local  Ordering Provider:Jaqueline  Would the patient rather a call back or a response via [facility]? Call back  Best Call Back Number:[phone]  Additional Information:

## 2022-06-01 ENCOUNTER — TELEPHONE (OUTPATIENT)
Dept: PAIN MEDICINE | Facility: CLINIC | Age: 72
End: 2022-06-01
Payer: OTHER GOVERNMENT

## 2022-06-01 NOTE — TELEPHONE ENCOUNTER
Staff contacted and spoke with patient in regards to his previous message about medication refill        Staff informed patient due to his office visit being January he's required to follow up in clinic with [facility] or  Provider (staff offered pt the next available 6/2 or 6/3; pt stated he have something to do).      The next available appt would be 6/6 at 11:40a with [facility]        Pt verbalized understanding and has accepted the appt   Patient is a 32y old  Female who presents with a chief complaint of Chest pain (14 Dec 2018 21:05)        PAST MEDICAL & SURGICAL HISTORY:  Asthma  Endometriosis  Endometriosis of pelvis      Summary of admission HPI:                PREVIOUS DIAGNOSTIC TESTING:      ECHO  FINDINGS:    STRESS  FINDINGS:    CATHETERIZATION  FINDINGS:    ELECTROPHYSIOLOGY STUDY  FINDINGS:    CAROTID ULTRASOUND:  FINDINGS    VENOUS DUPLEX SCAN:  FINDINGS:    CHEST CT PULMONARY ANGIO with IV Contrast:  FINDINGS:  MEDICATIONS  (STANDING):  dextrose 5% + sodium chloride 0.45%. 1000 milliLiter(s) (50 mL/Hr) IV Continuous <Continuous>  pantoprazole    Tablet 40 milliGRAM(s) Oral before breakfast    MEDICATIONS  (PRN):  ketorolac   Injectable 30 milliGRAM(s) IV Push every 6 hours PRN Severe Pain (7 - 10)      FAMILY HISTORY:      SOCIAL HISTORY:    CIGARETTES:    ALCOHOL:    REVIEW OF SYSTEMS:    CONSTITUTIONAL: No fever, weight loss, chills, shakes, or fatigue  EYES: No eye pain, visual disturbances, or discharge  ENMT:  No difficulty hearing, tinnitus, vertigo; No sinus or throat pain  NECK: No pain or stiffness  BREASTS: No pain, masses, or nipple discharge  RESPIRATORY: No cough, wheezing, hemoptysis, or shortness of breath  CARDIOVASCULAR: No chest pain, dyspnea, palpitations, dizziness, syncope, paroxysmal nocturnal dyspnea, orthopnea, or arm or leg swelling  GASTROINTESTINAL: No abdominal  or epigastric pain, nausea, vomiting, hematemesis, diarrhea, constipation, melena or bright red blood.  GENITOURINARY: No dysuria, nocturia, hematuria, or urinary incontinence  NEUROLOGICAL: No headaches, memory loss, slurred speech, limb weakness, loss of strength, numbness, or tremors  SKIN: No itching, burning, rashes, or lesions   LYMPH NODES: No enlarged glands  ENDOCRINE: No heat or cold intolerance, or hair loss  MUSCULOSKELETAL: No joint pain or swelling, muscle, back, or extremity pain  PSYCHIATRIC: No depression, anxiety, or difficulty sleeping  HEME/LYMPH: No easy bruising or bleeding gums  ALLERY AND IMMUNOLOGIC: No hives or rash.      Vital Signs Last 24 Hrs  T(C): 36.6 (14 Dec 2018 19:14), Max: 36.7 (14 Dec 2018 15:05)  T(F): 97.8 (14 Dec 2018 19:14), Max: 98.1 (14 Dec 2018 15:05)  HR: 74 (14 Dec 2018 19:14) (74 - 78)  BP: 121/74 (14 Dec 2018 19:14) (121/74 - 128/84)  BP(mean): --  RR: 18 (14 Dec 2018 19:14) (18 - 18)  SpO2: 99% (14 Dec 2018 19:14) (96% - 99%)    PHYSICAL EXAM:    GENERAL: In no apparent distress, well nourished, and hydrated.  HEAD:  Atraumatic, Normocephalic  EYES: EOMI, PERRLA, conjunctiva and sclera clear  ENMT: No tonsillar erythema, exudates, or enlargement; Moist mucous membranes, Good dentition, No lesions  NECK: Supple and normal thyroid.  No JVD or carotid bruit.  Carotid pulse is 2+ bilaterally.  HEART: Regular rate and rhythm; No murmurs, rubs, or gallops.  PULMONARY: Clear to auscultation and perfusion.  No rales, wheezing, or rhonchi bilaterally.  ABDOMEN: Soft, Nontender, Nondistended; Bowel sounds present  EXTREMITIES:  2+ Peripheral Pulses, No clubbing, cyanosis, or edema  LYMPH: No lymphadenopathy noted  NEUROLOGICAL: Grossly nonfocal          INTERPRETATION OF TELEMETRY:    ECG:    I&O's Detail      LABS:                        13.3   5.25  )-----------( 283      ( 14 Dec 2018 16:06 )             40.6     12-14    141  |  109<H>  |  11  ----------------------------<  101<H>  3.8   |  25  |  0.80    Ca    8.6      14 Dec 2018 16:06  Mg     2.1     12-14    TPro  7.7  /  Alb  3.7  /  TBili  0.5  /  DBili  x   /  AST  14<L>  /  ALT  18  /  AlkPhos  70  12-14    CARDIAC MARKERS ( 14 Dec 2018 20:54 )  <.015 ng/mL / x     / 92 U/L / x     / <1.0 ng/mL  CARDIAC MARKERS ( 14 Dec 2018 16:06 )  <.015 ng/mL / x     / x     / x     / x              BNP  I&O's Detail    Daily Height in cm: 165.1 (14 Dec 2018 15:05)    Daily     RADIOLOGY & ADDITIONAL STUDIES:

## 2022-06-01 NOTE — TELEPHONE ENCOUNTER
----- Message from Gilmar Mac sent at 6/1/2022  1:37 PM CDT -----   Type:  Patient Returning Call    Who Called:ALLIE TOLEDO     Who Left Message for Patient:Jenn Reese MA     Does the patient know what this is regarding?:    Best Call Back Number:[phone]    Additional Information:

## 2022-06-10 ENCOUNTER — TELEPHONE (OUTPATIENT)
Dept: PAIN MEDICINE | Facility: CLINIC | Age: 72
End: 2022-06-10
Payer: OTHER GOVERNMENT

## 2022-06-10 NOTE — TELEPHONE ENCOUNTER
This message is for patient in regards to his/her appointment 06/13/22 at 3:00p      [facility] Policy: For the safety of all patients and staff members.     Patient Visitor policy: During this visit we're asking all patients to only have one visitor over the age of 18yrs old to accompany to be seen by  JOSEPH Kaufman. If patient do not required assistance with their visit, we're asking all visitors to remain outside the waiting area.    Upon arriving to your schedule appointment; patients are required to wear a face mask, if patient do not have a face mask one will be provided entering the facility. If you have any questions or concerns please contact [phone]      Pt verbalized understanding and has confirmed appt

## 2022-06-13 ENCOUNTER — OFFICE VISIT (OUTPATIENT)
Dept: PAIN MEDICINE | Facility: CLINIC | Age: 72
End: 2022-06-13
Payer: OTHER GOVERNMENT

## 2022-06-13 VITALS
TEMPERATURE: 97 F | WEIGHT: 259.69 LBS | HEART RATE: 84 BPM | HEIGHT: 76 IN | SYSTOLIC BLOOD PRESSURE: 127 MMHG | DIASTOLIC BLOOD PRESSURE: 89 MMHG | BODY MASS INDEX: 31.62 KG/M2

## 2022-06-13 DIAGNOSIS — Z51.81 ENCOUNTER FOR THERAPEUTIC DRUG MONITORING: Primary | ICD-10-CM

## 2022-06-13 DIAGNOSIS — M25.569 KNEE PAIN, UNSPECIFIED CHRONICITY, UNSPECIFIED LATERALITY: ICD-10-CM

## 2022-06-13 DIAGNOSIS — M54.16 LUMBAR RADICULOPATHY: ICD-10-CM

## 2022-06-13 DIAGNOSIS — M17.0 PRIMARY OSTEOARTHRITIS OF BOTH KNEES: ICD-10-CM

## 2022-06-13 DIAGNOSIS — M70.61 GREATER TROCHANTERIC BURSITIS OF RIGHT HIP: ICD-10-CM

## 2022-06-13 PROCEDURE — 99214 OFFICE O/P EST MOD 30 MIN: CPT | Mod: S$PBB,,, | Performed by: NURSE PRACTITIONER

## 2022-06-13 PROCEDURE — 99999 PR PBB SHADOW E&M-EST. PATIENT-LVL V: CPT | Mod: PBBFAC,,, | Performed by: NURSE PRACTITIONER

## 2022-06-13 PROCEDURE — 80307 DRUG TEST PRSMV CHEM ANLYZR: CPT | Performed by: NURSE PRACTITIONER

## 2022-06-13 PROCEDURE — 99999 PR PBB SHADOW E&M-EST. PATIENT-LVL V: ICD-10-PCS | Mod: PBBFAC,,, | Performed by: NURSE PRACTITIONER

## 2022-06-13 PROCEDURE — 99215 OFFICE O/P EST HI 40 MIN: CPT | Mod: PBBFAC | Performed by: NURSE PRACTITIONER

## 2022-06-13 PROCEDURE — 99214 PR OFFICE/OUTPT VISIT, EST, LEVL IV, 30-39 MIN: ICD-10-PCS | Mod: S$PBB,,, | Performed by: NURSE PRACTITIONER

## 2022-06-13 NOTE — PROGRESS NOTES
Chronic patient Established Note (Follow up visit)  This a.m.   HPI  Ishmael Thrasher is a 70 y.o. male with a history of CHF (EF 10%) who presents today with chronic low back pain. This pain started in 1971 as a result of and injury in the .  He describes a constant, right sided pain that starts in his low back and radiates down the posterior aspect of his right leg to the bottom of his foot.  This is associated with a numbness, tingling sensation.  The pain occurs when he is standing still for longer than 5 minutes.  The pain does not occur when walking unless he is standing first.  He denies heaviness and weakness.  This pain is described in detail below.     Aggravating factors: Standing in place for 5 minutes     Mitigating factors: Walking, medication, rest     Previously seeing: Dr. Davide Wang ([place])     Physical Therapy: Has done for his heart, but not for his back     Interval History (7/2/2020):  The patient returns to clinic today for follow up of back pain. He continues to report low back pain that radiates down the posterior aspect of his right leg to the bottom of his foot. He denies any left leg pain. His pain is worse with prolonged standing. He has had injections in the past without relief. He is currently taking Tramadol with limited relief. He denies any other health changes. His pain today is 10/10.     Interval History 8/6/2020:   Ishmael Thrasher presents to the clinic for a follow-up appointment for bilateral leg pain. Since the last visit, Ishmael Thrasher states the pain has been worsening. Current pain intensity is 10/10.     Interval History 9/28/2020:  The patient returns to clinic today for follow up of low back and leg pain. He is s/p right L4/5 and L5/S1 TF PÉREZ on 9/11/2020. He reports no significant relief of his back and leg pain. He continues to report low back pain that radiates down the posterior aspect of his right leg to under his foot. He denies any left leg  pain. His pain is worse with prolonged standing and walking. He is currently taking Norco with limited benefit. He reports that this decreases his pain but does not last. He denies any other health changes. His pain today is 10/10.     Interval History 11/16/2020:  The patient returns to clinic today for follow up of low back pain. He is s/p right L5/S1 and S1 TF PÉREZ on 10/23/2020. He reports 60% relief for about a week. He continues to report low back pain that radiates into the posterior aspect of his right leg to his foot. He denies any left leg pain. His pain is worse with standing and walking. He also reports difficulty sleeping due to pain. He is frustrated with his continued pain. He continues to take Norco but reports that this does not last. He denies any other health changes. His pain today is 10/10.     Interval History 12/21/2020:  Ishmael Thrasher presents to the clinic for a  1 month follow-up appointment. Since the last visit, Ishmael Thrasher states the pain has been stable. Current pain intensity is 10/10.    Interval History 3/31/2021  Patient complains of bilateral knee pain, usually uses walker for assistance but was unable to bring with them. Patient is s/p right cooled RFA on 2/26/21 with 60% relief of symptoms that has since returned. He reports he was admitted in the hospital for acute decompensated heart failure following receiving the second dose of the COVID-19 Moderna vaccine. He states he was admitted to [facility] twice and then discharged and re-admitted [facility] (discharged on 3/20/21) the pain started on 5 days ago while there and he reported it while admitted. He reports his pain level is 10/10. Its effecting his ability ambulate as he has not been able to use his legs since being in the hospital. He reports the pain has been this level the last 5 days since worsening. He is taking the Hydrocodone 5/325 TID, which is not helping. Also taking 325mg ASA  "with moderate relief - his heart doctor told him not to continue taking ASA at this dose. During hospital admission, patient complained of left elbow and left knee pain that was attributed to the gouty arthritis that has since resolved. Left knee pain now feels like his "usual" knee pain. Patient states he was able to move around on a daily basis with PT while admitted in the hospital. Feel more depressed and less motivated since receiving 2/16/21. Patient also complains of bilateral back pain radiating down his right leg, which is unchanged from his baseline chronic pain.    Interval History 7/27/2021:  The patient returns to clinic today for follow up of low back and knee pain. He continues to report right knee pain. He also reports increased low back and right hip pain. He reports intermittent radiating pain into his right lateral thigh. His pain is worse with standing and walking. He denies any left leg pain. He continues to perform a home exercise routine. He continues to take Norco as needed. He denies any other health changes. His pain today is 10/10.    Interval History 10/27/2021:  The patient returns to clinic today for follow up of low back and knee pain. He is s/p right SI joint injection on 8/13/2021. He reports 40% relief of his right hip and buttock pain. He continues to reports low back pain that radiates into the posterior aspect of his right leg to his calf. He denies any left leg pain. He reports increased right knee pain. He did have a recent fall which triggered this pain. His pain is worse with standing and walking. He continues to take Norco, although this provides limited relief. He would like to go back to Tramadol. He denies any other health changes. His pain today is 10/10.    Interval History 12/2/2021:  Mr Thrasher presents for routine medication follow up. He stats prior R knee genicular RFA did well. He continues to have diffuse pain complaints one which is R lateral hip pain and " inability to lay on that site due to focal pain. He states tramadol has not benefit when compared to Norco and would like to rotate back to Norco 5/325mg BID. Denies new ban of pain or neurological changes.     Interval History 1/10/2022:  Mr Thrasher presents for follow up of lower back pain and radicular pain down posterior leg going to bottom of calf. He is s/p focal R GTB TTP and states approx 2 weeks benefit. He states rotation from Tramadol to Norco 5/325mg with some mild benefit.     Interval History 6/17/2022:  Mr Thrasher presents for delayed FU. Over interval he has been stable. He inadverntely was Rxed Norco 7.5mg q6hrs. He also has been started on medical mariajuana and received Norco 10mg for gout while visiting ER. We discussed all these issues and Pt voiced understanding. States last Dosing of Beattie was this a.m. Denies new areas of pain, denies any neurological changes. Denies SE of medications.     Pain Disability Index Review:  Last 3 PDI Scores 6/13/2022 1/10/2022 12/2/2021   Pain Disability Index (PDI) 60 59 50       Pain Medications:  Norco 5mg  ASA 325mg  Topical lidocaine      Opioid Contract: yes     report:  Reviewed and consistent with medication use as prescribed.    Pain Procedures:   · 9/11/2020- Right L4/5 and L5/S1 TF PÉREZ  · 10/23/2020- Right L5/S1 and S1 TF PÉREZ  · 2/5/2021- Bilateral genicular nerve block  · 2/26/2021- Right genicular cooled RFA  · 11/12/2021 Right knee genicular RFA     Physical Therapy/Home Exercise: Set up from PT since discharge from hospital, but has not arranged.    Imaging:   Narrative & Impression       EXAMINATION:  XR LUMBAR SPINE 5 VIEW WITH FLEX AND EXT     CLINICAL HISTORY:  Back pain or radiculopathy, > 6 wks;  Radiculopathy, lumbar region     TECHNIQUE:  AP, lateral, oblique views of the lumbar spine with spot view of the lumbosacral region and lateral views in flexion and extension.     COMPARISON:  None     FINDINGS:  There is no collapse or  malalignment in the lumbar spine.  There is no significant change in alignment between flexion and extension.     There is reduced of the intervertebral disc spaces at L4-L5 and L5-S1 with disc vacuum phenomena and anterior osteophyte formation.  There are mild neural foraminal stenosis at these levels.     There is mild bilateral neural foraminal stenosis.     There are extensive vascular calcifications in the abdominal aorta.  The soft tissues are otherwise unremarkable.     Impression:     1. Moderate to severe spondylosis and neural foraminal stenosis at L4-L5 and L5-S1.  2. Straightening of lumbar lordosis  3. No changes in alignment in flexion and extension     Electronically signed by resident: Marko Lamas  Date:                                            07/07/2020  Time:                                           15:05     Electronically signed by: Roly Qiu  Date:                                            07/07/2020  Time:                                           15:21          Narrative & Impression       EXAMINATION:  CT LUMBAR SPINE WITHOUT CONTRAST     CLINICAL HISTORY:  Back pain or radiculopathy, > 6 wks;.     COMPARISON:  None.     FINDINGS:  Decrease height of vertebral bodies L4-L5 predominant at L5.  Degenerative changes in the endplates with decrease height intervertebral space L4-L5.  Vacuum phenomenon at L5-S1 indicating remarkable decrease.  Facet hypertrophy and degenerative changes in facet joints L3-L4, bilaterally.     Prominent aortic calcifications.     Left pleural effusion and atelectasis     Impression:     Prominent degenerative changes in the lumbar spine at L4, L5 and S1     Decrease height of intervertebral space L4-L5 with degenerative process and decrease height of vertebral bodies L4-L5 predominant at L5     Remarkable disc disease at L5-S1     Atherosclerosis     Left pleural effusion and atelectasis.  Please see CT of the chest        Electronically signed  by: Roly Qiu  Date:                                            07/08/2020  Time:                                           16:00          Allergies:   Review of patient's allergies indicates:   Allergen Reactions    Vasotec [enalapril maleate] Swelling     Other reaction(s): Swelling    Vasotec [enalaprilat] Swelling     Neck swelling    Zoloft [sertraline] Other (See Comments)     Patient states it put him to sleep in the hospital he could not talk nor move    Cyclobenzaprine Hallucinations     Other reaction(s): Swelling  Hallucinations according to patient.    Other reaction(s): Lymphadenopathy, Restlessness, Sensation of being cold, Restlessness, Sensation of being cold    Amitriptyline Hallucinations    Labetalol      Other reaction(s): Pharyngeal swelling    Lisinopril      Other reaction(s): Pharyngeal swelling    Tramadol Nausea Only and Hallucinations       Current Medications:   Current Outpatient Medications   Medication Sig Dispense Refill    allopurinol (ZYLOPRIM) 300 MG tablet Take 300 mg by mouth once daily.      apixaban (ELIQUIS) 5 mg Tab Take 5 mg by mouth 2 (two) times daily.      aspirin (ECOTRIN) 81 MG EC tablet Take 81 mg by mouth.      atorvastatin (LIPITOR) 80 MG tablet Take 80 mg by mouth once daily.      cholecalciferol, vitamin D3, 3,000 unit Tab Take 1 tablet by mouth.      febuxostat (ULORIC) 40 mg Tab Take 1 tablet (40 mg total) by mouth once daily. 30 tablet 6    glipiZIDE (GLUCOTROL) 5 MG tablet Take 5 mg by mouth 2 (two) times daily before meals.      indomethacin (INDOCIN) 50 MG capsule Take 1 capsule (50 mg total) by mouth 3 (three) times daily with meals. 30 capsule 0    potassium chloride SA (K-DUR,KLOR-CON) 20 MEQ tablet Take 1 tablet (20 mEq total) by mouth 2 (two) times daily. 180 tablet 3    risperiDONE (RISPERDAL) 3 MG Tab Take by mouth.      sacubitriL-valsartan (ENTRESTO)  mg per tablet Take 1 tablet by mouth 2 (two) times daily.        spironolactone (ALDACTONE) 25 MG tablet Take 1 tablet (25 mg total) by mouth once daily. 90 tablet 3    torsemide (DEMADEX) 20 MG Tab Take 2 tablets (40 mg total) by mouth 2 (two) times daily. 360 tablet 3    coenzyme Q10 10 mg capsule Take 200 mg by mouth once daily.      colchicine (COLCRYS) 0.6 mg tablet TK 1 T PO BID PRF GOUT      isosorbide dinitrate (ISORDIL) 20 MG tablet Take 1 tablet (20 mg total) by mouth 3 (three) times daily. (Patient not taking: Reported on 6/13/2022) 90 tablet 11    magnesium oxide (MAG-OX) 400 mg (241.3 mg magnesium) tablet Take 1 tablet (400 mg total) by mouth 2 (two) times daily. (Patient not taking: Reported on 6/13/2022) 180 tablet 0    melatonin 5 mg Cap Take by mouth.      sertraline (ZOLOFT) 50 MG tablet Take 50 mg by mouth once daily. TAKE THREE TABLETS BY MOUTH EVERY MORNING TO IMPROVE MOOD       No current facility-administered medications for this visit.       REVIEW OF SYSTEMS:    GENERAL:  No weight loss, malaise or fevers.  HEENT:  Negative for frequent or significant headaches.  NECK:  Negative for lumps, goiter, pain and significant neck swelling.  RESPIRATORY:  Negative for cough, wheezing or shortness of breath.  CARDIOVASCULAR:  Negative for chest pain, leg swelling or palpitations. +CHF, CAD, HTN  GI:  Negative for abdominal discomfort, blood in stools or black stools or change in bowel habits. GERD  MUSCULOSKELETAL:  See HPI  SKIN:  Negative for lesions, rash, and itching.  PSYCH:  Negative for sleep disturbance, mood disorder and recent psychosocial stressors.  HEMATOLOGY/LYMPHOLOGY:  Negative for prolonged bleeding, bruising easily or swollen nodes.  NEURO:   No history of headaches, syncope, paralysis, seizures or tremors.  ENDO: Diabetes  All other reviewed and negative other than HPI.    Past Medical History:  Past Medical History:   Diagnosis Date    Brain damage     traumatic    CAD (coronary artery disease)     Cardiomyopathy     CHF  (congestive heart failure)     Diabetes mellitus     type 2    Edema     Erectile dysfunction     GERD (gastroesophageal reflux disease)     HTN (hypertension)     Hyperlipemia     Sciatic nerve pain, right     leg       Past Surgical History:  Past Surgical History:   Procedure Laterality Date    CARDIAC DEFIBRILLATOR PLACEMENT      CATHETERIZATION OF BOTH LEFT AND RIGHT HEART Bilateral 08/20/2018    CORONARY ARTERY BYPASS GRAFT      ILIAC ARTERY STENT      INJECTION OF JOINT Right 8/13/2021    Procedure: INJECTION, JOINT, SACROILIAC (SI);  Surgeon: Austin Parsons MD;  Location: [facility];  Service: Pain Management;  Laterality: Right;    RADIOFREQUENCY ABLATION Right 2/26/2021    Procedure: RADIOFREQUENCY ABLATION GENICULAR NERVES, COOLED;  Surgeon: Austin Parsons MD;  Location: [facility];  Service: Pain Management;  Laterality: Right;  1 of 2  consent needed  eliquis clearance requested    RADIOFREQUENCY ABLATION Right 11/12/2021    Procedure: RADIOFREQUENCY ABLATION, GENICULAR COOLED  NEED CONSENT/ clear to hold  ELIQUIS;  Surgeon: Austin Parsons MD;  Location: [facility] [place];  Service: Pain Management;  Laterality: Right;    RIGHT HEART CATHETERIZATION Right 8/17/2020    Procedure: INSERTION, CATHETER, RIGHT HEART;  Surgeon: Brianda Navas MD;  Location: [facility];  Service: Cardiology;  Laterality: Right;    TRANSFORAMINAL EPIDURAL INJECTION OF STEROID Right 9/11/2020    Procedure: INJECTION, STEROID, EPIDURAL, TRANSFORAMINAL APPROACH, L4-L5 AND L5-S1 clear to hold Eliquis 3 days;  Surgeon: Austin Parsons MD;  Location: [facility] [place];  Service: Pain Management;  Laterality: Right;    TRANSFORAMINAL EPIDURAL INJECTION OF STEROID Right 10/23/2020    Procedure: INJECTION, STEROID, EPIDURAL, TRANSFORAMINAL APPROACH;  Surgeon: Austin Parsons MD;  Location: [facility];  Service: Pain Management;  Laterality: Right;  RT RFA L5/S1 and S1  Eliquis clearance in [place]  "      Family History:  History reviewed. No pertinent family history.    Social History:  Social History     Socioeconomic History    Marital status:    Tobacco Use    Smoking status: Never Smoker    Smokeless tobacco: Never Used   Substance and Sexual Activity    Alcohol use: Not Currently    Drug use: Not Currently       OBJECTIVE:    /89 (BP Location: Right arm, Patient Position: Sitting, BP Method: Large (Automatic))   Pulse 84   Temp 97.3 °F (36.3 °C)   Ht 6' 4" (1.93 m)   Wt 117.8 kg (259 lb 11.2 oz)   BMI 31.61 kg/m²     PHYSICAL EXAMINATION: 12/2/2021  Voice normal.  Normal affect without evidence of distress.  Musculoskeletal: Focal TTP to R GTB  Gait: antalgic, aided with cane   Prior   PHYSICAL EXAMINATION:    General appearance: Well appearing, in no acute distress, alert and oriented x3.  Psych:  Mood and affect appropriate.  Skin: Skin color, texture, turgor normal, no rashes or lesions, in both upper and lower body.  Head/face:  Atraumatic, normocephalic. No palpable lymph nodes  Cor: RRR  Pulm: Symmetric chest rise, no respiratory distress noted.   Back: Straight leg raising in the sitting position is negative to radicular pain bilaterally. There is pain with palpation over lumbar facet joints bilaterally. Limited ROM with pain on flexion and extension. Positive facet loading bilaterally.   Extremities: No deformities or skin discoloration. Good capillary refill.   Musculoskeletal:  There is pain with palpation over right SI joint. FABERs is positive on the right. There is pain with palpation over right GTB. There is pain with palpation over medial and lateral joint line of bilateral knees, right greater than left. Full ROM of right knee with extension. Crepitus noted to right knee. Bilateral lower extremity strength is normal and symmetric.  No atrophy or tone abnormalities are noted.   Neuro: Bilateral lower extremity coordination and muscle stretch reflexes are physiologic " and symmetric. Plantar response are downgoing. No loss of sensation is noted.  Gait: Antalgic- ambulates without assistance.     ASSESSMENT: 72 y.o. year old male with pain, consistent with the followin. Encounter for therapeutic drug monitoring  Pain Clinic Drug Screen   2. Greater trochanteric bursitis of right hip     3. Lumbar radiculopathy     4. Knee pain, unspecified chronicity, unspecified laterality     5. Primary osteoarthritis of both knees           PLAN:     - Previous imaging was reviewed and discussed with the patient today.    - Prior imaging reviewed    - Warning given regarding continuing medical mariajuana and he should not receive narcotics from other physicians/facilities    - Pain contract signed 2020.    - Will update [facility] today 2022    , Will also have Pt return for in-person visit with Dr Parsons to continue medication mgt     - Restarting 5/325mg BID #60     - The patient is here today for a refill of current pain medications and they believe these provide effective pain control and improvements in quality of life.  The patient notes no serious side effects, and feels the benefits outweigh the risks.  The patient was reminded of the pain contract that they signed previously as well as the risks and benefits of the medication including possible death.  The updated [place] Board  Pharmacy prescription monitoring program was reviewed, and the patient has been found to be compliant with current treatment plan.  - Continue home exercise routine.     The above plan and management options were discussed at length with patient. Patient is in agreement with the above and verbalized understanding.    - RTC with Dr Parsons to continue medication mgt    Mike Daley NP  2022     I spent a total of 30 minutes on the day of the visit.  This includes face to face time and non-face to face time preparing to see the patient by reviewing previous labs/imaging, obtaining  and/or reviewing separately obtained history, documenting clinical information in the electronic or other health record, independently interpreting results and communicating results to the patient/family/caregiver.

## 2022-06-14 PROBLEM — E87.1 HYPONATREMIA: Status: ACTIVE | Noted: 2022-06-14

## 2022-06-14 RX ORDER — HYDROCODONE BITARTRATE AND ACETAMINOPHEN 5; 325 MG/1; MG/1
1 TABLET ORAL EVERY 12 HOURS PRN
Qty: 60 TABLET | Refills: 0 | Status: SHIPPED | OUTPATIENT
Start: 2022-06-14 | End: 2022-07-18 | Stop reason: SDUPTHER

## 2022-06-15 PROBLEM — Z86.718 HISTORY OF DVT (DEEP VEIN THROMBOSIS): Status: ACTIVE | Noted: 2022-06-15

## 2022-06-15 PROBLEM — I50.20 HFREF (HEART FAILURE WITH REDUCED EJECTION FRACTION): Status: ACTIVE | Noted: 2022-06-15

## 2022-06-17 LAB
6MAM UR QL: NOT DETECTED
7AMINOCLONAZEPAM UR QL: NOT DETECTED
A-OH ALPRAZ UR QL: NOT DETECTED
ALPHA-OH-MIDAZOLAM: NOT DETECTED
ALPRAZ UR QL: NOT DETECTED
AMPHET UR QL SCN: NOT DETECTED
ANNOTATION COMMENT IMP: NORMAL
ANNOTATION COMMENT IMP: NORMAL
BARBITURATES UR QL: NOT DETECTED
BUPRENORPHINE UR QL: NOT DETECTED
BZE UR QL: NOT DETECTED
CARBOXYTHC UR QL: NOT DETECTED
CARISOPRODOL UR QL: NOT DETECTED
CLONAZEPAM UR QL: NOT DETECTED
CODEINE UR QL: NOT DETECTED
CREAT UR-MCNC: 115 MG/DL (ref 20–400)
DIAZEPAM UR QL: NOT DETECTED
ETHYL GLUCURONIDE UR QL: NOT DETECTED
FENTANYL UR QL: NOT DETECTED
GABAPENTIN: PRESENT
HYDROCODONE UR QL: PRESENT
HYDROMORPHONE UR QL: PRESENT
LORAZEPAM UR QL: NOT DETECTED
MDA UR QL: NOT DETECTED
MDEA UR QL: NOT DETECTED
MDMA UR QL: NOT DETECTED
ME-PHENIDATE UR QL: NOT DETECTED
METHADONE UR QL: NOT DETECTED
METHAMPHET UR QL: NOT DETECTED
MIDAZOLAM UR QL SCN: NOT DETECTED
MORPHINE UR QL: NOT DETECTED
NALOXONE: NOT DETECTED
NORBUPRENORPHINE UR QL CFM: NOT DETECTED
NORDIAZEPAM UR QL: NOT DETECTED
NORFENTANYL UR QL: NOT DETECTED
NORHYDROCODONE UR QL CFM: PRESENT
NORMEPERIDINE UR QL CFM: NOT DETECTED
NOROXYCODONE UR QL CFM: NOT DETECTED
NOROXYMORPHONE UR QL SCN: NOT DETECTED
OXAZEPAM UR QL: NOT DETECTED
OXYCODONE UR QL: NOT DETECTED
OXYMORPHONE UR QL: NOT DETECTED
PATHOLOGY STUDY: NORMAL
PCP UR QL: NOT DETECTED
PHENTERMINE UR QL: NOT DETECTED
PREGABALIN: NOT DETECTED
SERVICE CMNT-IMP: NORMAL
TAPENTADOL UR QL SCN: NOT DETECTED
TAPENTADOL UR QL SCN: NOT DETECTED
TEMAZEPAM UR QL: NOT DETECTED
TRAMADOL UR QL: NOT DETECTED
ZOLPIDEM METABOLITE: NOT DETECTED
ZOLPIDEM UR QL: NOT DETECTED

## 2022-07-18 DIAGNOSIS — M54.42 CHRONIC RIGHT-SIDED LOW BACK PAIN WITH BILATERAL SCIATICA: Primary | ICD-10-CM

## 2022-07-18 DIAGNOSIS — M54.41 CHRONIC RIGHT-SIDED LOW BACK PAIN WITH BILATERAL SCIATICA: Primary | ICD-10-CM

## 2022-07-18 DIAGNOSIS — G89.29 CHRONIC RIGHT-SIDED LOW BACK PAIN WITH BILATERAL SCIATICA: Primary | ICD-10-CM

## 2022-07-18 RX ORDER — HYDROCODONE BITARTRATE AND ACETAMINOPHEN 5; 325 MG/1; MG/1
1 TABLET ORAL EVERY 12 HOURS PRN
Qty: 60 TABLET | Refills: 0 | Status: SHIPPED | OUTPATIENT
Start: 2022-07-18 | End: 2022-08-16 | Stop reason: SDUPTHER

## 2022-07-18 NOTE — TELEPHONE ENCOUNTER
----- Message from Janene Guilherme sent at 7/18/2022  8:07 AM CDT -----  Who Called: PT        Refill or New Rx:Refill        RX Name and Strength:HYDROcodone-acetaminophen (NORCO) 5-325 mg per tablet        How is the patient currently taking it? (ex. 1XDay):2x        Is this a 30 day or 90 day RX:30 day        Preferred Pharmacy with phone number:[id number] - [facility] [address] AT [facility]   Phone:  [phone]  Fax:  [phone]          Local or Mail Order:Local        Ordering Provider:Mike Daley NP        Best Call Back Number:[phone]        Additional Information: He would like to know if his consult is still good.

## 2022-07-18 NOTE — TELEPHONE ENCOUNTER
Patient requesting refill on Norco 5/325mg  Last office visit 06.13.22   shows last refill on 06.14.22  Patient does have a pain contract on file with [facility] department  Patient last UDS 06.13.22 was consistent with current therapy    CODEINE  Not Detected  Not Detected  Not Detected   Not Detected  Not Detected  Not Detected  Not Detected    MORPHINE  Not Detected  Not Detected  Not Detected   Not Detected  Not Detected  Not Detected  Not Detected    6-ACETYLMORPHINE  Not Detected  Not Detected  Not Detected   Not Detected  Not Detected  Not Detected  Not Detected    OXYCODONE  Not Detected  Not Detected  Not Detected   Not Detected  Not Detected  Not Detected  Not Detected    NOROYXCODONE  Not Detected  Not Detected  Not Detected   Not Detected  Not Detected  Not Detected  Not Detected    OXYMORPHONE  Not Detected  Not Detected  Not Detected   Not Detected  Not Detected  Not Detected  Not Detected    NOROXYMORPHONE  Not Detected  Not Detected  Not Detected   Not Detected  Not Detected  Not Detected  Not Detected    HYDROCODONE  Present  Not Detected  Present   Present  Present  Not Detected  Not Detected    NORHYDROCODONE  Present  Not Detected  Present   Not Detected  Present  Not Detected  Not Detected    HYDROMORPHONE  Present  Not Detected  Not Detected   Not Detected  Not Detected  Not Detected  Not Detected    BUPRENORPHINE  Not Detected  Not Detected  Not Detected   Not Detected  Not Detected  Not Detected  Not Detected    NORUBPRENORPHINE  Not Detected  Not Detected  Not Detected   Not Detected  Not Detected  Not Detected  Not Detected    FENTANYL  Not Detected  Not Detected  Not Detected   Not Detected  Not Detected  Not Detected  Not Detected    NORFENTANYL  Not Detected  Not Detected  Not Detected   Not Detected  Not Detected  Not Detected  Not Detected    MEPERIDINE METABOLITE  Not Detected  Not Detected  Not Detected   Not Detected  Not Detected  Not Detected  Not Detected     TAPENTADOL  Not Detected  Not Detected  Not Detected   Not Detected  Not Detected  Not Detected  Not Detected    TAPENTADOL-O-SULF  Not Detected  Not Detected  Not Detected   Not Detected  Not Detected  Not Detected  Not Detected    METHADONE  Not Detected  Not Detected  Not Detected   Not Detected  Not Detected  Not Detected  Not Detected    TRAMADOL  Not Detected  Present  Not Detected   Not Detected  Present  Present  Present    AMPHETAMINE  Not Detected  Not Detected  Not Detected   Not Detected  Not Detected  Not Detected  Not Detected    METHAMPHETAMINE  Not Detected  Not Detected  Not Detected   Not Detected  Not Detected  Not Detected  Not Detected    MDMA- ECSTASY  Not Detected  Not Detected  Not Detected   Not Detected  Not Detected  Not Detected  Not Detected    MDA  Not Detected  Not Detected  Not Detected   Not Detected  Not Detected  Not Detected  Not Detected    MDEA- Audelia  Not Detected  Not Detected  Not Detected   Not Detected  Not Detected  Not Detected  Not Detected    METHYLPHENIDATE  Not Detected  Not Detected  Not Detected   Not Detected  Not Detected  Not Detected  Not Detected    PHENTERMINE  Not Detected  Not Detected  Not Detected   Not Detected  Not Detected  Not Detected  Not Detected    BENZOYLECGONINE  Not Detected  Not Detected  Not Detected   Not Detected  Not Detected  Not Detected  Not Detected    ALPRAZOLAM  Not Detected  Not Detected  Not Detected   Not Detected  Not Detected  Not Detected  Not Detected    ALPHA-OH-ALPRAZOLAM  Not Detected  Not Detected  Not Detected   Not Detected  Not Detected  Not Detected  Not Detected    CLONAZEPAM  Not Detected  Not Detected  Not Detected   Not Detected  Not Detected  Not Detected  Not Detected    7-AMINOCLONAZEPAM  Not Detected  Not Detected  Not Detected   Not Detected  Not Detected  Not Detected  Not Detected    DIAZEPAM  Not Detected  Not Detected  Not Detected   Not Detected  Not Detected  Not Detected  Not Detected    NORDIAZEPAM  Not  Detected  Not Detected  Not Detected   Not Detected  Not Detected  Not Detected  Not Detected    OXAZEPAM  Not Detected  Not Detected  Not Detected   Not Detected  Not Detected  Not Detected  Not Detected    TEMAZEPAM  Not Detected  Not Detected  Not Detected   Not Detected  Not Detected  Not Detected  Not Detected    Lorazepam  Not Detected  Not Detected  Not Detected   Not Detected  Not Detected  Not Detected  Not Detected    MIDAZOLAM  Not Detected  Not Detected  Not Detected   Not Detected  Not Detected  Not Detected  Not Detected    ZOLPIDEM  Not Detected  Not Detected  Not Detected   Not Detected  Not Detected  Not Detected  Not Detected    BARBITURATES  Not Detected  Not Detected  Not Detected   Not Detected  Not Detected  Not Detected  Not Detected    Creatinine, Urine 20.0 - 400.0 mg/dL 115.0  76.4  177.0  160.0 R, CM  151.5  103.3  255.9  255.9    ETHYL GLUCURONIDE  Not Detected  Not Detected  Not Detected   Present  Not Detected  Not Detected  Not Detected    MARIJUANA METABOLITE  Not Detected  Not Detected  Not Detected   Not Detected  Not Detected  Not Detected  Not Detected    PCP  Not Detected  Not Detected  Not Detected   Not Detected  Not Detected  Not Detected  Not Detected    CARISOPRODOL  Not Detected  Not Detected CM  Not Detected CM   Not Detected CM  Not Detected CM  Not Detected CM  Not Detected CM    Comment: The carisoprodol immunoassay has cross-reactivity to   carisoprodol and meprobamate.    Naloxone  Not Detected  Not Detected          Gabapentin  Present  Present          Pregabalin  Not Detected  Not Detected          Alpha-OH-Midazolam  Not Detected  Not Detected          Zolpidem Metabolite  Not Detected  Not Detected

## 2022-08-16 DIAGNOSIS — M54.41 CHRONIC RIGHT-SIDED LOW BACK PAIN WITH BILATERAL SCIATICA: ICD-10-CM

## 2022-08-16 DIAGNOSIS — G89.29 CHRONIC RIGHT-SIDED LOW BACK PAIN WITH BILATERAL SCIATICA: ICD-10-CM

## 2022-08-16 DIAGNOSIS — M54.42 CHRONIC RIGHT-SIDED LOW BACK PAIN WITH BILATERAL SCIATICA: ICD-10-CM

## 2022-08-16 RX ORDER — HYDROCODONE BITARTRATE AND ACETAMINOPHEN 5; 325 MG/1; MG/1
1 TABLET ORAL EVERY 12 HOURS PRN
Qty: 60 TABLET | Refills: 0 | Status: SHIPPED | OUTPATIENT
Start: 2022-08-19 | End: 2022-01-01 | Stop reason: SDUPTHER

## 2022-08-16 NOTE — TELEPHONE ENCOUNTER
Patient requesting refill on Norco 5/325mg  Last office visit 06.13.22   shows last refill on 07.20.22  Patient does have a pain contract on file with [facility] department  Patient last UDS 06.13.22 was consistent with current therapy    CODEINE Not Detected Not Detected Not Detected Not Detected Not Detected Not Detected Not Detected MORPHINE Not Detected Not Detected Not Detected Not Detected Not Detected Not Detected Not Detected 6-ACETYLMORPHINE Not Detected Not Detected Not Detected Not Detected Not Detected Not Detected Not Detected OXYCODONE Not Detected Not Detected Not Detected Not Detected Not Detected Not Detected Not Detected NOROYXCODONE Not Detected Not Detected Not Detected Not Detected Not Detected Not Detected Not Detected OXYMORPHONE Not Detected Not Detected Not Detected Not Detected Not Detected Not Detected Not Detected NOROXYMORPHONE Not Detected Not Detected Not Detected Not Detected Not Detected Not Detected Not Detected HYDROCODONE Present Not Detected Present Present Present Not Detected Not Detected NORHYDROCODONE Present Not Detected Present Not Detected Present Not Detected Not Detected HYDROMORPHONE Present Not Detected Not Detected Not Detected Not Detected Not Detected Not Detected BUPRENORPHINE Not Detected Not Detected Not Detected Not Detected Not Detected Not Detected Not Detected NORUBPRENORPHINE Not Detected Not Detected Not Detected Not Detected Not Detected Not Detected Not Detected FENTANYL Not Detected Not Detected Not Detected Not Detected Not Detected Not Detected Not Detected NORFENTANYL Not Detected Not Detected Not Detected Not Detected Not Detected Not Detected Not Detected MEPERIDINE METABOLITE Not Detected Not Detected Not Detected Not Detected Not Detected Not Detected Not Detected TAPENTADOL Not Detected Not Detected Not Detected Not Detected Not Detected Not Detected Not Detected TAPENTADOL-O-SULF Not Detected Not Detected Not Detected Not  Detected Not Detected Not Detected Not Detected METHADONE Not Detected Not Detected Not Detected Not Detected Not Detected Not Detected Not Detected TRAMADOL Not Detected Present Not Detected Not Detected Present Present Present AMPHETAMINE Not Detected Not Detected Not Detected Not Detected Not Detected Not Detected Not Detected METHAMPHETAMINE Not Detected Not Detected Not Detected Not Detected Not Detected Not Detected Not Detected MDMA- ECSTASY Not Detected Not Detected Not Detected Not Detected Not Detected Not Detected Not Detected MDA Not Detected Not Detected Not Detected Not Detected Not Detected Not Detected Not Detected MDEA- Audelia Not Detected Not Detected Not Detected Not Detected Not Detected Not Detected Not Detected METHYLPHENIDATE Not Detected Not Detected Not Detected Not Detected Not Detected Not Detected Not Detected PHENTERMINE Not Detected Not Detected Not Detected Not Detected Not Detected Not Detected Not Detected BENZOYLECGONINE Not Detected Not Detected Not Detected Not Detected Not Detected Not Detected Not Detected ALPRAZOLAM Not Detected Not Detected Not Detected Not Detected Not Detected Not Detected Not Detected ALPHA-OH-ALPRAZOLAM Not Detected Not Detected Not Detected Not Detected Not Detected Not Detected Not Detected CLONAZEPAM Not Detected Not Detected Not Detected Not Detected Not Detected Not Detected Not Detected 7-AMINOCLONAZEPAM Not Detected Not Detected Not Detected Not Detected Not Detected Not Detected Not Detected DIAZEPAM Not Detected Not Detected Not Detected Not Detected Not Detected Not Detected Not Detected NORDIAZEPAM Not Detected Not Detected Not Detected Not Detected Not Detected Not Detected Not Detected OXAZEPAM Not Detected Not Detected Not Detected Not Detected Not Detected Not Detected Not Detected TEMAZEPAM Not Detected Not Detected Not Detected Not Detected Not Detected Not Detected Not Detected Lorazepam Not Detected Not Detected Not Detected Not Detected  Not Detected Not Detected Not Detected MIDAZOLAM Not Detected Not Detected Not Detected Not Detected Not Detected Not Detected Not Detected ZOLPIDEM Not Detected Not Detected Not Detected Not Detected Not Detected Not Detected Not Detected BARBITURATES Not Detected Not Detected Not Detected Not Detected Not Detected Not Detected Not Detected Creatinine, Urine 20.0 - 400.0 mg/dL 115.0 76.4 177.0 160.0 R, .5 103.3 255.9 255.9 ETHYL GLUCURONIDE Not Detected Not Detected Not Detected Present Not Detected Not Detected Not Detected MARIJUANA METABOLITE Not Detected Not Detected Not Detected Not Detected Not Detected Not Detected Not Detected PCP Not Detected Not Detected Not Detected Not Detected Not Detected Not Detected Not Detected CARISOPRODOL Not Detected Not Detected CM Not Detected CM Not Detected CM Not Detected CM Not Detected CM Not Detected CM Comment: The carisoprodol immunoassay has cross-reactivity to   carisoprodol and meprobamate.   Naloxone Not Detected Not Detected Gabapentin Present Present Pregabalin Not Detected Not Detected Alpha-OH-Midazolam Not Detected Not Detected Zolpidem Metabolite Not Detected Not Detected

## 2022-08-16 NOTE — TELEPHONE ENCOUNTER
----- Message from Ramirez Georges sent at 8/16/2022 10:29 AM CDT -----  Regarding: Refill  Who Called:ALLIE TOLEDO [[id number]]        Refill or New Rx: Refill        RX Name and Strength HYDROcodone-acetaminophen (NORCO) 5-325 mg per tablet        Is this a 30 day or 90 day RX: 60        Preferred Pharmacy with phone number: [id number] - [place], [place] - [address] AT [facility]          Local or Mail Order: Local          Would the patient rather a call back or a response via [facility]? No        Best Call Back Number:[phone]        Additional Information:

## 2022-08-23 ENCOUNTER — PATIENT MESSAGE (OUTPATIENT)
Dept: PAIN MEDICINE | Facility: CLINIC | Age: 72
End: 2022-08-23
Payer: OTHER GOVERNMENT

## 2022-09-12 NOTE — TELEPHONE ENCOUNTER
----- Message from aLta Jarrett sent at 9/12/2022  2:35 PM CDT -----  Contact: ALLIE TOLEDO [[id number]]  Type: Call Back      Who called:ALLIE TOLEDO [[id number]]      What is the request in detail: Patient is requesting a call back. He would like to know the reason his 09/13 appointment was cancelled. He would like to know if it was not authorized. Please advise.       Can the clinic reply by [facility]? No      Would the patient rather a call back or a response via [facility]? Call back       Best call back number: [phone] (mobile)      Additional Information:        Left message for patient to return call to clinic.  RE: Rx

## 2022-09-12 NOTE — TELEPHONE ENCOUNTER
Staff contacted pt in regards to pt requesting a callback due to his appointment being rescheduled staff left pt a voicemail letting pt know his appointment want canceled due to provider not being in office on that day and staff also informed pt to call back to reschedule his appt at [phone]

## 2022-09-15 NOTE — TELEPHONE ENCOUNTER
----- Message from Jia Laws sent at 9/15/2022  1:59 PM CDT -----  Regarding: Refill request  Type:  RX Refill Request    Who Called:  ALLIE TOLEDO [[id number]]    Refill or New Rx: Refill     RX Name and Strength: HYDROcodone-acetaminophen (NORCO) 5-325 mg per tablet    How is the patient currently taking it? (ex. 1XDay) 2xday     Is this a 30 day or 90 day RX: 30 days     Preferred Pharmacy with phone number: [facility] #[id number] - [place], [place] - [address] AT [facility]    Local or Mail Order: local     Ordering Provider: Jaqueline Petersen Call Back Number:[phone]    Additional Information: He would also like to know if the staff has [facility]. He would like call back

## 2022-09-16 NOTE — TELEPHONE ENCOUNTER
This message is for patient in regards to his/her appointment 09/19/22 at 2:00p  With JOSEPH Kaufman.      For the safety of all patients and staff members. We are requesting during this visit that all patients and visitors to wear required face mask at all times here at [facility].     If you have any questions or concerns please contact [phone]     Pt verbalized understanding and has confirmed appt

## 2022-09-19 NOTE — PROGRESS NOTES
Chronic patient Established Note (Follow up visit)  This a.m.   HPI  Ismhael Thrasher is a 70 y.o. male with a history of CHF (EF 10%) who presents today with chronic low back pain. This pain started in 1971 as a result of and injury in the .  He describes a constant, right sided pain that starts in his low back and radiates down the posterior aspect of his right leg to the bottom of his foot.  This is associated with a numbness, tingling sensation.  The pain occurs when he is standing still for longer than 5 minutes.  The pain does not occur when walking unless he is standing first.  He denies heaviness and weakness.  This pain is described in detail below.     Aggravating factors: Standing in place for 5 minutes     Mitigating factors: Walking, medication, rest     Previously seeing: Dr. Davide Wang ([place])     Physical Therapy: Has done for his heart, but not for his back     Interval History (7/2/2020):  The patient returns to clinic today for follow up of back pain. He continues to report low back pain that radiates down the posterior aspect of his right leg to the bottom of his foot. He denies any left leg pain. His pain is worse with prolonged standing. He has had injections in the past without relief. He is currently taking Tramadol with limited relief. He denies any other health changes. His pain today is 10/10.     Interval History 8/6/2020:   Ishmael Thrasher presents to the clinic for a follow-up appointment for bilateral leg pain. Since the last visit, Ishmael Thrasher states the pain has been worsening. Current pain intensity is 10/10.     Interval History 9/28/2020:  The patient returns to clinic today for follow up of low back and leg pain. He is s/p right L4/5 and L5/S1 TF PÉREZ on 9/11/2020. He reports no significant relief of his back and leg pain. He continues to report low back pain that radiates down the posterior aspect of his right leg to under his foot. He denies any left leg  pain. His pain is worse with prolonged standing and walking. He is currently taking Norco with limited benefit. He reports that this decreases his pain but does not last. He denies any other health changes. His pain today is 10/10.     Interval History 11/16/2020:  The patient returns to clinic today for follow up of low back pain. He is s/p right L5/S1 and S1 TF PÉREZ on 10/23/2020. He reports 60% relief for about a week. He continues to report low back pain that radiates into the posterior aspect of his right leg to his foot. He denies any left leg pain. His pain is worse with standing and walking. He also reports difficulty sleeping due to pain. He is frustrated with his continued pain. He continues to take Norco but reports that this does not last. He denies any other health changes. His pain today is 10/10.     Interval History 12/21/2020:  Ishmael Thrasher presents to the clinic for a  1 month follow-up appointment. Since the last visit, Ishmael Thrasher states the pain has been stable. Current pain intensity is 10/10.    Interval History 3/31/2021  Patient complains of bilateral knee pain, usually uses walker for assistance but was unable to bring with them. Patient is s/p right cooled RFA on 2/26/21 with 60% relief of symptoms that has since returned. He reports he was admitted in the hospital for acute decompensated heart failure following receiving the second dose of the COVID-19 Moderna vaccine. He states he was admitted to [facility] twice and then discharged and re-admitted [facility] (discharged on 3/20/21) the pain started on 5 days ago while there and he reported it while admitted. He reports his pain level is 10/10. Its effecting his ability ambulate as he has not been able to use his legs since being in the hospital. He reports the pain has been this level the last 5 days since worsening. He is taking the Hydrocodone 5/325 TID, which is not helping. Also taking 325mg ASA  "with moderate relief - his heart doctor told him not to continue taking ASA at this dose. During hospital admission, patient complained of left elbow and left knee pain that was attributed to the gouty arthritis that has since resolved. Left knee pain now feels like his "usual" knee pain. Patient states he was able to move around on a daily basis with PT while admitted in the hospital. Feel more depressed and less motivated since receiving 2/16/21. Patient also complains of bilateral back pain radiating down his right leg, which is unchanged from his baseline chronic pain.    Interval History 7/27/2021:  The patient returns to clinic today for follow up of low back and knee pain. He continues to report right knee pain. He also reports increased low back and right hip pain. He reports intermittent radiating pain into his right lateral thigh. His pain is worse with standing and walking. He denies any left leg pain. He continues to perform a home exercise routine. He continues to take Norco as needed. He denies any other health changes. His pain today is 10/10.    Interval History 10/27/2021:  The patient returns to clinic today for follow up of low back and knee pain. He is s/p right SI joint injection on 8/13/2021. He reports 40% relief of his right hip and buttock pain. He continues to reports low back pain that radiates into the posterior aspect of his right leg to his calf. He denies any left leg pain. He reports increased right knee pain. He did have a recent fall which triggered this pain. His pain is worse with standing and walking. He continues to take Norco, although this provides limited relief. He would like to go back to Tramadol. He denies any other health changes. His pain today is 10/10.    Interval History 12/2/2021:  Mr Thrasher presents for routine medication follow up. He stats prior R knee genicular RFA did well. He continues to have diffuse pain complaints one which is R lateral hip pain and " inability to lay on that site due to focal pain. He states tramadol has not benefit when compared to Norco and would like to rotate back to Norco 5/325mg BID. Denies new ban of pain or neurological changes.     Interval History 1/10/2022:  Mr Thrasher presents for follow up of lower back pain and radicular pain down posterior leg going to bottom of calf. He is s/p focal R GTB TTP and states approx 2 weeks benefit. He states rotation from Tramadol to Norco 5/325mg with some mild benefit.     Interval History 6/17/2022:  Mr Thrasher presents for delayed FU. Over interval he has been stable. He inadverntely was Rxed Norco 7.5mg q6hrs. He also has been started on medical mariajuana and received Norco 10mg for gout while visiting ER. We discussed all these issues and Pt voiced understanding. States last Dosing of [place] was this a.m. Denies new areas of pain, denies any neurological changes. Denies SE of medications.     Interval History 9/19/2022:  Mr Thrasher presents for follow up of chronic pain. He has had worsening pain due to being out of medication as FU was just past 90 days. He is having newer return of L elbow pain. Denies further neurological changes. Denies worsening of chronic pain to R hip and R knee. Continues to take Norco 5/325mg BID without SE.       Pain Disability Index Review:  Last 3 PDI Scores 9/19/2022 6/13/2022 1/10/2022   Pain Disability Index (PDI) 50 60 59       Pain Medications:  Norco 5mg  ASA 325mg  Topical lidocaine      Opioid Contract: yes     report:  Reviewed and consistent with medication use as prescribed.    Pain Procedures:   9/11/2020- Right L4/5 and L5/S1 TF PÉREZ  10/23/2020- Right L5/S1 and S1 TF PÉREZ  2/5/2021- Bilateral genicular nerve block  2/26/2021- Right genicular cooled RFA  11/12/2021 Right knee genicular RFA     Physical Therapy/Home Exercise: Set up from PT since discharge from hospital, but has not arranged.    Imaging:   Narrative & Impression       EXAMINATION:  XR  LUMBAR SPINE 5 VIEW WITH FLEX AND EXT     CLINICAL HISTORY:  Back pain or radiculopathy, > 6 wks;  Radiculopathy, lumbar region     TECHNIQUE:  AP, lateral, oblique views of the lumbar spine with spot view of the lumbosacral region and lateral views in flexion and extension.     COMPARISON:  None     FINDINGS:  There is no collapse or malalignment in the lumbar spine.  There is no significant change in alignment between flexion and extension.     There is reduced of the intervertebral disc spaces at L4-L5 and L5-S1 with disc vacuum phenomena and anterior osteophyte formation.  There are mild neural foraminal stenosis at these levels.     There is mild bilateral neural foraminal stenosis.     There are extensive vascular calcifications in the abdominal aorta.  The soft tissues are otherwise unremarkable.     Impression:     1. Moderate to severe spondylosis and neural foraminal stenosis at L4-L5 and L5-S1.  2. Straightening of lumbar lordosis  3. No changes in alignment in flexion and extension     Electronically signed by resident: Marko Lamas  Date:                                            07/07/2020  Time:                                           15:05     Electronically signed by: Roly Qiu  Date:                                            07/07/2020  Time:                                           15:21          Narrative & Impression       EXAMINATION:  CT LUMBAR SPINE WITHOUT CONTRAST     CLINICAL HISTORY:  Back pain or radiculopathy, > 6 wks;.     COMPARISON:  None.     FINDINGS:  Decrease height of vertebral bodies L4-L5 predominant at L5.  Degenerative changes in the endplates with decrease height intervertebral space L4-L5.  Vacuum phenomenon at L5-S1 indicating remarkable decrease.  Facet hypertrophy and degenerative changes in facet joints L3-L4, bilaterally.     Prominent aortic calcifications.     Left pleural effusion and atelectasis     Impression:     Prominent degenerative changes  in the lumbar spine at L4, L5 and S1     Decrease height of intervertebral space L4-L5 with degenerative process and decrease height of vertebral bodies L4-L5 predominant at L5     Remarkable disc disease at L5-S1     Atherosclerosis     Left pleural effusion and atelectasis.  Please see CT of the chest        Electronically signed by: Roly Qiu  Date:                                            07/08/2020  Time:                                           16:00          Allergies:   Review of patient's allergies indicates:   Allergen Reactions    Enalapril maleate Swelling and Edema     Other reaction(s): Swelling    Vasotec [enalaprilat] Swelling     Neck swelling    Zoloft [sertraline] Other (See Comments)     Patient states it put him to sleep in the hospital he could not talk nor move    Cyclobenzaprine Hallucinations     Other reaction(s): Swelling  Hallucinations according to patient.    Other reaction(s): Lymphadenopathy, Restlessness, Sensation of being cold, Restlessness, Sensation of being cold    Amitriptyline Hallucinations    Labetalol      Other reaction(s): Pharyngeal swelling    Lisinopril      Other reaction(s): Pharyngeal swelling    Tramadol Nausea Only and Hallucinations       Current Medications:   Current Outpatient Medications   Medication Sig Dispense Refill    allopurinol (ZYLOPRIM) 300 MG tablet Take 300 mg by mouth once daily.      apixaban (ELIQUIS) 5 mg Tab Take 5 mg by mouth 2 (two) times daily.      aspirin (ECOTRIN) 81 MG EC tablet Take 81 mg by mouth.      atorvastatin (LIPITOR) 80 MG tablet Take 80 mg by mouth once daily.      cholecalciferol, vitamin D3, 3,000 unit Tab Take 1 tablet by mouth.      coenzyme Q10 10 mg capsule Take 200 mg by mouth once daily.      colchicine (COLCRYS) 0.6 mg tablet TK 1 T PO BID PRF GOUT      febuxostat (ULORIC) 40 mg Tab Take 1 tablet (40 mg total) by mouth once daily. 30 tablet 6    glipiZIDE (GLUCOTROL) 5 MG tablet Take 5 mg by mouth 2  (two) times daily before meals.      indomethacin (INDOCIN) 50 MG capsule Take 1 capsule (50 mg total) by mouth 3 (three) times daily with meals. 30 capsule 0    melatonin 5 mg Cap Take by mouth.      metoprolol succinate (TOPROL-XL) 50 MG 24 hr tablet Take 1 tablet (50 mg total) by mouth once daily. 30 tablet 1    potassium chloride SA (K-DUR,KLOR-CON) 20 MEQ tablet Take 1 tablet (20 mEq total) by mouth 2 (two) times daily. 180 tablet 3    risperiDONE (RISPERDAL) 3 MG Tab Take by mouth.      sacubitriL-valsartan (ENTRESTO) 49-51 mg per tablet Take 1 tablet by mouth 2 (two) times daily. 60 tablet 3    sertraline (ZOLOFT) 50 MG tablet Take 50 mg by mouth once daily. TAKE THREE TABLETS BY MOUTH EVERY MORNING TO IMPROVE MOOD      HYDROcodone-acetaminophen (NORCO) 5-325 mg per tablet Take 1 tablet by mouth every 12 (twelve) hours as needed for Pain. 60 tablet 0    spironolactone (ALDACTONE) 25 MG tablet Take 1 tablet (25 mg total) by mouth once daily. 90 tablet 3    torsemide (DEMADEX) 20 MG Tab Take 2 tablets (40 mg total) by mouth 2 (two) times daily. 360 tablet 3     No current facility-administered medications for this visit.       REVIEW OF SYSTEMS:    GENERAL:  No weight loss, malaise or fevers.  HEENT:  Negative for frequent or significant headaches.  NECK:  Negative for lumps, goiter, pain and significant neck swelling.  RESPIRATORY:  Negative for cough, wheezing or shortness of breath.  CARDIOVASCULAR:  Negative for chest pain, leg swelling or palpitations. +CHF, CAD, HTN  GI:  Negative for abdominal discomfort, blood in stools or black stools or change in bowel habits. GERD  MUSCULOSKELETAL:  See HPI  SKIN:  Negative for lesions, rash, and itching.  PSYCH:  Negative for sleep disturbance, mood disorder and recent psychosocial stressors.  HEMATOLOGY/LYMPHOLOGY:  Negative for prolonged bleeding, bruising easily or swollen nodes.  NEURO:   No history of headaches, syncope, paralysis, seizures or tremors.  ENDO:  Diabetes  All other reviewed and negative other than HPI.    Past Medical History:  Past Medical History:   Diagnosis Date    Brain damage     traumatic    CAD (coronary artery disease)     Cardiomyopathy     CHF (congestive heart failure)     Diabetes mellitus     type 2    Edema     Erectile dysfunction     GERD (gastroesophageal reflux disease)     HTN (hypertension)     Hyperlipemia     Sciatic nerve pain, right     leg       Past Surgical History:  Past Surgical History:   Procedure Laterality Date    CARDIAC DEFIBRILLATOR PLACEMENT      CATHETERIZATION OF BOTH LEFT AND RIGHT HEART Bilateral 08/20/2018    CORONARY ARTERY BYPASS GRAFT      ILIAC ARTERY STENT      INJECTION OF JOINT Right 8/13/2021    Procedure: INJECTION, JOINT, SACROILIAC (SI);  Surgeon: Austin Parsons MD;  Location: [facility] PAIN MGT;  Service: Pain Management;  Laterality: Right;    RADIOFREQUENCY ABLATION Right 2/26/2021    Procedure: RADIOFREQUENCY ABLATION GENICULAR NERVES, COOLED;  Surgeon: Austin Parsons MD;  Location: [facility] PAIN MGT;  Service: Pain Management;  Laterality: Right;  1 of 2  consent needed  eliquis clearance requested    RADIOFREQUENCY ABLATION Right 11/12/2021    Procedure: RADIOFREQUENCY ABLATION, GENICULAR COOLED  NEED CONSENT/ clear to hold  ELIQUIS;  Surgeon: Austin Parsons MD;  Location: [facility] PAIN MGT;  Service: Pain Management;  Laterality: Right;    RIGHT HEART CATHETERIZATION Right 8/17/2020    Procedure: INSERTION, CATHETER, RIGHT HEART;  Surgeon: Brianda Navas MD;  Location: [facility] CATH LAB;  Service: Cardiology;  Laterality: Right;    TRANSFORAMINAL EPIDURAL INJECTION OF STEROID Right 9/11/2020    Procedure: INJECTION, STEROID, EPIDURAL, TRANSFORAMINAL APPROACH, L4-L5 AND L5-S1 clear to hold Eliquis 3 days;  Surgeon: Austin Parsons MD;  Location: [facility] PAIN MGT;  Service: Pain Management;  Laterality: Right;    TRANSFORAMINAL EPIDURAL INJECTION OF STEROID Right 10/23/2020    Procedure: INJECTION, STEROID,  "EPIDURAL, TRANSFORAMINAL APPROACH;  Surgeon: Austin Parsons MD;  Location: [facility] PAIN [facility]GT;  Service: Pain Management;  Laterality: Right;  RT RFA L5/S1 and S1  Eliquis clearance in [place]       Family History:  History reviewed. No pertinent family history.    Social History:  Social History     Socioeconomic History    Marital status:    Tobacco Use    Smoking status: Never    Smokeless tobacco: Never   Substance and Sexual Activity    Alcohol use: Not Currently    Drug use: Not Currently       OBJECTIVE:    /81   Pulse 92   Temp 98 °F (36.7 °C) (Oral)   Resp 18   Ht 6' 4" (1.93 m)   Wt 121.6 kg (268 lb)   BMI 32.62 kg/m²     PHYSICAL EXAMINATION: 2021  Voice normal.  Normal affect without evidence of distress.  Musculoskeletal: Focal TTP to R GTB  Gait: antalgic, aided with cane     ASSESSMENT: 72 y.o. year old male with pain, consistent with the followin. Knee pain, unspecified chronicity, unspecified laterality        2. Chronic pain syndrome        3. Sacroiliac joint pain        4. Lumbar spondylosis                PLAN:     - Previous imaging was reviewed and discussed with the patient today.    - Prior imaging reviewed    - Pain contract signed 2020.    - Last UDS 2022 and complaint.     Will also have Pt return for in-person visit with Dr Parsons to continue medication mgt     - Refill  5/325mg BID #60     - The patient is here today for a refill of current pain medications and they believe these provide effective pain control and improvements in quality of life.  The patient notes no serious side effects, and feels the benefits outweigh the risks.  The patient was reminded of the pain contract that they signed previously as well as the risks and benefits of the medication including possible death.  The updated [place] [employer] prescription monitoring program was reviewed, and the patient has been found to be compliant with current treatment plan.  - " Continue home exercise routine.     The above plan and management options were discussed at length with patient. Patient is in agreement with the above and verbalized understanding.    - RTC with Dr Parsons to continue medication mgt    Mike Daley NP  09/20/2022     I spent a total of 30 minutes on the day of the visit.  This includes face to face time and non-face to face time preparing to see the patient by reviewing previous labs/imaging, obtaining and/or reviewing separately obtained history, documenting clinical information in the electronic or other health record, independently interpreting results and communicating results to the patient/family/caregiver.

## 2022-10-20 NOTE — TELEPHONE ENCOUNTER
----- Message from Jia Laws sent at 10/20/2022 11:32 AM CDT -----  Regarding: Pharmacy call back  Who called: Michell phillips [facility]     What is the request in detail: Patient is requesting a call back. She states she is calling back regarding the HYDROcodone-acetaminophen (NORCO) 5-325 mg per tablet as she just spoke with the staff  Please advise.    Can the clinic reply by [facility]? No    Best call back number: [phone]    Additional Information: N/A

## 2022-10-20 NOTE — TELEPHONE ENCOUNTER
Patient call in regarding his medication which is due as of now.    Staff contact the pharmacy and informed them the patient has been out of medication for a few days.     Pharmacy verified that the patient can come in and  his medication today.    Verbalized understanding

## 2022-10-20 NOTE — TELEPHONE ENCOUNTER
Staff contact Michell back from the pharmacy in regards of patient Hydrocodone. Staff was ask to resend patient medication since it's due now.    Patient requesting refill on Hydrocodone 5-325mg  Last office visit 09/19/22   shows last refill on 09/19/22  Patient does have a pain contract on file with [facility] department  Patient last UDS 06/13/22 was consistent with current therapy   CODEINE  Not Detected  Not Detected  Not Detected   Not Detected  Not Detected  Not Detected  Not Detected    MORPHINE  Not Detected  Not Detected  Not Detected   Not Detected  Not Detected  Not Detected  Not Detected    6-ACETYLMORPHINE  Not Detected  Not Detected  Not Detected   Not Detected  Not Detected  Not Detected  Not Detected    OXYCODONE  Not Detected  Not Detected  Not Detected   Not Detected  Not Detected  Not Detected  Not Detected    NOROYXCODONE  Not Detected  Not Detected  Not Detected   Not Detected  Not Detected  Not Detected  Not Detected    OXYMORPHONE  Not Detected  Not Detected  Not Detected   Not Detected  Not Detected  Not Detected  Not Detected    NOROXYMORPHONE  Not Detected  Not Detected  Not Detected   Not Detected  Not Detected  Not Detected  Not Detected    HYDROCODONE  Present  Not Detected  Present   Present  Present  Not Detected  Not Detected    NORHYDROCODONE  Present  Not Detected  Present   Not Detected  Present  Not Detected  Not Detected    HYDROMORPHONE  Present  Not Detected  Not Detected   Not Detected  Not Detected  Not Detected  Not Detected    BUPRENORPHINE  Not Detected  Not Detected  Not Detected   Not Detected  Not Detected  Not Detected  Not Detected    NORUBPRENORPHINE  Not Detected  Not Detected  Not Detected   Not Detected  Not Detected  Not Detected  Not Detected    FENTANYL  Not Detected  Not Detected  Not Detected   Not Detected  Not Detected  Not Detected  Not Detected    NORFENTANYL  Not Detected  Not Detected  Not Detected   Not Detected  Not Detected   Not Detected  Not Detected    MEPERIDINE METABOLITE  Not Detected  Not Detected  Not Detected   Not Detected  Not Detected  Not Detected  Not Detected    TAPENTADOL  Not Detected  Not Detected  Not Detected   Not Detected  Not Detected  Not Detected  Not Detected    TAPENTADOL-O-SULF  Not Detected  Not Detected  Not Detected   Not Detected  Not Detected  Not Detected  Not Detected    METHADONE  Not Detected  Not Detected  Not Detected   Not Detected  Not Detected  Not Detected  Not Detected    TRAMADOL  Not Detected  Present  Not Detected   Not Detected  Present  Present  Present    AMPHETAMINE  Not Detected  Not Detected  Not Detected   Not Detected  Not Detected  Not Detected  Not Detected    METHAMPHETAMINE  Not Detected  Not Detected  Not Detected   Not Detected  Not Detected  Not Detected  Not Detected    MDMA- ECSTASY  Not Detected  Not Detected  Not Detected   Not Detected  Not Detected  Not Detected  Not Detected    MDA  Not Detected  Not Detected  Not Detected   Not Detected  Not Detected  Not Detected  Not Detected    MDEA- Audelia  Not Detected  Not Detected  Not Detected   Not Detected  Not Detected  Not Detected  Not Detected    METHYLPHENIDATE  Not Detected  Not Detected  Not Detected   Not Detected  Not Detected  Not Detected  Not Detected    PHENTERMINE  Not Detected  Not Detected  Not Detected   Not Detected  Not Detected  Not Detected  Not Detected    BENZOYLECGONINE  Not Detected  Not Detected  Not Detected   Not Detected  Not Detected  Not Detected  Not Detected    ALPRAZOLAM  Not Detected  Not Detected  Not Detected   Not Detected  Not Detected  Not Detected  Not Detected    ALPHA-OH-ALPRAZOLAM  Not Detected  Not Detected  Not Detected   Not Detected  Not Detected  Not Detected  Not Detected    CLONAZEPAM  Not Detected  Not Detected  Not Detected   Not Detected  Not Detected  Not Detected  Not Detected    7-AMINOCLONAZEPAM  Not Detected  Not Detected  Not Detected   Not Detected  Not Detected  Not  Detected  Not Detected    DIAZEPAM  Not Detected  Not Detected  Not Detected   Not Detected  Not Detected  Not Detected  Not Detected    NORDIAZEPAM  Not Detected  Not Detected  Not Detected   Not Detected  Not Detected  Not Detected  Not Detected    OXAZEPAM  Not Detected  Not Detected  Not Detected   Not Detected  Not Detected  Not Detected  Not Detected    TEMAZEPAM  Not Detected  Not Detected  Not Detected   Not Detected  Not Detected  Not Detected  Not Detected    Lorazepam  Not Detected  Not Detected  Not Detected   Not Detected  Not Detected  Not Detected  Not Detected    MIDAZOLAM  Not Detected  Not Detected  Not Detected   Not Detected  Not Detected  Not Detected  Not Detected    ZOLPIDEM  Not Detected  Not Detected  Not Detected   Not Detected  Not Detected  Not Detected  Not Detected    BARBITURATES  Not Detected  Not Detected  Not Detected   Not Detected  Not Detected  Not Detected  Not Detected    Creatinine, Urine 20.0 - 400.0 mg/dL 115.0  76.4  177.0  160.0 R, CM  151.5  103.3  255.9  255.9    ETHYL GLUCURONIDE  Not Detected  Not Detected  Not Detected   Present  Not Detected  Not Detected  Not Detected    MARIJUANA METABOLITE  Not Detected  Not Detected  Not Detected   Not Detected  Not Detected  Not Detected  Not Detected    PCP  Not Detected  Not Detected  Not Detected   Not Detected  Not Detected  Not Detected  Not Detected    CARISOPRODOL  Not Detected  Not Detected CM  Not Detected CM   Not Detected CM  Not Detected CM  Not Detected CM  Not Detected CM    Comment: The carisoprodol immunoassay has cross-reactivity to

## 2022-10-20 NOTE — TELEPHONE ENCOUNTER
----- Message from Angella Frazier sent at 10/20/2022 11:50 AM CDT -----  Regarding: REFILL/change pharmacy  Who Called:ALLIE TOLEDO [[id number]]          RX Name and Strength:HYDROcodone-acetaminophen (NORCO) 5-325 mg per tablet            Is this a 30 day or 90 day RX: 60          Preferred Pharmacy with phone number[facility]  [id number] [place] [facility] [address] AT [facility] [address] [[id number]]           Local or Mail Order: LOCAL

## 2022-10-20 NOTE — TELEPHONE ENCOUNTER
----- Message from Jia Laws sent at 10/20/2022 10:54 AM CDT -----  Regarding: Patient call back  Who called:ALLIE TOLEDO [[id number]]    What is the request in detail: Patient is requesting a call back.  Patient states his script HYDROcodone-acetaminophen (NORCO) 5-325 mg per tablet is due for 10/19 not 10/21. He would like to further discuss.   Please advise.    Can the clinic reply by [facility]? No    Best call back number: [phone]    Additional Information: N/A

## 2022-10-21 NOTE — TELEPHONE ENCOUNTER
----- Message from Gwen Dumas sent at 10/21/2022 10:24 AM CDT -----  Regarding: [facility]  Name of Who is Calling:ALLIE TOLEDO [[id number]]          What is the request in detailPt  is asking if you could call in his RX of Hydrocodone :to [id number] - [place], [place] - [address] AT [facility] & [facility]   Phone:  [phone]  Fax:  [phone]                Can the clinic reply by [facility]:no          [facility] to [id number] - [place] - [address] AT [facility] & [facility]   Phone:  [phone]  Fax:  [phone]

## 2022-11-28 NOTE — TELEPHONE ENCOUNTER
----- Message from Matthew Hernandez sent at 11/28/2022  8:12 AM CST -----  Regarding: Refill  Contact: ALLIE TOLEDO [[id number]]  Type:  RX Refill Request    Who Called: ALLIE TOLEDO [[id number]]    Refill or New Rx: Refill    RX Name and Strength: HYDROcodone-acetaminophen (NORCO) 5-325 mg per tablet    How is the patient currently taking it? (ex. 1XDay):    Is this a 30 day or 90 day RX:    Preferred Pharmacy with phone number: [facility] #[id number] - [place], [place] - [address] AT [facility]    Would the patient rather a call back or a response via [facility]? Call    Best Call Back Number: [phone]     Additional Information:

## 2022-11-28 NOTE — TELEPHONE ENCOUNTER
Patient requesting refill on hydrocodone-acetaminophen (NORCO) 5 mg  Last office visit 09/19/2022   shows last refill on 10/20/2022  Patient does have a pain contract on file with [facility] department  Patient last UDS 06/13/2022 was consistent with current therapy     HYDROCODONE  Present  Not Detected  Present   Present  Present  Not Detected  Not Detected

## 2022-12-12 NOTE — PROGRESS NOTES
Chronic patient Established Note (Follow up visit)      HPI  Ishmael Thrasher is a 70 y.o. male with a history of CAD, CHF with EF 10% with AICD, T2DM, atrial fibrillation on eliquis who presents today with chronic low back pain. This pain started in 1971 as a result of and injury in the .  He describes a constant, right sided pain that starts in his low back and radiates down the posterior aspect of his right leg to the bottom of his foot.  This is associated with a numbness, tingling sensation.  The pain occurs when he is standing still for longer than 5 minutes.  The pain does not occur when walking unless he is standing first.  He denies heaviness and weakness.  This pain is described in detail below.     Aggravating factors: Standing in place for 5 minutes     Mitigating factors: Walking, medication, rest     Previously seeing: Dr. Davide Wang ([place])     Physical Therapy: Has done for his heart, but not for his back    Interval History  12/12/2022  Patient continues to have bilateral knee pain R>L. He did get relief from genicular RFA in past for 4 months. Back pain is constant and 6-7/10. Back pain is right sided, sharp in nature and radiates down his leg. Discussed repeat genicular RFA then possible MBB. Patient agreeable to this plan. Will get L spine CT as patient is unable to get MRI 2/2 AICD.    Interval History (7/2/2020):  The patient returns to clinic today for follow up of back pain. He continues to report low back pain that radiates down the posterior aspect of his right leg to the bottom of his foot. He denies any left leg pain. His pain is worse with prolonged standing. He has had injections in the past without relief. He is currently taking Tramadol with limited relief. He denies any other health changes. His pain today is 10/10.     Interval History 8/6/2020:   Ishmael Thrasher presents to the clinic for a follow-up appointment for bilateral leg pain. Since the last visit,  Ishmael Thrasher states the pain has been worsening. Current pain intensity is 10/10.     Interval History 9/28/2020:  The patient returns to clinic today for follow up of low back and leg pain. He is s/p right L4/5 and L5/S1 TF PÉREZ on 9/11/2020. He reports no significant relief of his back and leg pain. He continues to report low back pain that radiates down the posterior aspect of his right leg to under his foot. He denies any left leg pain. His pain is worse with prolonged standing and walking. He is currently taking Norco with limited benefit. He reports that this decreases his pain but does not last. He denies any other health changes. His pain today is 10/10.     Interval History 11/16/2020:  The patient returns to clinic today for follow up of low back pain. He is s/p right L5/S1 and S1 TF PÉREZ on 10/23/2020. He reports 60% relief for about a week. He continues to report low back pain that radiates into the posterior aspect of his right leg to his foot. He denies any left leg pain. His pain is worse with standing and walking. He also reports difficulty sleeping due to pain. He is frustrated with his continued pain. He continues to take Norco but reports that this does not last. He denies any other health changes. His pain today is 10/10.     Interval History 12/21/2020:  Ishmael Thrasher presents to the clinic for a  1 month follow-up appointment. Since the last visit, Ishmael Thrasher states the pain has been stable. Current pain intensity is 10/10.    Interval History 3/31/2021  Patient complains of bilateral knee pain, usually uses walker for assistance but was unable to bring with them. Patient is s/p right cooled RFA on 2/26/21 with 60% relief of symptoms that has since returned. He reports he was admitted in the hospital for acute decompensated heart failure following receiving the second dose of the COVID-19 Moderna vaccine. He states he was admitted to [facility] twice and then  "discharged and re-admitted [facility] (discharged on 3/20/21) the pain started on 5 days ago while there and he reported it while admitted. He reports his pain level is 10/10. Its effecting his ability ambulate as he has not been able to use his legs since being in the hospital. He reports the pain has been this level the last 5 days since worsening. He is taking the Hydrocodone 5/325 TID, which is not helping. Also taking 325mg ASA with moderate relief - his heart doctor told him not to continue taking ASA at this dose. During hospital admission, patient complained of left elbow and left knee pain that was attributed to the gouty arthritis that has since resolved. Left knee pain now feels like his "usual" knee pain. Patient states he was able to move around on a daily basis with PT while admitted in the hospital. Feel more depressed and less motivated since receiving 2/16/21. Patient also complains of bilateral back pain radiating down his right leg, which is unchanged from his baseline chronic pain.    Interval History 7/27/2021:  The patient returns to clinic today for follow up of low back and knee pain. He continues to report right knee pain. He also reports increased low back and right hip pain. He reports intermittent radiating pain into his right lateral thigh. His pain is worse with standing and walking. He denies any left leg pain. He continues to perform a home exercise routine. He continues to take Norco as needed. He denies any other health changes. His pain today is 10/10.    Interval History 10/27/2021:  The patient returns to clinic today for follow up of low back and knee pain. He is s/p right SI joint injection on 8/13/2021. He reports 40% relief of his right hip and buttock pain. He continues to reports low back pain that radiates into the posterior aspect of his right leg to his calf. He denies any left leg pain. He reports increased right knee pain. He did have a recent fall which " triggered this pain. His pain is worse with standing and walking. He continues to take Norco, although this provides limited relief. He would like to go back to Tramadol. He denies any other health changes. His pain today is 10/10.    Interval History 12/2/2021:  Mr Thrasher presents for routine medication follow up. He stats prior R knee genicular RFA did well. He continues to have diffuse pain complaints one which is R lateral hip pain and inability to lay on that site due to focal pain. He states tramadol has not benefit when compared to Norco and would like to rotate back to Norco 5/325mg BID. Denies new ban of pain or neurological changes.     Interval History 1/10/2022:  Mr Thrasher presents for follow up of lower back pain and radicular pain down posterior leg going to bottom of calf. He is s/p focal R GTB TTP and states approx 2 weeks benefit. He states rotation from Tramadol to Norco 5/325mg with some mild benefit.     Interval History 6/17/2022:  Mr Thrasher presents for delayed FU. Over interval he has been stable. He inadverntely was Rxed Norco 7.5mg q6hrs. He also has been started on medical mariajuana and received Norco 10mg for gout while visiting ER. We discussed all these issues and Pt voiced understanding. States last Dosing of [place] was this a.m. Denies new areas of pain, denies any neurological changes. Denies SE of medications.     Interval History 9/19/2022:  Mr Thrasher presents for follow up of chronic pain. He has had worsening pain due to being out of medication as FU was just past 90 days. He is having newer return of L elbow pain. Denies further neurological changes. Denies worsening of chronic pain to R hip and R knee. Continues to take Norco 5/325mg BID without SE.       Pain Disability Index Review:  Last 3 PDI Scores 12/12/2022 9/19/2022 6/13/2022   Pain Disability Index (PDI) 34 50 60       Pain Medications:  Norco 5mg  ASA 325mg  Topical lidocaine      Opioid Contract: yes     report:   Reviewed and consistent with medication use as prescribed.    Pain Procedures:   9/11/2020- Right L4/5 and L5/S1 TF PÉREZ  10/23/2020- Right L5/S1 and S1 TF PÉREZ  2/5/2021- Bilateral genicular nerve block  2/26/2021- Right genicular cooled RFA  11/12/2021 Right knee genicular RFA  12/17/2021: Right Greater Trochanteric Bursa Injection under Fluoroscopic Guidance     Physical Therapy/Home Exercise: Set up from PT since discharge from hospital, but has not arranged.    Imaging:   Narrative & Impression       EXAMINATION:  XR LUMBAR SPINE 5 VIEW WITH FLEX AND EXT     CLINICAL HISTORY:  Back pain or radiculopathy, > 6 wks;  Radiculopathy, lumbar region     TECHNIQUE:  AP, lateral, oblique views of the lumbar spine with spot view of the lumbosacral region and lateral views in flexion and extension.     COMPARISON:  None     FINDINGS:  There is no collapse or malalignment in the lumbar spine.  There is no significant change in alignment between flexion and extension.     There is reduced of the intervertebral disc spaces at L4-L5 and L5-S1 with disc vacuum phenomena and anterior osteophyte formation.  There are mild neural foraminal stenosis at these levels.     There is mild bilateral neural foraminal stenosis.     There are extensive vascular calcifications in the abdominal aorta.  The soft tissues are otherwise unremarkable.     Impression:     1. Moderate to severe spondylosis and neural foraminal stenosis at L4-L5 and L5-S1.  2. Straightening of lumbar lordosis  3. No changes in alignment in flexion and extension     Electronically signed by resident: Marko Lamas  Date:                                            07/07/2020  Time:                                           15:05     Electronically signed by: Roly Qiu  Date:                                            07/07/2020  Time:                                           15:21          Narrative & Impression       EXAMINATION:  CT LUMBAR SPINE WITHOUT  CONTRAST     CLINICAL HISTORY:  Back pain or radiculopathy, > 6 wks;.     COMPARISON:  None.     FINDINGS:  Decrease height of vertebral bodies L4-L5 predominant at L5.  Degenerative changes in the endplates with decrease height intervertebral space L4-L5.  Vacuum phenomenon at L5-S1 indicating remarkable decrease.  Facet hypertrophy and degenerative changes in facet joints L3-L4, bilaterally.     Prominent aortic calcifications.     Left pleural effusion and atelectasis     Impression:     Prominent degenerative changes in the lumbar spine at L4, L5 and S1     Decrease height of intervertebral space L4-L5 with degenerative process and decrease height of vertebral bodies L4-L5 predominant at L5     Remarkable disc disease at L5-S1     Atherosclerosis     Left pleural effusion and atelectasis.  Please see CT of the chest        Electronically signed by: Roly Qiu  Date:                                            07/08/2020  Time:                                           16:00          Allergies:   Review of patient's allergies indicates:   Allergen Reactions    Enalapril maleate Swelling and Edema     Other reaction(s): Swelling    Vasotec [enalaprilat] Swelling     Neck swelling    Zoloft [sertraline] Other (See Comments)     Patient states it put him to sleep in the hospital he could not talk nor move    Cyclobenzaprine Hallucinations     Other reaction(s): Swelling  Hallucinations according to patient.    Other reaction(s): Lymphadenopathy, Restlessness, Sensation of being cold, Restlessness, Sensation of being cold    Amitriptyline Hallucinations    Labetalol      Other reaction(s): Pharyngeal swelling    Lisinopril      Other reaction(s): Pharyngeal swelling    Tramadol Nausea Only and Hallucinations       Current Medications:   Current Outpatient Medications   Medication Sig Dispense Refill    allopurinol (ZYLOPRIM) 300 MG tablet Take 300 mg by mouth once daily.      apixaban (ELIQUIS) 5 mg Tab  Take 5 mg by mouth 2 (two) times daily.      aspirin (ECOTRIN) 81 MG EC tablet Take 81 mg by mouth.      atorvastatin (LIPITOR) 80 MG tablet Take 80 mg by mouth once daily.      cholecalciferol, vitamin D3, 3,000 unit Tab Take 1 tablet by mouth.      coenzyme Q10 10 mg capsule Take 200 mg by mouth once daily.      colchicine (COLCRYS) 0.6 mg tablet TK 1 T PO BID PRF GOUT      febuxostat (ULORIC) 40 mg Tab Take 1 tablet (40 mg total) by mouth once daily. 30 tablet 6    glipiZIDE (GLUCOTROL) 5 MG tablet Take 5 mg by mouth 2 (two) times daily before meals.      HYDROcodone-acetaminophen (NORCO) 5-325 mg per tablet Take 1 tablet by mouth every 6 (six) hours as needed for Pain. 60 tablet 0    indomethacin (INDOCIN) 50 MG capsule Take 1 capsule (50 mg total) by mouth 3 (three) times daily with meals. 30 capsule 0    melatonin 5 mg Cap Take by mouth.      metoprolol succinate (TOPROL-XL) 50 MG 24 hr tablet Take 1 tablet (50 mg total) by mouth once daily. 30 tablet 1    potassium chloride SA (K-DUR,KLOR-CON) 20 MEQ tablet Take 1 tablet (20 mEq total) by mouth 2 (two) times daily. 180 tablet 3    risperiDONE (RISPERDAL) 3 MG Tab Take by mouth.      sacubitriL-valsartan (ENTRESTO) 49-51 mg per tablet Take 1 tablet by mouth 2 (two) times daily. 60 tablet 3    sertraline (ZOLOFT) 50 MG tablet Take 50 mg by mouth once daily. TAKE THREE TABLETS BY MOUTH EVERY MORNING TO IMPROVE MOOD      sildenafiL (VIAGRA) 100 MG tablet Take 1 tablet (100 mg total) by mouth As instructed for Erectile Dysfunction. Please take 1 tablet one hour prior to intercourse on empty stomach 30 tablet 1    tadalafiL (CIALIS) 20 MG Tab Take 1 pill as needed prior to sex 20 tablet 1    spironolactone (ALDACTONE) 25 MG tablet Take 1 tablet (25 mg total) by mouth once daily. 90 tablet 3    torsemide (DEMADEX) 20 MG Tab Take 2 tablets (40 mg total) by mouth 2 (two) times daily. 360 tablet 3     No current facility-administered medications for this visit.        REVIEW OF SYSTEMS:    GENERAL:  No weight loss, malaise or fevers.  HEENT:  Negative for frequent or significant headaches.  NECK:  Negative for lumps, goiter, pain and significant neck swelling.  RESPIRATORY:  Negative for cough, wheezing or shortness of breath.  CARDIOVASCULAR:  Negative for chest pain, leg swelling or palpitations. +CHF, CAD, HTN  GI:  Negative for abdominal discomfort, blood in stools or black stools or change in bowel habits. GERD  MUSCULOSKELETAL:  See HPI  SKIN:  Negative for lesions, rash, and itching.  PSYCH:  Negative for sleep disturbance, mood disorder and recent psychosocial stressors.  HEMATOLOGY/LYMPHOLOGY:  Negative for prolonged bleeding, bruising easily or swollen nodes.  NEURO:   Daily migraine headaches  ENDO: Diabetes  All other reviewed and negative other than HPI.    Past Medical History:  Past Medical History:   Diagnosis Date    Brain damage     traumatic    CAD (coronary artery disease)     Cardiomyopathy     CHF (congestive heart failure)     Diabetes mellitus     type 2    Edema     Erectile dysfunction     GERD (gastroesophageal reflux disease)     HTN (hypertension)     Hyperlipemia     Sciatic nerve pain, right     leg       Past Surgical History:  Past Surgical History:   Procedure Laterality Date    CARDIAC DEFIBRILLATOR PLACEMENT      CATHETERIZATION OF BOTH LEFT AND RIGHT HEART Bilateral 08/20/2018    CORONARY ARTERY BYPASS GRAFT      ILIAC ARTERY STENT      INJECTION OF JOINT Right 8/13/2021    Procedure: INJECTION, JOINT, SACROILIAC (SI);  Surgeon: Austin Parsons MD;  Location: [facility] PAIN [facility]GT;  Service: Pain Management;  Laterality: Right;    RADIOFREQUENCY ABLATION Right 2/26/2021    Procedure: RADIOFREQUENCY ABLATION GENICULAR NERVES, COOLED;  Surgeon: Austin Parsons MD;  Location: [facility] PAIN [facility]GT;  Service: Pain Management;  Laterality: Right;  1 of 2  consent needed  eliquis clearance requested    RADIOFREQUENCY ABLATION Right 11/12/2021    Procedure:  "RADIOFREQUENCY ABLATION, GENICULAR COOLED  NEED CONSENT/ clear to hold  ELIQUIS;  Surgeon: Austin Parsons MD;  Location: [facility] PAIN [facility]GT;  Service: Pain Management;  Laterality: Right;    RIGHT HEART CATHETERIZATION Right 2020    Procedure: INSERTION, CATHETER, RIGHT HEART;  Surgeon: Brianda Navas MD;  Location: [facility] CATH LAB;  Service: Cardiology;  Laterality: Right;    TRANSFORAMINAL EPIDURAL INJECTION OF STEROID Right 2020    Procedure: INJECTION, STEROID, EPIDURAL, TRANSFORAMINAL APPROACH, L4-L5 AND L5-S1 clear to hold Eliquis 3 days;  Surgeon: Austin Parsons MD;  Location: [facility] PAIN [facility]GT;  Service: Pain Management;  Laterality: Right;    TRANSFORAMINAL EPIDURAL INJECTION OF STEROID Right 10/23/2020    Procedure: INJECTION, STEROID, EPIDURAL, TRANSFORAMINAL APPROACH;  Surgeon: Austin Parsons MD;  Location: [facility] PAIN [facility]GT;  Service: Pain Management;  Laterality: Right;  RT RFA L5/S1 and S1  Eliquis clearance in Media       Family History:  No family history on file.    Social History:  Social History     Socioeconomic History    Marital status:    Tobacco Use    Smoking status: Never    Smokeless tobacco: Never   Substance and Sexual Activity    Alcohol use: Not Currently    Drug use: Not Currently       OBJECTIVE:    /79   Pulse 78   Temp 98 °F (36.7 °C) (Oral)   Resp 18   Ht 6' 4" (1.93 m)   Wt 111.6 kg (246 lb)   BMI 29.94 kg/m²     PHYSICAL EXAMINATION: 2021  Voice normal.  Normal affect without evidence of distress.  Musculoskeletal: Focal TTP to R GTB, + R leg straight leg test, + facet loading R L-spine  Limited ROM of the knee joints R>L.   Gait: antalgic, aided with cane     ASSESSMENT: 72 y.o. year old male with pain, consistent with the followin. Dorsalgia, unspecified  CT Lumbar Spine Without Contrast      2. Primary osteoarthritis of knee, unspecified laterality  Ambulatory referral/consult to Physical/Occupational Therapy    Procedure Order to Pain " Management    Ambulatory referral/consult to Physical/Occupational Therapy      3. Lumbar spondylosis  Ambulatory referral/consult to Physical/Occupational Therapy    Ambulatory referral/consult to Physical/Occupational Therapy      4. Chronic, continuous use of opioids  Pain Clinic Drug Screen      5. Chronic pain disorder            PLAN:   -UDS in office today  -Order for PT  -Schedule R genicular nerves cooled RFA  -Repeat L spine CT, cannot do MRI 2/2 AICD   -Possible MBB to RFA if appropriate  -Follow up 2 weeks after genicular RFA  The above plan and management options were discussed at length with patient. Patient is in agreement with the above and verbalized understanding.    Husam Boland, DO  12/12/2022     I spent a total of 30 minutes on the day of the visit.  This includes face to face time and non-face to face time preparing to see the patient by reviewing previous labs/imaging, obtaining and/or reviewing separately obtained history, documenting clinical information in the electronic or other health record, independently interpreting results and communicating results to the patient/family/caregiver.      I have reviewed and concur with the resident's history, physical, assessment, and plan.  I have personally interviewed and examined the patient at bedside.  See below addendum for my evaluation and additional findings.    Austin Parsons MD

## 2022-12-12 NOTE — H&P (VIEW-ONLY)
Chronic patient Established Note (Follow up visit)      HPI  Ishmael Thrasher is a 70 y.o. male with a history of CAD, CHF with EF 10% with AICD, T2DM, atrial fibrillation on eliquis who presents today with chronic low back pain. This pain started in 1971 as a result of and injury in the .  He describes a constant, right sided pain that starts in his low back and radiates down the posterior aspect of his right leg to the bottom of his foot.  This is associated with a numbness, tingling sensation.  The pain occurs when he is standing still for longer than 5 minutes.  The pain does not occur when walking unless he is standing first.  He denies heaviness and weakness.  This pain is described in detail below.     Aggravating factors: Standing in place for 5 minutes     Mitigating factors: Walking, medication, rest     Previously seeing: Dr. Davide Wang ([place])     Physical Therapy: Has done for his heart, but not for his back    Interval History  12/12/2022  Patient continues to have bilateral knee pain R>L. He did get relief from genicular RFA in past for 4 months. Back pain is constant and 6-7/10. Back pain is right sided, sharp in nature and radiates down his leg. Discussed repeat genicular RFA then possible MBB. Patient agreeable to this plan. Will get L spine CT as patient is unable to get MRI 2/2 AICD.    Interval History (7/2/2020):  The patient returns to clinic today for follow up of back pain. He continues to report low back pain that radiates down the posterior aspect of his right leg to the bottom of his foot. He denies any left leg pain. His pain is worse with prolonged standing. He has had injections in the past without relief. He is currently taking Tramadol with limited relief. He denies any other health changes. His pain today is 10/10.     Interval History 8/6/2020:   Ishmael Thrasher presents to the clinic for a follow-up appointment for bilateral leg pain. Since the last visit,  Ishmael Thrasher states the pain has been worsening. Current pain intensity is 10/10.     Interval History 9/28/2020:  The patient returns to clinic today for follow up of low back and leg pain. He is s/p right L4/5 and L5/S1 TF PÉREZ on 9/11/2020. He reports no significant relief of his back and leg pain. He continues to report low back pain that radiates down the posterior aspect of his right leg to under his foot. He denies any left leg pain. His pain is worse with prolonged standing and walking. He is currently taking Norco with limited benefit. He reports that this decreases his pain but does not last. He denies any other health changes. His pain today is 10/10.     Interval History 11/16/2020:  The patient returns to clinic today for follow up of low back pain. He is s/p right L5/S1 and S1 TF PÉREZ on 10/23/2020. He reports 60% relief for about a week. He continues to report low back pain that radiates into the posterior aspect of his right leg to his foot. He denies any left leg pain. His pain is worse with standing and walking. He also reports difficulty sleeping due to pain. He is frustrated with his continued pain. He continues to take Norco but reports that this does not last. He denies any other health changes. His pain today is 10/10.     Interval History 12/21/2020:  Ishmael Thrasher presents to the clinic for a  1 month follow-up appointment. Since the last visit, Ishmael Thrasher states the pain has been stable. Current pain intensity is 10/10.    Interval History 3/31/2021  Patient complains of bilateral knee pain, usually uses walker for assistance but was unable to bring with them. Patient is s/p right cooled RFA on 2/26/21 with 60% relief of symptoms that has since returned. He reports he was admitted in the hospital for acute decompensated heart failure following receiving the second dose of the COVID-19 Moderna vaccine. He states he was admitted to [facility] twice and then  "discharged and re-admitted [facility] (discharged on 3/20/21) the pain started on 5 days ago while there and he reported it while admitted. He reports his pain level is 10/10. Its effecting his ability ambulate as he has not been able to use his legs since being in the hospital. He reports the pain has been this level the last 5 days since worsening. He is taking the Hydrocodone 5/325 TID, which is not helping. Also taking 325mg ASA with moderate relief - his heart doctor told him not to continue taking ASA at this dose. During hospital admission, patient complained of left elbow and left knee pain that was attributed to the gouty arthritis that has since resolved. Left knee pain now feels like his "usual" knee pain. Patient states he was able to move around on a daily basis with PT while admitted in the hospital. Feel more depressed and less motivated since receiving 2/16/21. Patient also complains of bilateral back pain radiating down his right leg, which is unchanged from his baseline chronic pain.    Interval History 7/27/2021:  The patient returns to clinic today for follow up of low back and knee pain. He continues to report right knee pain. He also reports increased low back and right hip pain. He reports intermittent radiating pain into his right lateral thigh. His pain is worse with standing and walking. He denies any left leg pain. He continues to perform a home exercise routine. He continues to take Norco as needed. He denies any other health changes. His pain today is 10/10.    Interval History 10/27/2021:  The patient returns to clinic today for follow up of low back and knee pain. He is s/p right SI joint injection on 8/13/2021. He reports 40% relief of his right hip and buttock pain. He continues to reports low back pain that radiates into the posterior aspect of his right leg to his calf. He denies any left leg pain. He reports increased right knee pain. He did have a recent fall which " triggered this pain. His pain is worse with standing and walking. He continues to take Norco, although this provides limited relief. He would like to go back to Tramadol. He denies any other health changes. His pain today is 10/10.    Interval History 12/2/2021:  Mr Thrasher presents for routine medication follow up. He stats prior R knee genicular RFA did well. He continues to have diffuse pain complaints one which is R lateral hip pain and inability to lay on that site due to focal pain. He states tramadol has not benefit when compared to Norco and would like to rotate back to Norco 5/325mg BID. Denies new ban of pain or neurological changes.     Interval History 1/10/2022:  Mr Thrasher presents for follow up of lower back pain and radicular pain down posterior leg going to bottom of calf. He is s/p focal R GTB TTP and states approx 2 weeks benefit. He states rotation from Tramadol to Norco 5/325mg with some mild benefit.     Interval History 6/17/2022:  Mr Thrasher presents for delayed FU. Over interval he has been stable. He inadverntely was Rxed Norco 7.5mg q6hrs. He also has been started on medical mariajuana and received Norco 10mg for gout while visiting ER. We discussed all these issues and Pt voiced understanding. States last Dosing of [place] was this a.m. Denies new areas of pain, denies any neurological changes. Denies SE of medications.     Interval History 9/19/2022:  Mr Thrasher presents for follow up of chronic pain. He has had worsening pain due to being out of medication as FU was just past 90 days. He is having newer return of L elbow pain. Denies further neurological changes. Denies worsening of chronic pain to R hip and R knee. Continues to take Norco 5/325mg BID without SE.       Pain Disability Index Review:  Last 3 PDI Scores 12/12/2022 9/19/2022 6/13/2022   Pain Disability Index (PDI) 34 50 60       Pain Medications:  Norco 5mg  ASA 325mg  Topical lidocaine      Opioid Contract: yes     report:   Reviewed and consistent with medication use as prescribed.    Pain Procedures:   9/11/2020- Right L4/5 and L5/S1 TF PÉREZ  10/23/2020- Right L5/S1 and S1 TF PÉREZ  2/5/2021- Bilateral genicular nerve block  2/26/2021- Right genicular cooled RFA  11/12/2021 Right knee genicular RFA  12/17/2021: Right Greater Trochanteric Bursa Injection under Fluoroscopic Guidance     Physical Therapy/Home Exercise: Set up from PT since discharge from hospital, but has not arranged.    Imaging:   Narrative & Impression       EXAMINATION:  XR LUMBAR SPINE 5 VIEW WITH FLEX AND EXT     CLINICAL HISTORY:  Back pain or radiculopathy, > 6 wks;  Radiculopathy, lumbar region     TECHNIQUE:  AP, lateral, oblique views of the lumbar spine with spot view of the lumbosacral region and lateral views in flexion and extension.     COMPARISON:  None     FINDINGS:  There is no collapse or malalignment in the lumbar spine.  There is no significant change in alignment between flexion and extension.     There is reduced of the intervertebral disc spaces at L4-L5 and L5-S1 with disc vacuum phenomena and anterior osteophyte formation.  There are mild neural foraminal stenosis at these levels.     There is mild bilateral neural foraminal stenosis.     There are extensive vascular calcifications in the abdominal aorta.  The soft tissues are otherwise unremarkable.     Impression:     1. Moderate to severe spondylosis and neural foraminal stenosis at L4-L5 and L5-S1.  2. Straightening of lumbar lordosis  3. No changes in alignment in flexion and extension     Electronically signed by resident: Marko Lamas  Date:                                            07/07/2020  Time:                                           15:05     Electronically signed by: Roly Qiu  Date:                                            07/07/2020  Time:                                           15:21          Narrative & Impression       EXAMINATION:  CT LUMBAR SPINE WITHOUT  CONTRAST     CLINICAL HISTORY:  Back pain or radiculopathy, > 6 wks;.     COMPARISON:  None.     FINDINGS:  Decrease height of vertebral bodies L4-L5 predominant at L5.  Degenerative changes in the endplates with decrease height intervertebral space L4-L5.  Vacuum phenomenon at L5-S1 indicating remarkable decrease.  Facet hypertrophy and degenerative changes in facet joints L3-L4, bilaterally.     Prominent aortic calcifications.     Left pleural effusion and atelectasis     Impression:     Prominent degenerative changes in the lumbar spine at L4, L5 and S1     Decrease height of intervertebral space L4-L5 with degenerative process and decrease height of vertebral bodies L4-L5 predominant at L5     Remarkable disc disease at L5-S1     Atherosclerosis     Left pleural effusion and atelectasis.  Please see CT of the chest        Electronically signed by: Roly Qiu  Date:                                            07/08/2020  Time:                                           16:00          Allergies:   Review of patient's allergies indicates:   Allergen Reactions    Enalapril maleate Swelling and Edema     Other reaction(s): Swelling    Vasotec [enalaprilat] Swelling     Neck swelling    Zoloft [sertraline] Other (See Comments)     Patient states it put him to sleep in the hospital he could not talk nor move    Cyclobenzaprine Hallucinations     Other reaction(s): Swelling  Hallucinations according to patient.    Other reaction(s): Lymphadenopathy, Restlessness, Sensation of being cold, Restlessness, Sensation of being cold    Amitriptyline Hallucinations    Labetalol      Other reaction(s): Pharyngeal swelling    Lisinopril      Other reaction(s): Pharyngeal swelling    Tramadol Nausea Only and Hallucinations       Current Medications:   Current Outpatient Medications   Medication Sig Dispense Refill    allopurinol (ZYLOPRIM) 300 MG tablet Take 300 mg by mouth once daily.      apixaban (ELIQUIS) 5 mg Tab  Take 5 mg by mouth 2 (two) times daily.      aspirin (ECOTRIN) 81 MG EC tablet Take 81 mg by mouth.      atorvastatin (LIPITOR) 80 MG tablet Take 80 mg by mouth once daily.      cholecalciferol, vitamin D3, 3,000 unit Tab Take 1 tablet by mouth.      coenzyme Q10 10 mg capsule Take 200 mg by mouth once daily.      colchicine (COLCRYS) 0.6 mg tablet TK 1 T PO BID PRF GOUT      febuxostat (ULORIC) 40 mg Tab Take 1 tablet (40 mg total) by mouth once daily. 30 tablet 6    glipiZIDE (GLUCOTROL) 5 MG tablet Take 5 mg by mouth 2 (two) times daily before meals.      HYDROcodone-acetaminophen (NORCO) 5-325 mg per tablet Take 1 tablet by mouth every 6 (six) hours as needed for Pain. 60 tablet 0    indomethacin (INDOCIN) 50 MG capsule Take 1 capsule (50 mg total) by mouth 3 (three) times daily with meals. 30 capsule 0    melatonin 5 mg Cap Take by mouth.      metoprolol succinate (TOPROL-XL) 50 MG 24 hr tablet Take 1 tablet (50 mg total) by mouth once daily. 30 tablet 1    potassium chloride SA (K-DUR,KLOR-CON) 20 MEQ tablet Take 1 tablet (20 mEq total) by mouth 2 (two) times daily. 180 tablet 3    risperiDONE (RISPERDAL) 3 MG Tab Take by mouth.      sacubitriL-valsartan (ENTRESTO) 49-51 mg per tablet Take 1 tablet by mouth 2 (two) times daily. 60 tablet 3    sertraline (ZOLOFT) 50 MG tablet Take 50 mg by mouth once daily. TAKE THREE TABLETS BY MOUTH EVERY MORNING TO IMPROVE MOOD      sildenafiL (VIAGRA) 100 MG tablet Take 1 tablet (100 mg total) by mouth As instructed for Erectile Dysfunction. Please take 1 tablet one hour prior to intercourse on empty stomach 30 tablet 1    tadalafiL (CIALIS) 20 MG Tab Take 1 pill as needed prior to sex 20 tablet 1    spironolactone (ALDACTONE) 25 MG tablet Take 1 tablet (25 mg total) by mouth once daily. 90 tablet 3    torsemide (DEMADEX) 20 MG Tab Take 2 tablets (40 mg total) by mouth 2 (two) times daily. 360 tablet 3     No current facility-administered medications for this visit.        REVIEW OF SYSTEMS:    GENERAL:  No weight loss, malaise or fevers.  HEENT:  Negative for frequent or significant headaches.  NECK:  Negative for lumps, goiter, pain and significant neck swelling.  RESPIRATORY:  Negative for cough, wheezing or shortness of breath.  CARDIOVASCULAR:  Negative for chest pain, leg swelling or palpitations. +CHF, CAD, HTN  GI:  Negative for abdominal discomfort, blood in stools or black stools or change in bowel habits. GERD  MUSCULOSKELETAL:  See HPI  SKIN:  Negative for lesions, rash, and itching.  PSYCH:  Negative for sleep disturbance, mood disorder and recent psychosocial stressors.  HEMATOLOGY/LYMPHOLOGY:  Negative for prolonged bleeding, bruising easily or swollen nodes.  NEURO:   Daily migraine headaches  ENDO: Diabetes  All other reviewed and negative other than HPI.    Past Medical History:  Past Medical History:   Diagnosis Date    Brain damage     traumatic    CAD (coronary artery disease)     Cardiomyopathy     CHF (congestive heart failure)     Diabetes mellitus     type 2    Edema     Erectile dysfunction     GERD (gastroesophageal reflux disease)     HTN (hypertension)     Hyperlipemia     Sciatic nerve pain, right     leg       Past Surgical History:  Past Surgical History:   Procedure Laterality Date    CARDIAC DEFIBRILLATOR PLACEMENT      CATHETERIZATION OF BOTH LEFT AND RIGHT HEART Bilateral 08/20/2018    CORONARY ARTERY BYPASS GRAFT      ILIAC ARTERY STENT      INJECTION OF JOINT Right 8/13/2021    Procedure: INJECTION, JOINT, SACROILIAC (SI);  Surgeon: Austin Parsons MD;  Location: [facility] PAIN MGT;  Service: Pain Management;  Laterality: Right;    RADIOFREQUENCY ABLATION Right 2/26/2021    Procedure: RADIOFREQUENCY ABLATION GENICULAR NERVES, COOLED;  Surgeon: Austin Parsons MD;  Location: [facility] PAIN MGT;  Service: Pain Management;  Laterality: Right;  1 of 2  consent needed  eliquis clearance requested    RADIOFREQUENCY ABLATION Right 11/12/2021    Procedure:  "RADIOFREQUENCY ABLATION, GENICULAR COOLED  NEED CONSENT/ clear to hold  ELIQUIS;  Surgeon: Austin Parsons MD;  Location: [facility] PAIN MGT;  Service: Pain Management;  Laterality: Right;    RIGHT HEART CATHETERIZATION Right 2020    Procedure: INSERTION, CATHETER, RIGHT HEART;  Surgeon: Brianda Navas MD;  Location: [facility] CATH LAB;  Service: Cardiology;  Laterality: Right;    TRANSFORAMINAL EPIDURAL INJECTION OF STEROID Right 2020    Procedure: INJECTION, STEROID, EPIDURAL, TRANSFORAMINAL APPROACH, L4-L5 AND L5-S1 clear to hold Eliquis 3 days;  Surgeon: Austin Parsons MD;  Location: [facility] PAIN MGT;  Service: Pain Management;  Laterality: Right;    TRANSFORAMINAL EPIDURAL INJECTION OF STEROID Right 10/23/2020    Procedure: INJECTION, STEROID, EPIDURAL, TRANSFORAMINAL APPROACH;  Surgeon: Austin Parsons MD;  Location: [facility] PAIN MGT;  Service: Pain Management;  Laterality: Right;  RT RFA L5/S1 and S1  Eliquis clearance in Media       Family History:  No family history on file.    Social History:  Social History     Socioeconomic History    Marital status:    Tobacco Use    Smoking status: Never    Smokeless tobacco: Never   Substance and Sexual Activity    Alcohol use: Not Currently    Drug use: Not Currently       OBJECTIVE:    /79   Pulse 78   Temp 98 °F (36.7 °C) (Oral)   Resp 18   Ht 6' 4" (1.93 m)   Wt 111.6 kg (246 lb)   BMI 29.94 kg/m²     PHYSICAL EXAMINATION: 2021  Voice normal.  Normal affect without evidence of distress.  Musculoskeletal: Focal TTP to R GTB, + R leg straight leg test, + facet loading R L-spine  Limited ROM of the knee joints R>L.   Gait: antalgic, aided with cane     ASSESSMENT: 72 y.o. year old male with pain, consistent with the followin. Dorsalgia, unspecified  CT Lumbar Spine Without Contrast      2. Primary osteoarthritis of knee, unspecified laterality  Ambulatory referral/consult to Physical/Occupational Therapy    Procedure Order to Pain " Management    Ambulatory referral/consult to Physical/Occupational Therapy      3. Lumbar spondylosis  Ambulatory referral/consult to Physical/Occupational Therapy    Ambulatory referral/consult to Physical/Occupational Therapy      4. Chronic, continuous use of opioids  Pain Clinic Drug Screen      5. Chronic pain disorder            PLAN:   -UDS in office today  -Order for PT  -Schedule R genicular nerves cooled RFA  -Repeat L spine CT, cannot do MRI 2/2 AICD   -Possible MBB to RFA if appropriate  -Follow up 2 weeks after genicular RFA  The above plan and management options were discussed at length with patient. Patient is in agreement with the above and verbalized understanding.    Husam Boland, DO  12/12/2022     I spent a total of 30 minutes on the day of the visit.  This includes face to face time and non-face to face time preparing to see the patient by reviewing previous labs/imaging, obtaining and/or reviewing separately obtained history, documenting clinical information in the electronic or other health record, independently interpreting results and communicating results to the patient/family/caregiver.      I have reviewed and concur with the resident's history, physical, assessment, and plan.  I have personally interviewed and examined the patient at bedside.  See below addendum for my evaluation and additional findings.    Austin Parsons MD

## 2022-12-12 NOTE — PROGRESS NOTES
Chronic patient Established Note (Follow up visit)      HPI  Ishmael Thrasher is a 70 y.o. male with a history of CAD, CHF with EF 10% with AICD, T2DM, atrial fibrillation on eliquis who presents today with chronic low back pain. This pain started in 1971 as a result of and injury in the .  He describes a constant, right sided pain that starts in his low back and radiates down the posterior aspect of his right leg to the bottom of his foot.  This is associated with a numbness, tingling sensation.  The pain occurs when he is standing still for longer than 5 minutes.  The pain does not occur when walking unless he is standing first.  He denies heaviness and weakness.  This pain is described in detail below.     Aggravating factors: Standing in place for 5 minutes     Mitigating factors: Walking, medication, rest     Previously seeing: Dr. Davide Wang ([place])     Physical Therapy: Has done for his heart, but not for his back    Interval History  12/12/2022  Patient continues to have bilateral knee pain R>L. He did get relief from genicular RFA in past for 4 months. Back pain is constant and 6-7/10. Back pain is right sided, sharp in nature and radiates down his leg. Discussed repeat genicular RFA then possible MBB. Patient agreeable to this plan. Will get L spine CT as patient is unable to get MRI 2/2 AICD.    Interval History (7/2/2020):  The patient returns to clinic today for follow up of back pain. He continues to report low back pain that radiates down the posterior aspect of his right leg to the bottom of his foot. He denies any left leg pain. His pain is worse with prolonged standing. He has had injections in the past without relief. He is currently taking Tramadol with limited relief. He denies any other health changes. His pain today is 10/10.     Interval History 8/6/2020:   Ishmael Thrasher presents to the clinic for a follow-up appointment for bilateral leg pain. Since the last visit,  Ishmael Thrasher states the pain has been worsening. Current pain intensity is 10/10.     Interval History 9/28/2020:  The patient returns to clinic today for follow up of low back and leg pain. He is s/p right L4/5 and L5/S1 TF PÉREZ on 9/11/2020. He reports no significant relief of his back and leg pain. He continues to report low back pain that radiates down the posterior aspect of his right leg to under his foot. He denies any left leg pain. His pain is worse with prolonged standing and walking. He is currently taking Norco with limited benefit. He reports that this decreases his pain but does not last. He denies any other health changes. His pain today is 10/10.     Interval History 11/16/2020:  The patient returns to clinic today for follow up of low back pain. He is s/p right L5/S1 and S1 TF PÉREZ on 10/23/2020. He reports 60% relief for about a week. He continues to report low back pain that radiates into the posterior aspect of his right leg to his foot. He denies any left leg pain. His pain is worse with standing and walking. He also reports difficulty sleeping due to pain. He is frustrated with his continued pain. He continues to take Norco but reports that this does not last. He denies any other health changes. His pain today is 10/10.     Interval History 12/21/2020:  Ishmael Thrasher presents to the clinic for a  1 month follow-up appointment. Since the last visit, Ishmael Thrasher states the pain has been stable. Current pain intensity is 10/10.    Interval History 3/31/2021  Patient complains of bilateral knee pain, usually uses walker for assistance but was unable to bring with them. Patient is s/p right cooled RFA on 2/26/21 with 60% relief of symptoms that has since returned. He reports he was admitted in the hospital for acute decompensated heart failure following receiving the second dose of the COVID-19 Moderna vaccine. He states he was admitted to [facility] twice and then  "discharged and re-admitted [facility] (discharged on 3/20/21) the pain started on 5 days ago while there and he reported it while admitted. He reports his pain level is 10/10. Its effecting his ability ambulate as he has not been able to use his legs since being in the hospital. He reports the pain has been this level the last 5 days since worsening. He is taking the Hydrocodone 5/325 TID, which is not helping. Also taking 325mg ASA with moderate relief - his heart doctor told him not to continue taking ASA at this dose. During hospital admission, patient complained of left elbow and left knee pain that was attributed to the gouty arthritis that has since resolved. Left knee pain now feels like his "usual" knee pain. Patient states he was able to move around on a daily basis with PT while admitted in the hospital. Feel more depressed and less motivated since receiving 2/16/21. Patient also complains of bilateral back pain radiating down his right leg, which is unchanged from his baseline chronic pain.    Interval History 7/27/2021:  The patient returns to clinic today for follow up of low back and knee pain. He continues to report right knee pain. He also reports increased low back and right hip pain. He reports intermittent radiating pain into his right lateral thigh. His pain is worse with standing and walking. He denies any left leg pain. He continues to perform a home exercise routine. He continues to take Norco as needed. He denies any other health changes. His pain today is 10/10.    Interval History 10/27/2021:  The patient returns to clinic today for follow up of low back and knee pain. He is s/p right SI joint injection on 8/13/2021. He reports 40% relief of his right hip and buttock pain. He continues to reports low back pain that radiates into the posterior aspect of his right leg to his calf. He denies any left leg pain. He reports increased right knee pain. He did have a recent fall which " triggered this pain. His pain is worse with standing and walking. He continues to take Norco, although this provides limited relief. He would like to go back to Tramadol. He denies any other health changes. His pain today is 10/10.    Interval History 12/2/2021:  Mr Thrasher presents for routine medication follow up. He stats prior R knee genicular RFA did well. He continues to have diffuse pain complaints one which is R lateral hip pain and inability to lay on that site due to focal pain. He states tramadol has not benefit when compared to Norco and would like to rotate back to Norco 5/325mg BID. Denies new ban of pain or neurological changes.     Interval History 1/10/2022:  Mr Thrasher presents for follow up of lower back pain and radicular pain down posterior leg going to bottom of calf. He is s/p focal R GTB TTP and states approx 2 weeks benefit. He states rotation from Tramadol to Norco 5/325mg with some mild benefit.     Interval History 6/17/2022:  Mr Thrasher presents for delayed FU. Over interval he has been stable. He inadverntely was Rxed Norco 7.5mg q6hrs. He also has been started on medical mariajuana and received Norco 10mg for gout while visiting ER. We discussed all these issues and Pt voiced understanding. States last Dosing of Roscoe was this a.m. Denies new areas of pain, denies any neurological changes. Denies SE of medications.     Interval History 9/19/2022:  Mr Thrasher presents for follow up of chronic pain. He has had worsening pain due to being out of medication as FU was just past 90 days. He is having newer return of L elbow pain. Denies further neurological changes. Denies worsening of chronic pain to R hip and R knee. Continues to take Norco 5/325mg BID without SE.       Pain Disability Index Review:  Last 3 PDI Scores 12/12/2022 9/19/2022 6/13/2022   Pain Disability Index (PDI) 34 50 60       Pain Medications:  Norco 5mg  ASA 325mg  Topical lidocaine      Opioid Contract: yes     report:   Reviewed and consistent with medication use as prescribed.    Pain Procedures:   9/11/2020- Right L4/5 and L5/S1 TF PÉREZ  10/23/2020- Right L5/S1 and S1 TF PÉREZ  2/5/2021- Bilateral genicular nerve block  2/26/2021- Right genicular cooled RFA  11/12/2021 Right knee genicular RFA  12/17/2021: Right Greater Trochanteric Bursa Injection under Fluoroscopic Guidance     Physical Therapy/Home Exercise: Set up from PT since discharge from hospital, but has not arranged.    Imaging:   Narrative & Impression       EXAMINATION:  XR LUMBAR SPINE 5 VIEW WITH FLEX AND EXT     CLINICAL HISTORY:  Back pain or radiculopathy, > 6 wks;  Radiculopathy, lumbar region     TECHNIQUE:  AP, lateral, oblique views of the lumbar spine with spot view of the lumbosacral region and lateral views in flexion and extension.     COMPARISON:  None     FINDINGS:  There is no collapse or malalignment in the lumbar spine.  There is no significant change in alignment between flexion and extension.     There is reduced of the intervertebral disc spaces at L4-L5 and L5-S1 with disc vacuum phenomena and anterior osteophyte formation.  There are mild neural foraminal stenosis at these levels.     There is mild bilateral neural foraminal stenosis.     There are extensive vascular calcifications in the abdominal aorta.  The soft tissues are otherwise unremarkable.     Impression:     1. Moderate to severe spondylosis and neural foraminal stenosis at L4-L5 and L5-S1.  2. Straightening of lumbar lordosis  3. No changes in alignment in flexion and extension     Electronically signed by resident: Marko Lamas  Date:                                            07/07/2020  Time:                                           15:05     Electronically signed by: Roly Qiu  Date:                                            07/07/2020  Time:                                           15:21          Narrative & Impression       EXAMINATION:  CT LUMBAR SPINE WITHOUT  CONTRAST     CLINICAL HISTORY:  Back pain or radiculopathy, > 6 wks;.     COMPARISON:  None.     FINDINGS:  Decrease height of vertebral bodies L4-L5 predominant at L5.  Degenerative changes in the endplates with decrease height intervertebral space L4-L5.  Vacuum phenomenon at L5-S1 indicating remarkable decrease.  Facet hypertrophy and degenerative changes in facet joints L3-L4, bilaterally.     Prominent aortic calcifications.     Left pleural effusion and atelectasis     Impression:     Prominent degenerative changes in the lumbar spine at L4, L5 and S1     Decrease height of intervertebral space L4-L5 with degenerative process and decrease height of vertebral bodies L4-L5 predominant at L5     Remarkable disc disease at L5-S1     Atherosclerosis     Left pleural effusion and atelectasis.  Please see CT of the chest        Electronically signed by: Roly Qiu  Date:                                            07/08/2020  Time:                                           16:00          Allergies:   Review of patient's allergies indicates:   Allergen Reactions    Enalapril maleate Swelling and Edema     Other reaction(s): Swelling    Vasotec [enalaprilat] Swelling     Neck swelling    Zoloft [sertraline] Other (See Comments)     Patient states it put him to sleep in the hospital he could not talk nor move    Cyclobenzaprine Hallucinations     Other reaction(s): Swelling  Hallucinations according to patient.    Other reaction(s): Lymphadenopathy, Restlessness, Sensation of being cold, Restlessness, Sensation of being cold    Amitriptyline Hallucinations    Labetalol      Other reaction(s): Pharyngeal swelling    Lisinopril      Other reaction(s): Pharyngeal swelling    Tramadol Nausea Only and Hallucinations       Current Medications:   Current Outpatient Medications   Medication Sig Dispense Refill    allopurinol (ZYLOPRIM) 300 MG tablet Take 300 mg by mouth once daily.      apixaban (ELIQUIS) 5 mg Tab  Take 5 mg by mouth 2 (two) times daily.      aspirin (ECOTRIN) 81 MG EC tablet Take 81 mg by mouth.      atorvastatin (LIPITOR) 80 MG tablet Take 80 mg by mouth once daily.      cholecalciferol, vitamin D3, 3,000 unit Tab Take 1 tablet by mouth.      coenzyme Q10 10 mg capsule Take 200 mg by mouth once daily.      colchicine (COLCRYS) 0.6 mg tablet TK 1 T PO BID PRF GOUT      febuxostat (ULORIC) 40 mg Tab Take 1 tablet (40 mg total) by mouth once daily. 30 tablet 6    glipiZIDE (GLUCOTROL) 5 MG tablet Take 5 mg by mouth 2 (two) times daily before meals.      HYDROcodone-acetaminophen (NORCO) 5-325 mg per tablet Take 1 tablet by mouth every 6 (six) hours as needed for Pain. 60 tablet 0    indomethacin (INDOCIN) 50 MG capsule Take 1 capsule (50 mg total) by mouth 3 (three) times daily with meals. 30 capsule 0    melatonin 5 mg Cap Take by mouth.      metoprolol succinate (TOPROL-XL) 50 MG 24 hr tablet Take 1 tablet (50 mg total) by mouth once daily. 30 tablet 1    potassium chloride SA (K-DUR,KLOR-CON) 20 MEQ tablet Take 1 tablet (20 mEq total) by mouth 2 (two) times daily. 180 tablet 3    risperiDONE (RISPERDAL) 3 MG Tab Take by mouth.      sacubitriL-valsartan (ENTRESTO) 49-51 mg per tablet Take 1 tablet by mouth 2 (two) times daily. 60 tablet 3    sertraline (ZOLOFT) 50 MG tablet Take 50 mg by mouth once daily. TAKE THREE TABLETS BY MOUTH EVERY MORNING TO IMPROVE MOOD      sildenafiL (VIAGRA) 100 MG tablet Take 1 tablet (100 mg total) by mouth As instructed for Erectile Dysfunction. Please take 1 tablet one hour prior to intercourse on empty stomach 30 tablet 1    tadalafiL (CIALIS) 20 MG Tab Take 1 pill as needed prior to sex 20 tablet 1    spironolactone (ALDACTONE) 25 MG tablet Take 1 tablet (25 mg total) by mouth once daily. 90 tablet 3    torsemide (DEMADEX) 20 MG Tab Take 2 tablets (40 mg total) by mouth 2 (two) times daily. 360 tablet 3     No current facility-administered medications for this visit.        REVIEW OF SYSTEMS:    GENERAL:  No weight loss, malaise or fevers.  HEENT:  Negative for frequent or significant headaches.  NECK:  Negative for lumps, goiter, pain and significant neck swelling.  RESPIRATORY:  Negative for cough, wheezing or shortness of breath.  CARDIOVASCULAR:  Negative for chest pain, leg swelling or palpitations. +CHF, CAD, HTN  GI:  Negative for abdominal discomfort, blood in stools or black stools or change in bowel habits. GERD  MUSCULOSKELETAL:  See HPI  SKIN:  Negative for lesions, rash, and itching.  PSYCH:  Negative for sleep disturbance, mood disorder and recent psychosocial stressors.  HEMATOLOGY/LYMPHOLOGY:  Negative for prolonged bleeding, bruising easily or swollen nodes.  NEURO:   Daily migraine headaches  ENDO: Diabetes  All other reviewed and negative other than HPI.    Past Medical History:  Past Medical History:   Diagnosis Date    Brain damage     traumatic    CAD (coronary artery disease)     Cardiomyopathy     CHF (congestive heart failure)     Diabetes mellitus     type 2    Edema     Erectile dysfunction     GERD (gastroesophageal reflux disease)     HTN (hypertension)     Hyperlipemia     Sciatic nerve pain, right     leg       Past Surgical History:  Past Surgical History:   Procedure Laterality Date    CARDIAC DEFIBRILLATOR PLACEMENT      CATHETERIZATION OF BOTH LEFT AND RIGHT HEART Bilateral 08/20/2018    CORONARY ARTERY BYPASS GRAFT      ILIAC ARTERY STENT      INJECTION OF JOINT Right 8/13/2021    Procedure: INJECTION, JOINT, SACROILIAC (SI);  Surgeon: Austin Parsons MD;  Location: [facility] PAIN MGT;  Service: Pain Management;  Laterality: Right;    RADIOFREQUENCY ABLATION Right 2/26/2021    Procedure: RADIOFREQUENCY ABLATION GENICULAR NERVES, COOLED;  Surgeon: Austin Parsons MD;  Location: [facility] PAIN MGT;  Service: Pain Management;  Laterality: Right;  1 of 2  consent needed  eliquis clearance requested    RADIOFREQUENCY ABLATION Right 11/12/2021    Procedure:  "RADIOFREQUENCY ABLATION, GENICULAR COOLED  NEED CONSENT/ clear to hold  ELIQUIS;  Surgeon: Austin Parsons MD;  Location: [facility] PAIN MGT;  Service: Pain Management;  Laterality: Right;    RIGHT HEART CATHETERIZATION Right 2020    Procedure: INSERTION, CATHETER, RIGHT HEART;  Surgeon: Brianda Navas MD;  Location: [facility] CATH LAB;  Service: Cardiology;  Laterality: Right;    TRANSFORAMINAL EPIDURAL INJECTION OF STEROID Right 2020    Procedure: INJECTION, STEROID, EPIDURAL, TRANSFORAMINAL APPROACH, L4-L5 AND L5-S1 clear to hold Eliquis 3 days;  Surgeon: Austin Parsons MD;  Location: [facility] PAIN MGT;  Service: Pain Management;  Laterality: Right;    TRANSFORAMINAL EPIDURAL INJECTION OF STEROID Right 10/23/2020    Procedure: INJECTION, STEROID, EPIDURAL, TRANSFORAMINAL APPROACH;  Surgeon: Austin Parsons MD;  Location: [facility] PAIN MGT;  Service: Pain Management;  Laterality: Right;  RT RFA L5/S1 and S1  Eliquis clearance in [place]       Family History:  No family history on file.    Social History:  Social History     Socioeconomic History    Marital status:    Tobacco Use    Smoking status: Never    Smokeless tobacco: Never   Substance and Sexual Activity    Alcohol use: Not Currently    Drug use: Not Currently       OBJECTIVE:    /79   Pulse 78   Temp 98 °F (36.7 °C) (Oral)   Resp 18   Ht 6' 4" (1.93 m)   Wt 111.6 kg (246 lb)   BMI 29.94 kg/m²     PHYSICAL EXAMINATION: 2021  Voice normal.  Normal affect without evidence of distress.  Musculoskeletal: Focal TTP to R GTB, + R leg straight leg test, + facet loading R L-spine  Limited ROM of the knee joints R>L.   Gait: antalgic, aided with cane     ASSESSMENT: 72 y.o. year old male with pain, consistent with the followin. Dorsalgia, unspecified  CT Lumbar Spine Without Contrast      2. Primary osteoarthritis of knee, unspecified laterality  Ambulatory referral/consult to Physical/Occupational Therapy    Procedure Order to Pain " Management    Ambulatory referral/consult to Physical/Occupational Therapy      3. Lumbar spondylosis  Ambulatory referral/consult to Physical/Occupational Therapy    Ambulatory referral/consult to Physical/Occupational Therapy      4. Chronic, continuous use of opioids  Pain Clinic Drug Screen      5. Chronic pain disorder            PLAN:   -UDS in office today  -Order for PT  -Schedule R genicular nerves cooled RFA  -Repeat L spine CT, cannot do MRI 2/2 AICD   -Possible MBB to RFA if appropriate  -Follow up 2 weeks after genicular RFA  The above plan and management options were discussed at length with patient. Patient is in agreement with the above and verbalized understanding.    Husam Boland, DO  12/12/2022     I spent a total of 30 minutes on the day of the visit.  This includes face to face time and non-face to face time preparing to see the patient by reviewing previous labs/imaging, obtaining and/or reviewing separately obtained history, documenting clinical information in the electronic or other health record, independently interpreting results and communicating results to the patient/family/caregiver.      I have reviewed and concur with the resident's history, physical, assessment, and plan.  I have personally interviewed and examined the patient at bedside.  See below addendum for my evaluation and additional findings.    Austin Parsons MD

## 2022-12-16 NOTE — TELEPHONE ENCOUNTER
----- Message from Shannon Watson sent at 2022 12:42 PM CST -----  Regarding: [facility] RFS Form  Good afternoon,    The patient's Ishmael Thrasher, MRN [id number], authorization with the [facility]  on 2022. The [facility] requires a RFS to request and extension of time. I've completed the RFS form and the only information that is needed is Dr. Parsons signature. I uploaded the form to the patient's chart under the  tab. Please print the form and have Dr. Parsons signed under the Provider Signature.     Once the form is signed, please returned back to me via e-mail to [email or web address] or fax to [phone].  Please let me know if it is returned by email or fax, so I can be on the lookout for it. Once the form is returned to me; then I will forward the request to the [facility] for authorization.      Please let me know if you have any questions     Thank you,  Shannon Watson

## 2023-01-01 ENCOUNTER — TELEPHONE (OUTPATIENT)
Dept: PAIN MEDICINE | Facility: OTHER | Age: 73
End: 2023-01-01
Payer: OTHER GOVERNMENT

## 2023-01-01 ENCOUNTER — TELEPHONE (OUTPATIENT)
Dept: PAIN MEDICINE | Facility: CLINIC | Age: 73
End: 2023-01-01
Payer: OTHER GOVERNMENT

## 2023-01-01 ENCOUNTER — HOSPITAL ENCOUNTER (OUTPATIENT)
Facility: OTHER | Age: 73
Discharge: HOME | End: 2023-02-10
Attending: ANESTHESIOLOGY | Admitting: ANESTHESIOLOGY
Payer: OTHER GOVERNMENT

## 2023-01-01 ENCOUNTER — HOSPITAL ENCOUNTER (OUTPATIENT)
Facility: OTHER | Age: 73
Discharge: HOME | End: 2023-01-05
Attending: ANESTHESIOLOGY | Admitting: ANESTHESIOLOGY
Payer: OTHER GOVERNMENT

## 2023-01-01 ENCOUNTER — ANESTHESIA (OUTPATIENT)
Dept: INTENSIVE CARE | Facility: HOSPITAL | Age: 73
DRG: 286 | End: 2023-01-01
Payer: OTHER GOVERNMENT

## 2023-01-01 ENCOUNTER — HOSPITAL ENCOUNTER (INPATIENT)
Facility: HOSPITAL | Age: 73
LOS: 15 days | DRG: 286 | End: 2023-09-12
Attending: INTERNAL MEDICINE | Admitting: INTERNAL MEDICINE
Payer: OTHER GOVERNMENT

## 2023-01-01 ENCOUNTER — OFFICE VISIT (OUTPATIENT)
Dept: PAIN MEDICINE | Facility: CLINIC | Age: 73
End: 2023-01-01
Payer: OTHER GOVERNMENT

## 2023-01-01 VITALS
WEIGHT: 235 LBS | SYSTOLIC BLOOD PRESSURE: 117 MMHG | DIASTOLIC BLOOD PRESSURE: 82 MMHG | HEART RATE: 72 BPM | OXYGEN SATURATION: 98 % | TEMPERATURE: 98 F | HEIGHT: 76 IN | BODY MASS INDEX: 28.62 KG/M2 | RESPIRATION RATE: 18 BRPM

## 2023-01-01 VITALS
WEIGHT: 245.13 LBS | DIASTOLIC BLOOD PRESSURE: 60 MMHG | SYSTOLIC BLOOD PRESSURE: 91 MMHG | HEIGHT: 76 IN | BODY MASS INDEX: 29.85 KG/M2 | TEMPERATURE: 98 F | RESPIRATION RATE: 19 BRPM | HEART RATE: 99 BPM

## 2023-01-01 VITALS
BODY MASS INDEX: 29.85 KG/M2 | DIASTOLIC BLOOD PRESSURE: 75 MMHG | WEIGHT: 245.13 LBS | SYSTOLIC BLOOD PRESSURE: 125 MMHG | HEIGHT: 76 IN

## 2023-01-01 VITALS
OXYGEN SATURATION: 97 % | SYSTOLIC BLOOD PRESSURE: 174 MMHG | HEIGHT: 76 IN | TEMPERATURE: 98 F | RESPIRATION RATE: 18 BRPM | DIASTOLIC BLOOD PRESSURE: 90 MMHG | WEIGHT: 240 LBS | BODY MASS INDEX: 29.22 KG/M2 | HEART RATE: 72 BPM

## 2023-01-01 VITALS
HEART RATE: 51 BPM | RESPIRATION RATE: 18 BRPM | HEIGHT: 76 IN | BODY MASS INDEX: 29.22 KG/M2 | DIASTOLIC BLOOD PRESSURE: 84 MMHG | WEIGHT: 240 LBS | SYSTOLIC BLOOD PRESSURE: 128 MMHG

## 2023-01-01 VITALS — TEMPERATURE: 96 F | WEIGHT: 215.81 LBS | BODY MASS INDEX: 26.28 KG/M2 | HEIGHT: 76 IN

## 2023-01-01 DIAGNOSIS — M47.816 LUMBAR SPONDYLOSIS: ICD-10-CM

## 2023-01-01 DIAGNOSIS — I50.20 HFREF (HEART FAILURE WITH REDUCED EJECTION FRACTION): Primary | ICD-10-CM

## 2023-01-01 DIAGNOSIS — G89.29 CHRONIC PAIN OF RIGHT KNEE: Primary | ICD-10-CM

## 2023-01-01 DIAGNOSIS — M17.10 PRIMARY OSTEOARTHRITIS OF KNEE, UNSPECIFIED LATERALITY: ICD-10-CM

## 2023-01-01 DIAGNOSIS — G89.29 CHRONIC PAIN: ICD-10-CM

## 2023-01-01 DIAGNOSIS — M54.9 DORSALGIA, UNSPECIFIED: ICD-10-CM

## 2023-01-01 DIAGNOSIS — G89.29 CHRONIC KNEE PAIN, UNSPECIFIED LATERALITY: ICD-10-CM

## 2023-01-01 DIAGNOSIS — G89.29 CHRONIC PAIN OF LEFT KNEE: Primary | ICD-10-CM

## 2023-01-01 DIAGNOSIS — I35.0 SYMPTOMATIC SEVERE AORTIC STENOSIS WITH LOW EJECTION FRACTION: ICD-10-CM

## 2023-01-01 DIAGNOSIS — M17.10 PRIMARY OSTEOARTHRITIS OF KNEE, UNSPECIFIED LATERALITY: Primary | ICD-10-CM

## 2023-01-01 DIAGNOSIS — I35.0 MODERATE AORTIC STENOSIS: ICD-10-CM

## 2023-01-01 DIAGNOSIS — M25.569 CHRONIC KNEE PAIN, UNSPECIFIED LATERALITY: ICD-10-CM

## 2023-01-01 DIAGNOSIS — G89.29 CHRONIC PAIN OF LEFT KNEE: ICD-10-CM

## 2023-01-01 DIAGNOSIS — M54.9 DORSALGIA, UNSPECIFIED: Primary | ICD-10-CM

## 2023-01-01 DIAGNOSIS — M25.569 CHRONIC KNEE PAIN, UNSPECIFIED LATERALITY: Primary | ICD-10-CM

## 2023-01-01 DIAGNOSIS — R00.0 TACHYCARDIA: ICD-10-CM

## 2023-01-01 DIAGNOSIS — Z51.81 ENCOUNTER FOR THERAPEUTIC DRUG MONITORING: Primary | ICD-10-CM

## 2023-01-01 DIAGNOSIS — M25.561 CHRONIC PAIN OF RIGHT KNEE: Primary | ICD-10-CM

## 2023-01-01 DIAGNOSIS — M25.561 CHRONIC PAIN OF RIGHT KNEE: ICD-10-CM

## 2023-01-01 DIAGNOSIS — G89.29 CHRONIC KNEE PAIN, UNSPECIFIED LATERALITY: Primary | ICD-10-CM

## 2023-01-01 DIAGNOSIS — R07.9 CHEST PAIN: ICD-10-CM

## 2023-01-01 DIAGNOSIS — M47.816 LUMBAR SPONDYLOSIS: Primary | ICD-10-CM

## 2023-01-01 DIAGNOSIS — M25.562 CHRONIC PAIN OF LEFT KNEE: ICD-10-CM

## 2023-01-01 DIAGNOSIS — M25.562 CHRONIC PAIN OF LEFT KNEE: Primary | ICD-10-CM

## 2023-01-01 DIAGNOSIS — M25.569 KNEE PAIN, UNSPECIFIED CHRONICITY, UNSPECIFIED LATERALITY: ICD-10-CM

## 2023-01-01 DIAGNOSIS — G89.29 CHRONIC PAIN OF RIGHT KNEE: ICD-10-CM

## 2023-01-01 DIAGNOSIS — I35.0 AORTIC STENOSIS: ICD-10-CM

## 2023-01-01 DIAGNOSIS — R57.0 CARDIOGENIC SHOCK: ICD-10-CM

## 2023-01-01 LAB
6MAM UR QL: NOT DETECTED
7AMINOCLONAZEPAM UR QL: NOT DETECTED
A-OH ALPRAZ UR QL: NOT DETECTED
ABO + RH BLD: NORMAL
ALBUMIN SERPL BCP-MCNC: 2.6 G/DL (ref 3.5–5.2)
ALBUMIN SERPL BCP-MCNC: 2.7 G/DL (ref 3.5–5.2)
ALBUMIN SERPL BCP-MCNC: 2.9 G/DL (ref 3.5–5.2)
ALBUMIN SERPL BCP-MCNC: 3 G/DL (ref 3.5–5.2)
ALBUMIN SERPL BCP-MCNC: 3.1 G/DL (ref 3.5–5.2)
ALBUMIN SERPL BCP-MCNC: 3.2 G/DL (ref 3.5–5.2)
ALBUMIN SERPL BCP-MCNC: 3.3 G/DL (ref 3.5–5.2)
ALBUMIN SERPL BCP-MCNC: 3.4 G/DL (ref 3.5–5.2)
ALLENS TEST: ABNORMAL
ALLENS TEST: NORMAL
ALP SERPL-CCNC: 100 U/L (ref 55–135)
ALP SERPL-CCNC: 101 U/L (ref 55–135)
ALP SERPL-CCNC: 101 U/L (ref 55–135)
ALP SERPL-CCNC: 102 U/L (ref 55–135)
ALP SERPL-CCNC: 105 U/L (ref 55–135)
ALP SERPL-CCNC: 106 U/L (ref 55–135)
ALP SERPL-CCNC: 112 U/L (ref 55–135)
ALP SERPL-CCNC: 114 U/L (ref 55–135)
ALP SERPL-CCNC: 114 U/L (ref 55–135)
ALP SERPL-CCNC: 116 U/L (ref 55–135)
ALP SERPL-CCNC: 122 U/L (ref 55–135)
ALP SERPL-CCNC: 124 U/L (ref 55–135)
ALP SERPL-CCNC: 124 U/L (ref 55–135)
ALP SERPL-CCNC: 128 U/L (ref 55–135)
ALP SERPL-CCNC: 128 U/L (ref 55–135)
ALP SERPL-CCNC: 147 U/L (ref 55–135)
ALP SERPL-CCNC: 174 U/L (ref 55–135)
ALP SERPL-CCNC: 181 U/L (ref 55–135)
ALP SERPL-CCNC: 96 U/L (ref 55–135)
ALPHA-OH-MIDAZOLAM: NOT DETECTED
ALPRAZ UR QL: NOT DETECTED
ALT SERPL W/O P-5'-P-CCNC: 12 U/L (ref 10–44)
ALT SERPL W/O P-5'-P-CCNC: 14 U/L (ref 10–44)
ALT SERPL W/O P-5'-P-CCNC: 15 U/L (ref 10–44)
ALT SERPL W/O P-5'-P-CCNC: 15 U/L (ref 10–44)
ALT SERPL W/O P-5'-P-CCNC: 16 U/L (ref 10–44)
ALT SERPL W/O P-5'-P-CCNC: 1610 U/L (ref 10–44)
ALT SERPL W/O P-5'-P-CCNC: 19 U/L (ref 10–44)
ALT SERPL W/O P-5'-P-CCNC: 1960 U/L (ref 10–44)
ALT SERPL W/O P-5'-P-CCNC: 2036 U/L (ref 10–44)
ALT SERPL W/O P-5'-P-CCNC: 23 U/L (ref 10–44)
ALT SERPL W/O P-5'-P-CCNC: 33 U/L (ref 10–44)
ALT SERPL W/O P-5'-P-CCNC: 36 U/L (ref 10–44)
ALT SERPL W/O P-5'-P-CCNC: 586 U/L (ref 10–44)
ALT SERPL W/O P-5'-P-CCNC: 594 U/L (ref 10–44)
ALT SERPL W/O P-5'-P-CCNC: 90 U/L (ref 10–44)
AMPHET UR QL SCN: NOT DETECTED
ANION GAP SERPL CALC-SCNC: 10 MMOL/L (ref 8–16)
ANION GAP SERPL CALC-SCNC: 11 MMOL/L (ref 8–16)
ANION GAP SERPL CALC-SCNC: 12 MMOL/L (ref 8–16)
ANION GAP SERPL CALC-SCNC: 13 MMOL/L (ref 8–16)
ANION GAP SERPL CALC-SCNC: 14 MMOL/L (ref 8–16)
ANION GAP SERPL CALC-SCNC: 15 MMOL/L (ref 8–16)
ANION GAP SERPL CALC-SCNC: 16 MMOL/L (ref 8–16)
ANION GAP SERPL CALC-SCNC: 25 MMOL/L (ref 8–16)
ANION GAP SERPL CALC-SCNC: 27 MMOL/L (ref 8–16)
ANION GAP SERPL CALC-SCNC: 28 MMOL/L (ref 8–16)
ANION GAP SERPL CALC-SCNC: 9 MMOL/L (ref 8–16)
ANISOCYTOSIS BLD QL SMEAR: SLIGHT
ANNOTATION COMMENT IMP: NORMAL
ANNOTATION COMMENT IMP: NORMAL
APTT PPP: 26.7 SEC (ref 21–32)
APTT PPP: 28.5 SEC (ref 21–32)
APTT PPP: 29.7 SEC (ref 21–32)
APTT PPP: 34.2 SEC (ref 21–32)
APTT PPP: 36.4 SEC (ref 21–32)
APTT PPP: 37.2 SEC (ref 21–32)
APTT PPP: 39 SEC (ref 21–32)
APTT PPP: 40.6 SEC (ref 21–32)
APTT PPP: 72.7 SEC (ref 21–32)
APTT PPP: 73.8 SEC (ref 21–32)
APTT PPP: 93.2 SEC (ref 21–32)
ASCENDING AORTA: 3.4 CM
ASCENDING AORTA: 3.62 CM
AST SERPL-CCNC: 101 U/L (ref 10–40)
AST SERPL-CCNC: 12 U/L (ref 10–40)
AST SERPL-CCNC: 13 U/L (ref 10–40)
AST SERPL-CCNC: 14 U/L (ref 10–40)
AST SERPL-CCNC: 16 U/L (ref 10–40)
AST SERPL-CCNC: 1699 U/L (ref 10–40)
AST SERPL-CCNC: 17 U/L (ref 10–40)
AST SERPL-CCNC: 1868 U/L (ref 10–40)
AST SERPL-CCNC: 2275 U/L (ref 10–40)
AST SERPL-CCNC: 27 U/L (ref 10–40)
AST SERPL-CCNC: 35 U/L (ref 10–40)
AST SERPL-CCNC: 693 U/L (ref 10–40)
AST SERPL-CCNC: 703 U/L (ref 10–40)
AV INDEX (PROSTH): 0.19
AV INDEX (PROSTH): 0.24
AV MEAN GRADIENT: 12 MMHG
AV MEAN GRADIENT: 26 MMHG
AV PEAK GRADIENT: 22 MMHG
AV PEAK GRADIENT: 43 MMHG
AV VALVE AREA BY VELOCITY RATIO: 0.78 CM²
AV VALVE AREA BY VELOCITY RATIO: 0.99 CM²
AV VALVE AREA: 0.82 CM²
AV VALVE AREA: 0.91 CM²
AV VELOCITY RATIO: 0.21
AV VELOCITY RATIO: 0.23
BARBITURATES UR QL: NOT DETECTED
BASOPHILS # BLD AUTO: 0.04 K/UL (ref 0–0.2)
BASOPHILS # BLD AUTO: 0.05 K/UL (ref 0–0.2)
BASOPHILS # BLD AUTO: 0.06 K/UL (ref 0–0.2)
BASOPHILS # BLD AUTO: 0.07 K/UL (ref 0–0.2)
BASOPHILS # BLD AUTO: 0.08 K/UL (ref 0–0.2)
BASOPHILS NFR BLD: 0 % (ref 0–1.9)
BASOPHILS NFR BLD: 0.3 % (ref 0–1.9)
BASOPHILS NFR BLD: 0.4 % (ref 0–1.9)
BASOPHILS NFR BLD: 0.5 % (ref 0–1.9)
BASOPHILS NFR BLD: 0.7 % (ref 0–1.9)
BASOPHILS NFR BLD: 0.8 % (ref 0–1.9)
BILIRUB DIRECT SERPL-MCNC: 0.3 MG/DL (ref 0.1–0.3)
BILIRUB DIRECT SERPL-MCNC: 0.4 MG/DL (ref 0.1–0.3)
BILIRUB DIRECT SERPL-MCNC: 0.5 MG/DL (ref 0.1–0.3)
BILIRUB DIRECT SERPL-MCNC: 0.6 MG/DL (ref 0.1–0.3)
BILIRUB DIRECT SERPL-MCNC: 1 MG/DL (ref 0.1–0.3)
BILIRUB DIRECT SERPL-MCNC: 1.2 MG/DL (ref 0.1–0.3)
BILIRUB DIRECT SERPL-MCNC: 1.4 MG/DL (ref 0.1–0.3)
BILIRUB SERPL-MCNC: 0.7 MG/DL (ref 0.1–1)
BILIRUB SERPL-MCNC: 0.9 MG/DL (ref 0.1–1)
BILIRUB SERPL-MCNC: 1 MG/DL (ref 0.1–1)
BILIRUB SERPL-MCNC: 1 MG/DL (ref 0.1–1)
BILIRUB SERPL-MCNC: 1.1 MG/DL (ref 0.1–1)
BILIRUB SERPL-MCNC: 1.2 MG/DL (ref 0.1–1)
BILIRUB SERPL-MCNC: 1.3 MG/DL (ref 0.1–1)
BILIRUB SERPL-MCNC: 1.3 MG/DL (ref 0.1–1)
BILIRUB SERPL-MCNC: 1.4 MG/DL (ref 0.1–1)
BILIRUB SERPL-MCNC: 2 MG/DL (ref 0.1–1)
BILIRUB SERPL-MCNC: 2.5 MG/DL (ref 0.1–1)
BILIRUB SERPL-MCNC: 2.5 MG/DL (ref 0.1–1)
BILIRUB SERPL-MCNC: 2.6 MG/DL (ref 0.1–1)
BILIRUB SERPL-MCNC: 2.6 MG/DL (ref 0.1–1)
BILIRUB SERPL-MCNC: 2.9 MG/DL (ref 0.1–1)
BILIRUB SERPL-MCNC: 3.8 MG/DL (ref 0.1–1)
BILIRUB SERPL-MCNC: 4 MG/DL (ref 0.1–1)
BLD GP AB SCN CELLS X3 SERPL QL: NORMAL
BNP SERPL-MCNC: 2220 PG/ML (ref 0–99)
BNP SERPL-MCNC: 2700 PG/ML (ref 0–99)
BSA FOR ECHO PROCEDURE: 2.3 M2
BSA FOR ECHO PROCEDURE: 2.31 M2
BUN SERPL-MCNC: 10 MG/DL (ref 8–23)
BUN SERPL-MCNC: 20 MG/DL (ref 8–23)
BUN SERPL-MCNC: 20 MG/DL (ref 8–23)
BUN SERPL-MCNC: 26 MG/DL (ref 8–23)
BUN SERPL-MCNC: 27 MG/DL (ref 8–23)
BUN SERPL-MCNC: 28 MG/DL (ref 8–23)
BUN SERPL-MCNC: 29 MG/DL (ref 8–23)
BUN SERPL-MCNC: 30 MG/DL (ref 8–23)
BUN SERPL-MCNC: 31 MG/DL (ref 8–23)
BUN SERPL-MCNC: 32 MG/DL (ref 8–23)
BUN SERPL-MCNC: 34 MG/DL (ref 8–23)
BUN SERPL-MCNC: 35 MG/DL (ref 8–23)
BUN SERPL-MCNC: 36 MG/DL (ref 8–23)
BUN SERPL-MCNC: 37 MG/DL (ref 8–23)
BUN SERPL-MCNC: 37 MG/DL (ref 8–23)
BUN SERPL-MCNC: 38 MG/DL (ref 8–23)
BUN SERPL-MCNC: 39 MG/DL (ref 8–23)
BUN SERPL-MCNC: 46 MG/DL (ref 8–23)
BUN SERPL-MCNC: 50 MG/DL (ref 8–23)
BUN SERPL-MCNC: 54 MG/DL (ref 8–23)
BUN SERPL-MCNC: 55 MG/DL (ref 8–23)
BUN SERPL-MCNC: 58 MG/DL (ref 8–23)
BUN SERPL-MCNC: 6 MG/DL (ref 8–23)
BUN SERPL-MCNC: 6 MG/DL (ref 8–23)
BUN SERPL-MCNC: 7 MG/DL (ref 8–23)
BUN SERPL-MCNC: 8 MG/DL (ref 8–23)
BUN SERPL-MCNC: 8 MG/DL (ref 8–23)
BUPRENORPHINE UR QL: NOT DETECTED
BURR CELLS BLD QL SMEAR: ABNORMAL
BZE UR QL: NOT DETECTED
CA-I BLDV-SCNC: 1.15 MMOL/L (ref 1.06–1.42)
CALCIUM SERPL-MCNC: 7.5 MG/DL (ref 8.7–10.5)
CALCIUM SERPL-MCNC: 7.5 MG/DL (ref 8.7–10.5)
CALCIUM SERPL-MCNC: 7.8 MG/DL (ref 8.7–10.5)
CALCIUM SERPL-MCNC: 8.3 MG/DL (ref 8.7–10.5)
CALCIUM SERPL-MCNC: 8.3 MG/DL (ref 8.7–10.5)
CALCIUM SERPL-MCNC: 8.5 MG/DL (ref 8.7–10.5)
CALCIUM SERPL-MCNC: 8.6 MG/DL (ref 8.7–10.5)
CALCIUM SERPL-MCNC: 8.6 MG/DL (ref 8.7–10.5)
CALCIUM SERPL-MCNC: 8.7 MG/DL (ref 8.7–10.5)
CALCIUM SERPL-MCNC: 8.8 MG/DL (ref 8.7–10.5)
CALCIUM SERPL-MCNC: 8.9 MG/DL (ref 8.7–10.5)
CALCIUM SERPL-MCNC: 9 MG/DL (ref 8.7–10.5)
CALCIUM SERPL-MCNC: 9.1 MG/DL (ref 8.7–10.5)
CALCIUM SERPL-MCNC: 9.2 MG/DL (ref 8.7–10.5)
CALCIUM SERPL-MCNC: 9.3 MG/DL (ref 8.7–10.5)
CALCIUM SERPL-MCNC: 9.4 MG/DL (ref 8.7–10.5)
CALCIUM SERPL-MCNC: 9.6 MG/DL (ref 8.7–10.5)
CALCIUM SERPL-MCNC: 9.6 MG/DL (ref 8.7–10.5)
CALCIUM SERPL-MCNC: 9.7 MG/DL (ref 8.7–10.5)
CARBOXYTHC UR QL: NOT DETECTED
CARISOPRODOL UR QL: NOT DETECTED
CHLORIDE SERPL-SCNC: 100 MMOL/L (ref 95–110)
CHLORIDE SERPL-SCNC: 101 MMOL/L (ref 95–110)
CHLORIDE SERPL-SCNC: 102 MMOL/L (ref 95–110)
CHLORIDE SERPL-SCNC: 103 MMOL/L (ref 95–110)
CHLORIDE SERPL-SCNC: 103 MMOL/L (ref 95–110)
CHLORIDE SERPL-SCNC: 104 MMOL/L (ref 95–110)
CHLORIDE SERPL-SCNC: 105 MMOL/L (ref 95–110)
CHLORIDE SERPL-SCNC: 95 MMOL/L (ref 95–110)
CHLORIDE SERPL-SCNC: 95 MMOL/L (ref 95–110)
CHLORIDE SERPL-SCNC: 97 MMOL/L (ref 95–110)
CHLORIDE SERPL-SCNC: 99 MMOL/L (ref 95–110)
CHLORIDE SERPL-SCNC: 99 MMOL/L (ref 95–110)
CLONAZEPAM UR QL: NOT DETECTED
CO2 SERPL-SCNC: 10 MMOL/L (ref 23–29)
CO2 SERPL-SCNC: 10 MMOL/L (ref 23–29)
CO2 SERPL-SCNC: 13 MMOL/L (ref 23–29)
CO2 SERPL-SCNC: 16 MMOL/L (ref 23–29)
CO2 SERPL-SCNC: 17 MMOL/L (ref 23–29)
CO2 SERPL-SCNC: 18 MMOL/L (ref 23–29)
CO2 SERPL-SCNC: 20 MMOL/L (ref 23–29)
CO2 SERPL-SCNC: 21 MMOL/L (ref 23–29)
CO2 SERPL-SCNC: 22 MMOL/L (ref 23–29)
CO2 SERPL-SCNC: 23 MMOL/L (ref 23–29)
CO2 SERPL-SCNC: 24 MMOL/L (ref 23–29)
CO2 SERPL-SCNC: 25 MMOL/L (ref 23–29)
CODEINE UR QL: NOT DETECTED
CREAT SERPL-MCNC: 0.8 MG/DL (ref 0.5–1.4)
CREAT SERPL-MCNC: 0.9 MG/DL (ref 0.5–1.4)
CREAT SERPL-MCNC: 1.1 MG/DL (ref 0.5–1.4)
CREAT SERPL-MCNC: 1.1 MG/DL (ref 0.5–1.4)
CREAT SERPL-MCNC: 1.3 MG/DL (ref 0.5–1.4)
CREAT SERPL-MCNC: 1.3 MG/DL (ref 0.5–1.4)
CREAT SERPL-MCNC: 1.5 MG/DL (ref 0.5–1.4)
CREAT SERPL-MCNC: 1.5 MG/DL (ref 0.5–1.4)
CREAT SERPL-MCNC: 1.6 MG/DL (ref 0.5–1.4)
CREAT SERPL-MCNC: 1.6 MG/DL (ref 0.5–1.4)
CREAT SERPL-MCNC: 1.7 MG/DL (ref 0.5–1.4)
CREAT SERPL-MCNC: 1.7 MG/DL (ref 0.5–1.4)
CREAT SERPL-MCNC: 1.8 MG/DL (ref 0.5–1.4)
CREAT SERPL-MCNC: 1.8 MG/DL (ref 0.5–1.4)
CREAT SERPL-MCNC: 1.9 MG/DL (ref 0.5–1.4)
CREAT SERPL-MCNC: 2 MG/DL (ref 0.5–1.4)
CREAT SERPL-MCNC: 2.1 MG/DL (ref 0.5–1.4)
CREAT SERPL-MCNC: 2.1 MG/DL (ref 0.5–1.4)
CREAT SERPL-MCNC: 2.2 MG/DL (ref 0.5–1.4)
CREAT SERPL-MCNC: 2.3 MG/DL (ref 0.5–1.4)
CREAT SERPL-MCNC: 2.3 MG/DL (ref 0.5–1.4)
CREAT SERPL-MCNC: 2.4 MG/DL (ref 0.5–1.4)
CREAT SERPL-MCNC: 2.4 MG/DL (ref 0.5–1.4)
CREAT SERPL-MCNC: 2.7 MG/DL (ref 0.5–1.4)
CREAT SERPL-MCNC: 2.9 MG/DL (ref 0.5–1.4)
CREAT UR-MCNC: 78.9 MG/DL (ref 20–400)
CV ECHO LV RWT: 0.18 CM
CV ECHO LV RWT: 0.25 CM
CV STRESS BASE HR: 106 BPM
DELSYS: ABNORMAL
DELSYS: NORMAL
DIASTOLIC BLOOD PRESSURE: 0 MMHG
DIAZEPAM UR QL: NOT DETECTED
DIFFERENTIAL METHOD: ABNORMAL
DOP CALC AO PEAK VEL: 2.33 M/S
DOP CALC AO PEAK VEL: 3.28 M/S
DOP CALC AO VTI: 35.93 CM
DOP CALC AO VTI: 61.93 CM
DOP CALC LVOT AREA: 3.8 CM2
DOP CALC LVOT AREA: 4.4 CM2
DOP CALC LVOT DIAMETER: 2.2 CM
DOP CALC LVOT DIAMETER: 2.37 CM
DOP CALC LVOT PEAK VEL: 0.48 M/S
DOP CALC LVOT PEAK VEL: 0.74 M/S
DOP CALC LVOT STROKE VOLUME: 32.83 CM3
DOP CALC LVOT STROKE VOLUME: 50.88 CM3
DOP CALCLVOT PEAK VEL VTI: 11.54 CM
DOP CALCLVOT PEAK VEL VTI: 8.64 CM
E WAVE DECELERATION TIME: 159.7 MSEC
E/A RATIO: 0.96
E/E' RATIO: 30.5 M/S
ECHO LV POSTERIOR WALL: 0.7 CM (ref 0.6–1.1)
ECHO LV POSTERIOR WALL: 0.97 CM (ref 0.6–1.1)
EOSINOPHIL # BLD AUTO: 0 K/UL (ref 0–0.5)
EOSINOPHIL # BLD AUTO: 0.1 K/UL (ref 0–0.5)
EOSINOPHIL # BLD AUTO: 0.1 K/UL (ref 0–0.5)
EOSINOPHIL # BLD AUTO: 0.2 K/UL (ref 0–0.5)
EOSINOPHIL # BLD AUTO: 0.3 K/UL (ref 0–0.5)
EOSINOPHIL # BLD AUTO: 0.3 K/UL (ref 0–0.5)
EOSINOPHIL # BLD AUTO: 0.4 K/UL (ref 0–0.5)
EOSINOPHIL # BLD AUTO: 0.5 K/UL (ref 0–0.5)
EOSINOPHIL # BLD AUTO: 0.6 K/UL (ref 0–0.5)
EOSINOPHIL # BLD AUTO: 0.6 K/UL (ref 0–0.5)
EOSINOPHIL NFR BLD: 0 % (ref 0–8)
EOSINOPHIL NFR BLD: 0.2 % (ref 0–8)
EOSINOPHIL NFR BLD: 0.5 % (ref 0–8)
EOSINOPHIL NFR BLD: 0.8 % (ref 0–8)
EOSINOPHIL NFR BLD: 1.1 % (ref 0–8)
EOSINOPHIL NFR BLD: 1.5 % (ref 0–8)
EOSINOPHIL NFR BLD: 1.9 % (ref 0–8)
EOSINOPHIL NFR BLD: 1.9 % (ref 0–8)
EOSINOPHIL NFR BLD: 2 % (ref 0–8)
EOSINOPHIL NFR BLD: 2 % (ref 0–8)
EOSINOPHIL NFR BLD: 2.6 % (ref 0–8)
EOSINOPHIL NFR BLD: 3.7 % (ref 0–8)
EOSINOPHIL NFR BLD: 3.8 % (ref 0–8)
EOSINOPHIL NFR BLD: 4.4 % (ref 0–8)
EOSINOPHIL NFR BLD: 4.4 % (ref 0–8)
EOSINOPHIL NFR BLD: 4.5 % (ref 0–8)
EOSINOPHIL NFR BLD: 5.3 % (ref 0–8)
EOSINOPHIL NFR BLD: 5.7 % (ref 0–8)
ERYTHROCYTE [DISTWIDTH] IN BLOOD BY AUTOMATED COUNT: 17.8 % (ref 11.5–14.5)
ERYTHROCYTE [DISTWIDTH] IN BLOOD BY AUTOMATED COUNT: 17.9 % (ref 11.5–14.5)
ERYTHROCYTE [DISTWIDTH] IN BLOOD BY AUTOMATED COUNT: 18 % (ref 11.5–14.5)
ERYTHROCYTE [DISTWIDTH] IN BLOOD BY AUTOMATED COUNT: 18.1 % (ref 11.5–14.5)
ERYTHROCYTE [DISTWIDTH] IN BLOOD BY AUTOMATED COUNT: 18.2 % (ref 11.5–14.5)
ERYTHROCYTE [DISTWIDTH] IN BLOOD BY AUTOMATED COUNT: 18.2 % (ref 11.5–14.5)
ERYTHROCYTE [DISTWIDTH] IN BLOOD BY AUTOMATED COUNT: 18.3 % (ref 11.5–14.5)
ERYTHROCYTE [DISTWIDTH] IN BLOOD BY AUTOMATED COUNT: 18.4 % (ref 11.5–14.5)
ERYTHROCYTE [DISTWIDTH] IN BLOOD BY AUTOMATED COUNT: 18.7 % (ref 11.5–14.5)
ERYTHROCYTE [DISTWIDTH] IN BLOOD BY AUTOMATED COUNT: 18.8 % (ref 11.5–14.5)
ERYTHROCYTE [DISTWIDTH] IN BLOOD BY AUTOMATED COUNT: 18.9 % (ref 11.5–14.5)
ERYTHROCYTE [DISTWIDTH] IN BLOOD BY AUTOMATED COUNT: 18.9 % (ref 11.5–14.5)
ERYTHROCYTE [SEDIMENTATION RATE] IN BLOOD BY WESTERGREN METHOD: 15 MM/H
ERYTHROCYTE [SEDIMENTATION RATE] IN BLOOD BY WESTERGREN METHOD: 20 MM/H
ERYTHROCYTE [SEDIMENTATION RATE] IN BLOOD BY WESTERGREN METHOD: 3 MM/H
ERYTHROCYTE [SEDIMENTATION RATE] IN BLOOD BY WESTERGREN METHOD: 3 MM/H
EST. GFR  (NO RACE VARIABLE): 22.3 ML/MIN/1.73 M^2
EST. GFR  (NO RACE VARIABLE): 24.3 ML/MIN/1.73 M^2
EST. GFR  (NO RACE VARIABLE): 28 ML/MIN/1.73 M^2
EST. GFR  (NO RACE VARIABLE): 28 ML/MIN/1.73 M^2
EST. GFR  (NO RACE VARIABLE): 29.4 ML/MIN/1.73 M^2
EST. GFR  (NO RACE VARIABLE): 29.4 ML/MIN/1.73 M^2
EST. GFR  (NO RACE VARIABLE): 31 ML/MIN/1.73 M^2
EST. GFR  (NO RACE VARIABLE): 32.8 ML/MIN/1.73 M^2
EST. GFR  (NO RACE VARIABLE): 32.8 ML/MIN/1.73 M^2
EST. GFR  (NO RACE VARIABLE): 34.8 ML/MIN/1.73 M^2
EST. GFR  (NO RACE VARIABLE): 37 ML/MIN/1.73 M^2
EST. GFR  (NO RACE VARIABLE): 39.5 ML/MIN/1.73 M^2
EST. GFR  (NO RACE VARIABLE): 39.5 ML/MIN/1.73 M^2
EST. GFR  (NO RACE VARIABLE): 42.3 ML/MIN/1.73 M^2
EST. GFR  (NO RACE VARIABLE): 42.3 ML/MIN/1.73 M^2
EST. GFR  (NO RACE VARIABLE): 45.5 ML/MIN/1.73 M^2
EST. GFR  (NO RACE VARIABLE): 45.5 ML/MIN/1.73 M^2
EST. GFR  (NO RACE VARIABLE): 49.2 ML/MIN/1.73 M^2
EST. GFR  (NO RACE VARIABLE): 49.2 ML/MIN/1.73 M^2
EST. GFR  (NO RACE VARIABLE): 58.4 ML/MIN/1.73 M^2
EST. GFR  (NO RACE VARIABLE): 58.4 ML/MIN/1.73 M^2
EST. GFR  (NO RACE VARIABLE): >60 ML/MIN/1.73 M^2
ESTIMATED AVG GLUCOSE: 160 MG/DL (ref 68–131)
ETHYL GLUCURONIDE UR QL: NOT DETECTED
FENTANYL UR QL: NOT DETECTED
FIO2: 100
FIO2: 21
FIO2: 32
FIO2: 32
FIO2: 40
FRACTIONAL SHORTENING: 4 % (ref 28–44)
GABAPENTIN: PRESENT
GIANT PLATELETS BLD QL SMEAR: PRESENT
GLUCOSE SERPL-MCNC: 106 MG/DL (ref 70–110)
GLUCOSE SERPL-MCNC: 106 MG/DL (ref 70–110)
GLUCOSE SERPL-MCNC: 108 MG/DL (ref 70–110)
GLUCOSE SERPL-MCNC: 109 MG/DL (ref 70–110)
GLUCOSE SERPL-MCNC: 109 MG/DL (ref 70–110)
GLUCOSE SERPL-MCNC: 111 MG/DL (ref 70–110)
GLUCOSE SERPL-MCNC: 115 MG/DL (ref 70–110)
GLUCOSE SERPL-MCNC: 115 MG/DL (ref 70–110)
GLUCOSE SERPL-MCNC: 118 MG/DL (ref 70–110)
GLUCOSE SERPL-MCNC: 118 MG/DL (ref 70–110)
GLUCOSE SERPL-MCNC: 120 MG/DL (ref 70–110)
GLUCOSE SERPL-MCNC: 122 MG/DL (ref 70–110)
GLUCOSE SERPL-MCNC: 122 MG/DL (ref 70–110)
GLUCOSE SERPL-MCNC: 134 MG/DL (ref 70–110)
GLUCOSE SERPL-MCNC: 141 MG/DL (ref 70–110)
GLUCOSE SERPL-MCNC: 141 MG/DL (ref 70–110)
GLUCOSE SERPL-MCNC: 149 MG/DL (ref 70–110)
GLUCOSE SERPL-MCNC: 164 MG/DL (ref 70–110)
GLUCOSE SERPL-MCNC: 166 MG/DL (ref 70–110)
GLUCOSE SERPL-MCNC: 167 MG/DL (ref 70–110)
GLUCOSE SERPL-MCNC: 169 MG/DL (ref 70–110)
GLUCOSE SERPL-MCNC: 169 MG/DL (ref 70–110)
GLUCOSE SERPL-MCNC: 170 MG/DL (ref 70–110)
GLUCOSE SERPL-MCNC: 181 MG/DL (ref 70–110)
GLUCOSE SERPL-MCNC: 188 MG/DL (ref 70–110)
GLUCOSE SERPL-MCNC: 195 MG/DL (ref 70–110)
GLUCOSE SERPL-MCNC: 204 MG/DL (ref 70–110)
GLUCOSE SERPL-MCNC: 269 MG/DL (ref 70–110)
GLUCOSE SERPL-MCNC: 271 MG/DL (ref 70–110)
GLUCOSE SERPL-MCNC: 98 MG/DL (ref 70–110)
GLUCOSE SERPL-MCNC: 99 MG/DL (ref 70–110)
HBA1C MFR BLD: 7.2 % (ref 4–5.6)
HCO3 UR-SCNC: 10.1 MMOL/L (ref 24–28)
HCO3 UR-SCNC: 12.2 MMOL/L (ref 24–28)
HCO3 UR-SCNC: 12.5 MMOL/L (ref 24–28)
HCO3 UR-SCNC: 13.4 MMOL/L (ref 24–28)
HCO3 UR-SCNC: 15.4 MMOL/L (ref 24–28)
HCO3 UR-SCNC: 17.2 MMOL/L (ref 24–28)
HCO3 UR-SCNC: 19.8 MMOL/L (ref 24–28)
HCO3 UR-SCNC: 20.1 MMOL/L (ref 24–28)
HCO3 UR-SCNC: 20.7 MMOL/L (ref 24–28)
HCO3 UR-SCNC: 20.8 MMOL/L (ref 24–28)
HCO3 UR-SCNC: 21.5 MMOL/L (ref 24–28)
HCO3 UR-SCNC: 21.6 MMOL/L (ref 24–28)
HCO3 UR-SCNC: 21.8 MMOL/L (ref 24–28)
HCO3 UR-SCNC: 22.5 MMOL/L (ref 24–28)
HCO3 UR-SCNC: 22.7 MMOL/L (ref 24–28)
HCO3 UR-SCNC: 22.8 MMOL/L (ref 24–28)
HCO3 UR-SCNC: 22.9 MMOL/L (ref 24–28)
HCO3 UR-SCNC: 23 MMOL/L (ref 24–28)
HCO3 UR-SCNC: 23 MMOL/L (ref 24–28)
HCO3 UR-SCNC: 23.1 MMOL/L (ref 24–28)
HCO3 UR-SCNC: 23.7 MMOL/L (ref 24–28)
HCO3 UR-SCNC: 23.9 MMOL/L (ref 24–28)
HCO3 UR-SCNC: 24.4 MMOL/L (ref 24–28)
HCO3 UR-SCNC: 24.5 MMOL/L (ref 24–28)
HCO3 UR-SCNC: 24.5 MMOL/L (ref 24–28)
HCO3 UR-SCNC: 25 MMOL/L (ref 24–28)
HCO3 UR-SCNC: 25.1 MMOL/L (ref 24–28)
HCO3 UR-SCNC: 25.5 MMOL/L (ref 24–28)
HCO3 UR-SCNC: 25.7 MMOL/L (ref 24–28)
HCO3 UR-SCNC: 26 MMOL/L (ref 24–28)
HCO3 UR-SCNC: 26.6 MMOL/L (ref 24–28)
HCO3 UR-SCNC: 26.7 MMOL/L (ref 24–28)
HCO3 UR-SCNC: 26.7 MMOL/L (ref 24–28)
HCO3 UR-SCNC: 26.8 MMOL/L (ref 24–28)
HCO3 UR-SCNC: 26.9 MMOL/L (ref 24–28)
HCO3 UR-SCNC: 27.4 MMOL/L (ref 24–28)
HCO3 UR-SCNC: 27.4 MMOL/L (ref 24–28)
HCO3 UR-SCNC: 27.5 MMOL/L (ref 24–28)
HCO3 UR-SCNC: 27.6 MMOL/L (ref 24–28)
HCO3 UR-SCNC: 27.8 MMOL/L (ref 24–28)
HCO3 UR-SCNC: 28 MMOL/L (ref 24–28)
HCO3 UR-SCNC: 28.1 MMOL/L (ref 24–28)
HCO3 UR-SCNC: 28.2 MMOL/L (ref 24–28)
HCO3 UR-SCNC: 28.7 MMOL/L (ref 24–28)
HCO3 UR-SCNC: 28.7 MMOL/L (ref 24–28)
HCO3 UR-SCNC: 28.8 MMOL/L (ref 24–28)
HCO3 UR-SCNC: 29.2 MMOL/L (ref 24–28)
HCO3 UR-SCNC: 29.4 MMOL/L (ref 24–28)
HCO3 UR-SCNC: 29.4 MMOL/L (ref 24–28)
HCO3 UR-SCNC: 29.5 MMOL/L (ref 24–28)
HCO3 UR-SCNC: 29.5 MMOL/L (ref 24–28)
HCO3 UR-SCNC: 29.7 MMOL/L (ref 24–28)
HCO3 UR-SCNC: 29.8 MMOL/L (ref 24–28)
HCO3 UR-SCNC: 29.8 MMOL/L (ref 24–28)
HCO3 UR-SCNC: 30.4 MMOL/L (ref 24–28)
HCT VFR BLD AUTO: 32.6 % (ref 40–54)
HCT VFR BLD AUTO: 33.8 % (ref 40–54)
HCT VFR BLD AUTO: 33.9 % (ref 40–54)
HCT VFR BLD AUTO: 34.1 % (ref 40–54)
HCT VFR BLD AUTO: 35.1 % (ref 40–54)
HCT VFR BLD AUTO: 35.5 % (ref 40–54)
HCT VFR BLD AUTO: 36 % (ref 40–54)
HCT VFR BLD AUTO: 36.4 % (ref 40–54)
HCT VFR BLD AUTO: 36.7 % (ref 40–54)
HCT VFR BLD AUTO: 37.4 % (ref 40–54)
HCT VFR BLD AUTO: 38.1 % (ref 40–54)
HCT VFR BLD AUTO: 38.2 % (ref 40–54)
HCT VFR BLD AUTO: 38.2 % (ref 40–54)
HCT VFR BLD AUTO: 38.8 % (ref 40–54)
HCT VFR BLD AUTO: 39.2 % (ref 40–54)
HCT VFR BLD AUTO: 39.8 % (ref 40–54)
HCT VFR BLD AUTO: 39.9 % (ref 40–54)
HCT VFR BLD AUTO: 42.3 % (ref 40–54)
HCT VFR BLD CALC: 35 %PCV (ref 36–54)
HCT VFR BLD CALC: 35 %PCV (ref 36–54)
HCT VFR BLD CALC: 38 %PCV (ref 36–54)
HCT VFR BLD CALC: 39 %PCV (ref 36–54)
HCT VFR BLD CALC: 41 %PCV (ref 36–54)
HCT VFR BLD CALC: 42 %PCV (ref 36–54)
HGB BLD-MCNC: 10.9 G/DL (ref 14–18)
HGB BLD-MCNC: 11 G/DL (ref 14–18)
HGB BLD-MCNC: 11.1 G/DL (ref 14–18)
HGB BLD-MCNC: 11.3 G/DL (ref 14–18)
HGB BLD-MCNC: 11.3 G/DL (ref 14–18)
HGB BLD-MCNC: 11.4 G/DL (ref 14–18)
HGB BLD-MCNC: 11.4 G/DL (ref 14–18)
HGB BLD-MCNC: 11.7 G/DL (ref 14–18)
HGB BLD-MCNC: 11.8 G/DL (ref 14–18)
HGB BLD-MCNC: 12 G/DL (ref 14–18)
HGB BLD-MCNC: 12.5 G/DL (ref 14–18)
HGB BLD-MCNC: 12.7 G/DL (ref 14–18)
HGB BLD-MCNC: 12.9 G/DL (ref 14–18)
HGB BLD-MCNC: 12.9 G/DL (ref 14–18)
HGB BLD-MCNC: 13.5 G/DL (ref 14–18)
HOWELL-JOLLY BOD BLD QL SMEAR: ABNORMAL
HYDROCODONE UR QL: PRESENT
HYDROMORPHONE UR QL: PRESENT
HYPOCHROMIA BLD QL SMEAR: ABNORMAL
IMM GRANULOCYTES # BLD AUTO: 0.05 K/UL (ref 0–0.04)
IMM GRANULOCYTES # BLD AUTO: 0.06 K/UL (ref 0–0.04)
IMM GRANULOCYTES # BLD AUTO: 0.07 K/UL (ref 0–0.04)
IMM GRANULOCYTES # BLD AUTO: 0.07 K/UL (ref 0–0.04)
IMM GRANULOCYTES # BLD AUTO: 0.08 K/UL (ref 0–0.04)
IMM GRANULOCYTES # BLD AUTO: 0.09 K/UL (ref 0–0.04)
IMM GRANULOCYTES # BLD AUTO: 0.1 K/UL (ref 0–0.04)
IMM GRANULOCYTES # BLD AUTO: 0.1 K/UL (ref 0–0.04)
IMM GRANULOCYTES # BLD AUTO: 0.18 K/UL (ref 0–0.04)
IMM GRANULOCYTES # BLD AUTO: 0.2 K/UL (ref 0–0.04)
IMM GRANULOCYTES # BLD AUTO: ABNORMAL K/UL (ref 0–0.04)
IMM GRANULOCYTES NFR BLD AUTO: 0.4 % (ref 0–0.5)
IMM GRANULOCYTES NFR BLD AUTO: 0.5 % (ref 0–0.5)
IMM GRANULOCYTES NFR BLD AUTO: 0.5 % (ref 0–0.5)
IMM GRANULOCYTES NFR BLD AUTO: 0.6 % (ref 0–0.5)
IMM GRANULOCYTES NFR BLD AUTO: 0.7 % (ref 0–0.5)
IMM GRANULOCYTES NFR BLD AUTO: 0.8 % (ref 0–0.5)
IMM GRANULOCYTES NFR BLD AUTO: 1.1 % (ref 0–0.5)
IMM GRANULOCYTES NFR BLD AUTO: 1.1 % (ref 0–0.5)
IMM GRANULOCYTES NFR BLD AUTO: ABNORMAL % (ref 0–0.5)
INR PPP: 1.2 (ref 0.8–1.2)
INR PPP: 1.2 (ref 0.8–1.2)
INTERVENTRICULAR SEPTUM: 0.86 CM (ref 0.6–1.1)
INTERVENTRICULAR SEPTUM: 0.9 CM (ref 0.6–1.1)
LA MAJOR: 8.99 CM
LA MINOR: 8.86 CM
LA WIDTH: 5.41 CM
LACTATE SERPL-SCNC: 1.4 MMOL/L (ref 0.5–2.2)
LACTATE SERPL-SCNC: 1.6 MMOL/L (ref 0.5–2.2)
LACTATE SERPL-SCNC: 1.7 MMOL/L (ref 0.5–2.2)
LACTATE SERPL-SCNC: 1.9 MMOL/L (ref 0.5–2.2)
LACTATE SERPL-SCNC: 2.3 MMOL/L (ref 0.5–2.2)
LACTATE SERPL-SCNC: 2.3 MMOL/L (ref 0.5–2.2)
LACTATE SERPL-SCNC: >12 MMOL/L (ref 0.5–2.2)
LDH SERPL L TO P-CCNC: 1.03 MMOL/L (ref 0.5–2.2)
LDH SERPL L TO P-CCNC: 1.35 MMOL/L (ref 0.5–2.2)
LDH SERPL L TO P-CCNC: 1.61 MMOL/L (ref 0.5–2.2)
LDH SERPL L TO P-CCNC: 15.94 MMOL/L (ref 0.5–2.2)
LDH SERPL L TO P-CCNC: 16.31 MMOL/L (ref 0.36–1.25)
LDH SERPL L TO P-CCNC: 16.51 MMOL/L (ref 0.5–2.2)
LDH SERPL L TO P-CCNC: 17.46 MMOL/L (ref 0.36–1.25)
LDH SERPL L TO P-CCNC: 2.07 MMOL/L (ref 0.5–2.2)
LDH SERPL L TO P-CCNC: 2.19 MMOL/L (ref 0.5–2.2)
LDH SERPL L TO P-CCNC: 2.28 MMOL/L (ref 0.5–2.2)
LDH SERPL L TO P-CCNC: 2.75 MMOL/L (ref 0.5–2.2)
LDH SERPL L TO P-CCNC: 2.78 MMOL/L (ref 0.5–2.2)
LDH SERPL L TO P-CCNC: 3.2 MMOL/L (ref 0.5–2.2)
LDH SERPL L TO P-CCNC: 3.36 MMOL/L (ref 0.5–2.2)
LDH SERPL L TO P-CCNC: 3.56 MMOL/L (ref 0.5–2.2)
LDH SERPL L TO P-CCNC: 3.62 MMOL/L (ref 0.5–2.2)
LDH SERPL L TO P-CCNC: 330 U/L (ref 110–260)
LDH SERPL L TO P-CCNC: 4.16 MMOL/L (ref 0.5–2.2)
LDH SERPL L TO P-CCNC: 4.21 MMOL/L (ref 0.5–2.2)
LDH SERPL L TO P-CCNC: 4.29 MMOL/L (ref 0.5–2.2)
LDH SERPL L TO P-CCNC: 4.35 MMOL/L (ref 0.5–2.2)
LDH SERPL L TO P-CCNC: 4.93 MMOL/L (ref 0.5–2.2)
LDH SERPL L TO P-CCNC: 5.2 MMOL/L (ref 0.5–2.2)
LDH SERPL L TO P-CCNC: 5.56 MMOL/L (ref 0.5–2.2)
LDH SERPL L TO P-CCNC: 6.55 MMOL/L (ref 0.5–2.2)
LEFT ATRIUM SIZE: 6.77 CM
LEFT ATRIUM VOLUME INDEX MOD: 63.4 ML/M2
LEFT ATRIUM VOLUME INDEX: 121.3 ML/M2
LEFT ATRIUM VOLUME MOD: 145.21 CM3
LEFT ATRIUM VOLUME: 277.84 CM3
LEFT INTERNAL DIMENSION IN SYSTOLE: 7.59 CM (ref 2.1–4)
LEFT VENTRICLE DIASTOLIC VOLUME INDEX: 146.8 ML/M2
LEFT VENTRICLE DIASTOLIC VOLUME: 336.17 ML
LEFT VENTRICLE MASS INDEX: 132 G/M2
LEFT VENTRICLE MASS INDEX: 156 G/M2
LEFT VENTRICLE SYSTOLIC VOLUME INDEX: 133.8 ML/M2
LEFT VENTRICLE SYSTOLIC VOLUME: 306.47 ML
LEFT VENTRICULAR INTERNAL DIMENSION IN DIASTOLE: 7.9 CM (ref 3.5–6)
LEFT VENTRICULAR INTERNAL DIMENSION IN DIASTOLE: 7.91 CM (ref 3.5–6)
LEFT VENTRICULAR MASS: 303.73 G
LEFT VENTRICULAR MASS: 357.61 G
LORAZEPAM UR QL: NOT DETECTED
LV LATERAL E/E' RATIO: 40.67 M/S
LV SEPTAL E/E' RATIO: 24.4 M/S
LYMPHOCYTES # BLD AUTO: 0.8 K/UL (ref 1–4.8)
LYMPHOCYTES # BLD AUTO: 1.2 K/UL (ref 1–4.8)
LYMPHOCYTES # BLD AUTO: 1.3 K/UL (ref 1–4.8)
LYMPHOCYTES # BLD AUTO: 1.3 K/UL (ref 1–4.8)
LYMPHOCYTES # BLD AUTO: 1.4 K/UL (ref 1–4.8)
LYMPHOCYTES # BLD AUTO: 1.6 K/UL (ref 1–4.8)
LYMPHOCYTES # BLD AUTO: 1.6 K/UL (ref 1–4.8)
LYMPHOCYTES # BLD AUTO: 1.7 K/UL (ref 1–4.8)
LYMPHOCYTES # BLD AUTO: 1.7 K/UL (ref 1–4.8)
LYMPHOCYTES # BLD AUTO: 1.8 K/UL (ref 1–4.8)
LYMPHOCYTES # BLD AUTO: 1.9 K/UL (ref 1–4.8)
LYMPHOCYTES # BLD AUTO: 2 K/UL (ref 1–4.8)
LYMPHOCYTES # BLD AUTO: 2.1 K/UL (ref 1–4.8)
LYMPHOCYTES # BLD AUTO: 2.4 K/UL (ref 1–4.8)
LYMPHOCYTES NFR BLD: 10.6 % (ref 18–48)
LYMPHOCYTES NFR BLD: 12.3 % (ref 18–48)
LYMPHOCYTES NFR BLD: 12.7 % (ref 18–48)
LYMPHOCYTES NFR BLD: 13.1 % (ref 18–48)
LYMPHOCYTES NFR BLD: 14.1 % (ref 18–48)
LYMPHOCYTES NFR BLD: 14.4 % (ref 18–48)
LYMPHOCYTES NFR BLD: 17.1 % (ref 18–48)
LYMPHOCYTES NFR BLD: 17.1 % (ref 18–48)
LYMPHOCYTES NFR BLD: 17.2 % (ref 18–48)
LYMPHOCYTES NFR BLD: 18.1 % (ref 18–48)
LYMPHOCYTES NFR BLD: 18.3 % (ref 18–48)
LYMPHOCYTES NFR BLD: 19.1 % (ref 18–48)
LYMPHOCYTES NFR BLD: 19.5 % (ref 18–48)
LYMPHOCYTES NFR BLD: 19.8 % (ref 18–48)
LYMPHOCYTES NFR BLD: 20 % (ref 18–48)
LYMPHOCYTES NFR BLD: 5.8 % (ref 18–48)
LYMPHOCYTES NFR BLD: 6.6 % (ref 18–48)
LYMPHOCYTES NFR BLD: 8.2 % (ref 18–48)
MAGNESIUM SERPL-MCNC: 1.8 MG/DL (ref 1.6–2.6)
MAGNESIUM SERPL-MCNC: 1.8 MG/DL (ref 1.6–2.6)
MAGNESIUM SERPL-MCNC: 1.9 MG/DL (ref 1.6–2.6)
MAGNESIUM SERPL-MCNC: 2 MG/DL (ref 1.6–2.6)
MAGNESIUM SERPL-MCNC: 2.1 MG/DL (ref 1.6–2.6)
MAGNESIUM SERPL-MCNC: 2.2 MG/DL (ref 1.6–2.6)
MAGNESIUM SERPL-MCNC: 2.3 MG/DL (ref 1.6–2.6)
MAGNESIUM SERPL-MCNC: 2.4 MG/DL (ref 1.6–2.6)
MAGNESIUM SERPL-MCNC: 2.4 MG/DL (ref 1.6–2.6)
MAGNESIUM SERPL-MCNC: 2.5 MG/DL (ref 1.6–2.6)
MAGNESIUM SERPL-MCNC: 2.6 MG/DL (ref 1.6–2.6)
MAGNESIUM SERPL-MCNC: 2.6 MG/DL (ref 1.6–2.6)
MCH RBC QN AUTO: 24.2 PG (ref 27–31)
MCH RBC QN AUTO: 24.2 PG (ref 27–31)
MCH RBC QN AUTO: 24.7 PG (ref 27–31)
MCH RBC QN AUTO: 24.8 PG (ref 27–31)
MCH RBC QN AUTO: 24.8 PG (ref 27–31)
MCH RBC QN AUTO: 24.9 PG (ref 27–31)
MCH RBC QN AUTO: 24.9 PG (ref 27–31)
MCH RBC QN AUTO: 25 PG (ref 27–31)
MCH RBC QN AUTO: 25.1 PG (ref 27–31)
MCH RBC QN AUTO: 25.1 PG (ref 27–31)
MCH RBC QN AUTO: 25.2 PG (ref 27–31)
MCH RBC QN AUTO: 25.2 PG (ref 27–31)
MCH RBC QN AUTO: 25.3 PG (ref 27–31)
MCH RBC QN AUTO: 25.4 PG (ref 27–31)
MCH RBC QN AUTO: 25.6 PG (ref 27–31)
MCHC RBC AUTO-ENTMCNC: 29.9 G/DL (ref 32–36)
MCHC RBC AUTO-ENTMCNC: 31.1 G/DL (ref 32–36)
MCHC RBC AUTO-ENTMCNC: 31.8 G/DL (ref 32–36)
MCHC RBC AUTO-ENTMCNC: 31.9 G/DL (ref 32–36)
MCHC RBC AUTO-ENTMCNC: 32.1 G/DL (ref 32–36)
MCHC RBC AUTO-ENTMCNC: 32.2 G/DL (ref 32–36)
MCHC RBC AUTO-ENTMCNC: 32.3 G/DL (ref 32–36)
MCHC RBC AUTO-ENTMCNC: 32.4 G/DL (ref 32–36)
MCHC RBC AUTO-ENTMCNC: 32.5 G/DL (ref 32–36)
MCHC RBC AUTO-ENTMCNC: 32.7 G/DL (ref 32–36)
MCHC RBC AUTO-ENTMCNC: 32.8 G/DL (ref 32–36)
MCHC RBC AUTO-ENTMCNC: 32.8 G/DL (ref 32–36)
MCHC RBC AUTO-ENTMCNC: 32.9 G/DL (ref 32–36)
MCHC RBC AUTO-ENTMCNC: 33 G/DL (ref 32–36)
MCHC RBC AUTO-ENTMCNC: 33.4 G/DL (ref 32–36)
MCHC RBC AUTO-ENTMCNC: 34.7 G/DL (ref 32–36)
MCV RBC AUTO: 73 FL (ref 82–98)
MCV RBC AUTO: 74 FL (ref 82–98)
MCV RBC AUTO: 76 FL (ref 82–98)
MCV RBC AUTO: 77 FL (ref 82–98)
MCV RBC AUTO: 78 FL (ref 82–98)
MCV RBC AUTO: 78 FL (ref 82–98)
MCV RBC AUTO: 81 FL (ref 82–98)
MCV RBC AUTO: 84 FL (ref 82–98)
MDA UR QL: NOT DETECTED
MDEA UR QL: NOT DETECTED
MDMA UR QL: NOT DETECTED
ME-PHENIDATE UR QL: NOT DETECTED
METAMYELOCYTES NFR BLD MANUAL: 1 %
METHADONE UR QL: NOT DETECTED
METHAMPHET UR QL: NOT DETECTED
MIDAZOLAM UR QL SCN: NOT DETECTED
MODE: ABNORMAL
MONOCYTES # BLD AUTO: 0.9 K/UL (ref 0.3–1)
MONOCYTES # BLD AUTO: 1 K/UL (ref 0.3–1)
MONOCYTES # BLD AUTO: 1.1 K/UL (ref 0.3–1)
MONOCYTES # BLD AUTO: 1.2 K/UL (ref 0.3–1)
MONOCYTES # BLD AUTO: 1.3 K/UL (ref 0.3–1)
MONOCYTES # BLD AUTO: 1.3 K/UL (ref 0.3–1)
MONOCYTES # BLD AUTO: 1.4 K/UL (ref 0.3–1)
MONOCYTES # BLD AUTO: 1.6 K/UL (ref 0.3–1)
MONOCYTES # BLD AUTO: 1.7 K/UL (ref 0.3–1)
MONOCYTES NFR BLD: 1 % (ref 4–15)
MONOCYTES NFR BLD: 10 % (ref 4–15)
MONOCYTES NFR BLD: 10.2 % (ref 4–15)
MONOCYTES NFR BLD: 10.4 % (ref 4–15)
MONOCYTES NFR BLD: 10.6 % (ref 4–15)
MONOCYTES NFR BLD: 10.6 % (ref 4–15)
MONOCYTES NFR BLD: 12.6 % (ref 4–15)
MONOCYTES NFR BLD: 13 % (ref 4–15)
MONOCYTES NFR BLD: 5.9 % (ref 4–15)
MONOCYTES NFR BLD: 7.5 % (ref 4–15)
MONOCYTES NFR BLD: 8 % (ref 4–15)
MONOCYTES NFR BLD: 8.3 % (ref 4–15)
MONOCYTES NFR BLD: 8.4 % (ref 4–15)
MONOCYTES NFR BLD: 8.6 % (ref 4–15)
MONOCYTES NFR BLD: 8.6 % (ref 4–15)
MONOCYTES NFR BLD: 8.8 % (ref 4–15)
MONOCYTES NFR BLD: 9.8 % (ref 4–15)
MONOCYTES NFR BLD: 9.8 % (ref 4–15)
MORPHINE UR QL: NOT DETECTED
MV PEAK A VEL: 1.27 M/S
MV PEAK E VEL: 1.22 M/S
MV STENOSIS PRESSURE HALF TIME: 46.31 MS
MV VALVE AREA P 1/2 METHOD: 4.75 CM2
MYELOCYTES NFR BLD MANUAL: 1 %
NALOXONE: NOT DETECTED
NEUTROPHILS # BLD AUTO: 11.6 K/UL (ref 1.8–7.7)
NEUTROPHILS # BLD AUTO: 12.6 K/UL (ref 1.8–7.7)
NEUTROPHILS # BLD AUTO: 13.9 K/UL (ref 1.8–7.7)
NEUTROPHILS # BLD AUTO: 15.4 K/UL (ref 1.8–7.7)
NEUTROPHILS # BLD AUTO: 6.4 K/UL (ref 1.8–7.7)
NEUTROPHILS # BLD AUTO: 6.6 K/UL (ref 1.8–7.7)
NEUTROPHILS # BLD AUTO: 6.6 K/UL (ref 1.8–7.7)
NEUTROPHILS # BLD AUTO: 7 K/UL (ref 1.8–7.7)
NEUTROPHILS # BLD AUTO: 7.2 K/UL (ref 1.8–7.7)
NEUTROPHILS # BLD AUTO: 7.5 K/UL (ref 1.8–7.7)
NEUTROPHILS # BLD AUTO: 7.9 K/UL (ref 1.8–7.7)
NEUTROPHILS # BLD AUTO: 8.4 K/UL (ref 1.8–7.7)
NEUTROPHILS # BLD AUTO: 8.4 K/UL (ref 1.8–7.7)
NEUTROPHILS # BLD AUTO: 8.5 K/UL (ref 1.8–7.7)
NEUTROPHILS # BLD AUTO: 8.8 K/UL (ref 1.8–7.7)
NEUTROPHILS # BLD AUTO: 9 K/UL (ref 1.8–7.7)
NEUTROPHILS # BLD AUTO: 9.3 K/UL (ref 1.8–7.7)
NEUTROPHILS NFR BLD: 63.1 % (ref 38–73)
NEUTROPHILS NFR BLD: 65.3 % (ref 38–73)
NEUTROPHILS NFR BLD: 65.8 % (ref 38–73)
NEUTROPHILS NFR BLD: 66.7 % (ref 38–73)
NEUTROPHILS NFR BLD: 68 % (ref 38–73)
NEUTROPHILS NFR BLD: 68.9 % (ref 38–73)
NEUTROPHILS NFR BLD: 68.9 % (ref 38–73)
NEUTROPHILS NFR BLD: 69.5 % (ref 38–73)
NEUTROPHILS NFR BLD: 70.7 % (ref 38–73)
NEUTROPHILS NFR BLD: 73.2 % (ref 38–73)
NEUTROPHILS NFR BLD: 73.4 % (ref 38–73)
NEUTROPHILS NFR BLD: 74 % (ref 38–73)
NEUTROPHILS NFR BLD: 75 % (ref 38–73)
NEUTROPHILS NFR BLD: 75.3 % (ref 38–73)
NEUTROPHILS NFR BLD: 77.2 % (ref 38–73)
NEUTROPHILS NFR BLD: 81.8 % (ref 38–73)
NEUTROPHILS NFR BLD: 83.2 % (ref 38–73)
NEUTROPHILS NFR BLD: 84 % (ref 38–73)
NEUTS BAND NFR BLD MANUAL: 2 %
NORBUPRENORPHINE UR QL CFM: NOT DETECTED
NORDIAZEPAM UR QL: NOT DETECTED
NORFENTANYL UR QL: NOT DETECTED
NORHYDROCODONE UR QL CFM: PRESENT
NORMEPERIDINE UR QL CFM: NOT DETECTED
NOROXYCODONE UR QL CFM: NOT DETECTED
NOROXYMORPHONE UR QL SCN: NOT DETECTED
NRBC BLD-RTO: 0 /100 WBC
NRBC BLD-RTO: 1 /100 WBC
OHS CV CPX 1 MINUTE RECOVERY HEART RATE: 133 BPM
OHS CV CPX 85 PERCENT MAX PREDICTED HEART RATE MALE: 126
OHS CV CPX MAX PREDICTED HEART RATE: 148
OHS CV CPX PATIENT IS FEMALE: 0
OHS CV CPX PATIENT IS MALE: 1
OHS CV CPX PEAK DIASTOLIC BLOOD PRESSURE: 83 MMHG
OHS CV CPX PEAK HEAR RATE: 133 BPM
OHS CV CPX PEAK RATE PRESSURE PRODUCT: ABNORMAL
OHS CV CPX PEAK SYSTOLIC BLOOD PRESSURE: 124 MMHG
OHS CV CPX PERCENT MAX PREDICTED HEART RATE ACHIEVED: 90
OHS CV CPX RATE PRESSURE PRODUCT PRESENTING: ABNORMAL
OHS CV INITIAL DOSE: 5 MCG/KG/MIN
OHS CV PEAK DOSE: 20 MCG/KG/MIN
OVALOCYTES BLD QL SMEAR: ABNORMAL
OXAZEPAM UR QL: NOT DETECTED
OXYCODONE UR QL: NOT DETECTED
OXYMORPHONE UR QL: NOT DETECTED
PATHOLOGY STUDY: NORMAL
PCO2 BLDA: 25 MMHG (ref 35–45)
PCO2 BLDA: 32.2 MMHG (ref 35–45)
PCO2 BLDA: 33.7 MMHG (ref 35–45)
PCO2 BLDA: 33.9 MMHG (ref 35–45)
PCO2 BLDA: 34.3 MMHG (ref 35–45)
PCO2 BLDA: 34.4 MMHG (ref 35–45)
PCO2 BLDA: 35.3 MMHG (ref 35–45)
PCO2 BLDA: 35.6 MMHG (ref 35–45)
PCO2 BLDA: 35.7 MMHG (ref 35–45)
PCO2 BLDA: 35.8 MMHG (ref 35–45)
PCO2 BLDA: 36.1 MMHG (ref 35–45)
PCO2 BLDA: 36.5 MMHG (ref 35–45)
PCO2 BLDA: 36.7 MMHG (ref 35–45)
PCO2 BLDA: 36.9 MMHG (ref 35–45)
PCO2 BLDA: 37 MMHG (ref 35–45)
PCO2 BLDA: 37.2 MMHG (ref 35–45)
PCO2 BLDA: 37.2 MMHG (ref 35–45)
PCO2 BLDA: 37.6 MMHG (ref 35–45)
PCO2 BLDA: 38.4 MMHG (ref 35–45)
PCO2 BLDA: 38.9 MMHG (ref 35–45)
PCO2 BLDA: 39.6 MMHG (ref 35–45)
PCO2 BLDA: 39.7 MMHG (ref 35–45)
PCO2 BLDA: 39.8 MMHG (ref 35–45)
PCO2 BLDA: 40 MMHG (ref 35–45)
PCO2 BLDA: 40.1 MMHG (ref 35–45)
PCO2 BLDA: 40.3 MMHG (ref 35–45)
PCO2 BLDA: 40.5 MMHG (ref 35–45)
PCO2 BLDA: 40.7 MMHG (ref 35–45)
PCO2 BLDA: 41.4 MMHG (ref 35–45)
PCO2 BLDA: 42.1 MMHG (ref 35–45)
PCO2 BLDA: 42.1 MMHG (ref 35–45)
PCO2 BLDA: 42.5 MMHG (ref 35–45)
PCO2 BLDA: 42.9 MMHG (ref 35–45)
PCO2 BLDA: 43.3 MMHG (ref 35–45)
PCO2 BLDA: 43.8 MMHG (ref 35–45)
PCO2 BLDA: 43.8 MMHG (ref 35–45)
PCO2 BLDA: 43.9 MMHG (ref 35–45)
PCO2 BLDA: 44.3 MMHG (ref 35–45)
PCO2 BLDA: 44.8 MMHG (ref 35–45)
PCO2 BLDA: 45 MMHG (ref 35–45)
PCO2 BLDA: 45.3 MMHG (ref 35–45)
PCO2 BLDA: 45.4 MMHG (ref 35–45)
PCO2 BLDA: 45.5 MMHG (ref 35–45)
PCO2 BLDA: 45.8 MMHG (ref 35–45)
PCO2 BLDA: 45.9 MMHG (ref 35–45)
PCO2 BLDA: 46.8 MMHG (ref 35–45)
PCO2 BLDA: 47.2 MMHG (ref 35–45)
PCO2 BLDA: 47.5 MMHG (ref 35–45)
PCO2 BLDA: 47.7 MMHG (ref 35–45)
PCO2 BLDA: 48 MMHG (ref 35–45)
PCO2 BLDA: 48.1 MMHG (ref 35–45)
PCO2 BLDA: 48.1 MMHG (ref 35–45)
PCO2 BLDA: 48.6 MMHG (ref 35–45)
PCO2 BLDA: 49.6 MMHG (ref 35–45)
PCO2 BLDA: 50.1 MMHG (ref 35–45)
PCP UR QL: NOT DETECTED
PEEP: 5
PH SMN: 7.02 [PH] (ref 7.35–7.45)
PH SMN: 7.05 [PH] (ref 7.35–7.45)
PH SMN: 7.08 [PH] (ref 7.35–7.45)
PH SMN: 7.08 [PH] (ref 7.35–7.45)
PH SMN: 7.19 [PH] (ref 7.35–7.45)
PH SMN: 7.26 [PH] (ref 7.35–7.45)
PH SMN: 7.34 [PH] (ref 7.35–7.45)
PH SMN: 7.36 [PH] (ref 7.35–7.45)
PH SMN: 7.37 [PH] (ref 7.35–7.45)
PH SMN: 7.37 [PH] (ref 7.35–7.45)
PH SMN: 7.38 [PH] (ref 7.35–7.45)
PH SMN: 7.39 [PH] (ref 7.35–7.45)
PH SMN: 7.4 [PH] (ref 7.35–7.45)
PH SMN: 7.41 [PH] (ref 7.35–7.45)
PH SMN: 7.42 [PH] (ref 7.35–7.45)
PH SMN: 7.43 [PH] (ref 7.35–7.45)
PH SMN: 7.44 [PH] (ref 7.35–7.45)
PH SMN: 7.46 [PH] (ref 7.35–7.45)
PH SMN: 7.51 [PH] (ref 7.35–7.45)
PHENTERMINE UR QL: NOT DETECTED
PHOSPHATE SERPL-MCNC: 2.2 MG/DL (ref 2.7–4.5)
PHOSPHATE SERPL-MCNC: 2.3 MG/DL (ref 2.7–4.5)
PHOSPHATE SERPL-MCNC: 3.4 MG/DL (ref 2.7–4.5)
PHOSPHATE SERPL-MCNC: 3.7 MG/DL (ref 2.7–4.5)
PHOSPHATE SERPL-MCNC: 5.8 MG/DL (ref 2.7–4.5)
PHOSPHATE SERPL-MCNC: 9.8 MG/DL (ref 2.7–4.5)
PISA MRMAX VEL: 0.06 M/S
PISA TR MAX VEL: 3.52 M/S
PLATELET # BLD AUTO: 116 K/UL (ref 150–450)
PLATELET # BLD AUTO: 144 K/UL (ref 150–450)
PLATELET # BLD AUTO: 183 K/UL (ref 150–450)
PLATELET # BLD AUTO: 202 K/UL (ref 150–450)
PLATELET # BLD AUTO: 204 K/UL (ref 150–450)
PLATELET # BLD AUTO: 213 K/UL (ref 150–450)
PLATELET # BLD AUTO: 213 K/UL (ref 150–450)
PLATELET # BLD AUTO: 219 K/UL (ref 150–450)
PLATELET # BLD AUTO: 244 K/UL (ref 150–450)
PLATELET # BLD AUTO: 250 K/UL (ref 150–450)
PLATELET # BLD AUTO: 252 K/UL (ref 150–450)
PLATELET # BLD AUTO: 252 K/UL (ref 150–450)
PLATELET # BLD AUTO: 257 K/UL (ref 150–450)
PLATELET # BLD AUTO: 260 K/UL (ref 150–450)
PLATELET # BLD AUTO: 261 K/UL (ref 150–450)
PLATELET # BLD AUTO: 85 K/UL (ref 150–450)
PLATELET BLD QL SMEAR: ABNORMAL
PMV BLD AUTO: 10 FL (ref 9.2–12.9)
PMV BLD AUTO: 10.1 FL (ref 9.2–12.9)
PMV BLD AUTO: 10.1 FL (ref 9.2–12.9)
PMV BLD AUTO: 10.3 FL (ref 9.2–12.9)
PMV BLD AUTO: 10.3 FL (ref 9.2–12.9)
PMV BLD AUTO: 10.4 FL (ref 9.2–12.9)
PMV BLD AUTO: 10.5 FL (ref 9.2–12.9)
PMV BLD AUTO: 10.6 FL (ref 9.2–12.9)
PMV BLD AUTO: 10.6 FL (ref 9.2–12.9)
PMV BLD AUTO: 11.4 FL (ref 9.2–12.9)
PMV BLD AUTO: 11.4 FL (ref 9.2–12.9)
PMV BLD AUTO: 9.3 FL (ref 9.2–12.9)
PMV BLD AUTO: 9.4 FL (ref 9.2–12.9)
PMV BLD AUTO: 9.7 FL (ref 9.2–12.9)
PMV BLD AUTO: 9.7 FL (ref 9.2–12.9)
PMV BLD AUTO: 9.8 FL (ref 9.2–12.9)
PMV BLD AUTO: 9.9 FL (ref 9.2–12.9)
PMV BLD AUTO: 9.9 FL (ref 9.2–12.9)
PO2 BLDA: 17 MMHG (ref 40–60)
PO2 BLDA: 18 MMHG (ref 40–60)
PO2 BLDA: 19 MMHG (ref 40–60)
PO2 BLDA: 20 MMHG (ref 40–60)
PO2 BLDA: 207 MMHG (ref 80–100)
PO2 BLDA: 21 MMHG (ref 40–60)
PO2 BLDA: 210 MMHG (ref 80–100)
PO2 BLDA: 23 MMHG (ref 40–60)
PO2 BLDA: 24 MMHG (ref 40–60)
PO2 BLDA: 25 MMHG (ref 40–60)
PO2 BLDA: 26 MMHG (ref 40–60)
PO2 BLDA: 26 MMHG (ref 40–60)
PO2 BLDA: 27 MMHG (ref 40–60)
PO2 BLDA: 28 MMHG (ref 40–60)
PO2 BLDA: 29 MMHG (ref 40–60)
PO2 BLDA: 29 MMHG (ref 40–60)
PO2 BLDA: 30 MMHG (ref 40–60)
PO2 BLDA: 31 MMHG (ref 40–60)
PO2 BLDA: 32 MMHG (ref 40–60)
PO2 BLDA: 33 MMHG (ref 40–60)
PO2 BLDA: 34 MMHG (ref 40–60)
PO2 BLDA: 34 MMHG (ref 40–60)
PO2 BLDA: 36 MMHG (ref 40–60)
PO2 BLDA: 36 MMHG (ref 40–60)
PO2 BLDA: 37 MMHG (ref 40–60)
PO2 BLDA: 38 MMHG (ref 40–60)
PO2 BLDA: 39 MMHG (ref 40–60)
PO2 BLDA: 43 MMHG (ref 40–60)
PO2 BLDA: 46 MMHG (ref 40–60)
PO2 BLDA: 574 MMHG (ref 80–100)
POC BE: -1 MMOL/L
POC BE: -11 MMOL/L
POC BE: -12 MMOL/L
POC BE: -17 MMOL/L
POC BE: -18 MMOL/L
POC BE: -18 MMOL/L
POC BE: -2 MMOL/L
POC BE: -20 MMOL/L
POC BE: -3 MMOL/L
POC BE: -4 MMOL/L
POC BE: -4 MMOL/L
POC BE: -5 MMOL/L
POC BE: -6 MMOL/L
POC BE: 0 MMOL/L
POC BE: 1 MMOL/L
POC BE: 1 MMOL/L
POC BE: 2 MMOL/L
POC BE: 3 MMOL/L
POC BE: 4 MMOL/L
POC BE: 5 MMOL/L
POC BE: 6 MMOL/L
POC IONIZED CALCIUM: 1.18 MMOL/L (ref 1.06–1.42)
POC IONIZED CALCIUM: 1.19 MMOL/L (ref 1.06–1.42)
POC IONIZED CALCIUM: 1.2 MMOL/L (ref 1.06–1.42)
POC IONIZED CALCIUM: 1.21 MMOL/L (ref 1.06–1.42)
POC IONIZED CALCIUM: 1.22 MMOL/L (ref 1.06–1.42)
POC SATURATED O2: 100 % (ref 95–100)
POC SATURATED O2: 100 % (ref 95–100)
POC SATURATED O2: 25 % (ref 95–100)
POC SATURATED O2: 25 % (ref 95–100)
POC SATURATED O2: 27 % (ref 95–100)
POC SATURATED O2: 31 % (ref 95–100)
POC SATURATED O2: 31 % (ref 95–100)
POC SATURATED O2: 33 % (ref 95–100)
POC SATURATED O2: 37 % (ref 95–100)
POC SATURATED O2: 39 % (ref 95–100)
POC SATURATED O2: 40 % (ref 95–100)
POC SATURATED O2: 41 % (ref 95–100)
POC SATURATED O2: 41 % (ref 95–100)
POC SATURATED O2: 42 % (ref 95–100)
POC SATURATED O2: 42 % (ref 95–100)
POC SATURATED O2: 44 % (ref 95–100)
POC SATURATED O2: 45 % (ref 95–100)
POC SATURATED O2: 45 % (ref 95–100)
POC SATURATED O2: 46 % (ref 95–100)
POC SATURATED O2: 46 % (ref 95–100)
POC SATURATED O2: 47 % (ref 95–100)
POC SATURATED O2: 47 % (ref 95–100)
POC SATURATED O2: 48 % (ref 95–100)
POC SATURATED O2: 49 % (ref 95–100)
POC SATURATED O2: 50 % (ref 95–100)
POC SATURATED O2: 51 % (ref 95–100)
POC SATURATED O2: 51 % (ref 95–100)
POC SATURATED O2: 52 % (ref 95–100)
POC SATURATED O2: 52 % (ref 95–100)
POC SATURATED O2: 53 % (ref 95–100)
POC SATURATED O2: 54 % (ref 95–100)
POC SATURATED O2: 54 % (ref 95–100)
POC SATURATED O2: 55 % (ref 95–100)
POC SATURATED O2: 56 % (ref 95–100)
POC SATURATED O2: 56 % (ref 95–100)
POC SATURATED O2: 57 % (ref 95–100)
POC SATURATED O2: 57 % (ref 95–100)
POC SATURATED O2: 58 % (ref 95–100)
POC SATURATED O2: 58 % (ref 95–100)
POC SATURATED O2: 60 % (ref 95–100)
POC SATURATED O2: 60 % (ref 95–100)
POC SATURATED O2: 61 % (ref 95–100)
POC SATURATED O2: 63 % (ref 95–100)
POC SATURATED O2: 63 % (ref 95–100)
POC SATURATED O2: 66 % (ref 95–100)
POC SATURATED O2: 68 % (ref 95–100)
POC SATURATED O2: 69 % (ref 95–100)
POC SATURATED O2: 72 % (ref 95–100)
POC SATURATED O2: 74 % (ref 95–100)
POC SATURATED O2: 99 % (ref 95–100)
POC TCO2: 11 MMOL/L (ref 23–27)
POC TCO2: 13 MMOL/L (ref 24–29)
POC TCO2: 14 MMOL/L (ref 24–29)
POC TCO2: 15 MMOL/L (ref 24–29)
POC TCO2: 16 MMOL/L (ref 23–27)
POC TCO2: 19 MMOL/L (ref 24–29)
POC TCO2: 21 MMOL/L (ref 23–27)
POC TCO2: 21 MMOL/L (ref 24–29)
POC TCO2: 22 MMOL/L (ref 24–29)
POC TCO2: 22 MMOL/L (ref 24–29)
POC TCO2: 23 MMOL/L (ref 24–29)
POC TCO2: 24 MMOL/L (ref 24–29)
POC TCO2: 25 MMOL/L (ref 24–29)
POC TCO2: 26 MMOL/L (ref 24–29)
POC TCO2: 27 MMOL/L (ref 24–29)
POC TCO2: 28 MMOL/L (ref 24–29)
POC TCO2: 29 MMOL/L (ref 24–29)
POC TCO2: 30 MMOL/L (ref 24–29)
POC TCO2: 31 MMOL/L (ref 24–29)
POC TCO2: 32 MMOL/L (ref 24–29)
POCT GLUCOSE: 101 MG/DL (ref 70–110)
POCT GLUCOSE: 108 MG/DL (ref 70–110)
POCT GLUCOSE: 110 MG/DL (ref 70–110)
POCT GLUCOSE: 117 MG/DL (ref 70–110)
POCT GLUCOSE: 119 MG/DL (ref 70–110)
POCT GLUCOSE: 119 MG/DL (ref 70–110)
POCT GLUCOSE: 121 MG/DL (ref 70–110)
POCT GLUCOSE: 122 MG/DL (ref 70–110)
POCT GLUCOSE: 124 MG/DL (ref 70–110)
POCT GLUCOSE: 126 MG/DL (ref 70–110)
POCT GLUCOSE: 127 MG/DL (ref 70–110)
POCT GLUCOSE: 127 MG/DL (ref 70–110)
POCT GLUCOSE: 129 MG/DL (ref 70–110)
POCT GLUCOSE: 135 MG/DL (ref 70–110)
POCT GLUCOSE: 145 MG/DL (ref 70–110)
POCT GLUCOSE: 149 MG/DL (ref 70–110)
POCT GLUCOSE: 158 MG/DL (ref 70–110)
POCT GLUCOSE: 160 MG/DL (ref 70–110)
POCT GLUCOSE: 182 MG/DL (ref 70–110)
POCT GLUCOSE: 200 MG/DL (ref 70–110)
POCT GLUCOSE: 204 MG/DL (ref 70–110)
POCT GLUCOSE: 228 MG/DL (ref 70–110)
POCT GLUCOSE: 256 MG/DL (ref 70–110)
POCT GLUCOSE: 83 MG/DL (ref 70–110)
POCT GLUCOSE: 86 MG/DL (ref 70–110)
POIKILOCYTOSIS BLD QL SMEAR: SLIGHT
POLYCHROMASIA BLD QL SMEAR: ABNORMAL
POTASSIUM BLD-SCNC: 3.8 MMOL/L (ref 3.5–5.1)
POTASSIUM BLD-SCNC: 3.9 MMOL/L (ref 3.5–5.1)
POTASSIUM BLD-SCNC: 3.9 MMOL/L (ref 3.5–5.1)
POTASSIUM BLD-SCNC: 4 MMOL/L (ref 3.5–5.1)
POTASSIUM BLD-SCNC: 4.1 MMOL/L (ref 3.5–5.1)
POTASSIUM SERPL-SCNC: 3.6 MMOL/L (ref 3.5–5.1)
POTASSIUM SERPL-SCNC: 3.9 MMOL/L (ref 3.5–5.1)
POTASSIUM SERPL-SCNC: 4 MMOL/L (ref 3.5–5.1)
POTASSIUM SERPL-SCNC: 4.1 MMOL/L (ref 3.5–5.1)
POTASSIUM SERPL-SCNC: 4.2 MMOL/L (ref 3.5–5.1)
POTASSIUM SERPL-SCNC: 4.3 MMOL/L (ref 3.5–5.1)
POTASSIUM SERPL-SCNC: 4.4 MMOL/L (ref 3.5–5.1)
POTASSIUM SERPL-SCNC: 4.5 MMOL/L (ref 3.5–5.1)
POTASSIUM SERPL-SCNC: 4.6 MMOL/L (ref 3.5–5.1)
POTASSIUM SERPL-SCNC: 4.7 MMOL/L (ref 3.5–5.1)
POTASSIUM SERPL-SCNC: 5.4 MMOL/L (ref 3.5–5.1)
PREGABALIN: NOT DETECTED
PROT SERPL-MCNC: 5.5 G/DL (ref 6–8.4)
PROT SERPL-MCNC: 5.9 G/DL (ref 6–8.4)
PROT SERPL-MCNC: 6 G/DL (ref 6–8.4)
PROT SERPL-MCNC: 6.1 G/DL (ref 6–8.4)
PROT SERPL-MCNC: 6.2 G/DL (ref 6–8.4)
PROT SERPL-MCNC: 6.3 G/DL (ref 6–8.4)
PROT SERPL-MCNC: 6.5 G/DL (ref 6–8.4)
PROT SERPL-MCNC: 6.6 G/DL (ref 6–8.4)
PROT SERPL-MCNC: 6.9 G/DL (ref 6–8.4)
PROT SERPL-MCNC: 7 G/DL (ref 6–8.4)
PROTHROMBIN TIME: 12.9 SEC (ref 9–12.5)
PROTHROMBIN TIME: 12.9 SEC (ref 9–12.5)
RA MAJOR: 6.2 CM
RA PRESSURE ESTIMATED: 15 MMHG
RA WIDTH: 4.36 CM
RBC # BLD AUTO: 4.36 M/UL (ref 4.6–6.2)
RBC # BLD AUTO: 4.4 M/UL (ref 4.6–6.2)
RBC # BLD AUTO: 4.42 M/UL (ref 4.6–6.2)
RBC # BLD AUTO: 4.47 M/UL (ref 4.6–6.2)
RBC # BLD AUTO: 4.56 M/UL (ref 4.6–6.2)
RBC # BLD AUTO: 4.57 M/UL (ref 4.6–6.2)
RBC # BLD AUTO: 4.6 M/UL (ref 4.6–6.2)
RBC # BLD AUTO: 4.6 M/UL (ref 4.6–6.2)
RBC # BLD AUTO: 4.61 M/UL (ref 4.6–6.2)
RBC # BLD AUTO: 4.83 M/UL (ref 4.6–6.2)
RBC # BLD AUTO: 4.96 M/UL (ref 4.6–6.2)
RBC # BLD AUTO: 4.98 M/UL (ref 4.6–6.2)
RBC # BLD AUTO: 4.98 M/UL (ref 4.6–6.2)
RBC # BLD AUTO: 5.01 M/UL (ref 4.6–6.2)
RBC # BLD AUTO: 5.15 M/UL (ref 4.6–6.2)
RBC # BLD AUTO: 5.19 M/UL (ref 4.6–6.2)
RBC # BLD AUTO: 5.24 M/UL (ref 4.6–6.2)
RBC # BLD AUTO: 5.58 M/UL (ref 4.6–6.2)
RIGHT VENTRICLE GLOBAL SYSTOLIC STRAIN: 9 %
RIGHT VENTRICULAR END-DIASTOLIC DIMENSION: 4.86 CM
RV TB RVSP: 19 MMHG
SAMPLE: ABNORMAL
SAMPLE: NORMAL
SERVICE CMNT-IMP: NORMAL
SINUS: 3.33 CM
SINUS: 3.34 CM
SITE: ABNORMAL
SITE: NORMAL
SODIUM BLD-SCNC: 134 MMOL/L (ref 136–145)
SODIUM BLD-SCNC: 134 MMOL/L (ref 136–145)
SODIUM BLD-SCNC: 136 MMOL/L (ref 136–145)
SODIUM BLD-SCNC: 137 MMOL/L (ref 136–145)
SODIUM BLD-SCNC: 139 MMOL/L (ref 136–145)
SODIUM SERPL-SCNC: 130 MMOL/L (ref 136–145)
SODIUM SERPL-SCNC: 132 MMOL/L (ref 136–145)
SODIUM SERPL-SCNC: 132 MMOL/L (ref 136–145)
SODIUM SERPL-SCNC: 133 MMOL/L (ref 136–145)
SODIUM SERPL-SCNC: 134 MMOL/L (ref 136–145)
SODIUM SERPL-SCNC: 134 MMOL/L (ref 136–145)
SODIUM SERPL-SCNC: 135 MMOL/L (ref 136–145)
SODIUM SERPL-SCNC: 136 MMOL/L (ref 136–145)
SODIUM SERPL-SCNC: 137 MMOL/L (ref 136–145)
SODIUM SERPL-SCNC: 138 MMOL/L (ref 136–145)
SODIUM SERPL-SCNC: 139 MMOL/L (ref 136–145)
SPECIMEN OUTDATE: NORMAL
SPHEROCYTES BLD QL SMEAR: ABNORMAL
STJ: 2.61 CM
STJ: 2.65 CM
SYSTOLIC BLOOD PRESSURE: 120 MMHG
TAPENTADOL UR QL SCN: NOT DETECTED
TAPENTADOL UR QL SCN: NOT DETECTED
TDI LATERAL: 0.03 M/S
TDI SEPTAL: 0.05 M/S
TDI: 0.04 M/S
TEMAZEPAM UR QL: NOT DETECTED
TOXIC GRANULES BLD QL SMEAR: PRESENT
TR MAX PG: 50 MMHG
TRAMADOL UR QL: NOT DETECTED
TRICUSPID ANNULAR PLANE SYSTOLIC EXCURSION: 1.3 CM
TROPONIN I SERPL DL<=0.01 NG/ML-MCNC: 0.09 NG/ML (ref 0–0.03)
TV REST PULMONARY ARTERY PRESSURE: 65 MMHG
VT: 500
WBC # BLD AUTO: 10.02 K/UL (ref 3.9–12.7)
WBC # BLD AUTO: 10.27 K/UL (ref 3.9–12.7)
WBC # BLD AUTO: 10.46 K/UL (ref 3.9–12.7)
WBC # BLD AUTO: 10.56 K/UL (ref 3.9–12.7)
WBC # BLD AUTO: 10.73 K/UL (ref 3.9–12.7)
WBC # BLD AUTO: 10.75 K/UL (ref 3.9–12.7)
WBC # BLD AUTO: 11.63 K/UL (ref 3.9–12.7)
WBC # BLD AUTO: 11.92 K/UL (ref 3.9–12.7)
WBC # BLD AUTO: 12.11 K/UL (ref 3.9–12.7)
WBC # BLD AUTO: 12.11 K/UL (ref 3.9–12.7)
WBC # BLD AUTO: 12.63 K/UL (ref 3.9–12.7)
WBC # BLD AUTO: 12.71 K/UL (ref 3.9–12.7)
WBC # BLD AUTO: 13.91 K/UL (ref 3.9–12.7)
WBC # BLD AUTO: 16.56 K/UL (ref 3.9–12.7)
WBC # BLD AUTO: 16.76 K/UL (ref 3.9–12.7)
WBC # BLD AUTO: 17.01 K/UL (ref 3.9–12.7)
WBC # BLD AUTO: 18.37 K/UL (ref 3.9–12.7)
WBC # BLD AUTO: 9.58 K/UL (ref 3.9–12.7)
Z-SCORE OF LEFT VENTRICULAR DIMENSION IN END DIASTOLE: -1.01
Z-SCORE OF LEFT VENTRICULAR DIMENSION IN END DIASTOLE: -1.21
Z-SCORE OF LEFT VENTRICULAR DIMENSION IN END SYSTOLE: 2.87
ZOLPIDEM METABOLITE: NOT DETECTED
ZOLPIDEM UR QL: NOT DETECTED

## 2023-01-01 PROCEDURE — 99232 PR SUBSEQUENT HOSPITAL CARE,LEVL II: ICD-10-PCS | Mod: ,,, | Performed by: INTERNAL MEDICINE

## 2023-01-01 PROCEDURE — 20600001 HC STEP DOWN PRIVATE ROOM

## 2023-01-01 PROCEDURE — 83605 ASSAY OF LACTIC ACID: CPT | Mod: 91 | Performed by: INTERNAL MEDICINE

## 2023-01-01 PROCEDURE — 80048 BASIC METABOLIC PNL TOTAL CA: CPT | Performed by: INTERNAL MEDICINE

## 2023-01-01 PROCEDURE — 99223 PR INITIAL HOSPITAL CARE,LEVL III: ICD-10-PCS | Mod: ,,, | Performed by: INTERNAL MEDICINE

## 2023-01-01 PROCEDURE — 85730 THROMBOPLASTIN TIME PARTIAL: CPT | Performed by: INTERNAL MEDICINE

## 2023-01-01 PROCEDURE — 99152 PR MOD CONSCIOUS SEDATION, SAME PHYS, 5+ YRS, FIRST 15 MIN: ICD-10-PCS | Mod: ,,, | Performed by: ANESTHESIOLOGY

## 2023-01-01 PROCEDURE — 63600175 PHARM REV CODE 636 W HCPCS: Performed by: INTERNAL MEDICINE

## 2023-01-01 PROCEDURE — 99232 SBSQ HOSP IP/OBS MODERATE 35: CPT | Mod: ,,, | Performed by: INTERNAL MEDICINE

## 2023-01-01 PROCEDURE — 25000003 PHARM REV CODE 250: Performed by: STUDENT IN AN ORGANIZED HEALTH CARE EDUCATION/TRAINING PROGRAM

## 2023-01-01 PROCEDURE — 99291 CRITICAL CARE FIRST HOUR: CPT | Mod: ,,, | Performed by: INTERNAL MEDICINE

## 2023-01-01 PROCEDURE — 25000003 PHARM REV CODE 250

## 2023-01-01 PROCEDURE — 63600175 PHARM REV CODE 636 W HCPCS

## 2023-01-01 PROCEDURE — 82803 BLOOD GASES ANY COMBINATION: CPT

## 2023-01-01 PROCEDURE — 25000003 PHARM REV CODE 250: Performed by: INTERNAL MEDICINE

## 2023-01-01 PROCEDURE — 83605 ASSAY OF LACTIC ACID: CPT

## 2023-01-01 PROCEDURE — 27200966 HC CLOSED SUCTION SYSTEM

## 2023-01-01 PROCEDURE — 25000242 PHARM REV CODE 250 ALT 637 W/ HCPCS: Performed by: STUDENT IN AN ORGANIZED HEALTH CARE EDUCATION/TRAINING PROGRAM

## 2023-01-01 PROCEDURE — 83735 ASSAY OF MAGNESIUM: CPT | Mod: 91 | Performed by: INTERNAL MEDICINE

## 2023-01-01 PROCEDURE — 80076 HEPATIC FUNCTION PANEL: CPT | Performed by: INTERNAL MEDICINE

## 2023-01-01 PROCEDURE — 27000923 HC TRIALYSIS CATHETER, ANY SIZE

## 2023-01-01 PROCEDURE — 83735 ASSAY OF MAGNESIUM: CPT | Performed by: INTERNAL MEDICINE

## 2023-01-01 PROCEDURE — 83735 ASSAY OF MAGNESIUM: CPT | Performed by: STUDENT IN AN ORGANIZED HEALTH CARE EDUCATION/TRAINING PROGRAM

## 2023-01-01 PROCEDURE — 94660 CPAP INITIATION&MGMT: CPT

## 2023-01-01 PROCEDURE — 27000221 HC OXYGEN, UP TO 24 HOURS

## 2023-01-01 PROCEDURE — 99215 OFFICE O/P EST HI 40 MIN: CPT | Mod: PBBFAC | Performed by: NURSE PRACTITIONER

## 2023-01-01 PROCEDURE — 84132 ASSAY OF SERUM POTASSIUM: CPT | Performed by: INTERNAL MEDICINE

## 2023-01-01 PROCEDURE — 94761 N-INVAS EAR/PLS OXIMETRY MLT: CPT

## 2023-01-01 PROCEDURE — 99900035 HC TECH TIME PER 15 MIN (STAT)

## 2023-01-01 PROCEDURE — 85025 COMPLETE CBC W/AUTO DIFF WBC: CPT | Performed by: INTERNAL MEDICINE

## 2023-01-01 PROCEDURE — 63600175 PHARM REV CODE 636 W HCPCS: Performed by: STUDENT IN AN ORGANIZED HEALTH CARE EDUCATION/TRAINING PROGRAM

## 2023-01-01 PROCEDURE — 80048 BASIC METABOLIC PNL TOTAL CA: CPT | Mod: 91 | Performed by: INTERNAL MEDICINE

## 2023-01-01 PROCEDURE — 99152 MOD SED SAME PHYS/QHP 5/>YRS: CPT | Mod: ,,, | Performed by: ANESTHESIOLOGY

## 2023-01-01 PROCEDURE — 99233 SBSQ HOSP IP/OBS HIGH 50: CPT | Mod: ,,, | Performed by: INTERNAL MEDICINE

## 2023-01-01 PROCEDURE — 64624 DSTRJ NULYT AGT GNCLR NRV: CPT | Mod: RT | Performed by: ANESTHESIOLOGY

## 2023-01-01 PROCEDURE — 99233 PR SUBSEQUENT HOSPITAL CARE,LEVL III: ICD-10-PCS | Mod: ,,, | Performed by: INTERNAL MEDICINE

## 2023-01-01 PROCEDURE — 37799 UNLISTED PX VASCULAR SURGERY: CPT

## 2023-01-01 PROCEDURE — 99291 PR CRITICAL CARE, E/M 30-74 MINUTES: ICD-10-PCS | Mod: ,,, | Performed by: INTERNAL MEDICINE

## 2023-01-01 PROCEDURE — 99152 MOD SED SAME PHYS/QHP 5/>YRS: CPT | Mod: ,,, | Performed by: INTERNAL MEDICINE

## 2023-01-01 PROCEDURE — A4649 SURGICAL SUPPLIES: HCPCS | Performed by: ANESTHESIOLOGY

## 2023-01-01 PROCEDURE — 93455 PR CATH PLACE/CORONARY ANGIO, IMG SUPER/INTERP, BYPASS ANGIO: ICD-10-PCS | Mod: 26,,, | Performed by: INTERNAL MEDICINE

## 2023-01-01 PROCEDURE — 63600175 PHARM REV CODE 636 W HCPCS: Performed by: ANESTHESIOLOGY

## 2023-01-01 PROCEDURE — 99214 OFFICE O/P EST MOD 30 MIN: CPT | Mod: S$PBB,,, | Performed by: NURSE PRACTITIONER

## 2023-01-01 PROCEDURE — 80053 COMPREHEN METABOLIC PANEL: CPT | Performed by: INTERNAL MEDICINE

## 2023-01-01 PROCEDURE — 51798 US URINE CAPACITY MEASURE: CPT

## 2023-01-01 PROCEDURE — 64624 DSTRJ NULYT AGT GNCLR NRV: CPT | Mod: LT,,, | Performed by: ANESTHESIOLOGY

## 2023-01-01 PROCEDURE — 80053 COMPREHEN METABOLIC PANEL: CPT | Performed by: STUDENT IN AN ORGANIZED HEALTH CARE EDUCATION/TRAINING PROGRAM

## 2023-01-01 PROCEDURE — 80048 BASIC METABOLIC PNL TOTAL CA: CPT | Mod: 91,XB | Performed by: INTERNAL MEDICINE

## 2023-01-01 PROCEDURE — 83605 ASSAY OF LACTIC ACID: CPT | Performed by: STUDENT IN AN ORGANIZED HEALTH CARE EDUCATION/TRAINING PROGRAM

## 2023-01-01 PROCEDURE — 85730 THROMBOPLASTIN TIME PARTIAL: CPT | Mod: 91 | Performed by: INTERNAL MEDICINE

## 2023-01-01 PROCEDURE — 25500020 PHARM REV CODE 255: Performed by: INTERNAL MEDICINE

## 2023-01-01 PROCEDURE — 93455 CORONARY ART/GRFT ANGIO S&I: CPT | Performed by: INTERNAL MEDICINE

## 2023-01-01 PROCEDURE — 83605 ASSAY OF LACTIC ACID: CPT | Performed by: INTERNAL MEDICINE

## 2023-01-01 PROCEDURE — 36620 INSERTION CATHETER ARTERY: CPT

## 2023-01-01 PROCEDURE — 85025 COMPLETE CBC W/AUTO DIFF WBC: CPT | Mod: 91 | Performed by: STUDENT IN AN ORGANIZED HEALTH CARE EDUCATION/TRAINING PROGRAM

## 2023-01-01 PROCEDURE — 83615 LACTATE (LD) (LDH) ENZYME: CPT | Performed by: INTERNAL MEDICINE

## 2023-01-01 PROCEDURE — 86900 BLOOD TYPING SEROLOGIC ABO: CPT | Performed by: STUDENT IN AN ORGANIZED HEALTH CARE EDUCATION/TRAINING PROGRAM

## 2023-01-01 PROCEDURE — 99223 1ST HOSP IP/OBS HIGH 75: CPT | Mod: ,,, | Performed by: INTERNAL MEDICINE

## 2023-01-01 PROCEDURE — 90945 DIALYSIS ONE EVALUATION: CPT

## 2023-01-01 PROCEDURE — 63600367 HC NITRIC OXIDE PER HOUR

## 2023-01-01 PROCEDURE — 36620 INSERTION CATHETER ARTERY: CPT | Mod: ,,, | Performed by: INTERNAL MEDICINE

## 2023-01-01 PROCEDURE — 83735 ASSAY OF MAGNESIUM: CPT | Mod: 91 | Performed by: STUDENT IN AN ORGANIZED HEALTH CARE EDUCATION/TRAINING PROGRAM

## 2023-01-01 PROCEDURE — 27100171 HC OXYGEN HIGH FLOW UP TO 24 HOURS

## 2023-01-01 PROCEDURE — C1894 INTRO/SHEATH, NON-LASER: HCPCS | Performed by: INTERNAL MEDICINE

## 2023-01-01 PROCEDURE — 97161 PT EVAL LOW COMPLEX 20 MIN: CPT

## 2023-01-01 PROCEDURE — 51702 INSERT TEMP BLADDER CATH: CPT

## 2023-01-01 PROCEDURE — 85610 PROTHROMBIN TIME: CPT | Performed by: INTERNAL MEDICINE

## 2023-01-01 PROCEDURE — 31500 INSERT EMERGENCY AIRWAY: CPT | Mod: ,,, | Performed by: ANESTHESIOLOGY

## 2023-01-01 PROCEDURE — 99152 MOD SED SAME PHYS/QHP 5/>YRS: CPT | Performed by: ANESTHESIOLOGY

## 2023-01-01 PROCEDURE — 64624 DSTRJ NULYT AGT GNCLR NRV: CPT | Mod: LT | Performed by: ANESTHESIOLOGY

## 2023-01-01 PROCEDURE — 93010 ELECTROCARDIOGRAM REPORT: CPT | Mod: ,,, | Performed by: INTERNAL MEDICINE

## 2023-01-01 PROCEDURE — 99152 PR MOD CONSCIOUS SEDATION, SAME PHYS, 5+ YRS, FIRST 15 MIN: ICD-10-PCS | Mod: ,,, | Performed by: INTERNAL MEDICINE

## 2023-01-01 PROCEDURE — 82800 BLOOD PH: CPT

## 2023-01-01 PROCEDURE — 84100 ASSAY OF PHOSPHORUS: CPT | Performed by: STUDENT IN AN ORGANIZED HEALTH CARE EDUCATION/TRAINING PROGRAM

## 2023-01-01 PROCEDURE — 25000003 PHARM REV CODE 250: Performed by: ANESTHESIOLOGY

## 2023-01-01 PROCEDURE — 94640 AIRWAY INHALATION TREATMENT: CPT

## 2023-01-01 PROCEDURE — 64624 PR DESTRUCT, NEUROLYTIC AGENT, GENICULAR NERVE, W/IMG: ICD-10-PCS | Mod: RT,,, | Performed by: ANESTHESIOLOGY

## 2023-01-01 PROCEDURE — 31500 AD HOC INTUBATION: ICD-10-PCS | Mod: ,,, | Performed by: ANESTHESIOLOGY

## 2023-01-01 PROCEDURE — 64624 DSTRJ NULYT AGT GNCLR NRV: CPT | Mod: RT,,, | Performed by: ANESTHESIOLOGY

## 2023-01-01 PROCEDURE — 93451 PR RIGHT HEART CATH O2 SATURATION & CARDIAC OUTPUT: ICD-10-PCS | Mod: 26,,, | Performed by: INTERNAL MEDICINE

## 2023-01-01 PROCEDURE — 99233 PR SUBSEQUENT HOSPITAL CARE,LEVL III: ICD-10-PCS | Mod: 25,,, | Performed by: INTERNAL MEDICINE

## 2023-01-01 PROCEDURE — 93010 EKG 12-LEAD: ICD-10-PCS | Mod: ,,, | Performed by: INTERNAL MEDICINE

## 2023-01-01 PROCEDURE — 87040 BLOOD CULTURE FOR BACTERIA: CPT | Mod: 59

## 2023-01-01 PROCEDURE — C1751 CATH, INF, PER/CENT/MIDLINE: HCPCS | Performed by: INTERNAL MEDICINE

## 2023-01-01 PROCEDURE — 83880 ASSAY OF NATRIURETIC PEPTIDE: CPT

## 2023-01-01 PROCEDURE — 85027 COMPLETE CBC AUTOMATED: CPT | Performed by: INTERNAL MEDICINE

## 2023-01-01 PROCEDURE — 85007 BL SMEAR W/DIFF WBC COUNT: CPT | Performed by: INTERNAL MEDICINE

## 2023-01-01 PROCEDURE — 36620 ARTERIAL LINE: ICD-10-PCS | Mod: ,,, | Performed by: INTERNAL MEDICINE

## 2023-01-01 PROCEDURE — 64624 PR DESTRUCT, NEUROLYTIC AGENT, GENICULAR NERVE, W/IMG: ICD-10-PCS | Mod: LT,,, | Performed by: ANESTHESIOLOGY

## 2023-01-01 PROCEDURE — 80069 RENAL FUNCTION PANEL: CPT | Performed by: STUDENT IN AN ORGANIZED HEALTH CARE EDUCATION/TRAINING PROGRAM

## 2023-01-01 PROCEDURE — 99214 PR OFFICE/OUTPT VISIT, EST, LEVL IV, 30-39 MIN: ICD-10-PCS | Mod: S$PBB,,, | Performed by: NURSE PRACTITIONER

## 2023-01-01 PROCEDURE — 99999 PR PBB SHADOW E&M-EST. PATIENT-LVL IV: CPT | Mod: PBBFAC,,, | Performed by: NURSE PRACTITIONER

## 2023-01-01 PROCEDURE — 93451 RIGHT HEART CATH: CPT | Performed by: INTERNAL MEDICINE

## 2023-01-01 PROCEDURE — 94002 VENT MGMT INPAT INIT DAY: CPT

## 2023-01-01 PROCEDURE — 80069 RENAL FUNCTION PANEL: CPT | Mod: 91 | Performed by: STUDENT IN AN ORGANIZED HEALTH CARE EDUCATION/TRAINING PROGRAM

## 2023-01-01 PROCEDURE — 99999 PR PBB SHADOW E&M-EST. PATIENT-LVL IV: ICD-10-PCS | Mod: PBBFAC,,, | Performed by: NURSE PRACTITIONER

## 2023-01-01 PROCEDURE — 99900026 HC AIRWAY MAINTENANCE (STAT)

## 2023-01-01 PROCEDURE — 85610 PROTHROMBIN TIME: CPT

## 2023-01-01 PROCEDURE — 80326 AMPHETAMINES 5 OR MORE: CPT | Performed by: NURSE PRACTITIONER

## 2023-01-01 PROCEDURE — 99999 PR PBB SHADOW E&M-EST. PATIENT-LVL V: CPT | Mod: PBBFAC,,, | Performed by: NURSE PRACTITIONER

## 2023-01-01 PROCEDURE — 84484 ASSAY OF TROPONIN QUANT: CPT | Performed by: INTERNAL MEDICINE

## 2023-01-01 PROCEDURE — 85730 THROMBOPLASTIN TIME PARTIAL: CPT | Performed by: STUDENT IN AN ORGANIZED HEALTH CARE EDUCATION/TRAINING PROGRAM

## 2023-01-01 PROCEDURE — 99999 PR PBB SHADOW E&M-EST. PATIENT-LVL V: ICD-10-PCS | Mod: PBBFAC,,, | Performed by: NURSE PRACTITIONER

## 2023-01-01 PROCEDURE — 82330 ASSAY OF CALCIUM: CPT | Performed by: STUDENT IN AN ORGANIZED HEALTH CARE EDUCATION/TRAINING PROGRAM

## 2023-01-01 PROCEDURE — 27000190 HC CPAP FULL FACE MASK W/VALVE

## 2023-01-01 PROCEDURE — 99214 OFFICE O/P EST MOD 30 MIN: CPT | Mod: PBBFAC | Performed by: NURSE PRACTITIONER

## 2023-01-01 PROCEDURE — 83036 HEMOGLOBIN GLYCOSYLATED A1C: CPT | Performed by: INTERNAL MEDICINE

## 2023-01-01 PROCEDURE — 94003 VENT MGMT INPAT SUBQ DAY: CPT

## 2023-01-01 PROCEDURE — 99233 SBSQ HOSP IP/OBS HIGH 50: CPT | Mod: 25,,, | Performed by: INTERNAL MEDICINE

## 2023-01-01 PROCEDURE — 20000000 HC ICU ROOM

## 2023-01-01 PROCEDURE — C1769 GUIDE WIRE: HCPCS | Performed by: INTERNAL MEDICINE

## 2023-01-01 PROCEDURE — 97116 GAIT TRAINING THERAPY: CPT

## 2023-01-01 PROCEDURE — 93455 CORONARY ART/GRFT ANGIO S&I: CPT | Mod: 26,,, | Performed by: INTERNAL MEDICINE

## 2023-01-01 PROCEDURE — 82947 ASSAY GLUCOSE BLOOD QUANT: CPT | Performed by: ANESTHESIOLOGY

## 2023-01-01 PROCEDURE — 83880 ASSAY OF NATRIURETIC PEPTIDE: CPT | Performed by: INTERNAL MEDICINE

## 2023-01-01 PROCEDURE — 83605 ASSAY OF LACTIC ACID: CPT | Mod: 91 | Performed by: STUDENT IN AN ORGANIZED HEALTH CARE EDUCATION/TRAINING PROGRAM

## 2023-01-01 PROCEDURE — 80069 RENAL FUNCTION PANEL: CPT | Performed by: INTERNAL MEDICINE

## 2023-01-01 PROCEDURE — 80048 BASIC METABOLIC PNL TOTAL CA: CPT | Performed by: STUDENT IN AN ORGANIZED HEALTH CARE EDUCATION/TRAINING PROGRAM

## 2023-01-01 PROCEDURE — 85730 THROMBOPLASTIN TIME PARTIAL: CPT

## 2023-01-01 PROCEDURE — C1760 CLOSURE DEV, VASC: HCPCS | Performed by: INTERNAL MEDICINE

## 2023-01-01 PROCEDURE — C1752 CATH,HEMODIALYSIS,SHORT-TERM: HCPCS

## 2023-01-01 PROCEDURE — 80100008 HC CRRT DAILY MAINTENANCE

## 2023-01-01 PROCEDURE — 93005 ELECTROCARDIOGRAM TRACING: CPT

## 2023-01-01 PROCEDURE — 93451 RIGHT HEART CATH: CPT | Mod: 26,,, | Performed by: INTERNAL MEDICINE

## 2023-01-01 RX ORDER — HEPARIN SOD,PORCINE/0.9 % NACL 1000/500ML
INTRAVENOUS SOLUTION INTRAVENOUS
Status: DISCONTINUED | OUTPATIENT
Start: 2023-01-01 | End: 2023-01-01 | Stop reason: HOSPADM

## 2023-01-01 RX ORDER — LIDOCAINE HYDROCHLORIDE 20 MG/ML
INJECTION, SOLUTION INFILTRATION; PERINEURAL
Status: DISCONTINUED | OUTPATIENT
Start: 2023-01-01 | End: 2023-01-01 | Stop reason: HOSPADM

## 2023-01-01 RX ORDER — METOPROLOL SUCCINATE 50 MG/1
25 TABLET, EXTENDED RELEASE ORAL DAILY
Status: ON HOLD | COMMUNITY
End: 2023-01-01 | Stop reason: HOSPADM

## 2023-01-01 RX ORDER — HYDROCODONE BITARTRATE AND ACETAMINOPHEN 500; 5 MG/1; MG/1
TABLET ORAL CONTINUOUS
Status: ACTIVE | OUTPATIENT
Start: 2023-01-01 | End: 2023-01-01

## 2023-01-01 RX ORDER — METOPROLOL SUCCINATE 100 MG/1
100 TABLET, EXTENDED RELEASE ORAL DAILY
Status: ON HOLD | COMMUNITY
End: 2023-01-01 | Stop reason: HOSPADM

## 2023-01-01 RX ORDER — PHENYLEPHRINE HCL IN 0.9% NACL 20MG/250ML
0-5 PLASTIC BAG, INJECTION (ML) INTRAVENOUS CONTINUOUS
Status: DISCONTINUED | OUTPATIENT
Start: 2023-01-01 | End: 2023-01-01

## 2023-01-01 RX ORDER — ROSUVASTATIN CALCIUM 40 MG/1
40 TABLET, COATED ORAL DAILY
Status: ON HOLD | COMMUNITY
End: 2023-01-01 | Stop reason: HOSPADM

## 2023-01-01 RX ORDER — HEPARIN SODIUM,PORCINE/D5W 25000/250
0-40 INTRAVENOUS SOLUTION INTRAVENOUS CONTINUOUS
Status: DISCONTINUED | OUTPATIENT
Start: 2023-01-01 | End: 2023-01-01

## 2023-01-01 RX ORDER — FENTANYL CITRATE 50 UG/ML
INJECTION, SOLUTION INTRAMUSCULAR; INTRAVENOUS
Status: DISCONTINUED | OUTPATIENT
Start: 2023-01-01 | End: 2023-01-01 | Stop reason: HOSPADM

## 2023-01-01 RX ORDER — MAGNESIUM SULFATE HEPTAHYDRATE 40 MG/ML
2 INJECTION, SOLUTION INTRAVENOUS
Status: ACTIVE | OUTPATIENT
Start: 2023-01-01 | End: 2023-01-01

## 2023-01-01 RX ORDER — LIDOCAINE HYDROCHLORIDE 10 MG/ML
INJECTION, SOLUTION EPIDURAL; INFILTRATION; INTRACAUDAL; PERINEURAL
Status: COMPLETED
Start: 2023-01-01 | End: 2023-01-01

## 2023-01-01 RX ORDER — PHENYLEPHRINE HCL IN 0.9% NACL 1 MG/10 ML
SYRINGE (ML) INTRAVENOUS
Status: DISPENSED
Start: 2023-01-01 | End: 2023-01-01

## 2023-01-01 RX ORDER — COLCHICINE 0.6 MG/1
0.6 TABLET, FILM COATED ORAL
Status: DISCONTINUED | OUTPATIENT
Start: 2023-01-01 | End: 2023-01-01

## 2023-01-01 RX ORDER — DOBUTAMINE HYDROCHLORIDE 400 MG/100ML
2.5 INJECTION INTRAVENOUS CONTINUOUS
Status: DISCONTINUED | OUTPATIENT
Start: 2023-01-01 | End: 2023-01-01

## 2023-01-01 RX ORDER — HYDROMORPHONE HYDROCHLORIDE 1 MG/ML
0.2 INJECTION, SOLUTION INTRAMUSCULAR; INTRAVENOUS; SUBCUTANEOUS ONCE
Status: COMPLETED | OUTPATIENT
Start: 2023-01-01 | End: 2023-01-01

## 2023-01-01 RX ORDER — MIDAZOLAM HYDROCHLORIDE 1 MG/ML
INJECTION INTRAMUSCULAR; INTRAVENOUS
Status: COMPLETED
Start: 2023-01-01 | End: 2023-01-01

## 2023-01-01 RX ORDER — BUPIVACAINE HYDROCHLORIDE 5 MG/ML
INJECTION, SOLUTION EPIDURAL; INTRACAUDAL
Status: DISCONTINUED | OUTPATIENT
Start: 2023-01-01 | End: 2023-01-01 | Stop reason: HOSPADM

## 2023-01-01 RX ORDER — HYDROCODONE BITARTRATE AND ACETAMINOPHEN 10; 325 MG/1; MG/1
1 TABLET ORAL DAILY PRN
Status: ON HOLD | COMMUNITY
End: 2023-01-01 | Stop reason: HOSPADM

## 2023-01-01 RX ORDER — VALSARTAN 40 MG/1
40 TABLET ORAL 2 TIMES DAILY
Qty: 60 TABLET | Refills: 3 | Status: SHIPPED | OUTPATIENT
Start: 2023-01-01 | End: 2023-01-01 | Stop reason: HOSPADM

## 2023-01-01 RX ORDER — BUMETANIDE 2 MG/1
2 TABLET ORAL 2 TIMES DAILY
Status: ON HOLD | COMMUNITY
End: 2023-01-01 | Stop reason: HOSPADM

## 2023-01-01 RX ORDER — FAMOTIDINE 20 MG/1
20 TABLET, FILM COATED ORAL DAILY
Qty: 30 TABLET | Refills: 2 | Status: CANCELLED | OUTPATIENT
Start: 2023-01-01

## 2023-01-01 RX ORDER — IPRATROPIUM BROMIDE AND ALBUTEROL SULFATE 2.5; .5 MG/3ML; MG/3ML
3 SOLUTION RESPIRATORY (INHALATION) ONCE
Status: COMPLETED | OUTPATIENT
Start: 2023-01-01 | End: 2023-01-01

## 2023-01-01 RX ORDER — PREDNISONE 5 MG/1
5 TABLET ORAL DAILY
Qty: 1 TABLET | Refills: 0 | Status: SHIPPED | OUTPATIENT
Start: 2023-01-01 | End: 2023-01-01 | Stop reason: HOSPADM

## 2023-01-01 RX ORDER — SODIUM CHLORIDE 9 MG/ML
INJECTION, SOLUTION INTRAVENOUS CONTINUOUS
Status: ACTIVE | OUTPATIENT
Start: 2023-01-01 | End: 2023-01-01

## 2023-01-01 RX ORDER — POLYETHYLENE GLYCOL 3350 17 G/17G
17 POWDER, FOR SOLUTION ORAL DAILY PRN
Status: DISCONTINUED | OUTPATIENT
Start: 2023-01-01 | End: 2023-09-12 | Stop reason: HOSPADM

## 2023-01-01 RX ORDER — PROCHLORPERAZINE EDISYLATE 5 MG/ML
2.5 INJECTION INTRAMUSCULAR; INTRAVENOUS EVERY 6 HOURS PRN
Status: DISCONTINUED | OUTPATIENT
Start: 2023-01-01 | End: 2023-09-12 | Stop reason: HOSPADM

## 2023-01-01 RX ORDER — MIDAZOLAM HYDROCHLORIDE 1 MG/ML
4 INJECTION INTRAMUSCULAR; INTRAVENOUS ONCE
Status: COMPLETED | OUTPATIENT
Start: 2023-01-01 | End: 2023-01-01

## 2023-01-01 RX ORDER — ONDANSETRON 8 MG/1
8 TABLET, ORALLY DISINTEGRATING ORAL EVERY 8 HOURS PRN
Status: DISCONTINUED | OUTPATIENT
Start: 2023-01-01 | End: 2023-01-01

## 2023-01-01 RX ORDER — BUPIVACAINE HYDROCHLORIDE 2.5 MG/ML
INJECTION, SOLUTION EPIDURAL; INFILTRATION; INTRACAUDAL
Status: DISCONTINUED | OUTPATIENT
Start: 2023-01-01 | End: 2023-01-01 | Stop reason: HOSPADM

## 2023-01-01 RX ORDER — HYDRALAZINE HYDROCHLORIDE 25 MG/1
25 TABLET, FILM COATED ORAL
Status: ON HOLD | COMMUNITY
End: 2023-01-01 | Stop reason: HOSPADM

## 2023-01-01 RX ORDER — LANOLIN ALCOHOL/MO/W.PET/CERES
800 CREAM (GRAM) TOPICAL ONCE
Status: COMPLETED | OUTPATIENT
Start: 2023-01-01 | End: 2023-01-01

## 2023-01-01 RX ORDER — ONDANSETRON 2 MG/ML
8 INJECTION INTRAMUSCULAR; INTRAVENOUS ONCE
Status: COMPLETED | OUTPATIENT
Start: 2023-01-01 | End: 2023-01-01

## 2023-01-01 RX ORDER — PANTOPRAZOLE SODIUM 40 MG/1
40 TABLET, DELAYED RELEASE ORAL
Qty: 30 TABLET | Refills: 2 | Status: SHIPPED | OUTPATIENT
Start: 2023-01-01 | End: 2023-01-01 | Stop reason: HOSPADM

## 2023-01-01 RX ORDER — VALSARTAN 40 MG/1
40 TABLET ORAL 2 TIMES DAILY
Status: DISCONTINUED | OUTPATIENT
Start: 2023-01-01 | End: 2023-01-01

## 2023-01-01 RX ORDER — GABAPENTIN 300 MG/1
300 CAPSULE ORAL 2 TIMES DAILY
Status: DISCONTINUED | OUTPATIENT
Start: 2023-01-01 | End: 2023-09-12 | Stop reason: HOSPADM

## 2023-01-01 RX ORDER — EPINEPHRINE 1 MG/ML
INJECTION, SOLUTION, CONCENTRATE INTRAVENOUS
Status: COMPLETED
Start: 2023-01-01 | End: 2023-01-01

## 2023-01-01 RX ORDER — HYDROCODONE BITARTRATE AND ACETAMINOPHEN 500; 5 MG/1; MG/1
TABLET ORAL CONTINUOUS
Status: DISCONTINUED | OUTPATIENT
Start: 2023-01-01 | End: 2023-01-01

## 2023-01-01 RX ORDER — MAGNESIUM SULFATE HEPTAHYDRATE 40 MG/ML
2 INJECTION, SOLUTION INTRAVENOUS ONCE
Status: COMPLETED | OUTPATIENT
Start: 2023-01-01 | End: 2023-01-01

## 2023-01-01 RX ORDER — ISOSORBIDE MONONITRATE 30 MG/1
30 TABLET, EXTENDED RELEASE ORAL DAILY
Status: ON HOLD | COMMUNITY
End: 2023-01-01 | Stop reason: HOSPADM

## 2023-01-01 RX ORDER — EPINEPHRINE 1 MG/ML
INJECTION, SOLUTION, CONCENTRATE INTRAVENOUS
Status: DISPENSED
Start: 2023-01-01 | End: 2023-09-12

## 2023-01-01 RX ORDER — POTASSIUM CHLORIDE 20 MEQ/1
20 TABLET, EXTENDED RELEASE ORAL ONCE
Status: COMPLETED | OUTPATIENT
Start: 2023-01-01 | End: 2023-01-01

## 2023-01-01 RX ORDER — HYDROCODONE BITARTRATE AND ACETAMINOPHEN 5; 325 MG/1; MG/1
1 TABLET ORAL EVERY 12 HOURS PRN
Qty: 60 TABLET | Refills: 0 | Status: SHIPPED | OUTPATIENT
Start: 2023-01-01 | End: 2023-01-01 | Stop reason: SDUPTHER

## 2023-01-01 RX ORDER — PREDNISONE 5 MG/1
5 TABLET ORAL DAILY
Status: DISCONTINUED | OUTPATIENT
Start: 2023-01-01 | End: 2023-01-01

## 2023-01-01 RX ORDER — ETOMIDATE 2 MG/ML
INJECTION INTRAVENOUS CODE/TRAUMA/SEDATION MEDICATION
Status: COMPLETED | OUTPATIENT
Start: 2023-01-01 | End: 2023-01-01

## 2023-01-01 RX ORDER — DIPHENHYDRAMINE HCL 50 MG
50 CAPSULE ORAL ONCE
Status: COMPLETED | OUTPATIENT
Start: 2023-01-01 | End: 2023-01-01

## 2023-01-01 RX ORDER — MAGNESIUM SULFATE HEPTAHYDRATE 40 MG/ML
INJECTION, SOLUTION INTRAVENOUS
Status: COMPLETED
Start: 2023-01-01 | End: 2023-01-01

## 2023-01-01 RX ORDER — SUCCINYLCHOLINE CHLORIDE 20 MG/ML
INJECTION INTRAMUSCULAR; INTRAVENOUS CODE/TRAUMA/SEDATION MEDICATION
Status: COMPLETED | OUTPATIENT
Start: 2023-01-01 | End: 2023-01-01

## 2023-01-01 RX ORDER — FUROSEMIDE 10 MG/ML
80 INJECTION INTRAMUSCULAR; INTRAVENOUS
Status: DISCONTINUED | OUTPATIENT
Start: 2023-01-01 | End: 2023-01-01

## 2023-01-01 RX ORDER — MUPIROCIN 20 MG/G
OINTMENT TOPICAL 2 TIMES DAILY
Status: DISPENSED | OUTPATIENT
Start: 2023-01-01 | End: 2023-01-01

## 2023-01-01 RX ORDER — LANOLIN ALCOHOL/MO/W.PET/CERES
400 CREAM (GRAM) TOPICAL ONCE
Status: COMPLETED | OUTPATIENT
Start: 2023-01-01 | End: 2023-01-01

## 2023-01-01 RX ORDER — POTASSIUM CHLORIDE 750 MG/1
30 CAPSULE, EXTENDED RELEASE ORAL ONCE
Status: COMPLETED | OUTPATIENT
Start: 2023-01-01 | End: 2023-01-01

## 2023-01-01 RX ORDER — EZETIMIBE 10 MG/1
10 TABLET ORAL DAILY
Status: ON HOLD | COMMUNITY
End: 2023-01-01 | Stop reason: HOSPADM

## 2023-01-01 RX ORDER — SODIUM CHLORIDE 0.9 % (FLUSH) 0.9 %
3 SYRINGE (ML) INJECTION EVERY 6 HOURS PRN
Status: DISCONTINUED | OUTPATIENT
Start: 2023-01-01 | End: 2023-09-12 | Stop reason: HOSPADM

## 2023-01-01 RX ORDER — PHENYLEPHRINE HYDROCHLORIDE 10 MG/ML
INJECTION INTRAVENOUS
Status: DISPENSED
Start: 2023-01-01 | End: 2023-01-01

## 2023-01-01 RX ORDER — MIDAZOLAM HYDROCHLORIDE 1 MG/ML
INJECTION INTRAMUSCULAR; INTRAVENOUS
Status: DISCONTINUED | OUTPATIENT
Start: 2023-01-01 | End: 2023-01-01 | Stop reason: HOSPADM

## 2023-01-01 RX ORDER — INDOMETHACIN 25 MG/1
50 CAPSULE ORAL ONCE
Status: COMPLETED | OUTPATIENT
Start: 2023-01-01 | End: 2023-01-01

## 2023-01-01 RX ORDER — GABAPENTIN 300 MG/1
300 CAPSULE ORAL 2 TIMES DAILY
Status: ON HOLD | COMMUNITY
End: 2023-01-01 | Stop reason: HOSPADM

## 2023-01-01 RX ORDER — POTASSIUM CHLORIDE 20 MEQ/1
40 TABLET, EXTENDED RELEASE ORAL ONCE
Status: COMPLETED | OUTPATIENT
Start: 2023-01-01 | End: 2023-01-01

## 2023-01-01 RX ORDER — PROPOFOL 10 MG/ML
INJECTION, EMULSION INTRAVENOUS
Status: DISPENSED
Start: 2023-01-01 | End: 2023-01-01

## 2023-01-01 RX ORDER — COLCHICINE 0.6 MG/1
0.6 TABLET, FILM COATED ORAL DAILY
Status: DISCONTINUED | OUTPATIENT
Start: 2023-01-01 | End: 2023-01-01

## 2023-01-01 RX ORDER — HYDROCODONE BITARTRATE AND ACETAMINOPHEN 5; 325 MG/1; MG/1
1 TABLET ORAL EVERY 6 HOURS PRN
Qty: 60 TABLET | Refills: 0 | Status: SHIPPED | OUTPATIENT
Start: 2023-01-01 | End: 2023-01-01 | Stop reason: SDUPTHER

## 2023-01-01 RX ORDER — TORSEMIDE 20 MG/1
60 TABLET ORAL 2 TIMES DAILY
Qty: 180 TABLET | Refills: 3 | Status: SHIPPED | OUTPATIENT
Start: 2023-01-01 | End: 2023-01-01 | Stop reason: HOSPADM

## 2023-01-01 RX ORDER — DOBUTAMINE HYDROCHLORIDE 400 MG/100ML
5 INJECTION INTRAVENOUS CONTINUOUS
Status: DISCONTINUED | OUTPATIENT
Start: 2023-01-01 | End: 2023-09-12 | Stop reason: HOSPADM

## 2023-01-01 RX ORDER — ATORVASTATIN CALCIUM 40 MG/1
80 TABLET, FILM COATED ORAL DAILY
Status: DISCONTINUED | OUTPATIENT
Start: 2023-01-01 | End: 2023-01-01

## 2023-01-01 RX ORDER — RISPERIDONE 1 MG/1
3 TABLET ORAL NIGHTLY
Status: DISCONTINUED | OUTPATIENT
Start: 2023-01-01 | End: 2023-01-01

## 2023-01-01 RX ORDER — FERROUS GLUCONATE 324(38)MG
324 TABLET ORAL
Status: ON HOLD | COMMUNITY
End: 2023-01-01 | Stop reason: HOSPADM

## 2023-01-01 RX ORDER — PREDNISONE 5 MG/1
5 TABLET ORAL DAILY
Status: COMPLETED | OUTPATIENT
Start: 2023-01-01 | End: 2023-01-01

## 2023-01-01 RX ORDER — ACETAMINOPHEN 500 MG
5 TABLET ORAL NIGHTLY
Status: ON HOLD | COMMUNITY
End: 2023-01-01 | Stop reason: HOSPADM

## 2023-01-01 RX ORDER — TRIAMCINOLONE ACETONIDE 40 MG/ML
INJECTION, SUSPENSION INTRA-ARTICULAR; INTRAMUSCULAR
Status: DISCONTINUED | OUTPATIENT
Start: 2023-01-01 | End: 2023-01-01 | Stop reason: HOSPADM

## 2023-01-01 RX ORDER — HYDROCODONE BITARTRATE AND ACETAMINOPHEN 5; 325 MG/1; MG/1
1 TABLET ORAL EVERY 12 HOURS PRN
Qty: 60 TABLET | Refills: 0 | Status: CANCELLED | OUTPATIENT
Start: 2023-01-01 | End: 2023-01-01

## 2023-01-01 RX ORDER — MAGNESIUM SULFATE HEPTAHYDRATE 40 MG/ML
2 INJECTION, SOLUTION INTRAVENOUS ONCE
Status: DISCONTINUED | OUTPATIENT
Start: 2023-01-01 | End: 2023-01-01

## 2023-01-01 RX ORDER — FUROSEMIDE 10 MG/ML
80 INJECTION INTRAMUSCULAR; INTRAVENOUS ONCE
Status: COMPLETED | OUTPATIENT
Start: 2023-01-01 | End: 2023-01-01

## 2023-01-01 RX ORDER — MAGNESIUM SULFATE HEPTAHYDRATE 40 MG/ML
2 INJECTION, SOLUTION INTRAVENOUS
Status: DISCONTINUED | OUTPATIENT
Start: 2023-01-01 | End: 2023-01-01

## 2023-01-01 RX ORDER — SUCCINYLCHOLINE CHLORIDE 20 MG/ML
INJECTION INTRAMUSCULAR; INTRAVENOUS
Status: COMPLETED
Start: 2023-01-01 | End: 2023-01-01

## 2023-01-01 RX ORDER — PANTOPRAZOLE SODIUM 40 MG/1
40 TABLET, DELAYED RELEASE ORAL
Status: DISCONTINUED | OUTPATIENT
Start: 2023-01-01 | End: 2023-09-12 | Stop reason: HOSPADM

## 2023-01-01 RX ORDER — SODIUM CHLORIDE 9 MG/ML
500 INJECTION, SOLUTION INTRAVENOUS CONTINUOUS
Status: DISCONTINUED | OUTPATIENT
Start: 2023-01-01 | End: 2023-01-01 | Stop reason: HOSPADM

## 2023-01-01 RX ORDER — FAMOTIDINE 20 MG/1
20 TABLET, FILM COATED ORAL 2 TIMES DAILY
Status: ON HOLD | COMMUNITY
End: 2023-01-01 | Stop reason: HOSPADM

## 2023-01-01 RX ORDER — ROCURONIUM BROMIDE 10 MG/ML
INJECTION, SOLUTION INTRAVENOUS
Status: DISPENSED
Start: 2023-01-01 | End: 2023-01-01

## 2023-01-01 RX ORDER — VALSARTAN 40 MG/1
40 TABLET ORAL 2 TIMES DAILY
Status: ON HOLD | COMMUNITY
End: 2023-01-01 | Stop reason: SDUPTHER

## 2023-01-01 RX ORDER — ONDANSETRON 2 MG/ML
4 INJECTION INTRAMUSCULAR; INTRAVENOUS EVERY 6 HOURS PRN
Status: DISCONTINUED | OUTPATIENT
Start: 2023-01-01 | End: 2023-01-01

## 2023-01-01 RX ORDER — ATORVASTATIN CALCIUM 80 MG/1
80 TABLET, FILM COATED ORAL DAILY
Qty: 30 TABLET | Refills: 2 | Status: CANCELLED | OUTPATIENT
Start: 2023-01-01

## 2023-01-01 RX ORDER — TORSEMIDE 20 MG/1
60 TABLET ORAL 2 TIMES DAILY
Status: DISCONTINUED | OUTPATIENT
Start: 2023-01-01 | End: 2023-01-01

## 2023-01-01 RX ORDER — TORSEMIDE 20 MG/1
60 TABLET ORAL 3 TIMES DAILY
Status: DISCONTINUED | OUTPATIENT
Start: 2023-01-01 | End: 2023-01-01

## 2023-01-01 RX ORDER — AMOXICILLIN 250 MG
1 CAPSULE ORAL DAILY
Status: DISCONTINUED | OUTPATIENT
Start: 2023-01-01 | End: 2023-09-12 | Stop reason: HOSPADM

## 2023-01-01 RX ORDER — ALLOPURINOL 300 MG/1
300 TABLET ORAL DAILY
Status: DISCONTINUED | OUTPATIENT
Start: 2023-01-01 | End: 2023-01-01

## 2023-01-01 RX ORDER — SERTRALINE HYDROCHLORIDE 50 MG/1
50 TABLET, FILM COATED ORAL DAILY
Status: DISCONTINUED | OUTPATIENT
Start: 2023-01-01 | End: 2023-01-01

## 2023-01-01 RX ORDER — ASPIRIN 81 MG/1
81 TABLET ORAL DAILY
Status: DISCONTINUED | OUTPATIENT
Start: 2023-01-01 | End: 2023-09-12 | Stop reason: HOSPADM

## 2023-01-01 RX ORDER — FENTANYL CITRATE-0.9 % NACL/PF 10 MCG/ML
0-250 PLASTIC BAG, INJECTION (ML) INTRAVENOUS CONTINUOUS
Status: DISCONTINUED | OUTPATIENT
Start: 2023-01-01 | End: 2023-09-12 | Stop reason: HOSPADM

## 2023-01-01 RX ORDER — ACETAMINOPHEN 325 MG/1
650 TABLET ORAL EVERY 6 HOURS PRN
Status: DISCONTINUED | OUTPATIENT
Start: 2023-01-01 | End: 2023-09-12 | Stop reason: HOSPADM

## 2023-01-01 RX ORDER — ACETAMINOPHEN 325 MG/1
650 TABLET ORAL EVERY 4 HOURS PRN
Status: DISCONTINUED | OUTPATIENT
Start: 2023-01-01 | End: 2023-09-12 | Stop reason: HOSPADM

## 2023-01-01 RX ORDER — TALC
9 POWDER (GRAM) TOPICAL NIGHTLY PRN
Status: DISCONTINUED | OUTPATIENT
Start: 2023-01-01 | End: 2023-09-12 | Stop reason: HOSPADM

## 2023-01-01 RX ORDER — NOREPINEPHRINE BITARTRATE/D5W 4MG/250ML
0-3 PLASTIC BAG, INJECTION (ML) INTRAVENOUS CONTINUOUS
Status: DISCONTINUED | OUTPATIENT
Start: 2023-01-01 | End: 2023-01-01

## 2023-01-01 RX ORDER — PHENYLEPHRINE HCL IN 0.9% NACL 20MG/250ML
PLASTIC BAG, INJECTION (ML) INTRAVENOUS
Status: COMPLETED
Start: 2023-01-01 | End: 2023-01-01

## 2023-01-01 RX ORDER — DOBUTAMINE HYDROCHLORIDE 400 MG/100ML
20 INJECTION, SOLUTION INTRAVENOUS
Status: COMPLETED | OUTPATIENT
Start: 2023-01-01 | End: 2023-01-01

## 2023-01-01 RX ORDER — PROCHLORPERAZINE EDISYLATE 5 MG/ML
2.5 INJECTION INTRAMUSCULAR; INTRAVENOUS EVERY 4 HOURS PRN
Status: DISCONTINUED | OUTPATIENT
Start: 2023-01-01 | End: 2023-01-01

## 2023-01-01 RX ORDER — DOBUTAMINE HYDROCHLORIDE 400 MG/100ML
2 INJECTION INTRAVENOUS CONTINUOUS
Status: DISCONTINUED | OUTPATIENT
Start: 2023-01-01 | End: 2023-01-01

## 2023-01-01 RX ORDER — ETOMIDATE 2 MG/ML
INJECTION INTRAVENOUS
Status: COMPLETED
Start: 2023-01-01 | End: 2023-01-01

## 2023-01-01 RX ORDER — SPIRONOLACTONE 25 MG/1
25 TABLET ORAL DAILY
Status: DISCONTINUED | OUTPATIENT
Start: 2023-01-01 | End: 2023-01-01

## 2023-01-01 RX ORDER — ATORVASTATIN CALCIUM 80 MG/1
80 TABLET, FILM COATED ORAL DAILY
Qty: 30 TABLET | Refills: 3 | Status: SHIPPED | OUTPATIENT
Start: 2023-01-01 | End: 2023-01-01 | Stop reason: HOSPADM

## 2023-01-01 RX ORDER — MIDAZOLAM HYDROCHLORIDE 2 MG/2ML
INJECTION, SOLUTION INTRAMUSCULAR; INTRAVENOUS
Status: DISCONTINUED | OUTPATIENT
Start: 2023-01-01 | End: 2023-01-01 | Stop reason: HOSPADM

## 2023-01-01 RX ORDER — ONDANSETRON 4 MG/1
4 TABLET, ORALLY DISINTEGRATING ORAL ONCE
Status: COMPLETED | OUTPATIENT
Start: 2023-01-01 | End: 2023-01-01

## 2023-01-01 RX ADMIN — HUMAN ALBUMIN MICROSPHERES AND PERFLUTREN 0.11 MG: 10; .22 INJECTION, SOLUTION INTRAVENOUS at 10:08

## 2023-01-01 RX ADMIN — APIXABAN 5 MG: 5 TABLET, FILM COATED ORAL at 08:09

## 2023-01-01 RX ADMIN — LIDOCAINE HYDROCHLORIDE: 10 SOLUTION INTRAVENOUS at 04:09

## 2023-01-01 RX ADMIN — MICONAZOLE NITRATE: 20 OINTMENT TOPICAL at 09:09

## 2023-01-01 RX ADMIN — FUROSEMIDE 20 MG/HR: 10 INJECTION, SOLUTION INTRAMUSCULAR; INTRAVENOUS at 09:09

## 2023-01-01 RX ADMIN — PANTOPRAZOLE SODIUM 40 MG: 40 TABLET, DELAYED RELEASE ORAL at 06:09

## 2023-01-01 RX ADMIN — FUROSEMIDE 80 MG: 10 INJECTION, SOLUTION INTRAVENOUS at 03:09

## 2023-01-01 RX ADMIN — EPINEPHRINE 2 MCG/KG/MIN: 1 INJECTION INTRAMUSCULAR; INTRAVENOUS; SUBCUTANEOUS at 04:09

## 2023-01-01 RX ADMIN — ACETAMINOPHEN 650 MG: 325 TABLET ORAL at 09:09

## 2023-01-01 RX ADMIN — COLCHICINE 0.6 MG: 0.6 TABLET, FILM COATED ORAL at 10:08

## 2023-01-01 RX ADMIN — MUPIROCIN: 20 OINTMENT TOPICAL at 09:09

## 2023-01-01 RX ADMIN — POTASSIUM CHLORIDE 40 MEQ: 1500 TABLET, EXTENDED RELEASE ORAL at 11:08

## 2023-01-01 RX ADMIN — EPINEPHRINE 2 MCG/KG/MIN: 1 INJECTION INTRAMUSCULAR; INTRAVENOUS; SUBCUTANEOUS at 10:09

## 2023-01-01 RX ADMIN — EPINEPHRINE 2 MCG/KG/MIN: 1 INJECTION INTRAMUSCULAR; INTRAVENOUS; SUBCUTANEOUS at 12:09

## 2023-01-01 RX ADMIN — ACETAMINOPHEN 650 MG: 325 TABLET ORAL at 08:09

## 2023-01-01 RX ADMIN — EPINEPHRINE 2 MCG/KG/MIN: 1 INJECTION INTRAMUSCULAR; INTRAVENOUS; SUBCUTANEOUS at 09:09

## 2023-01-01 RX ADMIN — ASPIRIN 81 MG: 81 TABLET, COATED ORAL at 09:09

## 2023-01-01 RX ADMIN — MAGNESIUM SULFATE HEPTAHYDRATE 2 G: 40 INJECTION, SOLUTION INTRAVENOUS at 02:09

## 2023-01-01 RX ADMIN — APIXABAN 5 MG: 5 TABLET, FILM COATED ORAL at 09:09

## 2023-01-01 RX ADMIN — PHENYLEPHRINE HYDROCHLORIDE 5 MCG/KG/MIN: 10 INJECTION INTRAVENOUS at 02:09

## 2023-01-01 RX ADMIN — ALLOPURINOL 150 MG: 100 TABLET ORAL at 08:09

## 2023-01-01 RX ADMIN — TORSEMIDE 60 MG: 20 TABLET ORAL at 09:09

## 2023-01-01 RX ADMIN — ONDANSETRON 8 MG: 8 TABLET, ORALLY DISINTEGRATING ORAL at 10:08

## 2023-01-01 RX ADMIN — ETOMIDATE 8 MG: 2 INJECTION, SOLUTION INTRAVENOUS at 10:09

## 2023-01-01 RX ADMIN — ACETAMINOPHEN 650 MG: 325 TABLET ORAL at 08:08

## 2023-01-01 RX ADMIN — NOREPINEPHRINE BITARTRATE 3 MCG/KG/MIN: 1 INJECTION, SOLUTION, CONCENTRATE INTRAVENOUS at 09:09

## 2023-01-01 RX ADMIN — ALLOPURINOL 300 MG: 300 TABLET ORAL at 08:08

## 2023-01-01 RX ADMIN — MICONAZOLE NITRATE: 20 OINTMENT TOPICAL at 08:09

## 2023-01-01 RX ADMIN — EPINEPHRINE 0.06 MCG/KG/MIN: 1 INJECTION INTRAMUSCULAR; INTRAVENOUS; SUBCUTANEOUS at 01:09

## 2023-01-01 RX ADMIN — DIPHENHYDRAMINE HYDROCHLORIDE 50 MG: 50 CAPSULE ORAL at 02:08

## 2023-01-01 RX ADMIN — POLYETHYLENE GLYCOL 3350 17 G: 17 POWDER, FOR SOLUTION ORAL at 01:09

## 2023-01-01 RX ADMIN — ONDANSETRON 4 MG: 4 TABLET, ORALLY DISINTEGRATING ORAL at 11:08

## 2023-01-01 RX ADMIN — MICONAZOLE NITRATE: 20 OINTMENT TOPICAL at 01:09

## 2023-01-01 RX ADMIN — SODIUM CHLORIDE: 9 INJECTION, SOLUTION INTRAVENOUS at 10:09

## 2023-01-01 RX ADMIN — MICONAZOLE NITRATE: 20 OINTMENT TOPICAL at 08:08

## 2023-01-01 RX ADMIN — ACETAMINOPHEN 650 MG: 325 TABLET ORAL at 02:08

## 2023-01-01 RX ADMIN — MICONAZOLE NITRATE: 20 OINTMENT TOPICAL at 09:08

## 2023-01-01 RX ADMIN — Medication 0.02 MCG/KG/MIN: at 04:09

## 2023-01-01 RX ADMIN — NOREPINEPHRINE BITARTRATE 3 MCG/KG/MIN: 1 INJECTION, SOLUTION, CONCENTRATE INTRAVENOUS at 04:09

## 2023-01-01 RX ADMIN — PANTOPRAZOLE SODIUM 40 MG: 40 TABLET, DELAYED RELEASE ORAL at 02:09

## 2023-01-01 RX ADMIN — ATORVASTATIN CALCIUM 80 MG: 40 TABLET, FILM COATED ORAL at 08:09

## 2023-01-01 RX ADMIN — SENNOSIDES AND DOCUSATE SODIUM 1 TABLET: 50; 8.6 TABLET ORAL at 08:09

## 2023-01-01 RX ADMIN — PROCHLORPERAZINE EDISYLATE 2.5 MG: 5 INJECTION INTRAMUSCULAR; INTRAVENOUS at 10:09

## 2023-01-01 RX ADMIN — POTASSIUM CHLORIDE 30 MEQ: 10 CAPSULE, COATED, EXTENDED RELEASE ORAL at 09:08

## 2023-01-01 RX ADMIN — ATORVASTATIN CALCIUM 80 MG: 40 TABLET, FILM COATED ORAL at 09:09

## 2023-01-01 RX ADMIN — GABAPENTIN 300 MG: 300 CAPSULE ORAL at 08:08

## 2023-01-01 RX ADMIN — MAGNESIUM SULFATE HEPTAHYDRATE 2 G: 40 INJECTION, SOLUTION INTRAVENOUS at 04:09

## 2023-01-01 RX ADMIN — DOBUTAMINE IN DEXTROSE 5 MCG/KG/MIN: 400 INJECTION, SOLUTION INTRAVENOUS at 06:09

## 2023-01-01 RX ADMIN — ONDANSETRON 4 MG: 2 INJECTION INTRAMUSCULAR; INTRAVENOUS at 03:09

## 2023-01-01 RX ADMIN — NOREPINEPHRINE BITARTRATE 0.14 MCG/KG/MIN: 1 INJECTION, SOLUTION, CONCENTRATE INTRAVENOUS at 09:09

## 2023-01-01 RX ADMIN — EPINEPHRINE 2 MCG/KG/MIN: 1 INJECTION INTRAMUSCULAR; INTRAVENOUS; SUBCUTANEOUS at 03:09

## 2023-01-01 RX ADMIN — DOBUTAMINE IN DEXTROSE 2.5 MCG/KG/MIN: 400 INJECTION, SOLUTION INTRAVENOUS at 02:09

## 2023-01-01 RX ADMIN — NOREPINEPHRINE BITARTRATE 3 MCG/KG/MIN: 1 INJECTION, SOLUTION, CONCENTRATE INTRAVENOUS at 03:09

## 2023-01-01 RX ADMIN — EPINEPHRINE 2 MCG/KG/MIN: 1 INJECTION INTRAMUSCULAR; INTRAVENOUS; SUBCUTANEOUS at 06:09

## 2023-01-01 RX ADMIN — TORSEMIDE 60 MG: 20 TABLET ORAL at 08:09

## 2023-01-01 RX ADMIN — NOREPINEPHRINE BITARTRATE 3 MCG/KG/MIN: 1 INJECTION, SOLUTION, CONCENTRATE INTRAVENOUS at 10:09

## 2023-01-01 RX ADMIN — HEPARIN SODIUM AND DEXTROSE 12 UNITS/KG/HR: 10000; 5 INJECTION INTRAVENOUS at 07:08

## 2023-01-01 RX ADMIN — GABAPENTIN 300 MG: 300 CAPSULE ORAL at 10:09

## 2023-01-01 RX ADMIN — ASPIRIN 81 MG: 81 TABLET, COATED ORAL at 08:09

## 2023-01-01 RX ADMIN — HEPARIN SODIUM AND DEXTROSE 10 UNITS/KG/HR: 10000; 5 INJECTION INTRAVENOUS at 08:08

## 2023-01-01 RX ADMIN — PROCHLORPERAZINE EDISYLATE 2.5 MG: 5 INJECTION INTRAMUSCULAR; INTRAVENOUS at 01:09

## 2023-01-01 RX ADMIN — APIXABAN 5 MG: 5 TABLET, FILM COATED ORAL at 10:09

## 2023-01-01 RX ADMIN — SUCCINYLCHOLINE CHLORIDE 200 MG: 20 INJECTION, SOLUTION INTRAMUSCULAR; INTRAVENOUS; PARENTERAL at 10:09

## 2023-01-01 RX ADMIN — SUCCINYLCHOLINE CHLORIDE 200 MG: 20 INJECTION, SOLUTION INTRAMUSCULAR; INTRAVENOUS at 10:09

## 2023-01-01 RX ADMIN — HYDROMORPHONE HYDROCHLORIDE 0.2 MG: 1 INJECTION, SOLUTION INTRAMUSCULAR; INTRAVENOUS; SUBCUTANEOUS at 06:09

## 2023-01-01 RX ADMIN — MIDAZOLAM 2 MG: 1 INJECTION INTRAMUSCULAR; INTRAVENOUS at 10:09

## 2023-01-01 RX ADMIN — NOREPINEPHRINE BITARTRATE 3 MCG/KG/MIN: 1 INJECTION, SOLUTION, CONCENTRATE INTRAVENOUS at 12:09

## 2023-01-01 RX ADMIN — APIXABAN 5 MG: 5 TABLET, FILM COATED ORAL at 12:09

## 2023-01-01 RX ADMIN — EPINEPHRINE 2 MCG/KG/MIN: 1 INJECTION INTRAMUSCULAR; INTRAVENOUS; SUBCUTANEOUS at 01:09

## 2023-01-01 RX ADMIN — FUROSEMIDE 7.5 MG/HR: 10 INJECTION, SOLUTION INTRAMUSCULAR; INTRAVENOUS at 10:08

## 2023-01-01 RX ADMIN — GABAPENTIN 300 MG: 300 CAPSULE ORAL at 09:09

## 2023-01-01 RX ADMIN — FUROSEMIDE 80 MG: 10 INJECTION, SOLUTION INTRAMUSCULAR; INTRAVENOUS at 07:08

## 2023-01-01 RX ADMIN — VALSARTAN 40 MG: 40 TABLET, FILM COATED ORAL at 08:08

## 2023-01-01 RX ADMIN — PREDNISONE 5 MG: 5 TABLET ORAL at 08:08

## 2023-01-01 RX ADMIN — SODIUM CHLORIDE: 9 INJECTION, SOLUTION INTRAVENOUS at 07:09

## 2023-01-01 RX ADMIN — PROCHLORPERAZINE EDISYLATE 2.5 MG: 5 INJECTION INTRAMUSCULAR; INTRAVENOUS at 08:09

## 2023-01-01 RX ADMIN — SODIUM PHOSPHATE, MONOBASIC, MONOHYDRATE AND SODIUM PHOSPHATE, DIBASIC, ANHYDROUS 30 MMOL: 142; 276 INJECTION, SOLUTION INTRAVENOUS at 01:09

## 2023-01-01 RX ADMIN — AMIODARONE HYDROCHLORIDE 150 MG: 1.5 INJECTION, SOLUTION INTRAVENOUS at 03:09

## 2023-01-01 RX ADMIN — FUROSEMIDE 15 MG/HR: 10 INJECTION, SOLUTION INTRAMUSCULAR; INTRAVENOUS at 08:08

## 2023-01-01 RX ADMIN — TORSEMIDE 60 MG: 20 TABLET ORAL at 09:08

## 2023-01-01 RX ADMIN — NOREPINEPHRINE BITARTRATE 0.02 MCG/KG/MIN: 4 INJECTION, SOLUTION INTRAVENOUS at 08:09

## 2023-01-01 RX ADMIN — MICONAZOLE NITRATE: 20 OINTMENT TOPICAL at 10:09

## 2023-01-01 RX ADMIN — SODIUM PHOSPHATE, MONOBASIC, MONOHYDRATE AND SODIUM PHOSPHATE, DIBASIC, ANHYDROUS 30 MMOL: 142; 276 INJECTION, SOLUTION INTRAVENOUS at 05:09

## 2023-01-01 RX ADMIN — CHLOROTHIAZIDE SODIUM 500 MG: 500 INJECTION, POWDER, LYOPHILIZED, FOR SOLUTION INTRAVENOUS at 11:09

## 2023-01-01 RX ADMIN — AMIODARONE HYDROCHLORIDE 0.5 MG/MIN: 1.8 INJECTION, SOLUTION INTRAVENOUS at 09:09

## 2023-01-01 RX ADMIN — ASPIRIN 81 MG: 81 TABLET, COATED ORAL at 08:08

## 2023-01-01 RX ADMIN — CHLOROTHIAZIDE SODIUM 500 MG: 500 INJECTION, POWDER, LYOPHILIZED, FOR SOLUTION INTRAVENOUS at 12:09

## 2023-01-01 RX ADMIN — IPRATROPIUM BROMIDE AND ALBUTEROL SULFATE 3 ML: .5; 3 SOLUTION RESPIRATORY (INHALATION) at 05:09

## 2023-01-01 RX ADMIN — MAGNESIUM SULFATE 2 G: 2 INJECTION INTRAVENOUS at 02:09

## 2023-01-01 RX ADMIN — ASPIRIN 81 MG: 81 TABLET, COATED ORAL at 10:08

## 2023-01-01 RX ADMIN — AMIODARONE HYDROCHLORIDE 1 MG/MIN: 1.8 INJECTION, SOLUTION INTRAVENOUS at 03:09

## 2023-01-01 RX ADMIN — EPINEPHRINE 2 MCG/KG/MIN: 1 INJECTION INTRAMUSCULAR; INTRAVENOUS; SUBCUTANEOUS at 07:09

## 2023-01-01 RX ADMIN — Medication 2 MCG/KG/MIN: at 10:09

## 2023-01-01 RX ADMIN — ATORVASTATIN CALCIUM 80 MG: 40 TABLET, FILM COATED ORAL at 10:08

## 2023-01-01 RX ADMIN — PHENYLEPHRINE HYDROCHLORIDE 5 MCG/KG/MIN: 10 INJECTION INTRAVENOUS at 01:09

## 2023-01-01 RX ADMIN — ATORVASTATIN CALCIUM 80 MG: 40 TABLET, FILM COATED ORAL at 08:08

## 2023-01-01 RX ADMIN — ONDANSETRON 4 MG: 2 INJECTION INTRAMUSCULAR; INTRAVENOUS at 06:09

## 2023-01-01 RX ADMIN — TORSEMIDE 60 MG: 20 TABLET ORAL at 08:08

## 2023-01-01 RX ADMIN — DOBUTAMINE IN DEXTROSE 2.5 MCG/KG/MIN: 400 INJECTION, SOLUTION INTRAVENOUS at 06:08

## 2023-01-01 RX ADMIN — Medication 50 MCG/HR: at 11:09

## 2023-01-01 RX ADMIN — EPINEPHRINE 2 MCG/KG/MIN: 1 INJECTION INTRAMUSCULAR; INTRAVENOUS; SUBCUTANEOUS at 08:09

## 2023-01-01 RX ADMIN — FUROSEMIDE 80 MG: 10 INJECTION, SOLUTION INTRAVENOUS at 01:09

## 2023-01-01 RX ADMIN — PHENYLEPHRINE HYDROCHLORIDE 5 MCG/KG/MIN: 10 INJECTION INTRAVENOUS at 09:09

## 2023-01-01 RX ADMIN — PHENYLEPHRINE HYDROCHLORIDE 5 MCG/KG/MIN: 10 INJECTION INTRAVENOUS at 05:09

## 2023-01-01 RX ADMIN — Medication 800 MG: at 09:08

## 2023-01-01 RX ADMIN — ALLOPURINOL 150 MG: 100 TABLET ORAL at 09:09

## 2023-01-01 RX ADMIN — FUROSEMIDE 40 MG/HR: 10 INJECTION, SOLUTION INTRAMUSCULAR; INTRAVENOUS at 01:09

## 2023-01-01 RX ADMIN — ACETAMINOPHEN 650 MG: 325 TABLET ORAL at 03:09

## 2023-01-01 RX ADMIN — NOREPINEPHRINE BITARTRATE 0.02 MCG/KG/MIN: 4 INJECTION, SOLUTION INTRAVENOUS at 03:09

## 2023-01-01 RX ADMIN — VALSARTAN 40 MG: 40 TABLET, FILM COATED ORAL at 10:08

## 2023-01-01 RX ADMIN — ALLOPURINOL 300 MG: 300 TABLET ORAL at 10:08

## 2023-01-01 RX ADMIN — TORSEMIDE 60 MG: 20 TABLET ORAL at 02:08

## 2023-01-01 RX ADMIN — EPINEPHRINE 2 MCG/KG/MIN: 1 INJECTION INTRAMUSCULAR; INTRAVENOUS; SUBCUTANEOUS at 05:09

## 2023-01-01 RX ADMIN — VALSARTAN 40 MG: 40 TABLET, FILM COATED ORAL at 09:08

## 2023-01-01 RX ADMIN — SPIRONOLACTONE 25 MG: 25 TABLET, FILM COATED ORAL at 11:08

## 2023-01-01 RX ADMIN — NOREPINEPHRINE BITARTRATE 3 MCG/KG/MIN: 1 INJECTION, SOLUTION, CONCENTRATE INTRAVENOUS at 02:09

## 2023-01-01 RX ADMIN — CHLOROTHIAZIDE SODIUM 1000 MG: 500 INJECTION, POWDER, LYOPHILIZED, FOR SOLUTION INTRAVENOUS at 04:09

## 2023-01-01 RX ADMIN — DOBUTAMINE IN DEXTROSE 5 MCG/KG/MIN: 400 INJECTION, SOLUTION INTRAVENOUS at 07:09

## 2023-01-01 RX ADMIN — GABAPENTIN 300 MG: 300 CAPSULE ORAL at 08:09

## 2023-01-01 RX ADMIN — DOBUTAMINE IN DEXTROSE 5 MCG/KG/MIN: 400 INJECTION, SOLUTION INTRAVENOUS at 01:09

## 2023-01-01 RX ADMIN — COLCHICINE 0.6 MG: 0.6 TABLET, FILM COATED ORAL at 08:08

## 2023-01-01 RX ADMIN — POTASSIUM CHLORIDE 20 MEQ: 1500 TABLET, EXTENDED RELEASE ORAL at 04:09

## 2023-01-01 RX ADMIN — NOREPINEPHRINE BITARTRATE 3 MCG/KG/MIN: 1 INJECTION, SOLUTION, CONCENTRATE INTRAVENOUS at 06:09

## 2023-01-01 RX ADMIN — ONDANSETRON 8 MG: 8 TABLET, ORALLY DISINTEGRATING ORAL at 04:08

## 2023-01-01 RX ADMIN — NOREPINEPHRINE BITARTRATE 3 MCG/KG/MIN: 1 INJECTION, SOLUTION, CONCENTRATE INTRAVENOUS at 11:09

## 2023-01-01 RX ADMIN — SODIUM BICARBONATE 50 MEQ: 84 INJECTION, SOLUTION INTRAVENOUS at 04:09

## 2023-01-01 RX ADMIN — POLYETHYLENE GLYCOL 3350 17 G: 17 POWDER, FOR SOLUTION ORAL at 06:09

## 2023-01-01 RX ADMIN — LIDOCAINE HYDROCHLORIDE 10 MG: 10 INJECTION, SOLUTION EPIDURAL; INFILTRATION; INTRACAUDAL at 09:09

## 2023-01-01 RX ADMIN — TICAGRELOR 180 MG: 90 TABLET ORAL at 02:08

## 2023-01-01 RX ADMIN — SODIUM CHLORIDE: 9 INJECTION, SOLUTION INTRAVENOUS at 11:09

## 2023-01-01 RX ADMIN — PHENYLEPHRINE HYDROCHLORIDE 5 MCG/KG/MIN: 10 INJECTION INTRAVENOUS at 11:09

## 2023-01-01 RX ADMIN — ACETAMINOPHEN 650 MG: 325 TABLET ORAL at 11:08

## 2023-01-01 RX ADMIN — POTASSIUM CHLORIDE 40 MEQ: 1500 TABLET, EXTENDED RELEASE ORAL at 02:09

## 2023-01-01 RX ADMIN — DOBUTAMINE IN DEXTROSE 5 MCG/KG/MIN: 400 INJECTION, SOLUTION INTRAVENOUS at 09:09

## 2023-01-01 RX ADMIN — ONDANSETRON 8 MG: 2 INJECTION INTRAMUSCULAR; INTRAVENOUS at 05:08

## 2023-01-01 RX ADMIN — EPINEPHRINE 2 MCG/KG/MIN: 1 INJECTION INTRAMUSCULAR; INTRAVENOUS; SUBCUTANEOUS at 11:09

## 2023-01-01 RX ADMIN — Medication 250 MCG/HR: at 02:09

## 2023-01-01 RX ADMIN — VASOPRESSIN 0.04 UNITS/MIN: 20 INJECTION INTRAVENOUS at 03:09

## 2023-01-01 RX ADMIN — POTASSIUM CHLORIDE 40 MEQ: 1500 TABLET, EXTENDED RELEASE ORAL at 12:08

## 2023-01-01 RX ADMIN — SPIRONOLACTONE 25 MG: 25 TABLET, FILM COATED ORAL at 09:09

## 2023-01-01 RX ADMIN — POLYETHYLENE GLYCOL 3350 17 G: 17 POWDER, FOR SOLUTION ORAL at 04:09

## 2023-01-01 RX ADMIN — SENNOSIDES AND DOCUSATE SODIUM 1 TABLET: 50; 8.6 TABLET ORAL at 09:09

## 2023-01-01 RX ADMIN — SODIUM BICARBONATE 50 MEQ: 84 INJECTION, SOLUTION INTRAVENOUS at 01:09

## 2023-01-01 RX ADMIN — AMIODARONE HYDROCHLORIDE 0.5 MG/MIN: 1.8 INJECTION, SOLUTION INTRAVENOUS at 08:09

## 2023-01-01 RX ADMIN — FUROSEMIDE 80 MG: 10 INJECTION, SOLUTION INTRAMUSCULAR; INTRAVENOUS at 03:09

## 2023-01-01 RX ADMIN — PHENYLEPHRINE HYDROCHLORIDE 5 MCG/KG/MIN: 10 INJECTION INTRAVENOUS at 10:09

## 2023-01-01 RX ADMIN — VASOPRESSIN 0.04 UNITS/MIN: 20 INJECTION INTRAVENOUS at 12:09

## 2023-01-01 RX ADMIN — NOREPINEPHRINE BITARTRATE 3 MCG/KG/MIN: 1 INJECTION, SOLUTION, CONCENTRATE INTRAVENOUS at 01:09

## 2023-01-01 RX ADMIN — Medication 400 MG: at 06:09

## 2023-01-01 RX ADMIN — SODIUM CHLORIDE: 9 INJECTION, SOLUTION INTRAVENOUS at 08:09

## 2023-01-01 RX ADMIN — FUROSEMIDE 20 MG/HR: 10 INJECTION, SOLUTION INTRAMUSCULAR; INTRAVENOUS at 03:09

## 2023-01-01 RX ADMIN — POTASSIUM CHLORIDE 40 MEQ: 1500 TABLET, EXTENDED RELEASE ORAL at 06:09

## 2023-01-01 RX ADMIN — ACETAMINOPHEN 650 MG: 325 TABLET ORAL at 10:09

## 2023-01-01 RX ADMIN — NOREPINEPHRINE BITARTRATE 3 MCG/KG/MIN: 1 INJECTION, SOLUTION, CONCENTRATE INTRAVENOUS at 08:09

## 2023-01-01 RX ADMIN — POLYETHYLENE GLYCOL 3350 17 G: 17 POWDER, FOR SOLUTION ORAL at 08:09

## 2023-01-01 RX ADMIN — Medication 0.06 MCG/KG/MIN: at 06:09

## 2023-01-01 RX ADMIN — Medication 9 MG: at 09:09

## 2023-01-01 RX ADMIN — ONDANSETRON 4 MG: 2 INJECTION INTRAMUSCULAR; INTRAVENOUS at 04:09

## 2023-01-01 RX ADMIN — VASOPRESSIN 0.04 UNITS/MIN: 20 INJECTION INTRAVENOUS at 09:09

## 2023-01-01 RX ADMIN — Medication 250 MCG/HR: at 08:09

## 2023-01-01 RX ADMIN — MIDAZOLAM HYDROCHLORIDE 4 MG: 2 INJECTION, SOLUTION INTRAMUSCULAR; INTRAVENOUS at 10:09

## 2023-01-01 RX ADMIN — EPINEPHRINE 2 MG: 1 INJECTION, SOLUTION, CONCENTRATE INTRAVENOUS at 10:09

## 2023-01-01 RX ADMIN — DOBUTAMINE 20 MCG/KG/MIN: 12.5 INJECTION, SOLUTION, CONCENTRATE INTRAVENOUS at 09:08

## 2023-01-01 RX ADMIN — APIXABAN 5 MG: 5 TABLET, FILM COATED ORAL at 05:08

## 2023-01-01 RX ADMIN — VASOPRESSIN 0.01 UNITS/MIN: 20 INJECTION INTRAVENOUS at 09:09

## 2023-01-01 RX ADMIN — DOBUTAMINE IN DEXTROSE 2.5 MCG/KG/MIN: 400 INJECTION, SOLUTION INTRAVENOUS at 06:09

## 2023-01-01 RX ADMIN — Medication 225 MCG/HR: at 11:09

## 2023-01-01 RX ADMIN — HEPARIN SODIUM AND DEXTROSE 12 UNITS/KG/HR: 10000; 5 INJECTION INTRAVENOUS at 06:09

## 2023-01-01 RX ADMIN — EPINEPHRINE 1.99 MCG/KG/MIN: 1 INJECTION INTRAMUSCULAR; INTRAVENOUS; SUBCUTANEOUS at 01:09

## 2023-01-01 RX ADMIN — ETOMIDATE 40 MG: 2 INJECTION INTRAVENOUS at 10:09

## 2023-01-01 RX ADMIN — PREDNISONE 5 MG: 5 TABLET ORAL at 10:08

## 2023-01-01 RX ADMIN — VALSARTAN 40 MG: 40 TABLET, FILM COATED ORAL at 09:09

## 2023-01-01 RX ADMIN — SENNOSIDES AND DOCUSATE SODIUM 1 TABLET: 50; 8.6 TABLET ORAL at 01:09

## 2023-01-01 RX ADMIN — Medication 250 MCG/HR: at 06:09

## 2023-01-01 RX ADMIN — Medication 400 MG: at 04:09

## 2023-01-01 RX ADMIN — SODIUM BICARBONATE 50 MEQ: 84 INJECTION, SOLUTION INTRAVENOUS at 02:09

## 2023-01-01 RX ADMIN — EPINEPHRINE 2 MCG/KG/MIN: 1 INJECTION INTRAMUSCULAR; INTRAVENOUS; SUBCUTANEOUS at 02:09

## 2023-01-01 RX ADMIN — DOBUTAMINE IN DEXTROSE 5 MCG/KG/MIN: 400 INJECTION, SOLUTION INTRAVENOUS at 07:08

## 2023-01-01 RX ADMIN — SODIUM CHLORIDE: 0.9 INJECTION, SOLUTION INTRAVENOUS at 06:08

## 2023-01-03 NOTE — TELEPHONE ENCOUNTER
----- Message from Rosamaria Valles sent at 1/3/2023 10:55 AM CST -----  Regarding: Refill Request  Can the clinic reply in [facility]:NO          Please refill the medication(s) listed below. Please call the patient when the prescription(s) is ready for  at this phone number  [phone]        Medication #1  HYDROcodone-acetaminophen (NORCO) 5-325 mg per tablet             Medication #2               Preferred Pharmacy:  [id number]  [place] - [address] AT [facility]

## 2023-01-03 NOTE — TELEPHONE ENCOUNTER
Patient requesting refill on Hydrocodone 5-325mg  Last office visit 12/12/22   shows last refill on 11/28/22  Patient does have a pain contract on file with [facility] department  Patient last UDS 12/12/22 was consistent with current therapy     NOROYXCODONE  Not Detected  Not Detected  Not Detected  Not Detected   Not Detected  Not Detected    OXYMORPHONE  Not Detected  Not Detected  Not Detected  Not Detected   Not Detected  Not Detected    NOROXYMORPHONE  Not Detected  Not Detected  Not Detected  Not Detected   Not Detected  Not Detected    HYDROCODONE  Present  Present  Not Detected  Present   Present  Present    NORHYDROCODONE  Present  Present  Not Detected  Present   Not Detected  Present    HYDROMORPHONE  Present  Present  Not Detected  Not Detected   Not Detected  Not Detected    BUPRENORPHINE  Not Detected  Not Detected  Not Detected  Not Detected   Not Detected  Not Detected    NORUBPRENORPHINE  Not Detected  Not Detected  Not Detected  Not Detected   Not Detected  Not Detected    FENTANYL  Not Detected  Not Detected  Not Detected  Not Detected   Not Detected  Not Detected    NORFENTANYL  Not Detected  Not Detected  Not Detected  Not Detected   Not Detected  Not Detected    MEPERIDINE METABOLITE  Not Detected  Not Detected  Not Detected  Not Detected   Not Detected  Not Detected    TAPENTADOL  Not Detected  Not Detected  Not Detected  Not Detected   Not Detected  Not Detected    TAPENTADOL-O-SULF  Not Detected  Not Detected  Not Detected  Not Detected   Not Detected  Not Detected    METHADONE  Not Detected  Not Detected  Not Detected  Not Detected   Not Detected  Not Detected    TRAMADOL  Not Detected  Not Detected  Present  Not Detected   Not Detected  Present    AMPHETAMINE  Not Detected  Not Detected  Not Detected  Not Detected   Not Detected  Not Detected    METHAMPHETAMINE  Not Detected  Not Detected  Not Detected  Not Detected   Not Detected  Not Detected    MDMA- ECSTASY   Not Detected  Not Detected  Not Detected  Not Detected   Not Detected  Not Detected    MDA  Not Detected  Not Detected  Not Detected  Not Detected   Not Detected  Not Detected    MDEA- Audelia  Not Detected  Not Detected  Not Detected  Not Detected   Not Detected  Not Detected    METHYLPHENIDATE  Not Detected  Not Detected  Not Detected  Not Detected   Not Detected  Not Detected    PHENTERMINE  Not Detected  Not Detected  Not Detected  Not Detected   Not Detected  Not Detected    BENZOYLECGONINE  Not Detected  Not Detected  Not Detected  Not Detected   Not Detected  Not Detected    ALPRAZOLAM  Not Detected  Not Detected  Not Detected  Not Detected   Not Detected  Not Detected    ALPHA-OH-ALPRAZOLAM  Not Detected  Not Detected  Not Detected  Not Detected   Not Detected  Not Detected    CLONAZEPAM  Not Detected  Not Detected  Not Detected  Not Detected   Not Detected  Not Detected    7-AMINOCLONAZEPAM  Not Detected  Not Detected  Not Detected  Not Detected   Not Detected  Not Detected    DIAZEPAM  Not Detected  Not Detected  Not Detected  Not Detected   Not Detected  Not Detected    NORDIAZEPAM  Not Detected  Not Detected  Not Detected  Not Detected   Not Detected  Not Detected    OXAZEPAM  Not Detected  Not Detected  Not Detected  Not Detected   Not Detected  Not Detected    TEMAZEPAM  Not Detected  Not Detected  Not Detected  Not Detected   Not Detected  Not Detected    Lorazepam  Not Detected  Not Detected  Not Detected  Not Detected   Not Detected  Not Detected    MIDAZOLAM  Not Detected  Not Detected  Not Detected  Not Detected   Not Detected  Not Detected    ZOLPIDEM  Not Detected  Not Detected  Not Detected  Not Detected   Not Detected  Not Detected    BARBITURATES  Not Detected  Not Detected  Not Detected  Not Detected   Not Detected  Not Detected    Creatinine, Urine 20.0 - 400.0 mg/dL 99.5  115.0  76.4  177.0  160.0 R, CM  151.5  103.3    ETHYL GLUCURONIDE  Not Detected  Not Detected  Not Detected  Not Detected    Present  Not Detected    MARIJUANA METABOLITE  Not Detected  Not Detected  Not Detected  Not Detected   Not Detected  Not Detected    PCP  Not Detected  Not Detected  Not Detected  Not Detected   Not Detected  Not Detected    CARISOPRODOL  Not Detected  Not Detected CM  Not Detected CM  Not Detected CM   Not Detected CM  Not Detected CM    Comment: The carisoprodol immunoassay has cross-reactivity to   carisoprodol and meprobamate.    Naloxone  Not Detected  Not Detected  Not Detected        Gabapentin  Present  Present  Present        Pregabalin  Not Detected  Not Detected  Not Detected        Alpha-OH-Midazolam  Not Detected  Not Detected  Not Detected        Zolpidem Metabolite  Not Detected  Not Detected  Not Detected

## 2023-01-05 NOTE — DISCHARGE SUMMARY
Discharge Note  Short Stay      SUMMARY     Admit Date: 1/5/2023    Attending Physician: Austin Parsons      Discharge Physician: Austin Parsons      Discharge Date: 1/5/2023 2:06 PM    Procedure(s) (LRB):  RADIOFREQUENCY ABLATION RIGHT GENICULAR NERVES COOLED clear to hold Eliquis 3 days (Right)    Final Diagnosis: Primary osteoarthritis of knee, unspecified laterality [M17.10]    Disposition: Home or self care    Patient Instructions:   Current Discharge Medication List        CONTINUE these medications which have NOT CHANGED    Details   allopurinol (ZYLOPRIM) 300 MG tablet Take 300 mg by mouth once daily.      apixaban (ELIQUIS) 5 mg Tab Take 5 mg by mouth 2 (two) times daily.      aspirin (ECOTRIN) 81 MG EC tablet Take 81 mg by mouth.      atorvastatin (LIPITOR) 80 MG tablet Take 80 mg by mouth once daily.      cholecalciferol, vitamin D3, 3,000 unit Tab Take 1 tablet by mouth.      coenzyme Q10 10 mg capsule Take 200 mg by mouth once daily.      colchicine (COLCRYS) 0.6 mg tablet TK 1 T PO BID PRF GOUT      febuxostat (ULORIC) 40 mg Tab Take 1 tablet (40 mg total) by mouth once daily.  Qty: 30 tablet, Refills: 6    Comments: Underlying enlarged liver, high dose allopurinol not advisable. Please dispense Uloric to control hyperuricemia.      glipiZIDE (GLUCOTROL) 5 MG tablet Take 5 mg by mouth 2 (two) times daily before meals.      HYDROcodone-acetaminophen (NORCO) 5-325 mg per tablet Take 1 tablet by mouth every 6 (six) hours as needed for Pain.  Qty: 60 tablet, Refills: 0    Comments: Quantity prescribed more than 7 day supply? Yes, quantity medically necessary  Associated Diagnoses: Dorsalgia, unspecified; Primary osteoarthritis of knee, unspecified laterality; Lumbar spondylosis      indomethacin (INDOCIN) 50 MG capsule Take 1 capsule (50 mg total) by mouth 3 (three) times daily with meals.  Qty: 30 capsule, Refills: 0      melatonin 5 mg Cap Take by mouth.      metoprolol succinate (TOPROL-XL) 50 MG 24  hr tablet Take 1 tablet (50 mg total) by mouth once daily.  Qty: 30 tablet, Refills: 1    Comments: Please provide alternative medication covered by insurance if need be.      potassium chloride SA (K-DUR,KLOR-CON) 20 MEQ tablet Take 1 tablet (20 mEq total) by mouth 2 (two) times daily.  Qty: 180 tablet, Refills: 3      risperiDONE (RISPERDAL) 3 MG Tab Take by mouth.      sacubitriL-valsartan (ENTRESTO) 49-51 mg per tablet Take 1 tablet by mouth 2 (two) times daily.  Qty: 60 tablet, Refills: 3    Comments: ERWIN SALDAÑA MD, MPH      sertraline (ZOLOFT) 50 MG tablet Take 50 mg by mouth once daily. TAKE THREE TABLETS BY MOUTH EVERY MORNING TO IMPROVE MOOD      sildenafiL (VIAGRA) 100 MG tablet Take 1 tablet (100 mg total) by mouth As instructed for Erectile Dysfunction. Please take 1 tablet one hour prior to intercourse on empty stomach  Qty: 30 tablet, Refills: 1    Associated Diagnoses: Erectile dysfunction due to arterial insufficiency      spironolactone (ALDACTONE) 25 MG tablet Take 1 tablet (25 mg total) by mouth once daily.  Qty: 90 tablet, Refills: 3    Comments: .      tadalafiL (CIALIS) 20 MG Tab Take 1 pill as needed prior to sex  Qty: 20 tablet, Refills: 1    Associated Diagnoses: Erectile dysfunction, unspecified erectile dysfunction type      torsemide (DEMADEX) 20 MG Tab Take 2 tablets (40 mg total) by mouth 2 (two) times daily.  Qty: 360 tablet, Refills: 3    Comments: .                 Discharge Diagnosis: Primary osteoarthritis of knee, unspecified laterality [M17.10]  Condition on Discharge: Stable with no complications to procedure   Diet on Discharge: Same as before.  Activity: as per instruction sheet.  Discharge to: Home with a responsible adult.  Follow up: 2-4 weeks

## 2023-01-05 NOTE — DISCHARGE INSTRUCTIONS

## 2023-01-05 NOTE — OP NOTE
Therapeutic Genicular Cooled Nerve Radiofrequency Ablation under Fluoroscopy     The procedure, risks, benefits, and options were discussed with the patient. There are no contraindications to the procedure. The patent expressed understanding and agreed to the procedure. Informed written consent was obtained prior to the start of the procedure and can be found in the patient's chart.        PATIENT NAME: Mr. Ishmael Thrasher   MRN: [id number]     DATE OF PROCEDURE: 01/05/2023     PROCEDURE: Therapeutic Right Genicular Cooled Nerve Radiofrequency Ablation under Fluoroscopy    PRE-OP DIAGNOSIS: Primary osteoarthritis of knee, unspecified laterality [M17.10]    POST-OP DIAGNOSIS: Primary osteoarthritis of knee, unspecified laterality [M17.10]    PHYSICIAN: Austin Parsons MD    ASSISTANTS: Junito Romero MD    MEDICATIONS INJECTED:  Preservative-free Kenalog 40mg with 9cc of Bupivicaine 0.25%    LOCAL ANESTHETIC INJECTED:   Xylocaine 2%    SEDATION: Versed 2mg and Fentanyl 100mcg                                                                                                                                                                                     Conscious sedation ordered by M.D. Patient re-evaluation prior to administration of conscious sedation. No changes noted in patient's status from initial evaluation. The patient's vital signs were monitored by RN and patient remained hemodynamically stable throughout the procedure.    Event Time In   Sedation Start 1351   Sedation End 1406       ESTIMATED BLOOD LOSS:  None    COMPLICATIONS:  None     INTERVAL HISTORY: Patient has clinical findings of chronic knee pain. Patients has completed 2 previous diagnostic genicular nerve blocks with at least 80% relief for the expected duration of the local anesthetic utilized.     TECHNIQUE: Time-out was performed to identify the patient and procedure to be performed. With the patient laying in a supine position, the surgical  area was prepped and draped in the usual sterile fashion using ChloraPrep and fenestrated drape. Three target sites including the superior lateral genicular nerve where the lateral femoral shaft meets the epicondyle, the superior medial genicular nerve where the medial femoral shaft meets the epicondyle, and the inferior medial genicular nerve where the medial tibial shaft meets the epicondyle, were determined under fluoroscopic guidance. Skin anesthesia was achieved by injecting Lidocaine 2% over the injection sites. A 17 gauge, 50mm, 10mm active tip needle was then advanced under fluoroscopy in the AP and lateral views into the positions of the geniculate nerves at these levels. This was followed by motor testing at each of the nerves to ensure that there was no dorsiflexion of the foot. After negative aspiration for blood was confirmed, 1 mL of the lidocaine 2% listed above was injected slowly at each site. This was followed by cooled thermal lesioning at 80 degrees celsius for 150 seconds at each site. That was followed by slowly injecting 1 mL of the medication mixture listed above at each site. The needles were removed and bleeding was nil. A sterile dressing was applied. No specimens collected. The patient tolerated the procedure well and did not have any procedure related motor deficit at the conclusion of the procedure.    The patient was monitored after the procedure in the recovery area. They were given post-procedure and discharge instructions to follow at home. The patient was discharged in a stable condition.    Austin Parsons MD

## 2023-01-09 NOTE — TELEPHONE ENCOUNTER
----- Message from Lata Jarrett sent at 1/9/2023 11:09 AM CST -----  Contact: ALLIE TOLEDO [[id number]]  Type: Call Back      Who called: PARIS RICKMARIBEL [[id number]]      What is the request in detail: Patient is requesting a call back. Pt states that he needs [facility] to [facility] had [facility] on [facility]/05. He would like to know if the letter can be faxed to [phone].   Please advise.     Can the clinic reply by [facility]? No      Would the patient rather a call back or a response via [facility]? Call back       Best call back number: [phone] ([place])       Additional Information:

## 2023-01-18 NOTE — TELEPHONE ENCOUNTER
This message is for patient in regards to his/her appointment 01/19/23 at 1:20p   With  Mike Daley NP.      For the safety of all patients and staff members. We are requesting during this visit that all patients and visitors to wear required face mask at all times here at [facility].     If you have any questions or concerns please contact [phone]       Staff left pt a voicemail reminding of his appt

## 2023-01-19 NOTE — PROGRESS NOTES
Chronic patient Established Note (Follow up visit)      SRINIVAS Thrasher is a 70 y.o. male with a history of CAD, CHF with EF 10% with AICD, T2DM, atrial fibrillation on eliquis who presents today with chronic low back pain. This pain started in 1971 as a result of and injury in the .  He describes a constant, right sided pain that starts in his low back and radiates down the posterior aspect of his right leg to the bottom of his foot.  This is associated with a numbness, tingling sensation.  The pain occurs when he is standing still for longer than 5 minutes.  The pain does not occur when walking unless he is standing first.  He denies heaviness and weakness.  This pain is described in detail below.     Aggravating factors: Standing in place for 5 minutes     Mitigating factors: Walking, medication, rest     Previously seeing: Dr. Davide Wang ([place])     Interval History (7/2/2020):  The patient returns to clinic today for follow up of back pain. He continues to report low back pain that radiates down the posterior aspect of his right leg to the bottom of his foot. He denies any left leg pain. His pain is worse with prolonged standing. He has had injections in the past without relief. He is currently taking Tramadol with limited relief. He denies any other health changes. His pain today is 10/10.     Interval History 8/6/2020:   Ishmael Thrasher presents to the clinic for a follow-up appointment for bilateral leg pain. Since the last visit, Ishmael Thrasher states the pain has been worsening. Current pain intensity is 10/10.     Interval History 9/28/2020:  The patient returns to clinic today for follow up of low back and leg pain. He is s/p right L4/5 and L5/S1 TF PÉRZE on 9/11/2020. He reports no significant relief of his back and leg pain. He continues to report low back pain that radiates down the posterior aspect of his right leg to under his foot. He denies any left leg pain. His pain is  worse with prolonged standing and walking. He is currently taking Norco with limited benefit. He reports that this decreases his pain but does not last. He denies any other health changes. His pain today is 10/10.     Interval History 11/16/2020:  The patient returns to clinic today for follow up of low back pain. He is s/p right L5/S1 and S1 TF PÉREZ on 10/23/2020. He reports 60% relief for about a week. He continues to report low back pain that radiates into the posterior aspect of his right leg to his foot. He denies any left leg pain. His pain is worse with standing and walking. He also reports difficulty sleeping due to pain. He is frustrated with his continued pain. He continues to take Norco but reports that this does not last. He denies any other health changes. His pain today is 10/10.     Interval History 12/21/2020:  Ishmael Thrasher presents to the clinic for a  1 month follow-up appointment. Since the last visit, Ishmael Thrasher states the pain has been stable. Current pain intensity is 10/10.    Interval History 3/31/2021  Patient complains of bilateral knee pain, usually uses walker for assistance but was unable to bring with them. Patient is s/p right cooled RFA on 2/26/21 with 60% relief of symptoms that has since returned. He reports he was admitted in the hospital for acute decompensated heart failure following receiving the second dose of the COVID-19 Moderna vaccine. He states he was admitted to [facility] twice and then discharged and re-admitted [facility] (discharged on 3/20/21) the pain started on 5 days ago while there and he reported it while admitted. He reports his pain level is 10/10. Its effecting his ability ambulate as he has not been able to use his legs since being in the hospital. He reports the pain has been this level the last 5 days since worsening. He is taking the Hydrocodone 5/325 TID, which is not helping. Also taking 325mg ASA with moderate relief  "- his heart doctor told him not to continue taking ASA at this dose. During hospital admission, patient complained of left elbow and left knee pain that was attributed to the gouty arthritis that has since resolved. Left knee pain now feels like his "usual" knee pain. Patient states he was able to move around on a daily basis with PT while admitted in the hospital. Feel more depressed and less motivated since receiving 2/16/21. Patient also complains of bilateral back pain radiating down his right leg, which is unchanged from his baseline chronic pain.    Interval History 7/27/2021:  The patient returns to clinic today for follow up of low back and knee pain. He continues to report right knee pain. He also reports increased low back and right hip pain. He reports intermittent radiating pain into his right lateral thigh. His pain is worse with standing and walking. He denies any left leg pain. He continues to perform a home exercise routine. He continues to take Norco as needed. He denies any other health changes. His pain today is 10/10.    Interval History 10/27/2021:  The patient returns to clinic today for follow up of low back and knee pain. He is s/p right SI joint injection on 8/13/2021. He reports 40% relief of his right hip and buttock pain. He continues to reports low back pain that radiates into the posterior aspect of his right leg to his calf. He denies any left leg pain. He reports increased right knee pain. He did have a recent fall which triggered this pain. His pain is worse with standing and walking. He continues to take Norco, although this provides limited relief. He would like to go back to Tramadol. He denies any other health changes. His pain today is 10/10.    Interval History 12/2/2021:  Mr Thrasher presents for routine medication follow up. He stats prior R knee genicular RFA did well. He continues to have diffuse pain complaints one which is R lateral hip pain and inability to lay on that " site due to focal pain. He states tramadol has not benefit when compared to Norco and would like to rotate back to Norco 5/325mg BID. Denies new ban of pain or neurological changes.     Interval History 1/10/2022:  Mr Thrasher presents for follow up of lower back pain and radicular pain down posterior leg going to bottom of calf. He is s/p focal R GTB TTP and states approx 2 weeks benefit. He states rotation from Tramadol to Norco 5/325mg with some mild benefit.     Interval History 6/17/2022:  Mr Thrasher presents for delayed FU. Over interval he has been stable. He inadverntely was Rxed Norco 7.5mg q6hrs. He also has been started on medical mariajuana and received Norco 10mg for gout while visiting ER. We discussed all these issues and Pt voiced understanding. States last Dosing of [place] was this a.m. Denies new areas of pain, denies any neurological changes. Denies SE of medications.     Interval History 9/19/2022:  Mr Thrasher presents for follow up of chronic pain. He has had worsening pain due to being out of medication as FU was just past 90 days. He is having newer return of L elbow pain. Denies further neurological changes. Denies worsening of chronic pain to R hip and R knee. Continues to take Norco 5/325mg BID without SE.         Interval History  12/12/2022  Patient continues to have bilateral knee pain R>L. He did get relief from genicular RFA in past for 4 months. Back pain is constant and 6-7/10. Back pain is right sided, sharp in nature and radiates down his leg. Discussed repeat genicular RFA then possible MBB. Patient agreeable to this plan. Will get L spine CT as patient is unable to get MRI 2/2 AICD.    Interval History 1/19/2023:  Mr Thrasher presents for follow up of chronic knee pain and s/p R genicular RFA with significant improvement of symptoms. He is ready to to address left knee pain. He has had genicular block and found to be diagnostic but has always pursued R knee RFA as this was main pain  generator. He continues to take Norco 5/325mg BID with benefit and denies SE of medications. Denies newer areas of pain or neurological changes.       Pain Disability Index Review:  Last 3 PDI Scores 1/19/2023 12/12/2022 9/19/2022   Pain Disability Index (PDI) 40 34 50       Pain Medications:  Norco 5mg  ASA 325mg  Topical lidocaine      Opioid Contract: yes     report:  Reviewed and consistent with medication use as prescribed.    Pain Procedures:   9/11/2020- Right L4/5 and L5/S1 TF PÉREZ  10/23/2020- Right L5/S1 and S1 TF PÉREZ  2/5/2021- Bilateral genicular nerve block  2/26/2021- Right genicular cooled RFA  11/12/2021 Right knee genicular RFA  12/17/2021: Right Greater Trochanteric Bursa Injection under Fluoroscopic Guidance  1/5/2022 Right knee genicular RFA     Physical Therapy/Home Exercise: Set up from PT since discharge from hospital, but has not arranged.    Imaging:   Narrative & Impression       EXAMINATION:  XR LUMBAR SPINE 5 VIEW WITH FLEX AND EXT     CLINICAL HISTORY:  Back pain or radiculopathy, > 6 wks;  Radiculopathy, lumbar region     TECHNIQUE:  AP, lateral, oblique views of the lumbar spine with spot view of the lumbosacral region and lateral views in flexion and extension.     COMPARISON:  None     FINDINGS:  There is no collapse or malalignment in the lumbar spine.  There is no significant change in alignment between flexion and extension.     There is reduced of the intervertebral disc spaces at L4-L5 and L5-S1 with disc vacuum phenomena and anterior osteophyte formation.  There are mild neural foraminal stenosis at these levels.     There is mild bilateral neural foraminal stenosis.     There are extensive vascular calcifications in the abdominal aorta.  The soft tissues are otherwise unremarkable.     Impression:     1. Moderate to severe spondylosis and neural foraminal stenosis at L4-L5 and L5-S1.  2. Straightening of lumbar lordosis  3. No changes in alignment in flexion and extension      Electronically signed by resident: Marko Lamas  Date:                                            07/07/2020  Time:                                           15:05     Electronically signed by: Roly Qiu  Date:                                            07/07/2020  Time:                                           15:21          Narrative & Impression       EXAMINATION:  CT LUMBAR SPINE WITHOUT CONTRAST     CLINICAL HISTORY:  Back pain or radiculopathy, > 6 wks;.     COMPARISON:  None.     FINDINGS:  Decrease height of vertebral bodies L4-L5 predominant at L5.  Degenerative changes in the endplates with decrease height intervertebral space L4-L5.  Vacuum phenomenon at L5-S1 indicating remarkable decrease.  Facet hypertrophy and degenerative changes in facet joints L3-L4, bilaterally.     Prominent aortic calcifications.     Left pleural effusion and atelectasis     Impression:     Prominent degenerative changes in the lumbar spine at L4, L5 and S1     Decrease height of intervertebral space L4-L5 with degenerative process and decrease height of vertebral bodies L4-L5 predominant at L5     Remarkable disc disease at L5-S1     Atherosclerosis     Left pleural effusion and atelectasis.  Please see CT of the chest        Electronically signed by: Roly Qiu  Date:                                            07/08/2020  Time:                                           16:00          Allergies:   Review of patient's allergies indicates:   Allergen Reactions    Enalapril maleate Swelling and Edema     Other reaction(s): Swelling    Vasotec [enalaprilat] Swelling     Neck swelling    Zoloft [sertraline] Other (See Comments)     Patient states it put him to sleep in the hospital he could not talk nor move    Cyclobenzaprine Hallucinations     Other reaction(s): Swelling  Hallucinations according to patient.    Other reaction(s): Lymphadenopathy, Restlessness, Sensation of being cold, Restlessness,  Sensation of being cold    Amitriptyline Hallucinations    Labetalol      Other reaction(s): Pharyngeal swelling    Lisinopril      Other reaction(s): Pharyngeal swelling    Tramadol Nausea Only and Hallucinations       Current Medications:   Current Outpatient Medications   Medication Sig Dispense Refill    allopurinol (ZYLOPRIM) 300 MG tablet Take 300 mg by mouth once daily.      apixaban (ELIQUIS) 5 mg Tab Take 5 mg by mouth 2 (two) times daily.      aspirin (ECOTRIN) 81 MG EC tablet Take 81 mg by mouth.      atorvastatin (LIPITOR) 80 MG tablet Take 80 mg by mouth once daily.      cholecalciferol, vitamin D3, 3,000 unit Tab Take 1 tablet by mouth.      coenzyme Q10 10 mg capsule Take 200 mg by mouth once daily.      colchicine (COLCRYS) 0.6 mg tablet TK 1 T PO BID PRF GOUT      febuxostat (ULORIC) 40 mg Tab Take 1 tablet (40 mg total) by mouth once daily. 30 tablet 6    glipiZIDE (GLUCOTROL) 5 MG tablet Take 5 mg by mouth 2 (two) times daily before meals.      HYDROcodone-acetaminophen (NORCO) 5-325 mg per tablet Take 1 tablet by mouth every 6 (six) hours as needed for Pain. 60 tablet 0    indomethacin (INDOCIN) 50 MG capsule Take 1 capsule (50 mg total) by mouth 3 (three) times daily with meals. 30 capsule 0    melatonin 5 mg Cap Take by mouth.      metoprolol succinate (TOPROL-XL) 50 MG 24 hr tablet Take 1 tablet (50 mg total) by mouth once daily. 30 tablet 1    potassium chloride SA (K-DUR,KLOR-CON) 20 MEQ tablet Take 1 tablet (20 mEq total) by mouth 2 (two) times daily. 180 tablet 3    risperiDONE (RISPERDAL) 3 MG Tab Take by mouth.      sacubitriL-valsartan (ENTRESTO) 49-51 mg per tablet Take 1 tablet by mouth 2 (two) times daily. 60 tablet 3    sertraline (ZOLOFT) 50 MG tablet Take 50 mg by mouth once daily. TAKE THREE TABLETS BY MOUTH EVERY MORNING TO IMPROVE MOOD      sildenafiL (VIAGRA) 100 MG tablet Take 1 tablet (100 mg total) by mouth As instructed for Erectile Dysfunction. Please take 1 tablet one  hour prior to intercourse on empty stomach 30 tablet 1    spironolactone (ALDACTONE) 25 MG tablet Take 1 tablet (25 mg total) by mouth once daily. 90 tablet 3    tadalafiL (CIALIS) 20 MG Tab Take 1 pill as needed prior to sex 20 tablet 1    torsemide (DEMADEX) 20 MG Tab Take 2 tablets (40 mg total) by mouth 2 (two) times daily. 360 tablet 3     No current facility-administered medications for this visit.       REVIEW OF SYSTEMS:    GENERAL:  No weight loss, malaise or fevers.  HEENT:  Negative for frequent or significant headaches.  NECK:  Negative for lumps, goiter, pain and significant neck swelling.  RESPIRATORY:  Negative for cough, wheezing or shortness of breath.  CARDIOVASCULAR:  Negative for chest pain, leg swelling or palpitations. +CHF, CAD, HTN  GI:  Negative for abdominal discomfort, blood in stools or black stools or change in bowel habits. GERD  MUSCULOSKELETAL:  See HPI  SKIN:  Negative for lesions, rash, and itching.  PSYCH:  Negative for sleep disturbance, mood disorder and recent psychosocial stressors.  HEMATOLOGY/LYMPHOLOGY:  Negative for prolonged bleeding, bruising easily or swollen nodes.  NEURO:   Daily migraine headaches  ENDO: Diabetes  All other reviewed and negative other than HPI.    Past Medical History:  Past Medical History:   Diagnosis Date    Brain damage     traumatic    CAD (coronary artery disease)     Cardiomyopathy     CHF (congestive heart failure)     Diabetes mellitus     type 2    Edema     Erectile dysfunction     GERD (gastroesophageal reflux disease)     HTN (hypertension)     Hyperlipemia     Sciatic nerve pain, right     leg       Past Surgical History:  Past Surgical History:   Procedure Laterality Date    CARDIAC DEFIBRILLATOR PLACEMENT      CATHETERIZATION OF BOTH LEFT AND RIGHT HEART Bilateral 08/20/2018    CORONARY ARTERY BYPASS GRAFT      ILIAC ARTERY STENT      INJECTION OF JOINT Right 8/13/2021    Procedure: INJECTION, JOINT, SACROILIAC (SI);  Surgeon: Austin  MD Jaqueline;  Location: [facility] PAIN MGT;  Service: Pain Management;  Laterality: Right;    RADIOFREQUENCY ABLATION Right 2/26/2021    Procedure: RADIOFREQUENCY ABLATION GENICULAR NERVES, COOLED;  Surgeon: Austin Parsons MD;  Location: [facility] PAIN MGT;  Service: Pain Management;  Laterality: Right;  1 of 2  consent needed  eliquis clearance requested    RADIOFREQUENCY ABLATION Right 11/12/2021    Procedure: RADIOFREQUENCY ABLATION, GENICULAR COOLED  NEED CONSENT/ clear to hold  ELIQUIS;  Surgeon: Austin Parsons MD;  Location: [facility] PAIN MGT;  Service: Pain Management;  Laterality: Right;    RADIOFREQUENCY ABLATION Right 1/5/2023    Procedure: RADIOFREQUENCY ABLATION RIGHT GENICULAR NERVES COOLED clear to hold Eliquis 3 days;  Surgeon: Austin Parsons MD;  Location: [facility] PAIN MGT;  Service: Pain Management;  Laterality: Right;    RIGHT HEART CATHETERIZATION Right 8/17/2020    Procedure: INSERTION, CATHETER, RIGHT HEART;  Surgeon: Brianda Navas MD;  Location: [facility] CATH LAB;  Service: Cardiology;  Laterality: Right;    TRANSFORAMINAL EPIDURAL INJECTION OF STEROID Right 9/11/2020    Procedure: INJECTION, STEROID, EPIDURAL, TRANSFORAMINAL APPROACH, L4-L5 AND L5-S1 clear to hold Eliquis 3 days;  Surgeon: Austin Parsons MD;  Location: [facility] PAIN MGT;  Service: Pain Management;  Laterality: Right;    TRANSFORAMINAL EPIDURAL INJECTION OF STEROID Right 10/23/2020    Procedure: INJECTION, STEROID, EPIDURAL, TRANSFORAMINAL APPROACH;  Surgeon: Austin Parsons MD;  Location: [facility] PAIN MGT;  Service: Pain Management;  Laterality: Right;  RT RFA L5/S1 and S1  Eliquis clearance in Media       Family History:  History reviewed. No pertinent family history.    Social History:  Social History     Socioeconomic History    Marital status:    Tobacco Use    Smoking status: Never    Smokeless tobacco: Never   Substance and Sexual Activity    Alcohol use: Not Currently    Drug use: Not Currently       OBJECTIVE:    /84 (BP  "Location: Left arm, Patient Position: Sitting, BP Method: Large (Automatic))   Pulse (!) 51   Resp 18   Ht 6' 4" (1.93 m)   Wt 108.9 kg (240 lb)   BMI 29.21 kg/m²     PHYSICAL EXAMINATION: 2021  Voice normal.  Normal affect without evidence of distress.  Musculoskeletal: Focal TTP to R GTB, + R leg straight leg test, + facet loading R L-spine  Limited ROM of the knee joints R>L.   Gait: antalgic, aided with cane     ASSESSMENT: 73 y.o. year old male with pain, consistent with the followin. Chronic pain of left knee  Procedure Order to Pain Management      2. Dorsalgia, unspecified        3. Primary osteoarthritis of knee, unspecified laterality        4. Lumbar spondylosis              PLAN:   - Prior records reviewed  - S/p Repeat R knee genicular RFA and states significant improvement in pain and functioning   - will s/f left genicular RFA  -UDS 2022 Compliant   - LAPMP without issues  - Dr Parsons is provider of medication mgt and agrees with above plan of care.   -Repeat L spine CT was not done, cannot do MRI 2/2 AICD   -Possible MBB to RFA if appropriate but will address L knee 1st.   -Follow up 4 weeks after RFA.   The above plan and management options were discussed at length with patient. Patient is in agreement with the above and verbalized understanding.    Mike Daley NP  2023     I spent a total of 30 minutes on the day of the visit.  This includes face to face time and non-face to face time preparing to see the patient by reviewing previous labs/imaging, obtaining and/or reviewing separately obtained history, documenting clinical information in the electronic or other health record, independently interpreting results and communicating results to the patient/family/caregiver.        "

## 2023-01-23 NOTE — TELEPHONE ENCOUNTER
----- Message from Sherron Garcia sent at 1/23/2023 10:17 AM CST -----  Clearance obtained for  pt.,  it's in .

## 2023-01-23 NOTE — TELEPHONE ENCOUNTER
----- Message from Maricruz Rosario sent at 1/23/2023 11:46 AM CST -----  Name of Who is Calling: ALLIE TOLEDO [[id number]]           What is the request in detail: Patient returned Raya S call in regards to this message: LVM to inform pt that clearance to hold Eliquis for 3 days prior to procedure on 2/10 was obtained.Please contact to further discuss and advise.              Can the clinic reply by [facility]: NO           What [facility] Back [facility]: [phone]

## 2023-02-10 NOTE — DISCHARGE SUMMARY
Discharge Note  Short Stay      SUMMARY     Admit Date: 2/10/2023    Attending Physician: Austin Parsons      Discharge Physician: Austin Parsons      Discharge Date: 2/10/2023 3:44 PM    Procedure(s) (LRB):  RADIOFREQUENCY ABLATION LEFT GENICULAR CLEARED TO HOLD ELIQUIS 3 DAYS (Left)    Final Diagnosis: Chronic pain of left knee [M25.562, G89.29]    Disposition: Home or self care    Patient Instructions:   Current Discharge Medication List        CONTINUE these medications which have NOT CHANGED    Details   allopurinol (ZYLOPRIM) 300 MG tablet Take 300 mg by mouth once daily.      apixaban (ELIQUIS) 5 mg Tab Take 5 mg by mouth 2 (two) times daily.      aspirin (ECOTRIN) 81 MG EC tablet Take 81 mg by mouth.      atorvastatin (LIPITOR) 80 MG tablet Take 80 mg by mouth once daily.      cholecalciferol, vitamin D3, 3,000 unit Tab Take 1 tablet by mouth.      coenzyme Q10 10 mg capsule Take 200 mg by mouth once daily.      colchicine (COLCRYS) 0.6 mg tablet TK 1 T PO BID PRF GOUT      febuxostat (ULORIC) 40 mg Tab Take 1 tablet (40 mg total) by mouth once daily.  Qty: 30 tablet, Refills: 6    Comments: Underlying enlarged liver, high dose allopurinol not advisable. Please dispense Uloric to control hyperuricemia.      glipiZIDE (GLUCOTROL) 5 MG tablet Take 5 mg by mouth 2 (two) times daily before meals.      HYDROcodone-acetaminophen (NORCO) 5-325 mg per tablet Take 1 tablet by mouth every 6 (six) hours as needed for Pain.  Qty: 60 tablet, Refills: 0    Comments: Quantity prescribed more than 7 day supply? Yes, quantity medically necessary  Associated Diagnoses: Dorsalgia, unspecified; Primary osteoarthritis of knee, unspecified laterality; Lumbar spondylosis      indomethacin (INDOCIN) 50 MG capsule Take 1 capsule (50 mg total) by mouth 3 (three) times daily with meals.  Qty: 30 capsule, Refills: 0      melatonin 5 mg Cap Take by mouth.      metoprolol succinate (TOPROL-XL) 50 MG 24 hr tablet Take 1 tablet (50 mg  total) by mouth once daily.  Qty: 30 tablet, Refills: 1    Comments: Please provide alternative medication covered by insurance if need be.      potassium chloride SA (K-DUR,KLOR-CON) 20 MEQ tablet Take 1 tablet (20 mEq total) by mouth 2 (two) times daily.  Qty: 180 tablet, Refills: 3      risperiDONE (RISPERDAL) 3 MG Tab Take by mouth.      sacubitriL-valsartan (ENTRESTO) 49-51 mg per tablet Take 1 tablet by mouth 2 (two) times daily.  Qty: 60 tablet, Refills: 3    Comments: ERWIN SALDAÑA MD, MPH      sertraline (ZOLOFT) 50 MG tablet Take 50 mg by mouth once daily. TAKE THREE TABLETS BY MOUTH EVERY MORNING TO IMPROVE MOOD      sildenafiL (VIAGRA) 100 MG tablet Take 1 tablet (100 mg total) by mouth As instructed for Erectile Dysfunction. Please take 1 tablet one hour prior to intercourse on empty stomach  Qty: 30 tablet, Refills: 1    Associated Diagnoses: Erectile dysfunction due to arterial insufficiency      spironolactone (ALDACTONE) 25 MG tablet Take 1 tablet (25 mg total) by mouth once daily.  Qty: 90 tablet, Refills: 3    Comments: .      tadalafiL (CIALIS) 20 MG Tab Take 1 pill as needed prior to sex  Qty: 20 tablet, Refills: 1    Associated Diagnoses: Erectile dysfunction, unspecified erectile dysfunction type      torsemide (DEMADEX) 20 MG Tab Take 2 tablets (40 mg total) by mouth 2 (two) times daily.  Qty: 360 tablet, Refills: 3    Comments: .                 Discharge Diagnosis: Chronic pain of left knee [M25.562, G89.29]  Condition on Discharge: Stable with no complications to procedure   Diet on Discharge: Same as before.  Activity: as per instruction sheet.  Discharge to: Home with a responsible adult.  Follow up: 2-4 weeks

## 2023-02-10 NOTE — DISCHARGE INSTRUCTIONS

## 2023-02-10 NOTE — OP NOTE
Therapeutic Genicular Cooled Nerve Radiofrequency Ablation under Fluoroscopy     The procedure, risks, benefits, and options were discussed with the patient. There are no contraindications to the procedure. The patent expressed understanding and agreed to the procedure. Informed written consent was obtained prior to the start of the procedure and can be found in the patient's chart.        PATIENT NAME: Mr. Ishmael Thrasher   MRN: [id number]     DATE OF PROCEDURE: 02/10/2023     PROCEDURE: Therapeutic Left Genicular Cooled Nerve Radiofrequency Ablation under Fluoroscopy    PRE-OP DIAGNOSIS: Chronic pain of left knee [M25.562, G89.29]    POST-OP DIAGNOSIS: Chronic pain of left knee [M25.562, G89.29]    PHYSICIAN: Austin Parsons MD    ASSISTANTS:   Luke Alexander MD  [employer] Pain Fellow    MEDICATIONS INJECTED:  9cc of Bupivicaine 0.25%    LOCAL ANESTHETIC INJECTED:   Xylocaine 2%    SEDATION: Versed 2mg and Fentanyl 50mcg                                                                                                                                                                                     Conscious sedation ordered by M.NADYA. Patient re-evaluation prior to administration of conscious sedation. No changes noted in patient's status from initial evaluation. The patient's vital signs were monitored by RN and patient remained hemodynamically stable throughout the procedure.    Event Time In   Sedation Start 1546   Sedation End 1601       ESTIMATED BLOOD LOSS:  None    COMPLICATIONS:  None     INTERVAL HISTORY: Patient has clinical findings of chronic knee pain. Patients has completed a previous diagnostic genicular nerve blocks with at least 80% relief for the expected duration of the local anesthetic utilized.     TECHNIQUE: Time-out was performed to identify the patient and procedure to be performed. With the patient laying in a supine position, the surgical area was prepped and draped in the usual sterile fashion  using ChloraPrep and fenestrated drape. Three target sites including the superior lateral genicular nerve where the lateral femoral shaft meets the epicondyle, the superior medial genicular nerve where the medial femoral shaft meets the epicondyle, and the inferior medial genicular nerve where the medial tibial shaft meets the epicondyle, were determined under fluoroscopic guidance. Skin anesthesia was achieved by injecting Lidocaine 2% over the injection sites. A 17 gauge, 50mm, 10mm active tip needle was then advanced under fluoroscopy in the AP and lateral views into the positions of the geniculate nerves at these levels. This was followed by motor testing at each of the nerves to ensure that there was no dorsiflexion of the foot. After negative aspiration for blood was confirmed, 1 mL of the lidocaine 2% listed above was injected slowly at each site. This was followed by cooled thermal lesioning at 80 degrees celsius for 150 seconds at each site. That was followed by slowly injecting 3 mL of the medication mixture listed above at each site. The needles were removed and bleeding was nil. A sterile dressing was applied. No specimens collected. The patient tolerated the procedure well and did not have any procedure related motor deficit at the conclusion of the procedure.    The patient was monitored after the procedure in the recovery area. They were given post-procedure and discharge instructions to follow at home. The patient was discharged in a stable condition.    Austin Parsons MD

## 2023-02-24 NOTE — PROGRESS NOTES
Chronic patient Established Note (Follow up visit)      SRINIVAS Thrasher is a 70 y.o. male with a history of CAD, CHF with EF 10% with AICD, T2DM, atrial fibrillation on eliquis who presents today with chronic low back pain. This pain started in 1971 as a result of and injury in the .  He describes a constant, right sided pain that starts in his low back and radiates down the posterior aspect of his right leg to the bottom of his foot.  This is associated with a numbness, tingling sensation.  The pain occurs when he is standing still for longer than 5 minutes.  The pain does not occur when walking unless he is standing first.  He denies heaviness and weakness.  This pain is described in detail below.     Aggravating factors: Standing in place for 5 minutes     Mitigating factors: Walking, medication, rest     Previously seeing: Dr. Davide Wang ([place])     Interval History (7/2/2020):  The patient returns to clinic today for follow up of back pain. He continues to report low back pain that radiates down the posterior aspect of his right leg to the bottom of his foot. He denies any left leg pain. His pain is worse with prolonged standing. He has had injections in the past without relief. He is currently taking Tramadol with limited relief. He denies any other health changes. His pain today is 10/10.     Interval History 8/6/2020:   Ishmael Thrasher presents to the clinic for a follow-up appointment for bilateral leg pain. Since the last visit, Ishmael Thrasher states the pain has been worsening. Current pain intensity is 10/10.     Interval History 9/28/2020:  The patient returns to clinic today for follow up of low back and leg pain. He is s/p right L4/5 and L5/S1 TF PÉREZ on 9/11/2020. He reports no significant relief of his back and leg pain. He continues to report low back pain that radiates down the posterior aspect of his right leg to under his foot. He denies any left leg pain. His pain is  worse with prolonged standing and walking. He is currently taking Norco with limited benefit. He reports that this decreases his pain but does not last. He denies any other health changes. His pain today is 10/10.     Interval History 11/16/2020:  The patient returns to clinic today for follow up of low back pain. He is s/p right L5/S1 and S1 TF PÉREZ on 10/23/2020. He reports 60% relief for about a week. He continues to report low back pain that radiates into the posterior aspect of his right leg to his foot. He denies any left leg pain. His pain is worse with standing and walking. He also reports difficulty sleeping due to pain. He is frustrated with his continued pain. He continues to take Norco but reports that this does not last. He denies any other health changes. His pain today is 10/10.     Interval History 12/21/2020:  Ishmael Thrasher presents to the clinic for a  1 month follow-up appointment. Since the last visit, Ishmael Thrasher states the pain has been stable. Current pain intensity is 10/10.    Interval History 3/31/2021  Patient complains of bilateral knee pain, usually uses walker for assistance but was unable to bring with them. Patient is s/p right cooled RFA on 2/26/21 with 60% relief of symptoms that has since returned. He reports he was admitted in the hospital for acute decompensated heart failure following receiving the second dose of the COVID-19 Moderna vaccine. He states he was admitted to [facility] twice and then discharged and re-admitted [facility] (discharged on 3/20/21) the pain started on 5 days ago while there and he reported it while admitted. He reports his pain level is 10/10. Its effecting his ability ambulate as he has not been able to use his legs since being in the hospital. He reports the pain has been this level the last 5 days since worsening. He is taking the Hydrocodone 5/325 TID, which is not helping. Also taking 325mg ASA with moderate relief  "- his heart doctor told him not to continue taking ASA at this dose. During hospital admission, patient complained of left elbow and left knee pain that was attributed to the gouty arthritis that has since resolved. Left knee pain now feels like his "usual" knee pain. Patient states he was able to move around on a daily basis with PT while admitted in the hospital. Feel more depressed and less motivated since receiving 2/16/21. Patient also complains of bilateral back pain radiating down his right leg, which is unchanged from his baseline chronic pain.    Interval History 7/27/2021:  The patient returns to clinic today for follow up of low back and knee pain. He continues to report right knee pain. He also reports increased low back and right hip pain. He reports intermittent radiating pain into his right lateral thigh. His pain is worse with standing and walking. He denies any left leg pain. He continues to perform a home exercise routine. He continues to take Norco as needed. He denies any other health changes. His pain today is 10/10.    Interval History 10/27/2021:  The patient returns to clinic today for follow up of low back and knee pain. He is s/p right SI joint injection on 8/13/2021. He reports 40% relief of his right hip and buttock pain. He continues to reports low back pain that radiates into the posterior aspect of his right leg to his calf. He denies any left leg pain. He reports increased right knee pain. He did have a recent fall which triggered this pain. His pain is worse with standing and walking. He continues to take Norco, although this provides limited relief. He would like to go back to Tramadol. He denies any other health changes. His pain today is 10/10.    Interval History 12/2/2021:  Mr Thrasher presents for routine medication follow up. He stats prior R knee genicular RFA did well. He continues to have diffuse pain complaints one which is R lateral hip pain and inability to lay on that " site due to focal pain. He states tramadol has not benefit when compared to Norco and would like to rotate back to Norco 5/325mg BID. Denies new ban of pain or neurological changes.     Interval History 1/10/2022:  Mr Thrasher presents for follow up of lower back pain and radicular pain down posterior leg going to bottom of calf. He is s/p focal R GTB TTP and states approx 2 weeks benefit. He states rotation from Tramadol to Norco 5/325mg with some mild benefit.     Interval History 6/17/2022:  Mr Thrasher presents for delayed FU. Over interval he has been stable. He inadverntely was Rxed Norco 7.5mg q6hrs. He also has been started on medical mariajuana and received Norco 10mg for gout while visiting ER. We discussed all these issues and Pt voiced understanding. States last Dosing of Hilliards was this a.m. Denies new areas of pain, denies any neurological changes. Denies SE of medications.     Interval History 9/19/2022:  Mr Thrasher presents for follow up of chronic pain. He has had worsening pain due to being out of medication as FU was just past 90 days. He is having newer return of L elbow pain. Denies further neurological changes. Denies worsening of chronic pain to R hip and R knee. Continues to take Norco 5/325mg BID without SE.         Interval History  12/12/2022  Patient continues to have bilateral knee pain R>L. He did get relief from genicular RFA in past for 4 months. Back pain is constant and 6-7/10. Back pain is right sided, sharp in nature and radiates down his leg. Discussed repeat genicular RFA then possible MBB. Patient agreeable to this plan. Will get L spine CT as patient is unable to get MRI 2/2 AICD.    Interval History 1/19/2023:  Mr Thrasher presents for follow up of chronic knee pain and s/p R genicular RFA with significant improvement of symptoms. He is ready to to address left knee pain. He has had genicular block and found to be diagnostic but has always pursued R knee RFA as this was main pain  generator. He continues to take Norco 5/325mg BID with benefit and denies SE of medications. Denies newer areas of pain or neurological changes.     Interval History 2/24/2023:  Mr Thrasher presents for follow up evaluation of left knee genicular RFA. He states he is doing well since this and had significant pain control and improved functioning. He continues to take Norco 5/325mg BID to aid in pain control and denies SE of medications. Denies newer areas of pain or neurological changes. Lower back pain is gradually returning but not ready to repeat procedures at this time.     Pain Disability Index Review:  Last 3 PDI Scores 2/24/2023 1/19/2023 12/12/2022   Pain Disability Index (PDI) 50 40 34       Pain Medications:  Norco 5mg  ASA 325mg  Topical lidocaine      Opioid Contract: yes     report:  Reviewed and consistent with medication use as prescribed.    Pain Procedures:   9/11/2020- Right L4/5 and L5/S1 TF PÉREZ  10/23/2020- Right L5/S1 and S1 TF PÉREZ  2/5/2021- Bilateral genicular nerve block  2/26/2021- Right genicular cooled RFA  11/12/2021 Right knee genicular RFA  12/17/2021: Right Greater Trochanteric Bursa Injection under Fluoroscopic Guidance  1/5/2022 Right knee genicular RFA  2/10/2023 Left Knee genicular RFA     Physical Therapy/Home Exercise: Set up from PT since discharge from hospital, but has not arranged.    Imaging:   Narrative & Impression       EXAMINATION:  XR LUMBAR SPINE 5 VIEW WITH FLEX AND EXT     CLINICAL HISTORY:  Back pain or radiculopathy, > 6 wks;  Radiculopathy, lumbar region     TECHNIQUE:  AP, lateral, oblique views of the lumbar spine with spot view of the lumbosacral region and lateral views in flexion and extension.     COMPARISON:  None     FINDINGS:  There is no collapse or malalignment in the lumbar spine.  There is no significant change in alignment between flexion and extension.     There is reduced of the intervertebral disc spaces at L4-L5 and L5-S1 with disc vacuum  phenomena and anterior osteophyte formation.  There are mild neural foraminal stenosis at these levels.     There is mild bilateral neural foraminal stenosis.     There are extensive vascular calcifications in the abdominal aorta.  The soft tissues are otherwise unremarkable.     Impression:     1. Moderate to severe spondylosis and neural foraminal stenosis at L4-L5 and L5-S1.  2. Straightening of lumbar lordosis  3. No changes in alignment in flexion and extension     Electronically signed by resident: Marko Lamas  Date:                                            07/07/2020  Time:                                           15:05     Electronically signed by: Roly Qiu  Date:                                            07/07/2020  Time:                                           15:21          Narrative & Impression       EXAMINATION:  CT LUMBAR SPINE WITHOUT CONTRAST     CLINICAL HISTORY:  Back pain or radiculopathy, > 6 wks;.     COMPARISON:  None.     FINDINGS:  Decrease height of vertebral bodies L4-L5 predominant at L5.  Degenerative changes in the endplates with decrease height intervertebral space L4-L5.  Vacuum phenomenon at L5-S1 indicating remarkable decrease.  Facet hypertrophy and degenerative changes in facet joints L3-L4, bilaterally.     Prominent aortic calcifications.     Left pleural effusion and atelectasis     Impression:     Prominent degenerative changes in the lumbar spine at L4, L5 and S1     Decrease height of intervertebral space L4-L5 with degenerative process and decrease height of vertebral bodies L4-L5 predominant at L5     Remarkable disc disease at L5-S1     Atherosclerosis     Left pleural effusion and atelectasis.  Please see CT of the chest        Electronically signed by: Roly Qiu  Date:                                            07/08/2020  Time:                                           16:00          Allergies:   Review of patient's allergies  indicates:   Allergen Reactions    Enalapril maleate Swelling and Edema     Other reaction(s): Swelling    Vasotec [enalaprilat] Swelling     Neck swelling    Zoloft [sertraline] Other (See Comments)     Patient states it put him to sleep in the hospital he could not talk nor move    Cyclobenzaprine Hallucinations     Other reaction(s): Swelling  Hallucinations according to patient.    Other reaction(s): Lymphadenopathy, Restlessness, Sensation of being cold, Restlessness, Sensation of being cold    Amitriptyline Hallucinations    Labetalol      Other reaction(s): Pharyngeal swelling    Lisinopril      Other reaction(s): Pharyngeal swelling    Tramadol Nausea Only and Hallucinations       Current Medications:   Current Outpatient Medications   Medication Sig Dispense Refill    allopurinol (ZYLOPRIM) 300 MG tablet Take 300 mg by mouth once daily.      apixaban (ELIQUIS) 5 mg Tab Take 5 mg by mouth 2 (two) times daily.      aspirin (ECOTRIN) 81 MG EC tablet Take 81 mg by mouth.      atorvastatin (LIPITOR) 80 MG tablet Take 80 mg by mouth once daily.      cholecalciferol, vitamin D3, 3,000 unit Tab Take 1 tablet by mouth.      coenzyme Q10 10 mg capsule Take 200 mg by mouth once daily.      colchicine (COLCRYS) 0.6 mg tablet TK 1 T PO BID PRF GOUT      febuxostat (ULORIC) 40 mg Tab Take 1 tablet (40 mg total) by mouth once daily. 30 tablet 6    glipiZIDE (GLUCOTROL) 5 MG tablet Take 5 mg by mouth 2 (two) times daily before meals.      HYDROcodone-acetaminophen (NORCO) 5-325 mg per tablet Take 1 tablet by mouth every 6 (six) hours as needed for Pain. 60 tablet 0    indomethacin (INDOCIN) 50 MG capsule Take 1 capsule (50 mg total) by mouth 3 (three) times daily with meals. 30 capsule 0    melatonin 5 mg Cap Take by mouth.      metoprolol succinate (TOPROL-XL) 50 MG 24 hr tablet Take 1 tablet (50 mg total) by mouth once daily. 30 tablet 1    potassium chloride SA (K-DUR,KLOR-CON) 20 MEQ tablet Take 1 tablet (20 mEq total)  by mouth 2 (two) times daily. 180 tablet 3    risperiDONE (RISPERDAL) 3 MG Tab Take by mouth.      sacubitriL-valsartan (ENTRESTO) 49-51 mg per tablet Take 1 tablet by mouth 2 (two) times daily. 60 tablet 3    sertraline (ZOLOFT) 50 MG tablet Take 50 mg by mouth once daily. TAKE THREE TABLETS BY MOUTH EVERY MORNING TO IMPROVE MOOD      sildenafiL (VIAGRA) 100 MG tablet Take 1 tablet (100 mg total) by mouth As instructed for Erectile Dysfunction. Please take 1 tablet one hour prior to intercourse on empty stomach 30 tablet 1    tadalafiL (CIALIS) 20 MG Tab Take 1 pill as needed prior to sex 20 tablet 1    spironolactone (ALDACTONE) 25 MG tablet Take 1 tablet (25 mg total) by mouth once daily. 90 tablet 3    torsemide (DEMADEX) 20 MG Tab Take 2 tablets (40 mg total) by mouth 2 (two) times daily. 360 tablet 3     No current facility-administered medications for this visit.       REVIEW OF SYSTEMS:    GENERAL:  No weight loss, malaise or fevers.  HEENT:  Negative for frequent or significant headaches.  NECK:  Negative for lumps, goiter, pain and significant neck swelling.  RESPIRATORY:  Negative for cough, wheezing or shortness of breath.  CARDIOVASCULAR:  Negative for chest pain, leg swelling or palpitations. +CHF, CAD, HTN  GI:  Negative for abdominal discomfort, blood in stools or black stools or change in bowel habits. GERD  MUSCULOSKELETAL:  See HPI  SKIN:  Negative for lesions, rash, and itching.  PSYCH:  Negative for sleep disturbance, mood disorder and recent psychosocial stressors.  HEMATOLOGY/LYMPHOLOGY:  Negative for prolonged bleeding, bruising easily or swollen nodes.  NEURO:   Daily migraine headaches  ENDO: Diabetes  All other reviewed and negative other than HPI.    Past Medical History:  Past Medical History:   Diagnosis Date    Brain damage     traumatic    CAD (coronary artery disease)     Cardiomyopathy     CHF (congestive heart failure)     Diabetes mellitus     type 2    Edema     Erectile  dysfunction     GERD (gastroesophageal reflux disease)     HTN (hypertension)     Hyperlipemia     Sciatic nerve pain, right     leg       Past Surgical History:  Past Surgical History:   Procedure Laterality Date    CARDIAC DEFIBRILLATOR PLACEMENT      CATHETERIZATION OF BOTH LEFT AND RIGHT HEART Bilateral 08/20/2018    CORONARY ARTERY BYPASS GRAFT      ILIAC ARTERY STENT      INJECTION OF JOINT Right 8/13/2021    Procedure: INJECTION, JOINT, SACROILIAC (SI);  Surgeon: Austin Parsons MD;  Location: [facility], [place], operated by [facility]IN [facility]GT;  Service: Pain Management;  Laterality: Right;    RADIOFREQUENCY ABLATION Right 2/26/2021    Procedure: RADIOFREQUENCY ABLATION GENICULAR NERVES, COOLED;  Surgeon: Austin Parsons MD;  Location: [facility], [place], operated by [facility]IN [facility];  Service: Pain Management;  Laterality: Right;  1 of 2  consent needed  eliquis clearance requested    RADIOFREQUENCY ABLATION Right 11/12/2021    Procedure: RADIOFREQUENCY ABLATION, GENICULAR COOLED  NEED CONSENT/ clear to hold  ELIQUIS;  Surgeon: Austin Parsons MD;  Location: [facility], [place], operated by [facility]IN [facility];  Service: Pain Management;  Laterality: Right;    RADIOFREQUENCY ABLATION Right 1/5/2023    Procedure: RADIOFREQUENCY ABLATION RIGHT GENICULAR NERVES COOLED clear to hold Eliquis 3 days;  Surgeon: Austin Parsons MD;  Location: [facility], [place], operated by [facility]IN [facility];  Service: Pain Management;  Laterality: Right;    RADIOFREQUENCY ABLATION Left 2/10/2023    Procedure: RADIOFREQUENCY ABLATION LEFT GENICULAR CLEARED TO HOLD ELIQUIS 3 DAYS;  Surgeon: Austin Parsons MD;  Location: [facility], [place], operated by [facility]IN [facility];  Service: Pain Management;  Laterality: Left;    RIGHT HEART CATHETERIZATION Right 8/17/2020    Procedure: INSERTION, CATHETER, RIGHT HEART;  Surgeon: Brianda Navas MD;  Location: [facility] CATH LAB;  Service: Cardiology;  Laterality: Right;    TRANSFORAMINAL EPIDURAL INJECTION OF STEROID Right 9/11/2020    Procedure: INJECTION, STEROID, EPIDURAL, TRANSFORAMINAL APPROACH, L4-L5 AND L5-S1 clear to hold Eliquis 3 days;  Surgeon: Austin Parsons  "MD;  Location: [facility] PAIN MGT;  Service: Pain Management;  Laterality: Right;    TRANSFORAMINAL EPIDURAL INJECTION OF STEROID Right 10/23/2020    Procedure: INJECTION, STEROID, EPIDURAL, TRANSFORAMINAL APPROACH;  Surgeon: Austin Parsons MD;  Location: [facility] PAIN MGT;  Service: Pain Management;  Laterality: Right;  RT RFA L5/S1 and S1  Eliquis clearance in Media       Family History:  History reviewed. No pertinent family history.    Social History:  Social History     Socioeconomic History    Marital status:    Tobacco Use    Smoking status: Never    Smokeless tobacco: Never   Substance and Sexual Activity    Alcohol use: Not Currently    Drug use: Not Currently       OBJECTIVE:    /75 (BP Location: Left arm, Patient Position: Sitting, BP Method: Large (Automatic))   Ht 6' 4" (1.93 m)   Wt 111.2 kg (245 lb 2.4 oz)   BMI 29.84 kg/m²     PHYSICAL EXAMINATION: 2021  Voice normal.  Normal affect without evidence of distress.  Musculoskeletal: Focal TTP to R GTB, + R leg straight leg test, + facet loading R L-spine  Limited ROM of the knee joints R>L.   Gait: antalgic, aided with cane     ASSESSMENT: 73 y.o. year old male with pain, consistent with the followin. Primary osteoarthritis of knee, unspecified laterality        2. Chronic knee pain, unspecified laterality        3. Lumbar spondylosis        4. Chronic pain of left knee        5. Chronic pain of right knee                PLAN:   - Prior records reviewed  - s/p Right and most recently Left genicular RFA and doing well with improved functioning   - Consider repeat Lumbar RFAs at any time when pain worsens   - Continue Norco 5/325mg BID #60. He does not need refill at this time  -UDS 2022 Compliant   - LAPMP without issues  - Dr Parsons is provider of medication mgt and agrees with above plan of care.   - cannot do MRI 2/2 AICD  - I have stressed the importance of physical activity and a home exercise plan to help with pain and " improve health.  - Patient can continue with medications for now since they are providing benefits, using them appropriately, and without side effects.  - Counseled patient regarding the importance of physical therapy.  - RTC 3 months to continue medication mgt     The above plan and management options were discussed at length with patient. Patient is in agreement with the above and verbalized understanding.    Mike Daley NP  02/27/2023     I spent a total of 30 minutes on the day of the visit.  This includes face to face time and non-face to face time preparing to see the patient by reviewing previous labs/imaging, obtaining and/or reviewing separately obtained history, documenting clinical information in the electronic or other health record, independently interpreting results and communicating results to the patient/family/caregiver.

## 2023-03-09 NOTE — TELEPHONE ENCOUNTER
Patient requesting refill on (NORCO) 5-325 mg  Last office visit 2/24/23   shows last refill on 2/6/23  Patient [facility] have [facility]  Patient last UDS 12/12/22 was consistent with current therapy     CODEINE  Not Detected  Not Detected  Not Detected  Not Detected   Not Detected  Not Detected    MORPHINE  Not Detected  Not Detected  Not Detected  Not Detected   Not Detected  Not Detected    6-ACETYLMORPHINE  Not Detected  Not Detected  Not Detected  Not Detected   Not Detected  Not Detected    OXYCODONE  Not Detected  Not Detected  Not Detected  Not Detected   Not Detected  Not Detected    NOROYXCODONE  Not Detected  Not Detected  Not Detected  Not Detected   Not Detected  Not Detected    OXYMORPHONE  Not Detected  Not Detected  Not Detected  Not Detected   Not Detected  Not Detected    NOROXYMORPHONE  Not Detected  Not Detected  Not Detected  Not Detected   Not Detected  Not Detected    HYDROCODONE  Present  Present  Not Detected  Present   Present  Present    NORHYDROCODONE  Present  Present  Not Detected  Present   Not Detected  Present    HYDROMORPHONE  Present  Present  Not Detected  Not Detected   Not Detected  Not Detected    BUPRENORPHINE  Not Detected  Not Detected  Not Detected  Not Detected   Not Detected  Not Detected    NORUBPRENORPHINE  Not Detected  Not Detected  Not Detected  Not Detected   Not Detected  Not Detected    FENTANYL  Not Detected  Not Detected  Not Detected  Not Detected   Not Detected  Not Detected    NORFENTANYL  Not Detected  Not Detected  Not Detected  Not Detected   Not Detected  Not Detected    MEPERIDINE METABOLITE  Not Detected  Not Detected  Not Detected  Not Detected   Not Detected  Not Detected    TAPENTADOL  Not Detected  Not Detected  Not Detected  Not Detected   Not Detected  Not Detected    TAPENTADOL-O-SULF  Not Detected  Not Detected  Not Detected  Not Detected   Not Detected  Not Detected    METHADONE   Not Detected  Not Detected  Not Detected  Not Detected   Not Detected  Not Detected    TRAMADOL  Not Detected  Not Detected  Present  Not Detected   Not Detected  Present    AMPHETAMINE  Not Detected  Not Detected  Not Detected  Not Detected   Not Detected  Not Detected    METHAMPHETAMINE  Not Detected  Not Detected  Not Detected  Not Detected   Not Detected  Not Detected    MDMA- ECSTASY  Not Detected  Not Detected  Not Detected  Not Detected   Not Detected  Not Detected    MDA  Not Detected  Not Detected  Not Detected  Not Detected   Not Detected  Not Detected    MDEA- Audelia  Not Detected  Not Detected  Not Detected  Not Detected   Not Detected  Not Detected    METHYLPHENIDATE  Not Detected  Not Detected  Not Detected  Not Detected   Not Detected  Not Detected    PHENTERMINE  Not Detected  Not Detected  Not Detected  Not Detected   Not Detected  Not Detected    BENZOYLECGONINE  Not Detected  Not Detected  Not Detected  Not Detected   Not Detected  Not Detected    ALPRAZOLAM  Not Detected  Not Detected  Not Detected  Not Detected   Not Detected  Not Detected    ALPHA-OH-ALPRAZOLAM  Not Detected  Not Detected  Not Detected  Not Detected   Not Detected  Not Detected    CLONAZEPAM  Not Detected  Not Detected  Not Detected  Not Detected   Not Detected  Not Detected    7-AMINOCLONAZEPAM  Not Detected  Not Detected  Not Detected  Not Detected   Not Detected  Not Detected    DIAZEPAM  Not Detected  Not Detected  Not Detected  Not Detected   Not Detected  Not Detected    NORDIAZEPAM  Not Detected  Not Detected  Not Detected  Not Detected   Not Detected  Not Detected    OXAZEPAM  Not Detected  Not Detected  Not Detected  Not Detected   Not Detected  Not Detected    TEMAZEPAM  Not Detected  Not Detected  Not Detected  Not Detected   Not Detected  Not Detected    Lorazepam  Not Detected  Not Detected  Not Detected  Not Detected   Not Detected  Not Detected    MIDAZOLAM  Not Detected  Not Detected  Not Detected  Not Detected    Not Detected  Not Detected    ZOLPIDEM  Not Detected  Not Detected  Not Detected  Not Detected   Not Detected  Not Detected    BARBITURATES  Not Detected  Not Detected  Not Detected  Not Detected   Not Detected  Not Detected    Creatinine, Urine 20.0 - 400.0 mg/dL 99.5  115.0  76.4  177.0  160.0 R, CM  151.5  103.3    ETHYL GLUCURONIDE  Not Detected  Not Detected  Not Detected  Not Detected   Present  Not Detected    MARIJUANA METABOLITE  Not Detected  Not Detected  Not Detected  Not Detected   Not Detected  Not Detected    PCP  Not Detected  Not Detected  Not Detected  Not Detected   Not Detected  Not Detected    CARISOPRODOL  Not Detected  Not Detected CM  Not Detected CM  Not Detected CM   Not Detected CM  Not Detected CM    Comment: The carisoprodol immunoassay has cross-reactivity to   carisoprodol and meprobamate.    Naloxone  Not Detected  Not Detected  Not Detected        Gabapentin  Present  Present  Present        Pregabalin  Not Detected  Not Detected  Not Detected        Alpha-OH-Midazolam  Not Detected  Not Detected  Not Detected        Zolpidem Metabolite  Not Detected  Not Detected  Not Detected        PAIN MANAGEMENT DRUG PANEL  See Below  See Below CM  See Below CM  See Below CM   See Below CM  See Below CM    Comment: Methodology: Qualitative Enzyme

## 2023-03-09 NOTE — TELEPHONE ENCOUNTER
----- Message from Gwen Dumas sent at 3/9/2023 11:36 AM CST -----  Regarding: refill  Who Called:self  Pt asks if you can expedite the refill. Bc he is only in [place] for today and is asking it to go to pharmacy in [place]       Refill or New Rx: Refill        RX Name and Strength HYDROcodone-acetaminophen (NORCO) 5-325 mg per tablet      Is this a 30 day or 90 day RX:      Preferred Pharmacy with phone number:    [facility] #[id number] - [facility] [address] AT [facility] [address]   Phone:  [phone]  Fax:  [phone]          Local or Mail Order:local       Would the patient rather a call back or a response via [facility]?      best Call Back Number:[phone]      Additional Information:Please call pt when called in /Who Called:self  Pt asks if you can expedite the refill. Bc he is only in [place] for today and is asking it to go to pharmacy in [place]

## 2023-03-09 NOTE — TELEPHONE ENCOUNTER
----- Message from Dionne Moyer sent at 3/9/2023  2:23 PM CST -----  Regarding: Medication  Refill  Request                  Reply in [facility]:  NO      Please refill the medication listed below. Please call the patient :   [phone] (H)        Medication      HYDROcodone-acetaminophen (NORCO) 5-325 mg per tablet                          Sig - Route: Take 1 tablet by mouth every 12 (twelve) hours as needed for Pain. - Oral           Preferred Pharmacy:  [facility] #[id number]  [address] [place] [address] AT [facility] [address]     Phone: [phone]  Fax:  [phone]

## 2023-05-04 NOTE — TELEPHONE ENCOUNTER
----- Message from Jia Laws sent at 5/4/2023  9:13 AM CDT -----  Regarding: Refill request  Type:  RX Refill Request    Who Called: ALLIE TOLEDO [[id number]]    Refill or New Rx: Refill     RX Name and Strength: HYDROcodone-acetaminophen (NORCO) 5-325 mg per tablet    How is the patient currently taking it? (ex. 1XDay) 2xday     Is this a 30 day or 90 day RX: 30 days     Preferred Pharmacy with phone number: [facility] #[id number] - [place], [place] - [address] AT [facility] [address]    Local or Mail Order: local    Ordering Provider: Jaqueline Petersen Call Back Number: [phone]    Additional Information:

## 2023-05-04 NOTE — TELEPHONE ENCOUNTER
Patient requesting refill on hydrocodone 5-325 1 tab po q12h  Last office visit 02/2023-Mike Edi   shows last refill on 04/10/2023  [facility]  Patient last UDS 12/2023 was consistent with current therapy   CODEINE  Not Detected  Not Detected  Not Detected  Not Detected   Not Detected  Not Detected    MORPHINE  Not Detected  Not Detected  Not Detected  Not Detected   Not Detected  Not Detected    6-ACETYLMORPHINE  Not Detected  Not Detected  Not Detected  Not Detected   Not Detected  Not Detected    OXYCODONE  Not Detected  Not Detected  Not Detected  Not Detected   Not Detected  Not Detected    NOROYXCODONE  Not Detected  Not Detected  Not Detected  Not Detected   Not Detected  Not Detected    OXYMORPHONE  Not Detected  Not Detected  Not Detected  Not Detected   Not Detected  Not Detected    NOROXYMORPHONE  Not Detected  Not Detected  Not Detected  Not Detected   Not Detected  Not Detected    HYDROCODONE  Present  Present  Not Detected  Present   Present  Present    NORHYDROCODONE  Present  Present  Not Detected  Present   Not Detected  Present    HYDROMORPHONE  Present  Present  Not Detected  Not Detected   Not Detected  Not Detected    BUPRENORPHINE  Not Detected  Not Detected  Not Detected  Not Detected   Not Detected  Not Detected    NORUBPRENORPHINE  Not Detected  Not Detected  Not Detected  Not Detected   Not Detected  Not Detected    FENTANYL  Not Detected  Not Detected  Not Detected  Not Detected   Not Detected  Not Detected    NORFENTANYL  Not Detected  Not Detected  Not Detected  Not Detected   Not Detected  Not Detected    MEPERIDINE METABOLITE  Not Detected  Not Detected  Not Detected  Not Detected   Not Detected  Not Detected    TAPENTADOL  Not Detected  Not Detected  Not Detected  Not Detected   Not Detected  Not Detected    TAPENTADOL-O-SULF  Not Detected  Not Detected  Not Detected  Not Detected   Not Detected   Not Detected    METHADONE  Not Detected  Not Detected  Not Detected  Not Detected   Not Detected  Not Detected    TRAMADOL  Not Detected  Not Detected  Present  Not Detected   Not Detected  Present    AMPHETAMINE  Not Detected  Not Detected  Not Detected  Not Detected   Not Detected  Not Detected    METHAMPHETAMINE  Not Detected  Not Detected  Not Detected  Not Detected   Not Detected  Not Detected    MDMA- ECSTASY  Not Detected  Not Detected  Not Detected  Not Detected   Not Detected  Not Detected    MDA  Not Detected  Not Detected  Not Detected  Not Detected   Not Detected  Not Detected    MDEA- Audelia  Not Detected  Not Detected  Not Detected  Not Detected   Not Detected  Not Detected    METHYLPHENIDATE  Not Detected  Not Detected  Not Detected  Not Detected   Not Detected  Not Detected    PHENTERMINE  Not Detected  Not Detected  Not Detected  Not Detected   Not Detected  Not Detected    BENZOYLECGONINE  Not Detected  Not Detected  Not Detected  Not Detected   Not Detected  Not Detected    ALPRAZOLAM  Not Detected  Not Detected  Not Detected  Not Detected   Not Detected  Not Detected    ALPHA-OH-ALPRAZOLAM  Not Detected  Not Detected  Not Detected  Not Detected   Not Detected  Not Detected    CLONAZEPAM  Not Detected  Not Detected  Not Detected  Not Detected   Not Detected  Not Detected    7-AMINOCLONAZEPAM  Not Detected  Not Detected  Not Detected  Not Detected   Not Detected  Not Detected    DIAZEPAM  Not Detected  Not Detected  Not Detected  Not Detected   Not Detected  Not Detected    NORDIAZEPAM  Not Detected  Not Detected  Not Detected  Not Detected   Not Detected  Not Detected    OXAZEPAM  Not Detected  Not Detected  Not Detected  Not Detected   Not Detected  Not Detected    TEMAZEPAM  Not Detected  Not Detected  Not Detected  Not Detected   Not Detected  Not Detected    Lorazepam  Not Detected  Not Detected  Not Detected  Not Detected   Not Detected  Not Detected    MIDAZOLAM  Not Detected  Not Detected  Not  Detected  Not Detected   Not Detected  Not Detected    ZOLPIDEM  Not Detected  Not Detected  Not Detected  Not Detected   Not Detected  Not Detected    BARBITURATES  Not Detected  Not Detected  Not Detected  Not Detected   Not Detected  Not Detected    Creatinine, Urine 20.0 - 400.0 mg/dL 99.5  115.0  76.4  177.0  160.0 R, CM  151.5  103.3    ETHYL GLUCURONIDE  Not Detected  Not Detected  Not Detected  Not Detected   Present  Not Detected    MARIJUANA METABOLITE  Not Detected  Not Detected  Not Detected  Not Detected   Not Detected  Not Detected    PCP  Not Detected  Not Detected  Not Detected  Not Detected   Not Detected  Not Detected    CARISOPRODOL  Not Detected  Not Detected CM  Not Detected CM  Not Detected CM   Not Detected CM  Not Detected CM    Comment: The carisoprodol immunoassay has cross-reactivity to   carisoprodol and meprobamate.    Naloxone  Not Detected  Not Detected  Not Detected        Gabapentin  Present  Present  Present        Pregabalin  Not Detected  Not Detected  Not Detected        Alpha-OH-Midazolam  Not Detected  Not Detected  Not Detected        Zolpidem Metabolite  Not Detected  Not Detected  Not Detected

## 2023-05-26 NOTE — LETTER
May 26, 2023      [facility]  [address]  [facility] [address]  Phone: [phone]  Fax: [phone]       Patient: Ishmael Thrasher   YOB: 1950  Date of Visit: 05/26/2023    To Whom It May Concern:    Nguyễn Thrasher  was at [facility] on 05/26/2023. The patient may return to work/school on 5/26/23 with no restrictions. If you have any questions or concerns, or if I can be of further assistance, please do not hesitate to contact me.    Sincerely,    Mike Daley Np

## 2023-05-26 NOTE — PROGRESS NOTES
Chronic patient Established Note (Follow up visit)      SRINIVAS Thrasher is a 70 y.o. male with a history of CAD, CHF with EF 10% with AICD, T2DM, atrial fibrillation on eliquis who presents today with chronic low back pain. This pain started in 1971 as a result of and injury in the .  He describes a constant, right sided pain that starts in his low back and radiates down the posterior aspect of his right leg to the bottom of his foot.  This is associated with a numbness, tingling sensation.  The pain occurs when he is standing still for longer than 5 minutes.  The pain does not occur when walking unless he is standing first.  He denies heaviness and weakness.  This pain is described in detail below.     Aggravating factors: Standing in place for 5 minutes     Mitigating factors: Walking, medication, rest     Previously seeing: Dr. Davide Wang ([place])     Interval History (7/2/2020):  The patient returns to clinic today for follow up of back pain. He continues to report low back pain that radiates down the posterior aspect of his right leg to the bottom of his foot. He denies any left leg pain. His pain is worse with prolonged standing. He has had injections in the past without relief. He is currently taking Tramadol with limited relief. He denies any other health changes. His pain today is 10/10.     Interval History 8/6/2020:   Ishmael Thrasher presents to the clinic for a follow-up appointment for bilateral leg pain. Since the last visit, Ishmael Thrasher states the pain has been worsening. Current pain intensity is 10/10.     Interval History 9/28/2020:  The patient returns to clinic today for follow up of low back and leg pain. He is s/p right L4/5 and L5/S1 TF PÉREZ on 9/11/2020. He reports no significant relief of his back and leg pain. He continues to report low back pain that radiates down the posterior aspect of his right leg to under his foot. He denies any left leg pain. His pain is  worse with prolonged standing and walking. He is currently taking Norco with limited benefit. He reports that this decreases his pain but does not last. He denies any other health changes. His pain today is 10/10.     Interval History 11/16/2020:  The patient returns to clinic today for follow up of low back pain. He is s/p right L5/S1 and S1 TF PÉREZ on 10/23/2020. He reports 60% relief for about a week. He continues to report low back pain that radiates into the posterior aspect of his right leg to his foot. He denies any left leg pain. His pain is worse with standing and walking. He also reports difficulty sleeping due to pain. He is frustrated with his continued pain. He continues to take Norco but reports that this does not last. He denies any other health changes. His pain today is 10/10.     Interval History 12/21/2020:  Ishmael Thrasher presents to the clinic for a  1 month follow-up appointment. Since the last visit, Ishmael Thrasher states the pain has been stable. Current pain intensity is 10/10.    Interval History 3/31/2021  Patient complains of bilateral knee pain, usually uses walker for assistance but was unable to bring with them. Patient is s/p right cooled RFA on 2/26/21 with 60% relief of symptoms that has since returned. He reports he was admitted in the hospital for acute decompensated heart failure following receiving the second dose of the COVID-19 Moderna vaccine. He states he was admitted to [facility] twice and then discharged and re-admitted [facility] (discharged on 3/20/21) the pain started on 5 days ago while there and he reported it while admitted. He reports his pain level is 10/10. Its effecting his ability ambulate as he has not been able to use his legs since being in the hospital. He reports the pain has been this level the last 5 days since worsening. He is taking the Hydrocodone 5/325 TID, which is not helping. Also taking 325mg ASA with moderate relief  "- his heart doctor told him not to continue taking ASA at this dose. During hospital admission, patient complained of left elbow and left knee pain that was attributed to the gouty arthritis that has since resolved. Left knee pain now feels like his "usual" knee pain. Patient states he was able to move around on a daily basis with PT while admitted in the hospital. Feel more depressed and less motivated since receiving 2/16/21. Patient also complains of bilateral back pain radiating down his right leg, which is unchanged from his baseline chronic pain.    Interval History 7/27/2021:  The patient returns to clinic today for follow up of low back and knee pain. He continues to report right knee pain. He also reports increased low back and right hip pain. He reports intermittent radiating pain into his right lateral thigh. His pain is worse with standing and walking. He denies any left leg pain. He continues to perform a home exercise routine. He continues to take Norco as needed. He denies any other health changes. His pain today is 10/10.    Interval History 10/27/2021:  The patient returns to clinic today for follow up of low back and knee pain. He is s/p right SI joint injection on 8/13/2021. He reports 40% relief of his right hip and buttock pain. He continues to reports low back pain that radiates into the posterior aspect of his right leg to his calf. He denies any left leg pain. He reports increased right knee pain. He did have a recent fall which triggered this pain. His pain is worse with standing and walking. He continues to take Norco, although this provides limited relief. He would like to go back to Tramadol. He denies any other health changes. His pain today is 10/10.    Interval History 12/2/2021:  Mr Thrasher presents for routine medication follow up. He stats prior R knee genicular RFA did well. He continues to have diffuse pain complaints one which is R lateral hip pain and inability to lay on that " site due to focal pain. He states tramadol has not benefit when compared to Norco and would like to rotate back to Norco 5/325mg BID. Denies new ban of pain or neurological changes.     Interval History 1/10/2022:  Mr Thrasher presents for follow up of lower back pain and radicular pain down posterior leg going to bottom of calf. He is s/p focal R GTB TTP and states approx 2 weeks benefit. He states rotation from Tramadol to Norco 5/325mg with some mild benefit.     Interval History 6/17/2022:  Mr Thrasher presents for delayed FU. Over interval he has been stable. He inadverntely was Rxed Norco 7.5mg q6hrs. He also has been started on medical mariajuana and received Norco 10mg for gout while visiting ER. We discussed all these issues and Pt voiced understanding. States last Dosing of [place] was this a.m. Denies new areas of pain, denies any neurological changes. Denies SE of medications.     Interval History 9/19/2022:  Mr Thrasher presents for follow up of chronic pain. He has had worsening pain due to being out of medication as FU was just past 90 days. He is having newer return of L elbow pain. Denies further neurological changes. Denies worsening of chronic pain to R hip and R knee. Continues to take Norco 5/325mg BID without SE.         Interval History  12/12/2022  Patient continues to have bilateral knee pain R>L. He did get relief from genicular RFA in past for 4 months. Back pain is constant and 6-7/10. Back pain is right sided, sharp in nature and radiates down his leg. Discussed repeat genicular RFA then possible MBB. Patient agreeable to this plan. Will get L spine CT as patient is unable to get MRI 2/2 AICD.    Interval History 1/19/2023:  Mr Thrasher presents for follow up of chronic knee pain and s/p R genicular RFA with significant improvement of symptoms. He is ready to to address left knee pain. He has had genicular block and found to be diagnostic but has always pursued R knee RFA as this was main pain  generator. He continues to take Norco 5/325mg BID with benefit and denies SE of medications. Denies newer areas of pain or neurological changes.     Interval History 2/24/2023:  Mr Thrasher presents for follow up evaluation of left knee genicular RFA. He states he is doing well since this and had significant pain control and improved functioning. He continues to take Norco 5/325mg BID to aid in pain control and denies SE of medications. Denies newer areas of pain or neurological changes. Lower back pain is gradually returning but not ready to repeat procedures at this time.     Interval History 5/26/2023:  Mr Thrasher presents for follow up of chronic knee pains. He has been doing fair with med mgt of Norco 5/325mg BID. He denies SE of medication. Lower back pain also doing fair. No s/s concerning for cauda equina syndrome. Knee pain stable, he did enquire about PT.     Pain Disability Index Review:  Last 3 PDI Scores 5/26/2023 2/24/2023 1/19/2023   Pain Disability Index (PDI) 47 50 40       Pain Medications:  Norco 5mg  ASA 325mg  Topical lidocaine      Opioid Contract: yes     report:  Reviewed and consistent with medication use as prescribed.    Pain Procedures:   9/11/2020- Right L4/5 and L5/S1 TF PÉREZ  10/23/2020- Right L5/S1 and S1 TF PÉREZ  2/5/2021- Bilateral genicular nerve block  2/26/2021- Right genicular cooled RFA  11/12/2021 Right knee genicular RFA  12/17/2021: Right Greater Trochanteric Bursa Injection under Fluoroscopic Guidance  1/5/2022 Right knee genicular RFA  2/10/2023 Left Knee genicular RFA     Physical Therapy/Home Exercise: Set up from PT since discharge from hospital, but has not arranged.    Imaging:   Narrative & Impression       EXAMINATION:  XR LUMBAR SPINE 5 VIEW WITH FLEX AND EXT     CLINICAL HISTORY:  Back pain or radiculopathy, > 6 wks;  Radiculopathy, lumbar region     TECHNIQUE:  AP, lateral, oblique views of the lumbar spine with spot view of the lumbosacral region and lateral views  in flexion and extension.     COMPARISON:  None     FINDINGS:  There is no collapse or malalignment in the lumbar spine.  There is no significant change in alignment between flexion and extension.     There is reduced of the intervertebral disc spaces at L4-L5 and L5-S1 with disc vacuum phenomena and anterior osteophyte formation.  There are mild neural foraminal stenosis at these levels.     There is mild bilateral neural foraminal stenosis.     There are extensive vascular calcifications in the abdominal aorta.  The soft tissues are otherwise unremarkable.     Impression:     1. Moderate to severe spondylosis and neural foraminal stenosis at L4-L5 and L5-S1.  2. Straightening of lumbar lordosis  3. No changes in alignment in flexion and extension     Electronically signed by resident: Marko Lamas  Date:                                            07/07/2020  Time:                                           15:05     Electronically signed by: Roly Qiu  Date:                                            07/07/2020  Time:                                           15:21          Narrative & Impression       EXAMINATION:  CT LUMBAR SPINE WITHOUT CONTRAST     CLINICAL HISTORY:  Back pain or radiculopathy, > 6 wks;.     COMPARISON:  None.     FINDINGS:  Decrease height of vertebral bodies L4-L5 predominant at L5.  Degenerative changes in the endplates with decrease height intervertebral space L4-L5.  Vacuum phenomenon at L5-S1 indicating remarkable decrease.  Facet hypertrophy and degenerative changes in facet joints L3-L4, bilaterally.     Prominent aortic calcifications.     Left pleural effusion and atelectasis     Impression:     Prominent degenerative changes in the lumbar spine at L4, L5 and S1     Decrease height of intervertebral space L4-L5 with degenerative process and decrease height of vertebral bodies L4-L5 predominant at L5     Remarkable disc disease at L5-S1     Atherosclerosis     Left  pleural effusion and atelectasis.  Please see CT of the chest        Electronically signed by: Roly Qiu  Date:                                            07/08/2020  Time:                                           16:00          Allergies:   Review of patient's allergies indicates:   Allergen Reactions    Enalapril maleate Swelling and Edema     Other reaction(s): Swelling    Vasotec [enalaprilat] Swelling     Neck swelling    Zoloft [sertraline] Other (See Comments)     Patient states it put him to sleep in the hospital he could not talk nor move    Cyclobenzaprine Hallucinations     Other reaction(s): Swelling  Hallucinations according to patient.    Other reaction(s): Lymphadenopathy, Restlessness, Sensation of being cold, Restlessness, Sensation of being cold    Amitriptyline Hallucinations    Labetalol      Other reaction(s): Pharyngeal swelling    Lisinopril      Other reaction(s): Pharyngeal swelling    Tramadol Nausea Only and Hallucinations       Current Medications:   Current Outpatient Medications   Medication Sig Dispense Refill    allopurinol (ZYLOPRIM) 300 MG tablet Take 300 mg by mouth once daily.      apixaban (ELIQUIS) 5 mg Tab Take 5 mg by mouth 2 (two) times daily.      aspirin (ECOTRIN) 81 MG EC tablet Take 81 mg by mouth.      atorvastatin (LIPITOR) 80 MG tablet Take 80 mg by mouth once daily.      cholecalciferol, vitamin D3, 3,000 unit Tab Take 1 tablet by mouth.      coenzyme Q10 10 mg capsule Take 200 mg by mouth once daily.      colchicine (COLCRYS) 0.6 mg tablet TK 1 T PO BID PRF GOUT      febuxostat (ULORIC) 40 mg Tab Take 1 tablet (40 mg total) by mouth once daily. 30 tablet 6    glipiZIDE (GLUCOTROL) 5 MG tablet Take 5 mg by mouth 2 (two) times daily before meals.      HYDROcodone-acetaminophen (NORCO) 5-325 mg per tablet Take 1 tablet by mouth every 12 (twelve) hours as needed for Pain. 60 tablet 0    indomethacin (INDOCIN) 50 MG capsule Take 1 capsule (50 mg total) by  mouth 3 (three) times daily with meals. 30 capsule 0    melatonin 5 mg Cap Take by mouth.      metoprolol succinate (TOPROL-XL) 50 MG 24 hr tablet Take 1 tablet (50 mg total) by mouth once daily. 30 tablet 1    potassium chloride SA (K-DUR,KLOR-CON) 20 MEQ tablet Take 1 tablet (20 mEq total) by mouth 2 (two) times daily. 180 tablet 3    risperiDONE (RISPERDAL) 3 MG Tab Take by mouth.      sacubitriL-valsartan (ENTRESTO) 49-51 mg per tablet Take 1 tablet by mouth 2 (two) times daily. 60 tablet 3    sertraline (ZOLOFT) 50 MG tablet Take 50 mg by mouth once daily. TAKE THREE TABLETS BY MOUTH EVERY MORNING TO IMPROVE MOOD      sildenafiL (VIAGRA) 100 MG tablet Take 1 tablet (100 mg total) by mouth As instructed for Erectile Dysfunction. Please take 1 tablet one hour prior to intercourse on empty stomach 30 tablet 1    tadalafiL (CIALIS) 20 MG Tab Take 1 pill as needed prior to sex 20 tablet 1    spironolactone (ALDACTONE) 25 MG tablet Take 1 tablet (25 mg total) by mouth once daily. 90 tablet 3    torsemide (DEMADEX) 20 MG Tab Take 2 tablets (40 mg total) by mouth 2 (two) times daily. 360 tablet 3     No current facility-administered medications for this visit.       REVIEW OF SYSTEMS:    GENERAL:  No weight loss, malaise or fevers.  HEENT:  Negative for frequent or significant headaches.  NECK:  Negative for lumps, goiter, pain and significant neck swelling.  RESPIRATORY:  Negative for cough, wheezing or shortness of breath.  CARDIOVASCULAR:  Negative for chest pain, leg swelling or palpitations. +CHF, CAD, HTN  GI:  Negative for abdominal discomfort, blood in stools or black stools or change in bowel habits. GERD  MUSCULOSKELETAL:  See HPI  SKIN:  Negative for lesions, rash, and itching.  PSYCH:  Negative for sleep disturbance, mood disorder and recent psychosocial stressors.  HEMATOLOGY/LYMPHOLOGY:  Negative for prolonged bleeding, bruising easily or swollen nodes.  NEURO:   Daily migraine headaches  ENDO:  Diabetes  All other reviewed and negative other than HPI.    Past Medical History:  Past Medical History:   Diagnosis Date    Brain damage     traumatic    CAD (coronary artery disease)     Cardiomyopathy     CHF (congestive heart failure)     Diabetes mellitus     type 2    Edema     Erectile dysfunction     GERD (gastroesophageal reflux disease)     HTN (hypertension)     Hyperlipemia     Sciatic nerve pain, right     leg       Past Surgical History:  Past Surgical History:   Procedure Laterality Date    CARDIAC DEFIBRILLATOR PLACEMENT      CATHETERIZATION OF BOTH LEFT AND RIGHT HEART Bilateral 08/20/2018    CORONARY ARTERY BYPASS GRAFT      ILIAC ARTERY STENT      INJECTION OF JOINT Right 8/13/2021    Procedure: INJECTION, JOINT, SACROILIAC (SI);  Surgeon: Austin Parsons MD;  Location: [facility] PAIN [facility]GT;  Service: Pain Management;  Laterality: Right;    RADIOFREQUENCY ABLATION Right 2/26/2021    Procedure: RADIOFREQUENCY ABLATION GENICULAR NERVES, COOLED;  Surgeon: Austin Parsons MD;  Location: [facility] PAIN [facility]GT;  Service: Pain Management;  Laterality: Right;  1 of 2  consent needed  eliquis clearance requested    RADIOFREQUENCY ABLATION Right 11/12/2021    Procedure: RADIOFREQUENCY ABLATION, GENICULAR COOLED  NEED CONSENT/ clear to hold  ELIQUIS;  Surgeon: Austin Parsons MD;  Location: [facility] PAIN MGT;  Service: Pain Management;  Laterality: Right;    RADIOFREQUENCY ABLATION Right 1/5/2023    Procedure: RADIOFREQUENCY ABLATION RIGHT GENICULAR NERVES COOLED clear to hold Eliquis 3 days;  Surgeon: Austin Parsons MD;  Location: [facility] PAIN MGT;  Service: Pain Management;  Laterality: Right;    RADIOFREQUENCY ABLATION Left 2/10/2023    Procedure: RADIOFREQUENCY ABLATION LEFT GENICULAR CLEARED TO HOLD ELIQUIS 3 DAYS;  Surgeon: Austin Parsons MD;  Location: [facility] PAIN MGT;  Service: Pain Management;  Laterality: Left;    RIGHT HEART CATHETERIZATION Right 8/17/2020    Procedure: INSERTION, CATHETER, RIGHT HEART;  Surgeon:  "Brianda Navas MD;  Location: [facility] CATH LAB;  Service: Cardiology;  Laterality: Right;    TRANSFORAMINAL EPIDURAL INJECTION OF STEROID Right 2020    Procedure: INJECTION, STEROID, EPIDURAL, TRANSFORAMINAL APPROACH, L4-L5 AND L5-S1 clear to hold Eliquis 3 days;  Surgeon: Austin Parsons MD;  Location: [facility] PAIN MGT;  Service: Pain Management;  Laterality: Right;    TRANSFORAMINAL EPIDURAL INJECTION OF STEROID Right 10/23/2020    Procedure: INJECTION, STEROID, EPIDURAL, TRANSFORAMINAL APPROACH;  Surgeon: Austin Parsons MD;  Location: [facility] PAIN MGT;  Service: Pain Management;  Laterality: Right;  RT RFA L5/S1 and S1  Eliquis clearance in [place]       Family History:  History reviewed. No pertinent family history.    Social History:  Social History     Socioeconomic History    Marital status:    Tobacco Use    Smoking status: Never    Smokeless tobacco: Never   Substance and Sexual Activity    Alcohol use: Not Currently    Drug use: Not Currently       OBJECTIVE:    BP 91/60 (BP Location: Right arm, Patient Position: Sitting)   Pulse 99   Temp 97.8 °F (36.6 °C) (Oral)   Resp 19   Ht 6' 4" (1.93 m)   Wt 111.2 kg (245 lb 2.4 oz)   BMI 29.84 kg/m²     PHYSICAL EXAMINATION: 2021  Voice normal.  Normal affect without evidence of distress.  Musculoskeletal: Focal TTP to R GTB, + R leg straight leg test, + facet loading R L-spine  Limited ROM of the knee joints R>L.   Gait: antalgic, aided with cane     ASSESSMENT: 73 y.o. year old male with pain, consistent with the followin. Encounter for therapeutic drug monitoring  Pain Clinic Drug Screen      2. Knee pain, unspecified chronicity, unspecified laterality  Ambulatory referral/consult to Physical/Occupational Therapy                PLAN:   - Prior records reviewed   - stable with med mgt at this time  - Continue Norco 5/325mg BID #60. He does not need refill at this time  -UDS 2022 Compliant, updated today 2023  - LAPMP without " issues  - Dr Parsons is provider of medication mgt and agrees with above plan of care.   - cannot do MRI 2/2 AICD  - I have stressed the importance of physical activity and a home exercise plan to help with pain and improve health.  - Patient can continue with medications for now since they are providing benefits, using them appropriately, and without side effects.  - Counseled patient regarding the importance of physical therapy.  - RTC 3 months to continue medication mgt     The above plan and management options were discussed at length with patient. Patient is in agreement with the above and verbalized understanding.    Mike Daley NP  05/26/2023     I spent a total of 30 minutes on the day of the visit.  This includes face to face time and non-face to face time preparing to see the patient by reviewing previous labs/imaging, obtaining and/or reviewing separately obtained history, documenting clinical information in the electronic or other health record, independently interpreting results and communicating results to the patient/family/caregiver.

## 2023-06-07 NOTE — TELEPHONE ENCOUNTER
Patient requesting refill on HYDROcodone-acetaminophen (NORCO) 5-325 mg   Last office visit 05/26/23   shows last refill on 05/09/23  Patient does not have a pain contract on file with [facility] department  Patient last UDS 05/26/23 was consistent with current therapy     CODEINE  Not Detected  Not Detected  Not Detected  Not Detected  Not Detected   Not Detected    MORPHINE  Not Detected  Not Detected  Not Detected  Not Detected  Not Detected   Not Detected    6-ACETYLMORPHINE  Not Detected  Not Detected  Not Detected  Not Detected  Not Detected   Not Detected    OXYCODONE  Not Detected  Not Detected  Not Detected  Not Detected  Not Detected   Not Detected    NOROYXCODONE  Not Detected  Not Detected  Not Detected  Not Detected  Not Detected   Not Detected    OXYMORPHONE  Not Detected  Not Detected  Not Detected  Not Detected  Not Detected   Not Detected    NOROXYMORPHONE  Not Detected  Not Detected  Not Detected  Not Detected  Not Detected   Not Detected    HYDROCODONE  Present  Present  Present  Not Detected  Present   Present    NORHYDROCODONE  Present  Present

## 2023-06-07 NOTE — TELEPHONE ENCOUNTER
----- Message from Jia Laws sent at 6/7/2023 10:46 AM CDT -----  Regarding: Refill request  Type:  RX Refill Request    Who Called: ALLIE TOLEDO [[id number]]    Refill or New Rx: refill     RX Name and Strength: HYDROcodone-acetaminophen (NORCO) 5-325 mg per tablet    How is the patient currently taking it? (ex. 1XDay) 2xday     Is this a 30 day or 90 day RX: 30 days     Preferred Pharmacy with phone number: [id number] - [place], [place] - [address] AT [address]    Local or Mail Order: local    Ordering Provider: Jaqueline Petersen Call Back Number: [phone]    Additional Information:

## 2023-07-10 NOTE — TELEPHONE ENCOUNTER
Patient requesting refill on Norco 5/325mg  Last office visit 05.26.23   shows last refill on 06.09.23  Patient does have a pain contract on file with [facility] department  Patient last UDS 05.26.23 was consistent with current therapy

## 2023-07-10 NOTE — TELEPHONE ENCOUNTER
"----- Message from Sheri Romero sent at 7/10/2023  1:19 PM CDT -----  Regarding: Medication  "Type:  Patient Call Back    Who Called:PT    What is the reqeust in detail:Pt requesting call back in regards to Hyrod 5mg medication not showing in pt chart. Please advise    Can the clinic reply by [facility]?no     Best Call Back Number:[phone]      Additional Information:            "

## 2023-08-28 PROBLEM — F43.10 PTSD (POST-TRAUMATIC STRESS DISORDER): Status: ACTIVE | Noted: 2021-03-16

## 2023-08-28 PROBLEM — I50.43 ACUTE ON CHRONIC COMBINED SYSTOLIC AND DIASTOLIC HEART FAILURE: Status: RESOLVED | Noted: 2021-03-14 | Resolved: 2023-01-01

## 2023-08-28 PROBLEM — I35.0 MODERATE AORTIC STENOSIS: Status: ACTIVE | Noted: 2023-01-01

## 2023-08-28 PROBLEM — I48.0 PAROXYSMAL A-FIB: Status: ACTIVE | Noted: 2023-01-01

## 2023-08-28 PROBLEM — G47.33 OSA (OBSTRUCTIVE SLEEP APNEA): Status: ACTIVE | Noted: 2023-01-01

## 2023-08-28 PROBLEM — I34.0 SEVERE MITRAL REGURGITATION: Status: ACTIVE | Noted: 2023-01-01

## 2023-08-28 PROBLEM — R57.0 CARDIOGENIC SHOCK: Status: ACTIVE | Noted: 2023-01-01

## 2023-08-28 NOTE — PLAN OF CARE
Initial arrival 6:30 pm hemodynamics (on  gtt 2 mcg/kg/min)    Just arrived from [facility] and we inserted stat [facility] TLC     CVP 16  SVO2 47  Lactic acid 2.3  CI/CO/SVR 1.63/3.74/1303    Plan  -IVP lasix 80 and gtt 15 mg/h  -Increase  to 5 mcg/kg/min  -repeat SVO2 in 1 hours  -lactic acid q2h until it normalizes

## 2023-08-28 NOTE — Clinical Note
The catheter was reinserted into the SVG-PDA graft. An angiography was performed of the graft. Multiple views were taken.

## 2023-08-28 NOTE — Clinical Note
The catheter was repositioned into the SVG-D2 graft. An angiography was performed of the graft. Multiple views were taken.  AND CATHETER WAS REMOVED

## 2023-08-28 NOTE — Clinical Note
The catheter was repositioned into the LIMA-LAD graft. An angiography was performed of the graft. Multiple views were taken.

## 2023-08-28 NOTE — Clinical Note
The PA catheter was repositioned to the main pulmonary artery. Hemodynamics were performed. O2 saturation was measured at 37%. CO- 3.04  CI- 1.33

## 2023-08-28 NOTE — HPI
This is a 73 year old gentleman with HFrEF (EF 10-15%) 2/2 iCMP s/p [facility] [place] CRT-D placement, CAD s/p CABG x 4 (3/2014 in [place] with LIMA-LAD, SVG-RCA, SVG-PLV, SVG-D2) followed by PCI late same year in 10/2014 with LANA x 3 to RCA and LANA x 3 to left coronary system. Also has h/o pAF on eliquis, PAM on CPAP, CKD, HTN, HLD, T2DM and PAD.      [facility] for higher level of care. He stayed in the [facility] x 11 days and was initially hospitalized for acute CHF and then RHC was done 8/18 (data not available) which showed cardiogenic shock after which he was put on  gtt which was later weaned but his UO dropped and Cr also went back up so he was restarted on  gtt and sent here. His echo there showed mod AS and severe MR and was sent here to eval regarding valvular heart disease.     Upon arrival here, he is A O x 3 and comfortable. He has PVC and non sustained VT runs on tele. He has sinus tach with rate 105-110. He is on room air with SpO2 98%. Initial hemodynamics are as under:    CVP 16  SVO2 47  Lactic acid 2.3  CI/CO/SVR 1.63/3.74/1303    Patient started on lasix push 80 followed by gtt 15 and  increased to 5 mcg/kg/min from 2 mcg/kg/min. His lactic acid is 2.3 and Cr 2.0

## 2023-08-29 NOTE — HOSPITAL COURSE
Patient is getting a left heart catheterization on 08/29/2023.  Repeat echo reveals severely reduced systolic function with estimated ejection fraction of 10 to 15%.  Aortic valve:  Moderate to severe stenosis low-flow.  In the mean stress echo was performed to assess aortic valve which showed Aortic Valve:  There is moderate to severe low flow low gradient aortic stenosis. There is no change is the LV stroke volume with dobutamine consistent with a lack of contractile reserve.  No valvular interventions at this time. RHC done on 9/4/23 revealing continuing hypervolemia with attempting to diuresis. 9/9/23 patient developed afib with RVR along with clinical deterioration shock call was placed with no intervention this time.  Dr. Frankel, contacted [facility] for possible cardiac interventions however it was noted that no interventions can be provided at this time.  Patient began dialysis on 9/9 and is on multiple pressors. PT not tolerating CRRT well. Pt would like to max on pressors and keep him on comfort care.

## 2023-08-29 NOTE — ASSESSMENT & PLAN NOTE
-CAD s/p CABG x 4 (3/2014 in [place] with LIMA-LAD, SVG-RCA, SVG-PLV, SVG-D2) followed by PCI late same year in 10/2014 with LANA x 3 to RCA and LANA x 3 to left coronary system  -ASA 81 daily and HI statin    Plan  - [facility] to see anatomy on 8/29,

## 2023-08-29 NOTE — PT/OT/SLP EVAL
Physical Therapy Evaluation    Patient Name:  Ishmael Thrasher   MRN:  [id number]    Recommendations:     Discharge Recommendations: other (see comments)   Discharge Equipment Recommendations: none   Barriers to discharge: None    Assessment:     Ishmael Thrasher is a 73 y.o. male admitted with a medical diagnosis of Cardiogenic shock.  He presents with the following impairments/functional limitations: impaired balance, decreased safety awareness, impaired endurance, impaired functional mobility, gait instability, decreased lower extremity function pt tolerated treatment well and will benefit from skilled PT 3x/wk to progress physically. Pt will be able to discharge home with further PT needs when medically stable. [facility] .    Rehab Prognosis: Good; patient would benefit from acute skilled PT services to address these deficits and reach maximum level of function.    Recent Surgery: Procedure(s) (LRB):  Left heart cath (N/A)      Plan:     During this hospitalization, patient to be seen 3 x/week to address the identified rehab impairments via gait training, therapeutic activities, therapeutic exercises and progress toward the following goals:    Plan of Care Expires:  09/27/23    Subjective     Chief Complaint: pt c/o pain during treatment. See below chart.   Patient/Family Comments/goals: to get better and go home.   Pain/Comfort:  Pain Rating 1:  (7- head, 10- R knee 8- L knee)  Pain Addressed 1: Reposition, Distraction  Pain Rating Post-Intervention 1:  (see above)    Patients cultural, spiritual, Restorationism conflicts given the current situation: no    Living Environment:  Pt works part-time from home and lives with his homemaker wife.   Prior to admission, patients level of function was modified Independent with rollator .  Equipment used at home: rollator, cane, straight, shower chair, bedside commode (hoveround).  DME owned (not currently used): none.  Upon discharge, patient will have  assistance from wife but no physical assist. .    Objective:     Communicated with nurse  prior to session.  Patient found supine with telemetry, blood pressure cuff, pulse ox (continuous), central line (CVP)  upon PT entry to room.    General Precautions: Standard, fall  Orthopedic Precautions:    Braces:    Respiratory Status: Room air    Exams:  Cognitive Exam:  Patient is oriented to Person, Place, Time, and Situation  RLE ROM: slight decreased knee extension  RLE Strength: 3/5 for major muscle groups  LLE ROM: WFL  LLE Strength: WFL    Functional Mobility:  Bed Mobility:     Rolling Left:  stand by assistance  Supine to Sit: stand by assistance  Sit to Supine: minimum assistance for LLE management    Transfers:     Sit to Stand:  contact guard assistance with hand-held assist    Gait: pt received gait training ~ 2 side steps to head of bed with CGA.    Balance: pt sat on EOB with SBA and stood with CGA for balance.     AM-PAC 6 CLICK MOBILITY  Total Score:17       Treatment & Education:  Pt received verbal instructions in role of PT and POC. Pt verbally expressed understanding of such.     Patient left supine with all lines intact, call button in reach, and RN  notified. Pt was returned to supine in bed due to pending test.     GOALS:   Multidisciplinary Problems       Physical Therapy Goals          Problem: Physical Therapy    Goal Priority Disciplines Outcome Goal Variances Interventions   Physical Therapy Goal     PT, PT/OT Ongoing, Progressing     Description: Goals to be met by: 23     Patient will increase functional independence with mobility by performin. Supine to sit with Modified [place]  2. Sit to stand transfer with Stand-by Assistance with AD if needed.   3. Gait  x 150 feet with Stand-by Assistance using AD if needed. .   4. Lower extremity exercise program x10 reps per handout, with assistance as needed to increase strength to increase functional mobility.                           History:     Past Medical History:   Diagnosis Date    Brain damage     traumatic    CAD (coronary artery disease)     Cardiomyopathy     CHF (congestive heart failure)     Diabetes mellitus     type 2    Edema     Erectile dysfunction     GERD (gastroesophageal reflux disease)     HTN (hypertension)     Hyperlipemia     Sciatic nerve pain, right     leg       Past Surgical History:   Procedure Laterality Date    CARDIAC DEFIBRILLATOR PLACEMENT      CATHETERIZATION OF BOTH LEFT AND RIGHT HEART Bilateral 08/20/2018    CORONARY ARTERY BYPASS GRAFT      ILIAC ARTERY STENT      INJECTION OF JOINT Right 8/13/2021    Procedure: INJECTION, JOINT, SACROILIAC (SI);  Surgeon: Austin Parsons MD;  Location: [facility] PAIN MGT;  Service: Pain Management;  Laterality: Right;    PHACOEMULSIFICATION, CATARACT, WITH IOL INSERTION Right 8/15/2023    Procedure: PHACOEMULSIFICATION, CATARACT, WITH IOL INSERTION- OD;  Surgeon: Eyal Mccarthy MD;  Location: [facility] OR;  Service: Ophthalmology;  Laterality: Right;    RADIOFREQUENCY ABLATION Right 2/26/2021    Procedure: RADIOFREQUENCY ABLATION GENICULAR NERVES, COOLED;  Surgeon: Austin Parsons MD;  Location: [facility] PAIN MGT;  Service: Pain Management;  Laterality: Right;  1 of 2  consent needed  eliquis clearance requested    RADIOFREQUENCY ABLATION Right 11/12/2021    Procedure: RADIOFREQUENCY ABLATION, GENICULAR COOLED  NEED CONSENT/ clear to hold  ELIQUIS;  Surgeon: Austin Parsons MD;  Location: [facility] PAIN MGT;  Service: Pain Management;  Laterality: Right;    RADIOFREQUENCY ABLATION Right 1/5/2023    Procedure: RADIOFREQUENCY ABLATION RIGHT GENICULAR NERVES COOLED clear to hold Eliquis 3 days;  Surgeon: Austin Parsons MD;  Location: [facility] PAIN MGT;  Service: Pain Management;  Laterality: Right;    RADIOFREQUENCY ABLATION Left 2/10/2023    Procedure: RADIOFREQUENCY ABLATION LEFT GENICULAR CLEARED TO HOLD ELIQUIS 3 DAYS;  Surgeon: Austin Parsons MD;  Location: [facility] PAIN T;   Service: Pain Management;  Laterality: Left;    RIGHT HEART CATHETERIZATION Right 8/17/2020    Procedure: INSERTION, CATHETER, RIGHT HEART;  Surgeon: Brianda Navas MD;  Location: [facility] CATH LAB;  Service: Cardiology;  Laterality: Right;    TRANSFORAMINAL EPIDURAL INJECTION OF STEROID Right 9/11/2020    Procedure: INJECTION, STEROID, EPIDURAL, TRANSFORAMINAL APPROACH, L4-L5 AND L5-S1 clear to hold Eliquis 3 days;  Surgeon: Austin Parsons MD;  Location: [facility] PAIN MGT;  Service: Pain Management;  Laterality: Right;    TRANSFORAMINAL EPIDURAL INJECTION OF STEROID Right 10/23/2020    Procedure: INJECTION, STEROID, EPIDURAL, TRANSFORAMINAL APPROACH;  Surgeon: Austin Parsons MD;  Location: [facility] PAIN MGT;  Service: Pain Management;  Laterality: Right;  RT RFA L5/S1 and S1  Eliquis clearance in Media       Time Tracking:     PT Received On: 08/29/23  PT Start Time: 1101     PT Stop Time: 1115  PT Total Time (min): 14 min     Billable Minutes: Evaluation 14 min       08/29/2023

## 2023-08-29 NOTE — SUBJECTIVE & OBJECTIVE
Interval History: No acute events overnight, afebrile, hemodynamically stable.       Review of Systems   Constitutional: Negative for chills and fever.   HENT:  Negative for congestion.    Cardiovascular:  Negative for chest pain and dyspnea on exertion.   Respiratory:  Negative for cough and shortness of breath.    Musculoskeletal:  Negative for back pain.   Gastrointestinal:  Negative for abdominal pain, diarrhea, nausea and vomiting.   Neurological:  Negative for headaches.     Objective:     Vital Signs (Most Recent):  Temp: 97.7 °F (36.5 °C) (08/29/23 0300)  Pulse: 103 (08/29/23 1501)  Resp: (!) 52 (08/29/23 1501)  BP: 119/84 (08/29/23 1501)  SpO2: 99 % (08/29/23 1501) Vital Signs (24h Range):  Temp:  [97.7 °F (36.5 °C)-98.1 °F (36.7 °C)] 97.7 °F (36.5 °C)  Pulse:  [] 103  Resp:  [12-52] 52  SpO2:  [89 %-100 %] 99 %  BP: ()/(67-84) 119/84     Weight: 98.4 kg (217 lb)  Body mass index is 26.41 kg/m².     SpO2: 99 %         Intake/Output Summary (Last 24 hours) at 8/29/2023 1559  Last data filed at 8/29/2023 1501  Gross per 24 hour   Intake 654.94 ml   Output 2600 ml   Net -1945.06 ml       Lines/Drains/Airways       Central Venous Catheter Line  Duration             Percutaneous Central Line Insertion/Assessment - Triple Lumen  08/28/23 1800 Internal Jugular Left <1 day              Peripheral Intravenous Line  Duration                  Peripheral IV - Single Lumen 08/28/23 1700 Anterior;Proximal;Right Forearm <1 day                       Physical Exam  HENT:      Head: Normocephalic.      Nose: Nose normal.      Mouth/Throat:      Mouth: Mucous membranes are moist.   Eyes:      Pupils: Pupils are equal, round, and reactive to light.   Cardiovascular:      Rate and Rhythm: Regular rhythm. Tachycardia present.   Pulmonary:      Effort: Pulmonary effort is normal.   Abdominal:      General: Abdomen is flat.   Musculoskeletal:         General: Normal range of motion.   Skin:     General: Skin is warm.    Neurological:      General: No focal deficit present.      Mental Status: He is alert.   Psychiatric:         Mood and Affect: Mood normal.            Significant Labs: CMP   Recent Labs   Lab 08/28/23  1722 08/28/23  2256 08/29/23  0335   * 136 138   K 4.5 4.2 4.4    100 102   CO2 22* 24 22*   * 141* 115*   BUN 58* 55* 54*   CREATININE 2.0* 2.1* 1.9*   CALCIUM 9.7 9.6 9.6   PROT 7.0  --  6.9   ALBUMIN 3.4*  --  3.3*   BILITOT 0.7  --  0.9   ALKPHOS 124  --  112   AST 35  --  27   ALT 36  --  33   ANIONGAP 11 12 14    and CBC   Recent Labs   Lab 08/28/23  1722 08/29/23  0335   WBC 10.27 10.56   HGB 13.5* 12.9*   HCT 42.3 39.8*    261       Significant Imaging:

## 2023-08-29 NOTE — ASSESSMENT & PLAN NOTE
-CAD s/p CABG x 4 (3/2014 in [place] with LIMA-LAD, SVG-RCA, SVG-PLV, SVG-D2) followed by PCI late same year in 10/2014 with LANA x 3 to RCA and LANA x 3 to left coronary system  -ASA 81 daily and HI statin    Plan  -will need to repeat LHC to see anatomy, Cr 2.0 currently, diurese  -NPO midnight in case gets LHC tomorrow

## 2023-08-29 NOTE — BRIEF OP NOTE
Brief Operative Note:    : Gage Low MD     Referring Physician: Davide Cunningham     All Operators: Surgeon(s):  Gage Low MD Davis, Arthur P, MD     Preoperative Diagnosis: Chest pain [R07.9]     Postop Diagnosis: Chest pain [R07.9]    Treatments/Procedures: Procedure(s) (LRB):  ANGIOGRAM, CORONARY ARTERY (N/A)  Bypass graft study    Findings:Severe coronary disease is present. See catheterization report for full details.    - dLCx  -patent LIMA-LAD and patent SVG-D2  -dLAD stenosis to small vessel distal LAD  -patent native RCA stents  -patent SVG-PDA  -occluded SVG-RCA    Estimated Blood loss: 20 cc    Specimens removed: No    Recommendations:   - Routine post-cath care as per orders  - IVF at  100cc/hr  for 4 hrs  - Continue medical management, Risk factor reduction, Follow-up with outpatient cardiologist      Lacho Díaz

## 2023-08-29 NOTE — ASSESSMENT & PLAN NOTE
-Acute gout attack in 8/2023 while [facility] with b/l knee pain  -Uric acid 11.6 at that time    Plan  -continue allopurinol 300 daily (modify dose in AM if needed)  -on colchicine 0.6 mg qd, will switch it to 0.6 mg q48 h  -was [facility] by Rheum in [facility], on prednisone taper (will give 5 mg prednisone x 3 more days and then stop)

## 2023-08-29 NOTE — PLAN OF CARE
[facility] - Cardiac Intensive Care  Initial Discharge Assessment       Primary Care Provider: [facility]    Admission Diagnosis: Aortic stenosis [I35.0]    Admission Date: 8/28/2023  Expected Discharge Date:     Transition of Care Barriers: None    Payor: [employer] / Plan: [facility] OPTUM / Product Type: Government /     Extended Emergency Contact Information  Primary Emergency Contact: Lauren Thrasher  Address: [address] [place] [address]  Home Phone: [phone]  Mobile Phone: [phone]  Relation: Spouse  Preferred language: English   needed? No  Secondary Emergency Contact: Shannan Thrasher  Mobile Phone: [phone]  Relation: Daughter  Preferred language: English   needed? No    Discharge Plan A: Home with family  Discharge Plan B: Home      [id number] [address]  AT [facility] [address]  [address] [facility] [address]  Phone: [phone] Fax: [phone]      Initial Assessment (most recent)       Adult Discharge Assessment - 08/29/23 1547          Discharge Assessment    Assessment Type Discharge Planning Assessment     Confirmed/corrected address, phone number and insurance Yes     Confirmed Demographics Correct on Facesheet     Source of Information patient     Communicated RENZO with patient/caregiver Date not available/Unable to determine     Reason For Admission cardiogenic shock     People in Home spouse     [facility]     Do you expect to return to your current living situation? Yes     Do you have help at home or someone to help you manage your care at home? Yes     Who are your caregiver(s) and their phone number(s)? Wendie Thrasher (spouse) [phone]     Prior to hospitilization cognitive status: Alert/Oriented     Current cognitive status: Alert/Oriented     Equipment Currently Used at Home rollator;cane, straight;shower  chair;bedside commode;CPAP     Readmission within 30 days? No     Patient currently being followed by outpatient case management? No     Do you currently have service(s) that help you manage your care at home? No     Do you take prescription medications? Yes     Do you have prescription coverage? Yes     Coverage [facility] [employer]     Do you have any problems affording any of your prescribed medications? No     Is the patient taking medications as prescribed? yes     Who is going to help you get home at discharge? Wendie Thrasher (spouse) [phone] or daughter Shannan Thrasher (daughter) [phone]     How do you get to doctors appointments? family or friend will provide;car, drives self     Are you on dialysis? No     Do you take coumadin? No     DME Needed Upon Discharge  none     Discharge Plan discussed with: Patient     Transition of Care Barriers None     Discharge Plan A Home with family     Discharge Plan B Home        Financial Resource Strain    How hard is it for you to pay for the very basics like food, housing, medical care, and heating? Not hard at all        Housing Stability    In the last 12 months, was there a time when you were not able to pay the mortgage or rent on time? No     In the last 12 months, how many places have you lived? 1     In the last 12 months, was there a time when you did not have a steady place to sleep or slept in a shelter (including now)? No        Transportation Needs    In the past 12 months, has lack of transportation kept you from medical appointments or from getting medications? No     In the past 12 months, has lack of transportation kept you from meetings, work, or from getting things needed for daily living? No        Food Insecurity    Within the past 12 months, you worried that your food would run out before you got the money to buy more. Never true     Within the past 12 months, the food you bought just didn't last and you didn't have money  to get more. Never true        Stress    Do you feel stress - tense, restless, nervous, or anxious, or unable to sleep at night because your mind is troubled all the time - these days? Not at all        Social Connections    In a typical week, how many times do you talk on the phone with family, friends, or neighbors? More than three times a week     How often do you get together with friends or relatives? Once a week     How often do you attend Anglican or Rastafarian services? Never     Do you belong to any clubs or organizations such as Anglican groups, unions, fraternal or athletic groups, or school groups? No     How often do you attend meetings of the clubs or organizations you belong to? Never     Are you , , , , never , or living with a partner?         Alcohol Use    Q1: How often do you have a drink containing alcohol? Never     Q2: How many drinks containing alcohol do you have on a typical day when you are drinking? Patient does not drink     Q3: How often do you have six or more drinks on one occasion? Never        OTHER    Name(s) of People in Home Wendie Thrasher (spouse) [phone]                        CM met with the patient at the bedside. Discussed the discharge process with him and placed contact numbers on the white board. Left discharge booklet at the bedside for him due to he was leaving for a procedure. Patient lives with spouse and spouse or daughter will bring home. He stated he has all kinds of equipment , c-pap, walker, W/C , cane and a hover-round. Patient denied coumadin and is not dialysis. [facility] [place] [facility]. Will continue to monitor for discharge needs.     Lissette Carl RN    [phone]

## 2023-08-29 NOTE — PLAN OF CARE
Problem: Physical Therapy  Goal: Physical Therapy Goal  Description: Goals to be met by: 23     Patient will increase functional independence with mobility by performin. Supine to sit with Modified [place]  2. Sit to stand transfer with Stand-by Assistance with AD if needed.   3. Gait  x 150 feet with Stand-by Assistance using AD if needed. .   4. Lower extremity exercise program x10 reps per handout, with assistance as needed to increase strength to increase functional mobility.     Outcome: Ongoing, Progressing   Evaluation completed and goals appropriate. 2023

## 2023-08-29 NOTE — H&P
[facility] - Cardiac Intensive [facility]  Cardiology  History and Physical     Patient Name: Ishmael Thrasher  MRN: [id number]  Admission Date: 8/28/2023  Code Status: Full Code   Attending Provider: Gage Low MD   Primary Care Physician: [facility]  Principal Problem:Cardiogenic shock    Patient information was obtained from patient and ER records.     Subjective:     Chief Complaint:  [facility]     HPI:  This is a 73 year old gentleman with HFrEF (EF 10-15%) 2/2 iCMP s/p [facility] [place] CRT-D placement, CAD s/p CABG x 4 (3/2014 in [place] with LIMA-LAD, SVG-RCA, SVG-PLV, SVG-D2) followed by PCI late same year in 10/2014 with LANA x 3 to RCA and LANA x 3 to left coronary system. Also has h/o pAF on eliquis, PAM on CPAP, CKD, HTN, HLD, T2DM and PAD.      [place] [facility] [place] [facility] for higher level of care. [facility] and was initially hospitalized for acute CHF and then RHC was done 8/18 (data not available) which showed cardiogenic shock after which he was put on  gtt which was later weaned but his UO dropped and Cr also went back up so he was restarted on  gtt and sent here. His echo there showed mod AS and severe MR and was sent here to eval regarding valvular heart disease.     Upon arrival here, he is A O x 3 and comfortable. He has PVC and non sustained VT runs on tele. He has sinus tach with rate 105-110. He is on room air with SpO2 98%. Initial hemodynamics are as under:    CVP 16  SVO2 47  Lactic acid 2.3  CI/CO/SVR 1.63/3.74/1303    Patient started on lasix push 80 followed by gtt 15 and  increased to 5 mcg/kg/min from 2 mcg/kg/min. His lactic acid is 2.3 and Cr 2.0          Past Medical History:   Diagnosis Date    Brain damage     traumatic    CAD (coronary artery disease)     Cardiomyopathy     CHF (congestive heart failure)     Diabetes mellitus     type 2    Edema     Erectile  dysfunction     GERD (gastroesophageal reflux disease)     HTN (hypertension)     Hyperlipemia     Sciatic nerve pain, right     leg       Past Surgical History:   Procedure Laterality Date    CARDIAC DEFIBRILLATOR PLACEMENT      CATHETERIZATION OF BOTH LEFT AND RIGHT HEART Bilateral 08/20/2018    CORONARY ARTERY BYPASS GRAFT      ILIAC ARTERY STENT      INJECTION OF JOINT Right 8/13/2021    Procedure: INJECTION, JOINT, SACROILIAC (SI);  Surgeon: Austin Parsons MD;  Location: [facility] PAIN MGT;  Service: Pain Management;  Laterality: Right;    PHACOEMULSIFICATION, CATARACT, WITH IOL INSERTION Right 8/15/2023    Procedure: PHACOEMULSIFICATION, CATARACT, WITH IOL INSERTION- OD;  Surgeon: Eyal Mccarthy MD;  Location: [facility] OR;  Service: Ophthalmology;  Laterality: Right;    RADIOFREQUENCY ABLATION Right 2/26/2021    Procedure: RADIOFREQUENCY ABLATION GENICULAR NERVES, COOLED;  Surgeon: Austin Parsons MD;  Location: [facility] PAIN MGT;  Service: Pain Management;  Laterality: Right;  1 of 2  consent needed  eliquis clearance requested    RADIOFREQUENCY ABLATION Right 11/12/2021    Procedure: RADIOFREQUENCY ABLATION, GENICULAR COOLED  NEED CONSENT/ clear to hold  ELIQUIS;  Surgeon: Austin Parsons MD;  Location: [facility] PAIN MGT;  Service: Pain Management;  Laterality: Right;    RADIOFREQUENCY ABLATION Right 1/5/2023    Procedure: RADIOFREQUENCY ABLATION RIGHT GENICULAR NERVES COOLED clear to hold Eliquis 3 days;  Surgeon: Austin Parsons MD;  Location: [facility] PAIN MGT;  Service: Pain Management;  Laterality: Right;    RADIOFREQUENCY ABLATION Left 2/10/2023    Procedure: RADIOFREQUENCY ABLATION LEFT GENICULAR CLEARED TO HOLD ELIQUIS 3 DAYS;  Surgeon: Austin Parsons MD;  Location: [facility] PAIN MGT;  Service: Pain Management;  Laterality: Left;    RIGHT HEART CATHETERIZATION Right 8/17/2020    Procedure: INSERTION, CATHETER, RIGHT HEART;  Surgeon: Brianda Navas MD;  Location: [facility] CATH LAB;  Service: Cardiology;  Laterality:  Right;    TRANSFORAMINAL EPIDURAL INJECTION OF STEROID Right 9/11/2020    Procedure: INJECTION, STEROID, EPIDURAL, TRANSFORAMINAL APPROACH, L4-L5 AND L5-S1 clear to hold Eliquis 3 days;  Surgeon: Austin Parsons MD;  Location: [facility] PAIN MGT;  Service: Pain Management;  Laterality: Right;    TRANSFORAMINAL EPIDURAL INJECTION OF STEROID Right 10/23/2020    Procedure: INJECTION, STEROID, EPIDURAL, TRANSFORAMINAL APPROACH;  Surgeon: Austin Parsons MD;  Location: [facility] PAIN MGT;  Service: Pain Management;  Laterality: Right;  RT RFA L5/S1 and S1  Eliquis clearance in Media       Review of patient's allergies indicates:   Allergen Reactions    Enalapril maleate Swelling and Edema     Other reaction(s): Swelling    Vasotec [enalaprilat] Swelling     Neck swelling    Zoloft [sertraline] Other (See Comments)     Patient states it put him to sleep in the hospital he could not talk nor move    Cyclobenzaprine Hallucinations     Other reaction(s): Swelling  Hallucinations according to patient.    Other reaction(s): Lymphadenopathy, Restlessness, Sensation of being cold, Restlessness, Sensation of being cold    Amitriptyline Hallucinations    Labetalol      Other reaction(s): Pharyngeal swelling    Lisinopril      Other reaction(s): Pharyngeal swelling    Tramadol Nausea Only and Hallucinations       No current facility-administered medications on file prior to encounter.     Current Outpatient Medications on File Prior to Encounter   Medication Sig    allopurinol (ZYLOPRIM) 300 MG tablet Take 300 mg by mouth once daily.    apixaban (ELIQUIS) 5 mg Tab Take 5 mg by mouth 2 (two) times daily.    aspirin (ECOTRIN) 81 MG EC tablet Take 81 mg by mouth.    atorvastatin (LIPITOR) 80 MG tablet Take 80 mg by mouth once daily.    coenzyme Q10 10 mg capsule Take 200 mg by mouth once daily.    colchicine (COLCRYS) 0.6 mg tablet TK 1 T PO BID PRF GOUT    cholecalciferol, vitamin D3, 3,000 unit Tab Take 1 tablet by mouth.    empagliflozin  (JARDIANCE) 25 mg tablet Take by mouth.    febuxostat (ULORIC) 40 mg Tab Take 1 tablet (40 mg total) by mouth once daily.    glipiZIDE (GLUCOTROL) 5 MG tablet Take 5 mg by mouth 2 (two) times daily before meals.    indomethacin (INDOCIN) 50 MG capsule Take 1 capsule (50 mg total) by mouth 3 (three) times daily with meals.    melatonin 5 mg Cap Take by mouth.    metoprolol succinate (TOPROL-XL) 50 MG 24 hr tablet Take 1 tablet (50 mg total) by mouth once daily.    potassium chloride SA (K-DUR,KLOR-CON) 20 MEQ tablet Take 1 tablet (20 mEq total) by mouth 2 (two) times daily.    risperiDONE (RISPERDAL) 3 MG Tab Take by mouth.    sertraline (ZOLOFT) 50 MG tablet Take 50 mg by mouth once daily. TAKE THREE TABLETS BY MOUTH EVERY MORNING TO IMPROVE MOOD    sildenafiL (VIAGRA) 100 MG tablet Take 1 tablet (100 mg total) by mouth As instructed for Erectile Dysfunction. Please take 1 tablet one hour prior to intercourse on empty stomach    spironolactone (ALDACTONE) 25 MG tablet Take 1 tablet (25 mg total) by mouth once daily.    tadalafiL (CIALIS) 20 MG Tab Take 1 pill as needed prior to sex    torsemide (DEMADEX) 20 MG Tab Take 2 tablets (40 mg total) by mouth 2 (two) times daily.    [DISCONTINUED] isosorbide dinitrate (ISORDIL) 20 MG tablet Take 1 tablet (20 mg total) by mouth 3 (three) times daily. (Patient not taking: Reported on 6/13/2022)     Family History    None       Tobacco Use    Smoking status: Never    Smokeless tobacco: Never   Substance and Sexual Activity    Alcohol use: Not Currently    Drug use: Not Currently    Sexual activity: Not on file     Review of Systems   Constitutional: Positive for decreased appetite, malaise/fatigue and weight gain. Negative for fever.   HENT:  Negative for ear pain.    Eyes:  Negative for double vision.   Cardiovascular:  Positive for palpitations. Negative for chest pain.   Respiratory:  Positive for shortness of breath.    Endocrine: Negative for heat intolerance.    Hematologic/Lymphatic: Negative for adenopathy.   Skin:  Negative for dry skin.   Musculoskeletal:  Negative for falls.   Gastrointestinal:  Negative for anorexia.   Genitourinary:  Negative for flank pain.   Neurological:  Negative for disturbances in coordination.   Psychiatric/Behavioral:  Negative for depression.      Objective:     Vital Signs (Most Recent):  Temp: 98 °F (36.7 °C) (08/28/23 1900)  Pulse: 107 (08/28/23 1900)  Resp: (!) 32 (08/28/23 1900)  BP: 108/69 (08/28/23 1900)  SpO2: 99 % (08/28/23 1900) Vital Signs (24h Range):  Temp:  [97 °F (36.1 °C)-98 °F (36.7 °C)] 98 °F (36.7 °C)  Pulse:  [106-107] 107  Resp:  [18-32] 32  SpO2:  [98 %-100 %] 99 %  BP: ()/(64-72) 108/69     Weight: 98.7 kg (217 lb 9.5 oz)  Body mass index is 26.49 kg/m².    SpO2: 99 %       No intake or output data in the 24 hours ending 08/28/23 1935    Lines/Drains/Airways       Central Venous Catheter Line  Duration             Trialysis (Dialysis) Catheter 08/28/23 1816 left internal jugular <1 day              Peripheral Intravenous Line  Duration                  Peripheral IV - Single Lumen 08/28/23 1700 Anterior;Distal;Left Forearm <1 day         Peripheral IV - Single Lumen 08/28/23 1700 Anterior;Proximal;Right Forearm <1 day                     Physical Exam  HENT:      Head: Normocephalic.      Nose: Nose normal.      Mouth/Throat:      Mouth: Mucous membranes are moist.   Eyes:      Pupils: Pupils are equal, round, and reactive to light.   Cardiovascular:      Rate and Rhythm: Regular rhythm. Tachycardia present.   Pulmonary:      Effort: Pulmonary effort is normal.   Abdominal:      General: Abdomen is flat.   Musculoskeletal:         General: Normal range of motion.   Skin:     General: Skin is warm.   Neurological:      General: No focal deficit present.      Mental Status: He is alert.   Psychiatric:         Mood and Affect: Mood normal.          Significant Labs: Blood Culture: No results for input(s):  ""LABBLOO" in the last 48 hours., BMP:   Recent Labs   Lab 08/28/23  1722   *   *   K 4.5      CO2 22*   BUN 58*   CREATININE 2.0*   CALCIUM 9.7   MG 2.4   , CMP   Recent Labs   Lab 08/28/23  1722   *   K 4.5      CO2 22*   *   BUN 58*   CREATININE 2.0*   CALCIUM 9.7   PROT 7.0   ALBUMIN 3.4*   BILITOT 0.7   ALKPHOS 124   AST 35   ALT 36   ANIONGAP 11   , CBC   Recent Labs   Lab 08/28/23  1722   WBC 10.27   HGB 13.5*   HCT 42.3      , INR   Recent Labs   Lab 08/28/23  1722   INR 1.2   , Lipid Panel No results for input(s): "CHOL", "HDL", "LDLCALC", "TRIG", "CHOLHDL" in the last 48 hours., and Troponin   Recent Labs   Lab 08/28/23  1722   TROPONINI 0.089*         Assessment and Plan:     * Cardiogenic shock  HFrEF due to iCMP  EF 10-15% on echo along with severe MR and mod AS per echo report from prior hospital     Initial hemodynamics on  2 mcg/kg/min on arrival  CVP 16  SVO2 47  Lactic acid 2.3  CI/CO/SVR 1.63/3.74/1303    Plan  -increase  gtt to 5 mcg/kg/min   -lasix push 80 and gtt 15  -K>4, Mg>2 and tele  -CVP q4h and SVO2 q12h    Moderate aortic stenosis  -[facility]  -repeat echo here in AM    Severe mitral regurgitation  -[facility]  -repeat echo here in AM    PAM (obstructive sleep apnea)  CPAP ordered     Paroxysmal A-fib  -currently in sinus tach 105  -Takes Eliquis at home for pAF and chronic DVT  -Switch Eliquis to heparin gtt for now    PTSD (post-traumatic stress disorder)  Resume psych meds    Chronic deep vein thrombosis (DVT) of right lower extremity  Takes Eliquis at home for pAF and chronic DVT  Switch Eliquis to heparin gtt for now    Gout  -Acute gout attack in 8/2023 [facility] with b/l knee pain  -Uric acid 11.6 at that time    Plan  -continue allopurinol 300 daily (modify dose in AM if needed)  -on colchicine 0.6 mg qd, continue (checked with pharmacist and ok with current kidney function)  -[facility], on prednisone taper (will give 5 mg prednisone x 3 more days and then stop)    Ischemic cardiomyopathy  -CAD s/p CABG x 4 (3/2014 in [place] with LIMA-LAD, SVG-RCA, SVG-PLV, SVG-D2) followed by PCI late same year in 10/2014 with LANA x 3 to RCA and LANA x 3 to left coronary system  -ASA 81 daily and HI statin    Plan  -will need to repeat C to see anatomy, Cr 2.0 currently, diurese  -NPO midnight in case gets LHC tomorrow        VTE Risk Mitigation (From admission, onward)           Ordered     heparin 25,000 units in dextrose 5% (100 units/ml) IV bolus from bag - ADDITIONAL PRN BOLUS - 60 units/kg  As needed (PRN)        Question:  Heparin Infusion Adjustment (DO NOT MODIFY ANSWER)  Answer:  [email or web address]\epic\Images\Pharmacy\HeparinInfusions\heparin LOW INTENSITY nomogram for [facility] [id number]    08/28/23 1719     heparin 25,000 units in dextrose 5% (100 units/ml) IV bolus from bag - ADDITIONAL PRN BOLUS - 30 units/kg  As needed (PRN)        Question:  Heparin Infusion Adjustment (DO NOT MODIFY ANSWER)  Answer:  [email or web address]\epic\Images\Pharmacy\HeparinInfusions\heparin LOW INTENSITY nomogram for [facility] [id number]    08/28/23 1719     heparin 25,000 units in dextrose 5% 250 mL (100 units/mL) infusion LOW INTENSITY nomogram - [facility]  Continuous        Question Answer Comment   Heparin Infusion Adjustment (DO NOT MODIFY ANSWER) [email or web address]\epic\Images\Pharmacy\HeparinInfusions\heparin LOW INTENSITY nomogram for [facility] [id number]    Begin at (in units/kg/hr) 12        08/28/23 1719                    Edgardo Miranda MD  Cardiology   [facility] - Cardiac Intensive Care    Staff Attestation:  I have seen the patient, reviewed the Resident's assessment and plan, personally interviewed and examined the patient at bedside and agree with the findings.Total critical care time: Approximately 45 minutes. Due to a high probability of clinically significant, life threatening deterioration, I personally spent this critical  care time directly and personally managing the patient. This critical care time included obtaining a history; examining the patient; ordering and review of studies; arranging urgent treatment with development of a management plan; evaluation of patient's response to treatment; frequent reassessment; and, discussions with other providers.   This critical care time was performed to assess and manage the high probability of imminent, life-threatening deterioration that could result in multi-organ failure. It was medically reasonable and necessary. It was exclusive of separately billable procedures and treating other patients and teaching time.    Gage Low MD  [facility] - Cardiology

## 2023-08-29 NOTE — PLAN OF CARE
Hemodynamics on  gtt 3    Having frequent runs of non sustained VT, lasting few seconds only    CVP 15  SVO2 52  CI/CO/SVR 1.77/4.06/1240  Lactic acid 1.4 (normal x 2 now)     cc/h     Infusions: lasix gtt 15, hep gtt,  gtt 3    Plan  -great UO and normal lactic acid x 2, continue to diurese with lasix gtt to bring CVP down  - gtt is at 3, with non sustained VT runs, would not increase the dose  -aggressively replete K and Mg

## 2023-08-29 NOTE — ASSESSMENT & PLAN NOTE
HFrEF due to iCMP  EF 10-15% on echo along with severe MR and mod AS per echo report from prior hospital     Initial hemodynamics on  2 mcg/kg/min on arrival  CVP 7  SVO2 57  Hemodynamics: CVP:7 , Scvo2:57 , CO:5.08 , CI2.21: , SVR:1402.      Plan  - Currently on dobutamine 3, Lasix for 7.5

## 2023-08-29 NOTE — CONSULTS
"Food & Nutrition  Education     Diet Education: Fluid and Salt restriction   Time Spent: 10 minutes   Learners: Patient      Handouts provided: Low Sodium Nutrition Therapy, Fluid-Restricted Nutrition Therapy      Comments: RD consulted for CHF. Patient shouting when RD entered room. Patient agitated that surgery has been promised and pushed back multiple times. Patients states he does not wish to speak to dietitian he wishes to speak to surgery. RD unable to obtain nutrition information. RD left education papers at bedside for HF. Patient agreed to read papers at bedside.    Follow-Up: Yes     Please Re-consult as needed.     Thanks!    Samra Justin" Peace, MS, RD, LDN    "

## 2023-08-29 NOTE — CONSULTS
[facility] - Cardiac Intensive Care  Wound Care    Patient Name:  Ishmael Thrasher   MRN:  [id number]  Date: 8/29/2023  Diagnosis: Cardiogenic shock    History:     Past Medical History:   Diagnosis Date    Brain damage     traumatic    CAD (coronary artery disease)     Cardiomyopathy     CHF (congestive heart failure)     Diabetes mellitus     type 2    Edema     Erectile dysfunction     GERD (gastroesophageal reflux disease)     HTN (hypertension)     Hyperlipemia     Sciatic nerve pain, right     leg       Social History     Socioeconomic History    Marital status:    Tobacco Use    Smoking status: Never    Smokeless tobacco: Never   Substance and Sexual Activity    Alcohol use: Not Currently    Drug use: Not Currently       Precautions:     Allergies as of 08/28/2023 - Reviewed 08/28/2023   Allergen Reaction Noted    Enalapril maleate Swelling and Edema 03/17/2014    Vasotec [enalaprilat] Swelling 09/11/2020    Zoloft [sertraline] Other (See Comments) 03/31/2021    Cyclobenzaprine Hallucinations 12/16/2013    Amitriptyline Hallucinations 05/30/2019    Labetalol  11/22/2008    Lisinopril  09/17/2010    Tramadol Nausea Only and Hallucinations 12/28/2012       WOC Assessment Details/Treatment     Wound consult received on patient's buttocks. Appears to be resolving moisture associated dermatitis to buttocks. Recommend antifungal barrier cream. Updated Dr. David Melendrez. Approved recommendations.     08/29/23 1444        Altered Skin Integrity 08/28/23 1800 Buttocks Moisture associated dermatitis   Date First Assessed/Time First Assessed: 08/28/23 1800   Altered Skin Integrity Present on Admission - Did Patient arrive to the hospital with altered skin?: yes  Location: Buttocks  Is this injury device related?: No  Primary Wound Type: Moisture ass...   Drainage Amount None   Drainage Characteristics/Odor No odor   Appearance   (dry flaky skin)   Tissue loss description Not applicable   Periwound Area Intact   Wound  Edges Undefined

## 2023-08-29 NOTE — PLAN OF CARE
CICU DAILY GOALS       A: Awake    RASS: Goal - RASS Goal: 0-->alert and calm  Actual - RASS (Diaz Agitation-Sedation Scale): alert and calm   Restraint necessity:    B: Breath   SBT: Not intubated   C: Coordinate A & B, analgesics/sedatives   Pain: managed    SAT: Not intubated  D: Delirium   CAM-ICU: Overall CAM-ICU: Negative  E: Early(intubated/ Progressive (non-intubated) Mobility   MOVE Screen: Pass   Activity: Activity Management: Arm raise - L1  FAS: Feeding/Nutrition   Diet order: Diet/Nutrition Received: restrict fluids,   Fluid restriction:  1500 cc   Nutritional Supplement Intake: Quantity 0, Type: Boost  T: Thrombus   DVT prophylaxis: VTE Required Core Measure: Pharmacological prophylaxis initiated/maintained  H: HOB Elevation   Head of Bed (HOB) Positioning: HOB elevated  U: Ulcer Prophylaxis   GI: yes  G: Glucose control   managed    S: Skin   Bundle compliance: yes   Bathing/Skin Care: back care, linen changed, dressed/undressed, bath, complete Date: 8 28 23  B: Bowel Function   no issues   I: Indwelling Catheters   Fontenot necessity:     CVC necessity: Yes   IPAD offered: No  D: De-escalation Antibx   No  Plan for the day   Hemodynamic stability  Family/Goals of care/Code Status   Code Status: Full Code     No acute events throughout day, VS and assessment per flow sheet, patient progressing towards goals as tolerated, plan of care reviewed with Ishmael Thrasher and family, all concerns addressed, will continue to monitor.

## 2023-08-29 NOTE — ASSESSMENT & PLAN NOTE
-Acute gout attack in 8/2023 [facility] with [facility]  -Uric acid 11.6 at that time    Plan  -continue allopurinol 300 daily (modify dose in AM if needed)  -on colchicine 0.6 mg qd, continue  -[facility]um [facility], on prednisone taper (will give 5 mg prednisone x 3 more days and then stop)

## 2023-08-29 NOTE — ASSESSMENT & PLAN NOTE
-currently in sinus tach 105  -Takes Eliquis at home for pAF and chronic DVT  -Switch Eliquis to heparin gtt for now

## 2023-08-29 NOTE — NURSING
1700 - [facility] Patient AAOX4 and a-febrile. Denies of any Chest pain, SOB, pain, or Headache. Patient complaining of nausea, MD notified.    1730 - Dr. Reynoso at bedside, central line placed. CVP 16.

## 2023-08-29 NOTE — ASSESSMENT & PLAN NOTE
Repeat Echo 8/29    Summary    Tachycardia was present during the study    Left Ventricle: The left ventricle is severely dilated. Normal wall thickness. There is moderate eccentric hypertrophy. Severe global hypokinesis present. There is severely reduced systolic function with a visually estimated ejection fraction of 10 -15%. No LV apical thrombus present. Unable to assess diastolic functionUnable to assess due to poor image quality.    Right Ventricle: Mild right ventricular enlargement. Wall thickness is normal. Right ventricle wall motion  is normal. Systolic function is moderately reduced. TAPSE is 1.30 cm. Right ventricular global longitudinal strain is -9.0 % and is reduced.    Left Atrium: Left atrium is severely dilated.    Right Atrium: Right atrium is mildly dilated.    Aortic Valve: The aortic valve is a trileaflet valve. There is moderate aortic valve sclerosis. Moderate annular calcification. There is moderate to severe stenosis low flow . Aortic valve area by VTI is 0.91 cm². Aortic valve peak velocity is 2.33 m/s. Mean gradient is 12 mmHg. The dimensionless index is 0.24. Consider Dobutmine stress to differentiate from pseudosevere AS if clinically inidcated.    Mitral Valve: There is mild to moderate regurgitation.    Pulmonary Artery: The estimated pulmonary artery systolic pressure is 65 mmHg.    IVC/SVC: Elevated venous pressure at 15 mmHg.

## 2023-08-29 NOTE — PROGRESS NOTES
[facility] - Cath Lab  Cardiology  Progress Note    Patient Name: Ishmael Thrasher  MRN: [id number]  Admission Date: 8/28/2023  Hospital Length of Stay: 1 days  Code Status: Full Code   Attending Physician: Gage Low MD   Primary Care Physician: Kirk, [facility]  Expected Discharge Date: 8/30/2023  Principal Problem:Cardiogenic shock    Subjective:     Hospital Course:   Patient is getting a left heart catheterization on 08/29/2023.  Repeat echo reveals severely reduced systolic function with estimated ejection fraction of 10 to 15%.  Aortic valve:  Moderate to severe stenosis low-flow.  We will repeat stress echo with dobutamine to assess aortic valve.       Interval History: No acute events overnight, afebrile, hemodynamically stable.       Review of Systems   Constitutional: Negative for chills and fever.   HENT:  Negative for congestion.    Cardiovascular:  Negative for chest pain and dyspnea on exertion.   Respiratory:  Negative for cough and shortness of breath.    Musculoskeletal:  Negative for back pain.   Gastrointestinal:  Negative for abdominal pain, diarrhea, nausea and vomiting.   Neurological:  Negative for headaches.     Objective:     Vital Signs (Most Recent):  Temp: 97.7 °F (36.5 °C) (08/29/23 0300)  Pulse: 103 (08/29/23 1501)  Resp: (!) 52 (08/29/23 1501)  BP: 119/84 (08/29/23 1501)  SpO2: 99 % (08/29/23 1501) Vital Signs (24h Range):  Temp:  [97.7 °F (36.5 °C)-98.1 °F (36.7 °C)] 97.7 °F (36.5 °C)  Pulse:  [] 103  Resp:  [12-52] 52  SpO2:  [89 %-100 %] 99 %  BP: ()/(67-84) 119/84     Weight: 98.4 kg (217 lb)  Body mass index is 26.41 kg/m².     SpO2: 99 %         Intake/Output Summary (Last 24 hours) at 8/29/2023 1559  Last data filed at 8/29/2023 1501  Gross per 24 hour   Intake 654.94 ml   Output 2600 ml   Net -1945.06 ml       Lines/Drains/Airways       Central Venous Catheter Line  Duration             Percutaneous Central Line Insertion/Assessment - Triple  Lumen  08/28/23 1800 Internal Jugular Left <1 day              Peripheral Intravenous Line  Duration                  Peripheral IV - Single Lumen 08/28/23 1700 Anterior;Proximal;Right Forearm <1 day                       Physical Exam  HENT:      Head: Normocephalic.      Nose: Nose normal.      Mouth/Throat:      Mouth: Mucous membranes are moist.   Eyes:      Pupils: Pupils are equal, round, and reactive to light.   Cardiovascular:      Rate and Rhythm: Regular rhythm. Tachycardia present.   Pulmonary:      Effort: Pulmonary effort is normal.   Abdominal:      General: Abdomen is flat.   Musculoskeletal:         General: Normal range of motion.   Skin:     General: Skin is warm.   Neurological:      General: No focal deficit present.      Mental Status: He is alert.   Psychiatric:         Mood and Affect: Mood normal.            Significant Labs: CMP   Recent Labs   Lab 08/28/23  1722 08/28/23  2256 08/29/23  0335   * 136 138   K 4.5 4.2 4.4    100 102   CO2 22* 24 22*   * 141* 115*   BUN 58* 55* 54*   CREATININE 2.0* 2.1* 1.9*   CALCIUM 9.7 9.6 9.6   PROT 7.0  --  6.9   ALBUMIN 3.4*  --  3.3*   BILITOT 0.7  --  0.9   ALKPHOS 124  --  112   AST 35  --  27   ALT 36  --  33   ANIONGAP 11 12 14    and CBC   Recent Labs   Lab 08/28/23  1722 08/29/23  0335   WBC 10.27 10.56   HGB 13.5* 12.9*   HCT 42.3 39.8*    261       Significant Imaging:     Assessment and Plan:       * Cardiogenic shock  HFrEF due to iCMP  EF 10-15% on echo along with severe MR and mod AS per echo report from prior hospital     Initial hemodynamics on  2 mcg/kg/min on arrival  CVP 7  SVO2 57  Hemodynamics: CVP:7 , Scvo2:57 , CO:5.08 , CI2.21: , SVR:1402.      Plan  - Currently on dobutamine 3, Lasix for 7.5    Moderate aortic stenosis  See above    Severe mitral regurgitation  Repeat Echo 8/29    Summary    Tachycardia was present during the study    Left Ventricle: The left ventricle is severely dilated. Normal wall  thickness. There is moderate eccentric hypertrophy. Severe global hypokinesis present. There is severely reduced systolic function with a visually estimated ejection fraction of 10 -15%. No LV apical thrombus present. Unable to assess diastolic functionUnable to assess due to poor image quality.    Right Ventricle: Mild right ventricular enlargement. Wall thickness is normal. Right ventricle wall motion  is normal. Systolic function is moderately reduced. TAPSE is 1.30 cm. Right ventricular global longitudinal strain is -9.0 % and is reduced.    Left Atrium: Left atrium is severely dilated.    Right Atrium: Right atrium is mildly dilated.    Aortic Valve: The aortic valve is a trileaflet valve. There is moderate aortic valve sclerosis. Moderate annular calcification. There is moderate to severe stenosis low flow . Aortic valve area by VTI is 0.91 cm². Aortic valve peak velocity is 2.33 m/s. Mean gradient is 12 mmHg. The dimensionless index is 0.24. Consider Dobutmine stress to differentiate from pseudosevere AS if clinically inidcated.    Mitral Valve: There is mild to moderate regurgitation.    Pulmonary Artery: The estimated pulmonary artery systolic pressure is 65 mmHg.    IVC/SVC: Elevated venous pressure at 15 mmHg.         PAM (obstructive sleep apnea)  CPAP ordered     Paroxysmal A-fib  -currently in sinus tach 105  -Takes Eliquis at home for pAF and chronic DVT  -Switch Eliquis to heparin gtt for now    PTSD (post-traumatic stress disorder)  Resume psych meds    Chronic deep vein thrombosis (DVT) of right lower extremity  Takes Eliquis at home for pAF and chronic DVT  Switch Eliquis to heparin gtt for now    Gout  -Acute gout attack in 8/[facility]23 [facility]  -Uric acid 11.6 at that time    Plan  -continue allopurinol 300 daily (modify dose in AM if needed)  -on colchicine 0.6 mg qd, continue  -was [facility], on prednisone taper (will give 5 mg prednisone x 3 more  days and then stop)    Ischemic cardiomyopathy  -CAD s/p CABG x 4 (3/2014 in [place] with LIMA-LAD, SVG-RCA, SVG-PLV, SVG-D2) followed by PCI late same year in 10/2014 with LANA x 3 to RCA and LANA x 3 to left coronary system  -ASA 81 daily and HI statin    Plan  - [facility] to see anatomy on 8/29,           VTE Risk Mitigation (From admission, onward)           Ordered     heparin 25,000 units in dextrose 5% (100 units/ml) IV bolus from bag - ADDITIONAL PRN BOLUS - 60 units/kg  As needed (PRN)        Question:  Heparin Infusion Adjustment (DO NOT MODIFY ANSWER)  Answer:  [email or web address]\epic\Images\Pharmacy\HeparinInfusions\heparin LOW INTENSITY nomogram for [facility] [id number]    08/28/23 1719     heparin 25,000 units in dextrose 5% (100 units/ml) IV bolus from bag - ADDITIONAL PRN BOLUS - 30 units/kg  As needed (PRN)        Question:  Heparin Infusion Adjustment (DO NOT MODIFY ANSWER)  Answer:  [email or web address]\epic\Images\Pharmacy\HeparinInfusions\heparin LOW INTENSITY nomogram for [facility] [id number]    08/28/23 1719     heparin 25,000 units in dextrose 5% 250 mL (100 units/mL) infusion LOW INTENSITY nomogram - [facility]  Continuous        Question Answer Comment   Heparin Infusion Adjustment (DO NOT MODIFY ANSWER) [email or web address]\epic\Images\Pharmacy\HeparinInfusions\heparin LOW INTENSITY nomogram for [facility] [id number]    Begin at (in units/kg/hr) 12        08/28/23 1719                    David Melendrez DO  Cardiology  [facility] - Cath Lab    I have seen the patient, reviewed the Fellow's history and physical, assessment and plan. I have personally interviewed and examined the patient and agree with the findings.     DIO Low MD

## 2023-08-29 NOTE — SUBJECTIVE & OBJECTIVE
Past Medical History:   Diagnosis Date    Brain damage     traumatic    CAD (coronary artery disease)     Cardiomyopathy     CHF (congestive heart failure)     Diabetes mellitus     type 2    Edema     Erectile dysfunction     GERD (gastroesophageal reflux disease)     HTN (hypertension)     Hyperlipemia     Sciatic nerve pain, right     leg       Past Surgical History:   Procedure Laterality Date    CARDIAC DEFIBRILLATOR PLACEMENT      CATHETERIZATION OF BOTH LEFT AND RIGHT HEART Bilateral 08/20/2018    CORONARY ARTERY BYPASS GRAFT      ILIAC ARTERY STENT      INJECTION OF JOINT Right 8/13/2021    Procedure: INJECTION, JOINT, SACROILIAC (SI);  Surgeon: Austin Parsons MD;  Location: [facility], [place], operated by [facility]IN [facility]GT;  Service: Pain Management;  Laterality: Right;    PHACOEMULSIFICATION, CATARACT, WITH IOL INSERTION Right 8/15/2023    Procedure: PHACOEMULSIFICATION, CATARACT, WITH IOL INSERTION- OD;  Surgeon: Eyal Mccarthy MD;  Location: [facility] OR;  Service: Ophthalmology;  Laterality: Right;    RADIOFREQUENCY ABLATION Right 2/26/2021    Procedure: RADIOFREQUENCY ABLATION GENICULAR NERVES, COOLED;  Surgeon: Austin Parsons MD;  Location: [facility], [place], operated by [facility] PAIN [facility];  Service: Pain Management;  Laterality: Right;  1 of 2  consent needed  eliquis clearance requested    RADIOFREQUENCY ABLATION Right 11/12/2021    Procedure: RADIOFREQUENCY ABLATION, GENICULAR COOLED  NEED CONSENT/ clear to hold  ELIQUIS;  Surgeon: Austin Parsons MD;  Location: [facility], [place], operated by [facility]IN [facility];  Service: Pain Management;  Laterality: Right;    RADIOFREQUENCY ABLATION Right 1/5/2023    Procedure: RADIOFREQUENCY ABLATION RIGHT GENICULAR NERVES COOLED clear to hold Eliquis 3 days;  Surgeon: Austin Parsons MD;  Location: [facility], [place], operated by [facility]IN [facility];  Service: Pain Management;  Laterality: Right;    RADIOFREQUENCY ABLATION Left 2/10/2023    Procedure: RADIOFREQUENCY ABLATION LEFT GENICULAR CLEARED TO HOLD ELIQUIS 3 DAYS;  Surgeon: Austin Parsons MD;  Location: [facility], [place], operated by [facility]IN [facility];  Service: Pain  Management;  Laterality: Left;    RIGHT HEART CATHETERIZATION Right 8/17/2020    Procedure: INSERTION, CATHETER, RIGHT HEART;  Surgeon: Brianda Navas MD;  Location: [facility] CATH LAB;  Service: Cardiology;  Laterality: Right;    TRANSFORAMINAL EPIDURAL INJECTION OF STEROID Right 9/11/2020    Procedure: INJECTION, STEROID, EPIDURAL, TRANSFORAMINAL APPROACH, L4-L5 AND L5-S1 clear to hold Eliquis 3 days;  Surgeon: Austin Parsons MD;  Location: [facility] PAIN MGT;  Service: Pain Management;  Laterality: Right;    TRANSFORAMINAL EPIDURAL INJECTION OF STEROID Right 10/23/2020    Procedure: INJECTION, STEROID, EPIDURAL, TRANSFORAMINAL APPROACH;  Surgeon: Austin Parsons MD;  Location: [facility] PAIN MGT;  Service: Pain Management;  Laterality: Right;  RT RFA L5/S1 and S1  Eliquis clearance in Media       Review of patient's allergies indicates:   Allergen Reactions    Enalapril maleate Swelling and Edema     Other reaction(s): Swelling    Vasotec [enalaprilat] Swelling     Neck swelling    Zoloft [sertraline] Other (See Comments)     Patient states it put him to sleep in the hospital he could not talk nor move    Cyclobenzaprine Hallucinations     Other reaction(s): Swelling  Hallucinations according to patient.    Other reaction(s): Lymphadenopathy, Restlessness, Sensation of being cold, Restlessness, Sensation of being cold    Amitriptyline Hallucinations    Labetalol      Other reaction(s): Pharyngeal swelling    Lisinopril      Other reaction(s): Pharyngeal swelling    Tramadol Nausea Only and Hallucinations       No current facility-administered medications on file prior to encounter.     Current Outpatient Medications on File Prior to Encounter   Medication Sig    allopurinol (ZYLOPRIM) 300 MG tablet Take 300 mg by mouth once daily.    apixaban (ELIQUIS) 5 mg Tab Take 5 mg by mouth 2 (two) times daily.    aspirin (ECOTRIN) 81 MG EC tablet Take 81 mg by mouth.    atorvastatin (LIPITOR) 80 MG tablet Take 80 mg by mouth once  daily.    coenzyme Q10 10 mg capsule Take 200 mg by mouth once daily.    colchicine (COLCRYS) 0.6 mg tablet TK 1 T PO BID PRF GOUT    cholecalciferol, vitamin D3, 3,000 unit Tab Take 1 tablet by mouth.    empagliflozin (JARDIANCE) 25 mg tablet Take by mouth.    febuxostat (ULORIC) 40 mg Tab Take 1 tablet (40 mg total) by mouth once daily.    glipiZIDE (GLUCOTROL) 5 MG tablet Take 5 mg by mouth 2 (two) times daily before meals.    indomethacin (INDOCIN) 50 MG capsule Take 1 capsule (50 mg total) by mouth 3 (three) times daily with meals.    melatonin 5 mg Cap Take by mouth.    metoprolol succinate (TOPROL-XL) 50 MG 24 hr tablet Take 1 tablet (50 mg total) by mouth once daily.    potassium chloride SA (K-DUR,KLOR-CON) 20 MEQ tablet Take 1 tablet (20 mEq total) by mouth 2 (two) times daily.    risperiDONE (RISPERDAL) 3 MG Tab Take by mouth.    sertraline (ZOLOFT) 50 MG tablet Take 50 mg by mouth once daily. TAKE THREE TABLETS BY MOUTH EVERY MORNING TO IMPROVE MOOD    sildenafiL (VIAGRA) 100 MG tablet Take 1 tablet (100 mg total) by mouth As instructed for Erectile Dysfunction. Please take 1 tablet one hour prior to intercourse on empty stomach    spironolactone (ALDACTONE) 25 MG tablet Take 1 tablet (25 mg total) by mouth once daily.    tadalafiL (CIALIS) 20 MG Tab Take 1 pill as needed prior to sex    torsemide (DEMADEX) 20 MG Tab Take 2 tablets (40 mg total) by mouth 2 (two) times daily.    [DISCONTINUED] isosorbide dinitrate (ISORDIL) 20 MG tablet Take 1 tablet (20 mg total) by mouth 3 (three) times daily. (Patient not taking: Reported on 6/13/2022)     Family History    None       Tobacco Use    Smoking status: Never    Smokeless tobacco: Never   Substance and Sexual Activity    Alcohol use: Not Currently    Drug use: Not Currently    Sexual activity: Not on file     Review of Systems   Constitutional: Positive for decreased appetite, malaise/fatigue and weight gain. Negative for fever.   HENT:  Negative for ear  pain.    Eyes:  Negative for double vision.   Cardiovascular:  Positive for palpitations. Negative for chest pain.   Respiratory:  Positive for shortness of breath.    Endocrine: Negative for heat intolerance.   Hematologic/Lymphatic: Negative for adenopathy.   Skin:  Negative for dry skin.   Musculoskeletal:  Negative for falls.   Gastrointestinal:  Negative for anorexia.   Genitourinary:  Negative for flank pain.   Neurological:  Negative for disturbances in coordination.   Psychiatric/Behavioral:  Negative for depression.      Objective:     Vital Signs (Most Recent):  Temp: 98 °F (36.7 °C) (08/28/23 1900)  Pulse: 107 (08/28/23 1900)  Resp: (!) 32 (08/28/23 1900)  BP: 108/69 (08/28/23 1900)  SpO2: 99 % (08/28/23 1900) Vital Signs (24h Range):  Temp:  [97 °F (36.1 °C)-98 °F (36.7 °C)] 98 °F (36.7 °C)  Pulse:  [106-107] 107  Resp:  [18-32] 32  SpO2:  [98 %-100 %] 99 %  BP: ()/(64-72) 108/69     Weight: 98.7 kg (217 lb 9.5 oz)  Body mass index is 26.49 kg/m².    SpO2: 99 %       No intake or output data in the 24 hours ending 08/28/23 1935    Lines/Drains/Airways       Central Venous Catheter Line  Duration             Trialysis (Dialysis) Catheter 08/28/23 1816 left internal jugular <1 day              Peripheral Intravenous Line  Duration                  Peripheral IV - Single Lumen 08/28/23 1700 Anterior;Distal;Left Forearm <1 day         Peripheral IV - Single Lumen 08/28/23 1700 Anterior;Proximal;Right Forearm <1 day                     Physical Exam  HENT:      Head: Normocephalic.      Nose: Nose normal.      Mouth/Throat:      Mouth: Mucous membranes are moist.   Eyes:      Pupils: Pupils are equal, round, and reactive to light.   Cardiovascular:      Rate and Rhythm: Regular rhythm. Tachycardia present.   Pulmonary:      Effort: Pulmonary effort is normal.   Abdominal:      General: Abdomen is flat.   Musculoskeletal:         General: Normal range of motion.   Skin:     General: Skin is warm.  "  Neurological:      General: No focal deficit present.      Mental Status: He is alert.   Psychiatric:         Mood and Affect: Mood normal.          Significant Labs: Blood Culture: No results for input(s): "LABBLOO" in the last 48 hours., BMP:   Recent Labs   Lab 08/28/23  1722   *   *   K 4.5      CO2 22*   BUN 58*   CREATININE 2.0*   CALCIUM 9.7   MG 2.4   , CMP   Recent Labs   Lab 08/28/23  1722   *   K 4.5      CO2 22*   *   BUN 58*   CREATININE 2.0*   CALCIUM 9.7   PROT 7.0   ALBUMIN 3.4*   BILITOT 0.7   ALKPHOS 124   AST 35   ALT 36   ANIONGAP 11   , CBC   Recent Labs   Lab 08/28/23  1722   WBC 10.27   HGB 13.5*   HCT 42.3      , INR   Recent Labs   Lab 08/28/23  1722   INR 1.2   , Lipid Panel No results for input(s): "CHOL", "HDL", "LDLCALC", "TRIG", "CHOLHDL" in the last 48 hours., and Troponin   Recent Labs   Lab 08/28/23  1722   TROPONINI 0.089*       "

## 2023-08-29 NOTE — PLAN OF CARE
Cardiac ICU Care Plan    POC reviewed with Ishmael Thrasher and family. Questions and concerns addressed. No acute events today. Pt progressing toward goals. Will continue to monitor. See below and flowsheets for full assessment and VS info.       Neuro:  Bu[place] Coma Scale  Best Eye Response: 4-->(E4) spontaneous  Best Motor Response: 6-->(M6) obeys commands  Best Verbal Response: 5-->(V5) oriented  Buxton Coma Scale Score: 15  Assessment Qualifiers: patient not sedated/intubated, no eye obstruction present  Pupil PERRLA: yes    24 hr Temp:  [97.7 °F (36.5 °C)-98.1 °F (36.7 °C)]      CV:  Rhythm: sinus tachycardia   DVT prophylaxis: VTE Required Core Measure: Pharmacological prophylaxis initiated/maintained    CVP (mean): 9 mmHg (08/29/23 1200)                       Pulses  Right Radial Pulse: 2+ (normal)  Left Radial Pulse: 2+ (normal)  Right Dorsalis Pedis Pulse: 1+ (weak)  Left Dorsalis Pedis Pulse: 1+ (weak)  Right Posterior Tibial Pulse: 1+ (weak)  Left Posterior Tibial Pulse: 1+ (weak)    Resp:  Flow (L/min): 2       GI/:  GI prophylaxis: yes  Diet/Nutrition Received: NPO, restrict fluids  Last Bowel Movement: 08/29/23      Intake/Output Summary (Last 24 hours) at 8/29/2023 1622  Last data filed at 8/29/2023 1501  Gross per 24 hour   Intake 654.94 ml   Output 2600 ml   Net -1945.06 ml        Nutritional Supplement Intake: Quantity , Type:     Labs/Accuchecks:  Recent Labs   Lab 08/28/23 1722 08/29/23 0335   WBC 10.27 10.56   RBC 5.58 5.19   HGB 13.5* 12.9*   HCT 42.3 39.8*    261      Recent Labs   Lab 08/28/23  1722 08/29/23  0335 08/29/23  1104   INR 1.2  --   --    APTT 26.7 72.7* 39.0*      Recent Labs     08/29/23 0335      K 4.4   CO2 22*      BUN 54*   CREATININE 1.9*   ALKPHOS 112   ALT 33   AST 27   BILITOT 0.9       Recent Labs   Lab 08/28/23 1722   TROPONINI 0.089*      Recent Labs     08/28/23  1820 08/28/23  2100 08/29/23  0835   PH 7.409 7.403  --    PCO2 42.1 42.9  --     PO2 26* 28* 29*   HCO3 26.6 26.8  --    POCSATURATED 47* 52* 57*   BE 2 2  --        Electrolytes: No replacement orders  Accuchecks: none    Gtts/LDAs:   DOBUTamine IV infusion (non-titrating) 3 mcg/kg/min (08/29/23 1501)    furosemide (LASIX) 500 mg in 50 mL infusion (conc: 10 mg/mL) 7.5 mg/hr (08/29/23 1501)    heparin (porcine) in D5W Stopped (08/29/23 1431)       Lines/Drains/Airways       Central Venous Catheter Line  Duration             Percutaneous Central Line Insertion/Assessment - Triple Lumen  08/28/23 1800 Internal Jugular Left <1 day              Peripheral Intravenous Line  Duration                  Peripheral IV - Single Lumen 08/28/23 1700 Anterior;Proximal;Right Forearm <1 day                    Skin/Wounds  Bathing/Skin Care: back care;linen changed;dressed/undressed;bath, complete (08/28/23 1818)  Wounds: Yes  Wound care consulted: Yes

## 2023-08-29 NOTE — NURSING
Nurses Note -- 4 Eyes      8/28/2023   7:55 PM      Skin assessed during: Daily Assessment      [] No Altered Skin Integrity Present    []Prevention Measures Documented      [x] Yes- Altered Skin Integrity Present or Discovered   [x] LDA Added if Not in Epic (Describe Wound)   [x] New Altered Skin Integrity was Present on Admit and Documented in LDA   [x] Wound Image Taken    Wound Care Consulted? No    Attending Nurse:  Sandra Menchaca RN/Staff Member:  CHRISTOPHER Nelson

## 2023-08-29 NOTE — ASSESSMENT & PLAN NOTE
HFrEF due to iCMP  EF 10-15% on echo along with severe MR and mod AS per echo report from prior hospital     Initial hemodynamics on  2 mcg/kg/min on arrival  CVP 16  SVO2 47  Lactic acid 2.3  CI/CO/SVR 1.63/3.74/1303    Plan  -increase  gtt to 5 mcg/kg/min   -lasix push 80 and gtt 15  -K>4, Mg>2 and tele  -CVP q4h and SVO2 q12h

## 2023-08-29 NOTE — PROCEDURES
PROCEDURE SUMMARY - Central Venous Catheter Placement     PROCEDURE:  Left-sided 7French Triple-lumen IJ central venous  catheter placement under ultrasound guidance    CONSENT:  The risks and benefits of this procedure were explained to the patient. All questions were answered and alternative options explained.     STAFF PHYSICIAN:  Dr Low    PERFORMING PHYSICIAN:  Edgardo Miranda    MEDICATIONS USED:  Lidocaine 1% without epinephrine, total quantity 5 mL.    PREOPERATIVE DIAGNOSIS(ES):  Cardiogenic Shock     POSTPROCEDURE DIAGNOSIS(ES):  Cardiogenic Shock     DESCRIPTION OF PROCEDURE:  Consent was obtained and placed upon the chart. Patient was positively identified and procedure site was marked. Time-out was performed by operating team and patient. Vital sign monitoring was accomplished by noninvasive hemodynamic monitoring, pulse oximetry, and telemetry.     Time-out was performed and consent was verified. The patient was placed in the supine position and the Left neck was prepped and draped in sterile fashion. The internal jugular vein was localized with the ultrasound and the skin was anesthetized with 1% lidocaine without epinephrine. A micropuncture needle was placed into the interal jugular vein with return of dark red non-pulsatile blood on the 1st attempt. A micropuncture wire was then advanced. A micropuncture dilator sheath was placed over the wire and then the wire was removed. A J-wire was advanced through the micropuncture sheath without difficulty. A small incision was made followed by dilation and a 7French Triple-lumen IJ central venous  catheter to a depth of 18cm. The catheter was sutured in place with 3-0 silk sutures x2. All 3 ports were withdrawn and flushed without difficulty. A sterile dressing was applied and procedure was terminated. Post-procedure chest x-ray is pending.       Edgardo Miranda  8/28/2023  9:21 PM          Staff Attestation:  I have seen the patient,  reviewed the Resident's assessment and plan, personally interviewed and examined the patient at bedside and agree with the findings.Total critical care time: Approximately 45 minutes. Due to a high probability of clinically significant, life threatening deterioration, I personally spent this critical care time directly and personally managing the patient. This critical care time included obtaining a history; examining the patient; ordering and review of studies; arranging urgent treatment with development of a management plan; evaluation of patient's response to treatment; frequent reassessment; and, discussions with other providers.   This critical care time was performed to assess and manage the high probability of imminent, life-threatening deterioration that could result in multi-organ failure. It was medically reasonable and necessary. It was exclusive of separately billable procedures and treating other patients and teaching time.    Gage Low MD  [facility] - Cardiology

## 2023-08-29 NOTE — CARE UPDATE
Interventional Cardiology Care Update Note  - Concern for ischemic cardiomyopathy. Consideration evaluation with PCI/LHC. Patient is a LANA candidate  - cr 1.9 (baseline 1.3), hgb 12.9, plt 261  - Anti-platelet Therapy: aspirin, heparin  - Access: R Radial, R fem.   - Allergies: No shellfish / Iodine contrast allergy  - Pre-Hydration: NS  - Pre-Op Med: Bendaryl 50mg pO   - All patient's questions were answered.  -The risks, benefits and alternatives of the procedure were explained to the patient.   -The risks of coronary angiography include but are not limited to: bleeding, infection, heart rhythm abnormalities, allergic reactions, kidney injury and potential need for dialysis, stroke and death.   - Should stenting be indicated, the patient has agreed to dual anti-platelet therapy for 1-consecutive year with a drug-eluting stent and a minimum of 1-month with the use of a bare metal stent  - Additionally, pt is aware that non-compliance is likely to result in stent clotting with heart attack, heart failure, and/or death  -The risks of moderate sedation include hypotension, respiratory depression, arrhythmias, bronchospasm, and death.   - Informed consent was obtained and the  patient is agreeable to proceed with the procedure.    Gume Benton MD  Cardiovascular Medicine; PGY4  [facility]

## 2023-08-30 PROBLEM — I25.10 CAD (CORONARY ARTERY DISEASE): Status: ACTIVE | Noted: 2023-01-01

## 2023-08-30 NOTE — ASSESSMENT & PLAN NOTE
Patient underwent dobutamine stress echocardiogram was found to have moderate to severe aortic stenosis.  He did not have any contractile reserve.   Intervention cardiology declined him for TAVR.

## 2023-08-30 NOTE — TELEPHONE ENCOUNTER
----- Message from Lata Luiza sent at 8/28/2023 11:11 AM CDT -----  Contact: ALLIE TOLEDO [[id number]]  Type: Call Back      Who called:ALLIE TOLEDO [[id number]]      What is the request in detail: Patient is requesting a call back in regard to rescheduling his 08/28 appointment.   Please advise.     Can the clinic reply by [facility]? No      Would the patient rather a call back or a response via [facility]? Call back       Best call back number: [phone]    Additional Information:

## 2023-08-30 NOTE — HPI
73 year old gentleman with HFrEF (EF 10-15%) 2/2 iCMP s/p [facility] [place] CRT-D placement, CAD s/p CABG x 4 (3/2014 in [place] with LIMA-LAD, SVG-RCA, SVG-PLV, SVG-D2) followed by PCI late same year in 10/2014 with LANA x 3 to RCA and LANA x 3 to left coronary system. Also has h/o pAF on eliquis, PAM on CPAP, CKD, HTN, HLD, T2DM and PAD.       [facility] for higher level of care. He stayed in the [facility] x 11 days and was initially hospitalized for acute CHF and then RHC was done 8/18 (data not available) which showed cardiogenic shock after which he was put on  gtt which was later weaned but his UO dropped and Cr also went back up so he was restarted on  gtt and sent here.  Patient underwent dobutamine stress echo here that showed moderate to severe aortic stenosis.   primary team unable to wean him off dobutamine.  His dimension index on dobutamine stress echo was 0.18 suggestive of severe aortic stenosis however patient did not have any contractile reserve.  This morning patient is CVP was 7, SvO2 was 54, cardiac output was 5.1 cardiac index of 2.2, SVR 1400 on dobutamine 2.5.  After weaning him off dobutamine patient's SvO2 dropped 45.  Now his CVP is 10.  Currently patient is on torsemide and dobutamine 2.5

## 2023-08-30 NOTE — ASSESSMENT & PLAN NOTE
Repeat Echo 8/29    Summary    Tachycardia was present during the study    Left Ventricle: The left ventricle is severely dilated. Normal wall thickness. There is moderate eccentric hypertrophy. Severe global hypokinesis present. There is severely reduced systolic function with a visually estimated ejection fraction of 10 -15%. No LV apical thrombus present. Unable to assess diastolic functionUnable to assess due to poor image quality.    Right Ventricle: Mild right ventricular enlargement. Wall thickness is normal. Right ventricle wall motion  is normal. Systolic function is moderately reduced. TAPSE is 1.30 cm. Right ventricular global longitudinal strain is -9.0 % and is reduced.    Left Atrium: Left atrium is severely dilated.    Right Atrium: Right atrium is mildly dilated.    Aortic Valve: The aortic valve is a trileaflet valve. There is moderate aortic valve sclerosis. Moderate annular calcification. There is moderate to severe stenosis low flow . Aortic valve area by VTI is 0.91 cm². Aortic valve peak velocity is 2.33 m/s. Mean gradient is 12 mmHg. The dimensionless index is 0.24. Consider Dobutmine stress to differentiate from pseudosevere AS if clinically inidcated.    Mitral Valve: There is mild to moderate regurgitation.    Pulmonary Artery: The estimated pulmonary artery systolic pressure is 65 mmHg.    IVC/SVC: Elevated venous pressure at 15 mmHg

## 2023-08-30 NOTE — ASSESSMENT & PLAN NOTE
-CAD s/p CABG x 4 (3/2014 in [place] with LIMA-LAD, SVG-RCA, SVG-PLV, SVG-D2) followed by PCI late same year in 10/2014 with LANA x 3 to RCA and LANA x 3 to left coronary system  -ASA 81 daily and HI statin    Plan  - optimize GDMT upon discharge  - Obtain Hemodynamics after dubutamine   - Starting valstaran then metoprolol after d/c dubtamine

## 2023-08-30 NOTE — DISCHARGE INSTRUCTIONS
Please follow up with outpatient interventional cardiology for aortic valve assessment.     Please continue taking your medications.

## 2023-08-30 NOTE — ASSESSMENT & PLAN NOTE
SeRepeat Echo 8/29    Summary    Tachycardia was present during the study    Left Ventricle: The left ventricle is severely dilated. Normal wall thickness. There is moderate eccentric hypertrophy. Severe global hypokinesis present. There is severely reduced systolic function with a visually estimated ejection fraction of 10 -15%. No LV apical thrombus present. Unable to assess diastolic functionUnable to assess due to poor image quality.    Right Ventricle: Mild right ventricular enlargement. Wall thickness is normal. Right ventricle wall motion  is normal. Systolic function is moderately reduced. TAPSE is 1.30 cm. Right ventricular global longitudinal strain is -9.0 % and is reduced.    Left Atrium: Left atrium is severely dilated.    Right Atrium: Right atrium is mildly dilated.    Aortic Valve: The aortic valve is a trileaflet valve. There is moderate aortic valve sclerosis. Moderate annular calcification. There is moderate to severe stenosis low flow . Aortic valve area by VTI is 0.91 cm². Aortic valve peak velocity is 2.33 m/s. Mean gradient is 12 mmHg. The dimensionless index is 0.24. Consider Dobutmine stress to differentiate from pseudosevere AS if clinically inidcated.    Mitral Valve: There is mild to moderate regurgitation.    Pulmonary Artery: The estimated pulmonary artery systolic pressure is 65 mmHg.    IVC/SVC: Elevated venous pressure at 15 mmHg     Dobutamine Stress echo:    Summary      A low dose Dobutamine stress echo was performed to evaluate low flow low gradient aortic stenosis.    Stress ECG: Frequent PACs and frequent PVCs.    ECG Conclusion: The ECG portion of the study is negative for ischemia.    Left Ventricle: The left ventricle is severely dilated measuring 7.9 cm. Severe global hypokinesis present. Severely reduced systolic function with a visually estimated ejection fraction of 10 -15%.    Left Ventricle: The left ventricle is severely dilated. LVIDd is 7.9 cm.     Aortic Valve:  There is moderate to severe low flow low gradient aortic stenosis. Aortic valve area by VTI is 0.82 cm². Following infusion of dobutamine, there was no increase in LVOT or aortic VTI, velocity or gradient. The aortic valve area by VTI at 15 mcg/kg/min of dobutamine is 1.1 cm². Peak velocity 2.9 m/s and mean gradient 19 mm Hg.    Post-stress Impression: There is no change is the LV stroke volume with dobutamine consistent with a lack of contractile reserve    Plan:  - no interventions at this time  -

## 2023-08-30 NOTE — SUBJECTIVE & OBJECTIVE
Interval History: No acute events overnight, afebrile, hemodynamically stable.       Review of Systems   Constitutional: Negative for chills and fever.   HENT:  Negative for congestion.    Cardiovascular:  Negative for chest pain and dyspnea on exertion.   Respiratory:  Negative for cough and shortness of breath.    Musculoskeletal:  Negative for back pain.   Gastrointestinal:  Negative for abdominal pain, diarrhea, nausea and vomiting.   Neurological:  Negative for headaches.     Objective:     Vital Signs (Most Recent):  Temp: 97.7 °F (36.5 °C) (08/30/23 0300)  Pulse: (!) 114 (08/30/23 0701)  Resp: 14 (08/30/23 0701)  BP: 96/74 (08/30/23 0701)  SpO2: 100 % (08/30/23 0701) Vital Signs (24h Range):  Temp:  [97.7 °F (36.5 °C)-98 °F (36.7 °C)] 97.7 °F (36.5 °C)  Pulse:  [] 114  Resp:  [9-52] 14  SpO2:  [89 %-100 %] 100 %  BP: ()/(59-89) 96/74     Weight: 98.4 kg (217 lb)  Body mass index is 26.41 kg/m².     SpO2: 100 %         Intake/Output Summary (Last 24 hours) at 8/30/2023 0727  Last data filed at 8/30/2023 0701  Gross per 24 hour   Intake 3315.37 ml   Output 2100 ml   Net 1215.37 ml       Lines/Drains/Airways       Central Venous Catheter Line  Duration             Percutaneous Central Line Insertion/Assessment - Triple Lumen  08/28/23 1800 Internal Jugular Left 1 day              Peripheral Intravenous Line  Duration                  Peripheral IV - Single Lumen 08/28/23 1700 Anterior;Proximal;Right Forearm 1 day                       Physical Exam  HENT:      Head: Normocephalic.      Nose: Nose normal.      Mouth/Throat:      Mouth: Mucous membranes are moist.   Eyes:      Pupils: Pupils are equal, round, and reactive to light.   Cardiovascular:      Rate and Rhythm: Regular rhythm. Tachycardia present.   Pulmonary:      Effort: Pulmonary effort is normal.   Abdominal:      General: Abdomen is flat.   Musculoskeletal:         General: Normal range of motion.   Skin:     General: Skin is warm.    Neurological:      General: No focal deficit present.      Mental Status: He is alert.   Psychiatric:         Mood and Affect: Mood normal.            Significant Labs: CMP   Recent Labs   Lab 08/28/23  1722 08/28/23  2256 08/29/23  0335 08/29/23  2103 08/30/23  0347   *   < > 138 137 138   K 4.5   < > 4.4 3.6 4.1      < > 102 102 104   CO2 22*   < > 22* 23 24   *   < > 115* 166* 134*   BUN 58*   < > 54* 46* 50*   CREATININE 2.0*   < > 1.9* 2.0* 1.8*   CALCIUM 9.7   < > 9.6 9.3 9.4   PROT 7.0  --  6.9  --  6.5   ALBUMIN 3.4*  --  3.3*  --  3.2*   BILITOT 0.7  --  0.9  --  1.2*   ALKPHOS 124  --  112  --  106   AST 35  --  27  --  16   ALT 36  --  33  --  23   ANIONGAP 11   < > 14 12 10    < > = values in this interval not displayed.    and CBC   Recent Labs   Lab 08/28/23  1722 08/29/23  0335 08/30/23  0347   WBC 10.27 10.56 11.92   HGB 13.5* 12.9* 12.9*   HCT 42.3 39.8* 39.2*    261 250       Significant Imaging:

## 2023-08-30 NOTE — NURSING
Cardiac ICU Care Plan    POC reviewed with Ishmael Thrasher and family. Questions and concerns addressed. CVP: 9, 10. SVO2 51, 45, 68. No acute events today. Pt progressing toward goals. Will continue to monitor. See below and flowsheets for full assessment and VS info.       Neuro:  Lambert Coma Scale  Best Eye Response: 4-->(E4) spontaneous  Best Motor Response: 6-->(M6) obeys commands  Best Verbal Response: 5-->(V5) oriented  Lambert Coma Scale Score: 15  Assessment Qualifiers: patient not sedated/intubated, no eye obstruction present  Pupil PERRLA: yes    24 hr Temp:  [97.1 °F (36.2 °C)-98 °F (36.7 °C)]      CV:  Rhythm: normal sinus rhythm   DVT prophylaxis: VTE Required Core Measure: Pharmacological prophylaxis initiated/maintained    CVP (mean): 10 mmHg (08/30/23 1501)                       Pulses  Right Radial Pulse: 2+ (normal)  Left Radial Pulse: 2+ (normal)  Right Dorsalis Pedis Pulse: 1+ (weak)  Left Dorsalis Pedis Pulse: 1+ (weak)  Right Posterior Tibial Pulse: 1+ (weak)  Left Posterior Tibial Pulse: 1+ (weak)    Resp:  Flow (L/min): 2       GI/:  GI prophylaxis: no  Diet/Nutrition Received: restrict fluids, 2 gram sodium, low saturated fat/low cholesterol  Last Bowel Movement: 08/30/23      Intake/Output Summary (Last 24 hours) at 8/30/2023 1846  Last data filed at 8/30/2023 1801  Gross per 24 hour   Intake 3232.68 ml   Output 2200 ml   Net 1032.68 ml         Labs/Accuchecks:  Recent Labs   Lab 08/28/23  1722 08/29/23  0335 08/30/23  0347   WBC 10.27 10.56 11.92   RBC 5.58 5.19 5.15   HGB 13.5* 12.9* 12.9*   HCT 42.3 39.8* 39.2*    261 250      Recent Labs   Lab 08/28/23  1722 08/29/23  0335 08/30/23  0449 08/30/23  0619 08/30/23  1348   INR 1.2  --   --   --   --    APTT 26.7   < > 73.8* 34.2* 36.4*    < > = values in this interval not displayed.      Recent Labs     08/30/23  0347      K 4.1   CO2 24      BUN 50*   CREATININE 1.8*   ALKPHOS 106   ALT 23   AST 16   BILITOT 1.2*        Recent Labs   Lab 08/28/23  1722   TROPONINI 0.089*      Recent Labs     08/30/23  1349 08/30/23  1527 08/30/23  1731   PH 7.406 7.404 7.441   PCO2 43.8 44.8 40.5   PO2 27* 25* 34*   HCO3 27.5 28.0 27.6   POCSATURATED 51* 45* 68*   BE 3 3 3       Electrolytes: N/A - electrolytes WDL  Accuchecks: ACHS    Gtts/LDAs:   DOBUTamine IV infusion (non-titrating) 2.5 mcg/kg/min (08/30/23 1818)       Lines/Drains/Airways       Central Venous Catheter Line  Duration             Percutaneous Central Line Insertion/Assessment - Triple Lumen  08/28/23 1800 Internal Jugular Left 2 days              Peripheral Intravenous Line  Duration                  Peripheral IV - Single Lumen 08/28/23 1700 Anterior;Proximal;Right Forearm 2 days                    Skin/Wounds  Bathing/Skin Care: bath, complete;linen changed;dressed/undressed (08/30/23 0830)  Wounds: Yes  Wound care consulted: Yes

## 2023-08-30 NOTE — SUBJECTIVE & OBJECTIVE
Past Medical History:   Diagnosis Date    Brain damage     traumatic    CAD (coronary artery disease)     Cardiomyopathy     CHF (congestive heart failure)     Diabetes mellitus     type 2    Edema     Erectile dysfunction     GERD (gastroesophageal reflux disease)     HTN (hypertension)     Hyperlipemia     Sciatic nerve pain, right     leg       Past Surgical History:   Procedure Laterality Date    CARDIAC DEFIBRILLATOR PLACEMENT      CATHETERIZATION OF BOTH LEFT AND RIGHT HEART Bilateral 08/20/2018    CORONARY ANGIOGRAPHY N/A 8/29/2023    Procedure: ANGIOGRAM, CORONARY ARTERY;  Surgeon: Gage Low MD;  Location: [facility] CATH LAB;  Service: Cardiology;  Laterality: N/A;    CORONARY ARTERY BYPASS GRAFT      CORONARY BYPASS GRAFT ANGIOGRAPHY  8/29/2023    Procedure: Bypass graft study;  Surgeon: Gage Low MD;  Location: [facility] CATH LAB;  Service: Cardiology;;    ILIAC ARTERY STENT      INJECTION OF JOINT Right 8/13/2021    Procedure: INJECTION, JOINT, SACROILIAC (SI);  Surgeon: Austin Parsons MD;  Location: [facility] PAIN MGT;  Service: Pain Management;  Laterality: Right;    PHACOEMULSIFICATION, CATARACT, WITH IOL INSERTION Right 8/15/2023    Procedure: PHACOEMULSIFICATION, CATARACT, WITH IOL INSERTION- OD;  Surgeon: Eyal Mccarthy MD;  Location: [facility] OR;  Service: Ophthalmology;  Laterality: Right;    RADIOFREQUENCY ABLATION Right 2/26/2021    Procedure: RADIOFREQUENCY ABLATION GENICULAR NERVES, COOLED;  Surgeon: Austin Parsons MD;  Location: [facility] PAIN MGT;  Service: Pain Management;  Laterality: Right;  1 of 2  consent needed  eliquis clearance requested    RADIOFREQUENCY ABLATION Right 11/12/2021    Procedure: RADIOFREQUENCY ABLATION, GENICULAR COOLED  NEED CONSENT/ clear to hold  ELIQUIS;  Surgeon: Austin Parsons MD;  Location: [facility] PAIN MGT;  Service: Pain Management;  Laterality: Right;    RADIOFREQUENCY ABLATION Right 1/5/2023    Procedure: RADIOFREQUENCY ABLATION RIGHT GENICULAR NERVES COOLED  clear to hold Eliquis 3 days;  Surgeon: Austin Parsons MD;  Location: [facility] PAIN MGT;  Service: Pain Management;  Laterality: Right;    RADIOFREQUENCY ABLATION Left 2/10/2023    Procedure: RADIOFREQUENCY ABLATION LEFT GENICULAR CLEARED TO HOLD ELIQUIS 3 DAYS;  Surgeon: Austin Parsons MD;  Location: [facility] PAIN MGT;  Service: Pain Management;  Laterality: Left;    RIGHT HEART CATHETERIZATION Right 8/17/2020    Procedure: INSERTION, CATHETER, RIGHT HEART;  Surgeon: Brianda Navas MD;  Location: [facility] CATH LAB;  Service: Cardiology;  Laterality: Right;    TRANSFORAMINAL EPIDURAL INJECTION OF STEROID Right 9/11/2020    Procedure: INJECTION, STEROID, EPIDURAL, TRANSFORAMINAL APPROACH, L4-L5 AND L5-S1 clear to hold Eliquis 3 days;  Surgeon: Asutin Parsons MD;  Location: [facility] PAIN MGT;  Service: Pain Management;  Laterality: Right;    TRANSFORAMINAL EPIDURAL INJECTION OF STEROID Right 10/23/2020    Procedure: INJECTION, STEROID, EPIDURAL, TRANSFORAMINAL APPROACH;  Surgeon: Austin Parsons MD;  Location: [facility] PAIN MGT;  Service: Pain Management;  Laterality: Right;  RT RFA L5/S1 and S1  Eliquis clearance in Media       Review of patient's allergies indicates:   Allergen Reactions    Enalapril maleate Swelling and Edema     Other reaction(s): Swelling    Vasotec [enalaprilat] Swelling     Neck swelling    Zoloft [sertraline] Other (See Comments)     Patient states it put him to sleep in the hospital he could not talk nor move    Cyclobenzaprine Hallucinations     Other reaction(s): Swelling  Hallucinations according to patient.    Other reaction(s): Lymphadenopathy, Restlessness, Sensation of being cold, Restlessness, Sensation of being cold    Amitriptyline Hallucinations    Labetalol      Other reaction(s): Pharyngeal swelling    Lisinopril      Other reaction(s): Pharyngeal swelling    Tramadol Nausea Only and Hallucinations       Current Facility-Administered Medications   Medication    acetaminophen tablet 650 mg     acetaminophen tablet 650 mg    allopurinoL tablet 300 mg    apixaban tablet 5 mg    aspirin EC tablet 81 mg    atorvastatin tablet 80 mg    colchicine capsule/tablet 0.6 mg    DOBUtamine 1000 mg in D5W 250 mL infusion    melatonin tablet 9 mg    miconazole nitrate 2% ointment    mupirocin 2 % ointment    ondansetron disintegrating tablet 8 mg    predniSONE tablet 5 mg    risperiDONE tablet 3 mg    sertraline tablet 50 mg    sodium chloride 0.9% flush 3 mL    torsemide tablet 60 mg    valsartan tablet 40 mg     Family History    None       Tobacco Use    Smoking status: Never    Smokeless tobacco: Never   Substance and Sexual Activity    Alcohol use: Not Currently    Drug use: Not Currently    Sexual activity: Not on file     Review of Systems   Constitutional: Negative.    HENT: Negative.     Eyes: Negative.    Respiratory: Negative.     Cardiovascular: Negative.    Gastrointestinal: Negative.    Endocrine: Negative.    Genitourinary: Negative.    Musculoskeletal: Negative.    Neurological: Negative.    Hematological: Negative.      Objective:     Vital Signs (Most Recent):  Temp: 97.6 °F (36.4 °C) (08/30/23 1501)  Pulse: 104 (08/30/23 1501)  Resp: 20 (08/30/23 1501)  BP: (!) 89/66 (08/30/23 1501)  SpO2: 98 % (08/30/23 1501) Vital Signs (24h Range):  Temp:  [97.1 °F (36.2 °C)-98 °F (36.7 °C)] 97.6 °F (36.4 °C)  Pulse:  [104-119] 104  Resp:  [9-45] 20  SpO2:  [93 %-100 %] 98 %  BP: ()/(51-89) 89/66     Patient Vitals for the past 72 hrs (Last 3 readings):   Weight   08/30/23 0903 99.3 kg (219 lb)   08/29/23 0900 98.4 kg (217 lb)   08/28/23 1845 98.7 kg (217 lb 9.5 oz)     Body mass index is 26.66 kg/m².      Intake/Output Summary (Last 24 hours) at 8/30/2023 1723  Last data filed at 8/30/2023 1501  Gross per 24 hour   Intake 3224.07 ml   Output 1825 ml   Net 1399.07 ml          HENT:      Head: Normocephalic.      Nose: Nose normal.      Mouth/Throat:      Mouth: Mucous membranes are moist.   Eyes:      Pupils:  Pupils are equal, round, and reactive to light.   Cardiovascular:      Rate and Rhythm: Regular rhythm. Tachycardia present.   Pulmonary:      Effort: Pulmonary effort is normal.   Abdominal:      General: Abdomen is flat.   Musculoskeletal:         General: Normal range of motion.   Skin:     General: Skin is warm.   Neurological:      General: No focal deficit present.      Mental Status: He is alert.   Psychiatric:         Mood and Affect: Mood normal.        Significant Labs:  CBC:  Recent Labs   Lab 08/28/23  1722 08/29/23 0335 08/30/23  0347   WBC 10.27 10.56 11.92   RBC 5.58 5.19 5.15   HGB 13.5* 12.9* 12.9*   HCT 42.3 39.8* 39.2*    261 250   MCV 76* 77* 76*   MCH 24.2* 24.9* 25.0*   MCHC 31.9* 32.4 32.9     BNP:  Recent Labs   Lab 08/28/23 1722   BNP 2,700*     CMP:  Recent Labs   Lab 08/28/23  1722 08/28/23  2256 08/29/23 0335 08/29/23  2103 08/30/23  0347   *   < > 115* 166* 134*   CALCIUM 9.7   < > 9.6 9.3 9.4   ALBUMIN 3.4*  --  3.3*  --  3.2*   PROT 7.0  --  6.9  --  6.5   *   < > 138 137 138   K 4.5   < > 4.4 3.6 4.1   CO2 22*   < > 22* 23 24      < > 102 102 104   BUN 58*   < > 54* 46* 50*   CREATININE 2.0*   < > 1.9* 2.0* 1.8*   ALKPHOS 124  --  112  --  106   ALT 36  --  33  --  23   AST 35  --  27  --  16   BILITOT 0.7  --  0.9  --  1.2*    < > = values in this interval not displayed.      Coagulation:   Recent Labs   Lab 08/28/23  1722 08/29/23 0335 08/30/23  0449 08/30/23  0619 08/30/23  1348   INR 1.2  --   --   --   --    APTT 26.7   < > 73.8* 34.2* 36.4*    < > = values in this interval not displayed.     LDH:  Recent Labs   Lab 08/28/23  1722   *     Microbiology:  Microbiology Results (last 7 days)       ** No results found for the last 168 hours. **            I have reviewed all pertinent labs within the past 24 hours.    Diagnostic Results:  I have reviewed all pertinent imaging results/findings within the past 24 hours.

## 2023-08-30 NOTE — PLAN OF CARE
CICU DAILY GOALS       A: Awake    RASS: Goal - RASS Goal: 0-->alert and calm  Actual - RASS (Diaz Agitation-Sedation Scale): alert and calm   Restraint necessity:    B: Breath   SBT: Not intubated   C: Coordinate A & B, analgesics/sedatives   Pain: managed    SAT: Not intubated  D: Delirium   CAM-ICU: Overall CAM-ICU: Negative  E: Early(intubated/ Progressive (non-intubated) Mobility   MOVE Screen: Pass   Activity: Activity Management: Arm raise - L1  FAS: Feeding/Nutrition   Diet order: Diet/Nutrition Received: restrict fluids,   Fluid restriction:  1500 cc   Nutritional Supplement Intake: Quantity 0, Type: Boost  T: Thrombus   DVT prophylaxis: VTE Required Core Measure: Pharmacological prophylaxis initiated/maintained  H: HOB Elevation   Head of Bed (HOB) Positioning: HOB elevated  U: Ulcer Prophylaxis   GI: yes  G: Glucose control   managed    S: Skin   Bundle compliance: yes   Bathing/Skin Care: back care, linen changed, dressed/undressed, bath, complete Date: 8 29 23  B: Bowel Function   no issues   I: Indwelling Catheters   Fontenot necessity:     CVC necessity: Yes   IPAD offered: No  D: De-escalation Antibx   No  Plan for the day   Hemodynamic stability  Family/Goals of care/Code Status   Code Status: Full Code     No acute events throughout day, VS and assessment per flow sheet, patient progressing towards goals as tolerated, plan of care reviewed with Ishmael Thrasher and family, all concerns addressed, will continue to monitor.

## 2023-08-30 NOTE — ASSESSMENT & PLAN NOTE
Patient has known heart failure since 2019 with EF of 18%.  Current echo shows EF of 10% with LV RENZO of 7.9.  Also has moderate to severe aortic stenosis based on dobutamine stress echocardiogram.  However dimensional index is 0.18.  Intervention cardiology declined TAVR in CCU team consulted for advance options  Also on his echo his RV  has a strain of 9% with a TAPSE of 1.5  CRT-D place     Plan  Increase dobutamine to 5 and diurese him with Lasix drip at 10 milligrams/hour.  HTS team  will see him in the morning and if patient is interested in LVAD, then will consult CT surgery for their input regarding his severe aortic stenosis and LVAD

## 2023-08-30 NOTE — NURSING
Patient verified by 2 identifiers and allergies reviewed.  TLC IV in place to Lt neck with Dobutamine & Lasix infusing, Heparin infusing to PIV.  [facility] Protocol explained to patient & consent obtained.  Discussed w/ Dr. Zepeda, instructed to keep pt on Dobutamine 3mcg/kg/min & follow usual protocol of 5, 10, 15 & 20mcg/kg/min, if needed.  Testing completed, pt tolerated testing well. TLC flushed post testing.  Pt's nurse notified of completion of testing to transport pt back to his room.  Upon cleaning of pts linens, blood noted to blanket.  Rt groin dressing noted to be saturated w/ fresh blood.  Manual pressure applied & groin redressed w/ guaze & tegaderm.  Post study discharge instructions reviewed with patient, patient verbalized understanding.  Patient transported back to room via stretcher & nurse on portable monitor  in stable condition.

## 2023-08-30 NOTE — PROGRESS NOTES
[facility] - [facility]  Cardiology  Progress Note    Patient Name: Ishmael Thrasher  MRN: [id number]  Admission Date: 8/28/2023  Hospital Length of Stay: 2 days  Code Status: Full Code   Attending Physician: Gage Low MD   Primary Care Physician: Kirk, [facility] [place]  Expected Discharge Date: 8/31/2023  Principal Problem:Cardiogenic shock    Subjective:     Hospital Course:   Patient is getting a left heart catheterization on 08/29/2023.  Repeat echo reveals severely reduced systolic function with estimated ejection fraction of 10 to 15%.  Aortic valve:  Moderate to severe stenosis low-flow.  In the mean stress echo was performed to assess aortic valve which showed Aortic Valve:  There is moderate to severe low flow low gradient aortic stenosis. There is no change is the LV stroke volume with dobutamine consistent with a lack of contractile reserve.  No valvular interventions at this time.  Patient is to discharged home      Interval History: No acute events overnight, afebrile, hemodynamically stable.       Review of Systems   Constitutional: Negative for chills and fever.   HENT:  Negative for congestion.    Cardiovascular:  Negative for chest pain and dyspnea on exertion.   Respiratory:  Negative for cough and shortness of breath.    Musculoskeletal:  Negative for back pain.   Gastrointestinal:  Negative for abdominal pain, diarrhea, nausea and vomiting.   Neurological:  Negative for headaches.     Objective:     Vital Signs (Most Recent):  Temp: 97.7 °F (36.5 °C) (08/30/23 0300)  Pulse: (!) 114 (08/30/23 0701)  Resp: 14 (08/30/23 0701)  BP: 96/74 (08/30/23 0701)  SpO2: 100 % (08/30/23 0701) Vital Signs (24h Range):  Temp:  [97.7 °F (36.5 °C)-98 °F (36.7 °C)] 97.7 °F (36.5 °C)  Pulse:  [] 114  Resp:  [9-52] 14  SpO2:  [89 %-100 %] 100 %  BP: ()/(59-89) 96/74     Weight: 98.4 kg (217 lb)  Body mass index is 26.41 kg/m².     SpO2: 100 %         Intake/Output Summary (Last  24 hours) at 8/30/2023 0727  Last data filed at 8/30/2023 0701  Gross per 24 hour   Intake 3315.37 ml   Output 2100 ml   Net 1215.37 ml       Lines/Drains/Airways       Central Venous Catheter Line  Duration             Percutaneous Central Line Insertion/Assessment - Triple Lumen  08/28/23 1800 Internal Jugular Left 1 day              Peripheral Intravenous Line  Duration                  Peripheral IV - Single Lumen 08/28/23 1700 Anterior;Proximal;Right Forearm 1 day                       Physical Exam  HENT:      Head: Normocephalic.      Nose: Nose normal.      Mouth/Throat:      Mouth: Mucous membranes are moist.   Eyes:      Pupils: Pupils are equal, round, and reactive to light.   Cardiovascular:      Rate and Rhythm: Regular rhythm. Tachycardia present.   Pulmonary:      Effort: Pulmonary effort is normal.   Abdominal:      General: Abdomen is flat.   Musculoskeletal:         General: Normal range of motion.   Skin:     General: Skin is warm.   Neurological:      General: No focal deficit present.      Mental Status: He is alert.   Psychiatric:         Mood and Affect: Mood normal.            Significant Labs: CMP   Recent Labs   Lab 08/28/23  1722 08/28/23  2256 08/29/23 0335 08/29/23 2103 08/30/23 0347   *   < > 138 137 138   K 4.5   < > 4.4 3.6 4.1      < > 102 102 104   CO2 22*   < > 22* 23 24   *   < > 115* 166* 134*   BUN 58*   < > 54* 46* 50*   CREATININE 2.0*   < > 1.9* 2.0* 1.8*   CALCIUM 9.7   < > 9.6 9.3 9.4   PROT 7.0  --  6.9  --  6.5   ALBUMIN 3.4*  --  3.3*  --  3.2*   BILITOT 0.7  --  0.9  --  1.2*   ALKPHOS 124  --  112  --  106   AST 35  --  27  --  16   ALT 36  --  33  --  23   ANIONGAP 11   < > 14 12 10    < > = values in this interval not displayed.    and CBC   Recent Labs   Lab 08/28/23  1722 08/29/23  0335 08/30/23  0347   WBC 10.27 10.56 11.92   HGB 13.5* 12.9* 12.9*   HCT 42.3 39.8* 39.2*    261 250       Significant Imaging:       Assessment and Plan:      * Cardiogenic shock  HFrEF due to iCMP  EF 10-15% on echo along with severe MR and mod AS per echo report from prior hospital     Initial hemodynamics on  2 mcg/kg/min on arrival  CVP 7  SVO2 57  Hemodynamics: CVP:7 , Scvo2:57 , CO:5.08 , CI2.21: , SVR:1402.      Plan  - d/c dubutamine, Lasix for 7.5    CAD (coronary artery disease)  73 year old gentleman with HFrEF (EF 10-15%) 2/2 iCMP s/p [facility] CRT-D placement, CAD s/p CABG x 4 (3/2014 in [place] with LIMA-LAD, SVG-RCA, SVG-PLV, SVG-D2) followed by PCI late same year in 10/2014 with LANA x 3 to RCA and LANA x 3 to left coronary system. Also has h/o pAF on eliquis, PAM on CPAP, CKD, HTN, HLD, T2DM and PAD.      Results:  Left heart cath 8/29/23:   dLCx  -patent LIMA-LAD and patent SVG-D2  -dLAD stenosis to small vessel distal LAD  -patent native RCA stents  -patent SVG-PDA  -occluded SVG-RCA    Plan:  -     Recommendations:  Per interventional cardiology:   - Routine post-cath care as per orders  - IVF at  100cc/hr  for 4 hrs  - Continue medical management, Risk factor reduction, Follow-up with outpatient cardiologist    Moderate aortic stenosis  SeRepeat Echo 8/29    Summary    Tachycardia was present during the study    Left Ventricle: The left ventricle is severely dilated. Normal wall thickness. There is moderate eccentric hypertrophy. Severe global hypokinesis present. There is severely reduced systolic function with a visually estimated ejection fraction of 10 -15%. No LV apical thrombus present. Unable to assess diastolic functionUnable to assess due to poor image quality.    Right Ventricle: Mild right ventricular enlargement. Wall thickness is normal. Right ventricle wall motion  is normal. Systolic function is moderately reduced. TAPSE is 1.30 cm. Right ventricular global longitudinal strain is -9.0 % and is reduced.    Left Atrium: Left atrium is severely dilated.    Right Atrium: Right atrium is mildly dilated.    Aortic Valve: The aortic  valve is a trileaflet valve. There is moderate aortic valve sclerosis. Moderate annular calcification. There is moderate to severe stenosis low flow . Aortic valve area by VTI is 0.91 cm². Aortic valve peak velocity is 2.33 m/s. Mean gradient is 12 mmHg. The dimensionless index is 0.24. Consider Dobutmine stress to differentiate from pseudosevere AS if clinically inidcated.    Mitral Valve: There is mild to moderate regurgitation.    Pulmonary Artery: The estimated pulmonary artery systolic pressure is 65 mmHg.    IVC/SVC: Elevated venous pressure at 15 mmHg     Dobutamine Stress echo:    Summary      A low dose Dobutamine stress echo was performed to evaluate low flow low gradient aortic stenosis.    Stress ECG: Frequent PACs and frequent PVCs.    ECG Conclusion: The ECG portion of the study is negative for ischemia.    Left Ventricle: The left ventricle is severely dilated measuring 7.9 cm. Severe global hypokinesis present. Severely reduced systolic function with a visually estimated ejection fraction of 10 -15%.    Left Ventricle: The left ventricle is severely dilated. LVIDd is 7.9 cm.    Aortic Valve:  There is moderate to severe low flow low gradient aortic stenosis. Aortic valve area by VTI is 0.82 cm². Following infusion of dobutamine, there was no increase in LVOT or aortic VTI, velocity or gradient. The aortic valve area by VTI at 15 mcg/kg/min of dobutamine is 1.1 cm². Peak velocity 2.9 m/s and mean gradient 19 mm Hg.    Post-stress Impression: There is no change is the LV stroke volume with dobutamine consistent with a lack of contractile reserve    Plan:  - no interventions at this time  -         Severe mitral regurgitation  Repeat Echo 8/29    Summary    Tachycardia was present during the study    Left Ventricle: The left ventricle is severely dilated. Normal wall thickness. There is moderate eccentric hypertrophy. Severe global hypokinesis present. There is severely reduced systolic function with a  visually estimated ejection fraction of 10 -15%. No LV apical thrombus present. Unable to assess diastolic functionUnable to assess due to poor image quality.    Right Ventricle: Mild right ventricular enlargement. Wall thickness is normal. Right ventricle wall motion  is normal. Systolic function is moderately reduced. TAPSE is 1.30 cm. Right ventricular global longitudinal strain is -9.0 % and is reduced.    Left Atrium: Left atrium is severely dilated.    Right Atrium: Right atrium is mildly dilated.    Aortic Valve: The aortic valve is a trileaflet valve. There is moderate aortic valve sclerosis. Moderate annular calcification. There is moderate to severe stenosis low flow . Aortic valve area by VTI is 0.91 cm². Aortic valve peak velocity is 2.33 m/s. Mean gradient is 12 mmHg. The dimensionless index is 0.24. Consider Dobutmine stress to differentiate from pseudosevere AS if clinically inidcated.    Mitral Valve: There is mild to moderate regurgitation.    Pulmonary Artery: The estimated pulmonary artery systolic pressure is 65 mmHg.    IVC/SVC: Elevated venous pressure at 15 mmHg         PAM (obstructive sleep apnea)  CPAP ordered     Paroxysmal A-fib  -currently in sinus tach 105  -Takes Eliquis at home for pAF and chronic DVT  -Switch Eliquis to heparin gtt for now    PTSD (post-traumatic stress disorder)  Resume psych meds    Chronic deep vein thrombosis (DVT) of right lower extremity  Takes Eliquis at home for pAF and chronic DVT  Switch Eliquis to heparin gtt for now    Gout  -Acute gout attack [facility]  -Uric acid 11.6 at that time    Plan  -continue allopurinol 300 daily (modify dose in AM if needed)  -on colchicine 0.6 mg qd, continue  -was [facility] by Rheum [facility], on prednisone taper (will give 5 mg prednisone x 3 more days and then stop)    Ischemic cardiomyopathy  -CAD s/p CABG x 4 (3/2014 in [place] with LIMA-LAD, SVG-RCA, SVG-PLV, SVG-D2) followed by PCI  late same year in 10/2014 with LANA x 3 to RCA and LANA x 3 to left coronary system  -ASA 81 daily and HI statin    Plan  - optimize GDMT upon discharge  - Obtain Hemodynamics after dubutamine   - Starting valstaran then metoprolol after d/c dubtamine              VTE Risk Mitigation (From admission, onward)           Ordered     heparin 25,000 units in dextrose 5% (100 units/ml) IV bolus from bag - ADDITIONAL PRN BOLUS - 60 units/kg  As needed (PRN)        Question:  Heparin Infusion Adjustment (DO NOT MODIFY ANSWER)  Answer:  \\[email or web address]\epic\Images\Pharmacy\HeparinInfusions\heparin LOW INTENSITY nomogram for [facility] [id number]    08/28/23 1719     heparin 25,000 units in dextrose 5% (100 units/ml) IV bolus from bag - ADDITIONAL PRN BOLUS - 30 units/kg  As needed (PRN)        Question:  Heparin Infusion Adjustment (DO NOT MODIFY ANSWER)  Answer:  \\[email or web address]\epic\Images\Pharmacy\HeparinInfusions\heparin LOW INTENSITY nomogram for [facility] [id number]    08/28/23 1719     heparin 25,000 units in dextrose 5% 250 mL (100 units/mL) infusion LOW INTENSITY nomogram - [facility]  Continuous        Question Answer Comment   Heparin Infusion Adjustment (DO NOT MODIFY ANSWER) \\[email or web address]\epic\Images\Pharmacy\HeparinInfusions\heparin LOW INTENSITY nomogram for [facility] [id number]    Begin at (in units/kg/hr) 12        08/28/23 1719                    David Melendrez DO  Cardiology  [facility]    Staff Attestation:  I have seen the patient, reviewed the Resident's assessment and plan, personally interviewed and examined the patient at bedside and agree with the findings.Total critical care time: Approximately 45 minutes. Due to a high probability of clinically significant, life threatening deterioration, I personally spent this critical care time directly and personally managing the patient. This critical care time included obtaining a history; examining the patient; ordering and review of studies; arranging urgent treatment  with development of a management plan; evaluation of patient's response to treatment; frequent reassessment; and, discussions with other providers.   This critical care time was performed to assess and manage the high probability of imminent, life-threatening deterioration that could result in multi-organ failure. It was medically reasonable and necessary. It was exclusive of separately billable procedures and treating other patients and teaching time.    Gage Low MD  [facility] - Cardiology

## 2023-08-30 NOTE — ASSESSMENT & PLAN NOTE
HFrEF due to iCMP  EF 10-15% on echo along with severe MR and mod AS per echo report from prior hospital     Initial hemodynamics on  2 mcg/kg/min on arrival  CVP 7  SVO2 57  Hemodynamics: CVP:7 , Scvo2:57 , CO:5.08 , CI2.21: , SVR:1402.      Plan  - d/c dubutamine, Lasix for 7.5

## 2023-08-30 NOTE — NURSING
Nurses Note -- 4 Eyes      8/29/2023   8:19 PM      Skin assessed during: Daily Assessment      [] No Altered Skin Integrity Present    []Prevention Measures Documented      [] Yes- Altered Skin Integrity Present or Discovered   [] LDA Added if Not in Epic (Describe Wound)   [x] New Altered Skin Integrity was Present on Admit and Documented in LDA   [x] Wound Image Taken    Wound Care Consulted? Yes    Attending Nurse:  Sandra Menchaca RN/Staff Member:  CHRISTOPHER Nelson

## 2023-08-30 NOTE — CONSULTS
[facility] - Cardiac Intensive Care  Heart Transplant  Consult Note    Patient Name: Ishmael Thrasher  MRN: [id number]  Admission Date: 8/28/2023  Hospital Length of Stay: 2 days  Attending Physician: Gage Low MD  Primary Care Provider: [facility]   Principal Problem:Cardiogenic shock    Consults  Subjective:     History of Present Illness:  73 year old gentleman with HFrEF (EF 10-15%) 2/2 iCMP s/p [facility] [employer] CRT-D placement, CAD s/p CABG x 4 (3/2014 in [place] with LIMA-LAD, SVG-RCA, SVG-PLV, SVG-D2) followed by PCI late same year in 10/2014 with LANA x 3 to RCA and LANA x 3 to left coronary system. Also has h/o pAF on eliquis, PAM on CPAP, CKD, HTN, HLD, T2DM and PAD.       Patient was [facility] to [facility] the [facility] for higher level of care. He stayed in the [facility] x 11 days and was initially hospitalized for acute CHF and then RHC was done 8/18 (data not available) which showed cardiogenic shock after which he was put on  gtt which was later weaned but his UO dropped and Cr also went back up so he was restarted on  gtt and sent here.  Patient underwent dobutamine stress echo here that showed moderate to severe aortic stenosis.   primary team unable to wean him off dobutamine.  His dimension index on dobutamine stress echo was 0.18 suggestive of severe aortic stenosis however patient did not have any contractile reserve.  This morning patient is CVP was 7, SvO2 was 54, cardiac output was 5.1 cardiac index of 2.2, SVR 1400 on dobutamine 2.5.  After weaning him off dobutamine patient's SvO2 dropped 45.  Now his CVP is 10.  Currently patient is on torsemide and dobutamine 2.5      Past Medical History:   Diagnosis Date    Brain damage     traumatic    CAD (coronary artery disease)     Cardiomyopathy     CHF (congestive heart failure)     Diabetes mellitus     type 2    Edema     Erectile dysfunction     GERD (gastroesophageal reflux disease)     HTN  (hypertension)     Hyperlipemia     Sciatic nerve pain, right     leg       Past Surgical History:   Procedure Laterality Date    CARDIAC DEFIBRILLATOR PLACEMENT      CATHETERIZATION OF BOTH LEFT AND RIGHT HEART Bilateral 08/20/2018    CORONARY ANGIOGRAPHY N/A 8/29/2023    Procedure: ANGIOGRAM, CORONARY ARTERY;  Surgeon: Gage Low MD;  Location: [facility] CATH LAB;  Service: Cardiology;  Laterality: N/A;    CORONARY ARTERY BYPASS GRAFT      CORONARY BYPASS GRAFT ANGIOGRAPHY  8/29/2023    Procedure: Bypass graft study;  Surgeon: Gage Low MD;  Location: [facility] CATH LAB;  Service: Cardiology;;    ILIAC ARTERY STENT      INJECTION OF JOINT Right 8/13/2021    Procedure: INJECTION, JOINT, SACROILIAC (SI);  Surgeon: Austin Parsons MD;  Location: [facility] PAIN MGT;  Service: Pain Management;  Laterality: Right;    PHACOEMULSIFICATION, CATARACT, WITH IOL INSERTION Right 8/15/2023    Procedure: PHACOEMULSIFICATION, CATARACT, WITH IOL INSERTION- OD;  Surgeon: Eyal Mccarthy MD;  Location: [facility] OR;  Service: Ophthalmology;  Laterality: Right;    RADIOFREQUENCY ABLATION Right 2/26/2021    Procedure: RADIOFREQUENCY ABLATION GENICULAR NERVES, COOLED;  Surgeon: Austin Parsons MD;  Location: [facility] PAIN MGT;  Service: Pain Management;  Laterality: Right;  1 of 2  consent needed  eliquis clearance requested    RADIOFREQUENCY ABLATION Right 11/12/2021    Procedure: RADIOFREQUENCY ABLATION, GENICULAR COOLED  NEED CONSENT/ clear to hold  ELIQUIS;  Surgeon: Austin Parsons MD;  Location: [facility] PAIN MGT;  Service: Pain Management;  Laterality: Right;    RADIOFREQUENCY ABLATION Right 1/5/2023    Procedure: RADIOFREQUENCY ABLATION RIGHT GENICULAR NERVES COOLED clear to hold Eliquis 3 days;  Surgeon: Austin Parsons MD;  Location: [facility] PAIN MGT;  Service: Pain Management;  Laterality: Right;    RADIOFREQUENCY ABLATION Left 2/10/2023    Procedure: RADIOFREQUENCY ABLATION LEFT GENICULAR CLEARED TO HOLD ELIQUIS 3 DAYS;   Surgeon: Austin Parsons MD;  Location: [facility] PAIN MGT;  Service: Pain Management;  Laterality: Left;    RIGHT HEART CATHETERIZATION Right 8/17/2020    Procedure: INSERTION, CATHETER, RIGHT HEART;  Surgeon: Brianda Navas MD;  Location: [facility] CATH LAB;  Service: Cardiology;  Laterality: Right;    TRANSFORAMINAL EPIDURAL INJECTION OF STEROID Right 9/11/2020    Procedure: INJECTION, STEROID, EPIDURAL, TRANSFORAMINAL APPROACH, L4-L5 AND L5-S1 clear to hold Eliquis 3 days;  Surgeon: Austin Parsons MD;  Location: [facility] PAIN MGT;  Service: Pain Management;  Laterality: Right;    TRANSFORAMINAL EPIDURAL INJECTION OF STEROID Right 10/23/2020    Procedure: INJECTION, STEROID, EPIDURAL, TRANSFORAMINAL APPROACH;  Surgeon: Austin Parsons MD;  Location: [facility] PAIN MGT;  Service: Pain Management;  Laterality: Right;  RT RFA L5/S1 and S1  Eliquis clearance in [place]       Review of patient's allergies indicates:   Allergen Reactions    Enalapril maleate Swelling and Edema     Other reaction(s): Swelling    Vasotec [enalaprilat] Swelling     Neck swelling    Zoloft [sertraline] Other (See Comments)     Patient states it put him to sleep in the hospital he could not talk nor move    Cyclobenzaprine Hallucinations     Other reaction(s): Swelling  Hallucinations according to patient.    Other reaction(s): Lymphadenopathy, Restlessness, Sensation of being cold, Restlessness, Sensation of being cold    Amitriptyline Hallucinations    Labetalol      Other reaction(s): Pharyngeal swelling    Lisinopril      Other reaction(s): Pharyngeal swelling    Tramadol Nausea Only and Hallucinations       Current Facility-Administered Medications   Medication    acetaminophen tablet 650 mg    acetaminophen tablet 650 mg    allopurinoL tablet 300 mg    apixaban tablet 5 mg    aspirin EC tablet 81 mg    atorvastatin tablet 80 mg    colchicine capsule/tablet 0.6 mg    DOBUtamine 1000 mg in D5W 250 mL infusion    melatonin tablet 9  mg    miconazole nitrate 2% ointment    mupirocin 2 % ointment    ondansetron disintegrating tablet 8 mg    predniSONE tablet 5 mg    risperiDONE tablet 3 mg    sertraline tablet 50 mg    sodium chloride 0.9% flush 3 mL    torsemide tablet 60 mg    valsartan tablet 40 mg     Family History    None       Tobacco Use    Smoking status: Never    Smokeless tobacco: Never   Substance and Sexual Activity    Alcohol use: Not Currently    Drug use: Not Currently    Sexual activity: Not on file     Review of Systems   Constitutional: Negative.    HENT: Negative.     Eyes: Negative.    Respiratory: Negative.     Cardiovascular: Negative.    Gastrointestinal: Negative.    Endocrine: Negative.    Genitourinary: Negative.    Musculoskeletal: Negative.    Neurological: Negative.    Hematological: Negative.      Objective:     Vital Signs (Most Recent):  Temp: 97.6 °F (36.4 °C) (08/30/23 1501)  Pulse: 104 (08/30/23 1501)  Resp: 20 (08/30/23 1501)  BP: (!) 89/66 (08/30/23 1501)  SpO2: 98 % (08/30/23 1501) Vital Signs (24h Range):  Temp:  [97.1 °F (36.2 °C)-98 °F (36.7 °C)] 97.6 °F (36.4 °C)  Pulse:  [104-119] 104  Resp:  [9-45] 20  SpO2:  [93 %-100 %] 98 %  BP: ()/(51-89) 89/66     Patient Vitals for the past 72 hrs (Last 3 readings):   Weight   08/30/23 0903 99.3 kg (219 lb)   08/29/23 0900 98.4 kg (217 lb)   08/28/23 1845 98.7 kg (217 lb 9.5 oz)     Body mass index is 26.66 kg/m².      Intake/Output Summary (Last 24 hours) at 8/30/2023 1723  Last data filed at 8/30/2023 1501  Gross per 24 hour   Intake 3224.07 ml   Output 1825 ml   Net 1399.07 ml          HENT:      Head: Normocephalic.      Nose: Nose normal.      Mouth/Throat:      Mouth: Mucous membranes are moist.   Eyes:      Pupils: Pupils are equal, round, and reactive to light.   Cardiovascular:      Rate and Rhythm: Regular rhythm. Tachycardia present.   Pulmonary:      Effort: Pulmonary effort is normal.   Abdominal:      General: Abdomen is flat.    Musculoskeletal:         General: Normal range of motion.   Skin:     General: Skin is warm.   Neurological:      General: No focal deficit present.      Mental Status: He is alert.   Psychiatric:         Mood and Affect: Mood normal.        Significant Labs:  CBC:  Recent Labs   Lab 08/28/23 1722 08/29/23 0335 08/30/23  0347   WBC 10.27 10.56 11.92   RBC 5.58 5.19 5.15   HGB 13.5* 12.9* 12.9*   HCT 42.3 39.8* 39.2*    261 250   MCV 76* 77* 76*   MCH 24.2* 24.9* 25.0*   MCHC 31.9* 32.4 32.9     BNP:  Recent Labs   Lab 08/28/23 1722   BNP 2,700*     CMP:  Recent Labs   Lab 08/28/23 1722 08/28/23  2256 08/29/23 0335 08/29/23  2103 08/30/23  0347   *   < > 115* 166* 134*   CALCIUM 9.7   < > 9.6 9.3 9.4   ALBUMIN 3.4*  --  3.3*  --  3.2*   PROT 7.0  --  6.9  --  6.5   *   < > 138 137 138   K 4.5   < > 4.4 3.6 4.1   CO2 22*   < > 22* 23 24      < > 102 102 104   BUN 58*   < > 54* 46* 50*   CREATININE 2.0*   < > 1.9* 2.0* 1.8*   ALKPHOS 124  --  112  --  106   ALT 36  --  33  --  23   AST 35  --  27  --  16   BILITOT 0.7  --  0.9  --  1.2*    < > = values in this interval not displayed.      Coagulation:   Recent Labs   Lab 08/28/23 1722 08/29/23 0335 08/30/23  0449 08/30/23  0619 08/30/23  1348   INR 1.2  --   --   --   --    APTT 26.7   < > 73.8* 34.2* 36.4*    < > = values in this interval not displayed.     LDH:  Recent Labs   Lab 08/28/23 1722   *     Microbiology:  Microbiology Results (last 7 days)       ** No results found for the last 168 hours. **            I have reviewed all pertinent labs within the past 24 hours.    Diagnostic Results:  I have reviewed all pertinent imaging results/findings within the past 24 hours.      Assessment/Plan:         Ischemic cardiomyopathy  Patient has known heart failure since 2019 with EF of 18%.  Current echo shows EF of 10% with LV RENZO of 7.9.  Also has moderate to severe aortic stenosis based on dobutamine stress echocardiogram.   However dimensional index is 0.18.  Intervention cardiology declined TAVR in CCU team consulted for advance options  Also on his echo his RV  has a strain of 9% with a TAPSE of 1.5  CRT-D place     Plan  Increase dobutamine to 5 and diurese him with Lasix drip at 10 milligrams/hour.  HTS team  will see him in the morning and if patient is interested in LVAD, then will consult CT surgery for their input regarding his severe aortic stenosis and LVAD     Moderate aortic stenosis  Patient underwent dobutamine stress echocardiogram was found to have moderate to severe aortic stenosis.  He did not have any contractile reserve.   Intervention cardiology declined him for TAVR.    Paroxysmal A-fib  On eliquis for anticoagulation    PRABHJOT on CKD stage 3  Continue to monitor creatinine with diuresis    Thank you for your consult. I will follow-up with patient. Please contact us if you have any additional questions.    Zbigniew Grigsby MD  Heart Transplant  [facility] - Cardiac Intensive Care

## 2023-08-30 NOTE — ASSESSMENT & PLAN NOTE
73 year old gentleman with HFrEF (EF 10-15%) 2/2 iCMP s/p [facility] [employer] CRT-D placement, CAD s/p CABG x 4 (3/2014 in [place] with LIMA-LAD, SVG-RCA, SVG-PLV, SVG-D2) followed by PCI late same year in 10/2014 with LANA x 3 to RCA and LANA x 3 to left coronary system. Also has h/o pAF on eliquis, PAM on CPAP, CKD, HTN, HLD, T2DM and PAD.      Results:  Left heart cath 8/29/23:   dLCx  -patent LIMA-LAD and patent SVG-D2  -dLAD stenosis to small vessel distal LAD  -patent native RCA stents  -patent SVG-PDA  -occluded SVG-RCA    Plan:  -     Recommendations:  Per interventional cardiology:   - Routine post-cath care as per orders  - IVF at  100cc/hr  for 4 hrs  - Continue medical management, Risk factor reduction, Follow-up with outpatient cardiologist

## 2023-08-31 NOTE — PLAN OF CARE
Called to room per nurse and she stated patient had questions regarding [facility] and his points for home health. Informed patient once we  get discharge orders for home health if ordered from the physician, we will fax orders to [facility] for them to set up. Informed him to speak with his [facility] representative for further information regarding point system and how home health works through [facility]. He verbalized understanding.       Lissette Carl RN    [phone]

## 2023-08-31 NOTE — PROGRESS NOTES
[facility] - Cardiac Intensive Care  Heart Transplant  Progress Note    Patient Name: Ishmael Thrasher  MRN: [id number]  Admission Date: 8/28/2023  Hospital Length of Stay: 3 days  Attending Physician: Gage Low MD  [facility]  Principal Problem:Cardiogenic shock    Subjective:     Interval History: NAEON. Patient reports hesitation with LVAD, as he would rather TAVR if he's a candidate. Diuresing on po torsemide 60mg TID. CT imaging reviewed from   CVP 5, SVO2 55, CO 5, CI 2.1, SVR 1188,  0.88.  UOP 2.2L net negative 1.2L.     Continuous Infusions:   DOBUTamine IV infusion (non-titrating) 2.5 mcg/kg/min (08/31/23 0500)     Scheduled Meds:   [START ON 9/1/2023] allopurinoL  150 mg Oral Daily    apixaban  5 mg Oral BID    aspirin  81 mg Oral Daily    atorvastatin  80 mg Oral Daily    miconazole nitrate 2%   Topical (Top) BID    mupirocin   Nasal BID    spironolactone  25 mg Oral Daily    torsemide  60 mg Oral BID    valsartan  40 mg Oral BID     PRN Meds:acetaminophen, acetaminophen, melatonin, sodium chloride 0.9%    Review of patient's allergies indicates:   Allergen Reactions    Enalapril maleate Swelling and Edema     Other reaction(s): Swelling    Vasotec [enalaprilat] Swelling     Neck swelling    Zoloft [sertraline] Other (See Comments)     Patient states it put him to sleep in the hospital he could not talk nor move    Cyclobenzaprine Hallucinations     Other reaction(s): Swelling  Hallucinations according to patient.    Other reaction(s): Lymphadenopathy, Restlessness, Sensation of being cold, Restlessness, Sensation of being cold    Amitriptyline Hallucinations    Labetalol      Other reaction(s): Pharyngeal swelling    Lisinopril      Other reaction(s): Pharyngeal swelling    Tramadol Nausea Only and Hallucinations     Objective:     Vital Signs (Most Recent):  Temp: 97.7 °F (36.5 °C) (08/31/23 0700)  Pulse: 102 (08/31/23 1200)  Resp:  (!) 23 (08/31/23 1200)  BP: 92/65 (08/31/23 1200)  SpO2: 99 % (08/31/23 1200) Vital Signs (24h Range):  Temp:  [97.5 °F (36.4 °C)-97.8 °F (36.6 °C)] 97.7 °F (36.5 °C)  Pulse:  [] 102  Resp:  [11-31] 23  SpO2:  [93 %-100 %] 99 %  BP: ()/(51-74) 92/65     Patient Vitals for the past 72 hrs (Last 3 readings):   Weight   08/30/23 0903 99.3 kg (219 lb)   08/29/23 0900 98.4 kg (217 lb)   08/28/23 1845 98.7 kg (217 lb 9.5 oz)     Body mass index is 26.66 kg/m².      Intake/Output Summary (Last 24 hours) at 8/31/2023 1331  Last data filed at 8/31/2023 0500  Gross per 24 hour   Intake 859.86 ml   Output 2025 ml   Net -1165.14 ml              Physical Exam  Constitutional:       General: He is not in acute distress.     Appearance: Normal appearance. He is not ill-appearing, toxic-appearing or diaphoretic.   HENT:      Head: Normocephalic and atraumatic.      Nose: Nose normal.      Mouth/Throat:      Mouth: Mucous membranes are moist.   Eyes:      Extraocular Movements: Extraocular movements intact.   Cardiovascular:      Rate and Rhythm: Tachycardia present.      Pulses: Normal pulses.      Heart sounds: Murmur (3/6 systolic murmur hear across the precordium) heard.   Pulmonary:      Effort: Pulmonary effort is normal. No respiratory distress.      Breath sounds: Normal breath sounds. No wheezing or rales.   Abdominal:      General: Abdomen is flat. Bowel sounds are normal. There is no distension.   Musculoskeletal:         General: No swelling or tenderness. Normal range of motion.      Cervical back: Normal range of motion.   Skin:     General: Skin is warm.   Neurological:      Mental Status: He is alert and oriented to person, place, and time. Mental status is at baseline.   Psychiatric:         Mood and Affect: Mood normal.         Behavior: Behavior normal.            Significant Labs:  CBC:  Recent Labs   Lab 08/29/23  0335 08/30/23  0347 08/31/23  0142   WBC 10.56 11.92 11.63   RBC 5.19 5.15 5.24   HGB  "12.9* 12.9* 12.7*   HCT 39.8* 39.2* 39.9*    250 257   MCV 77* 76* 76*   MCH 24.9* 25.0* 24.2*   MCHC 32.4 32.9 31.8*     BNP:  Recent Labs   Lab 08/28/23  1722   BNP 2,700*     CMP:  Recent Labs   Lab 08/29/23  0335 08/29/23  2103 08/30/23  0347 08/31/23  0142   * 166* 134* 108   CALCIUM 9.6 9.3 9.4 9.2   ALBUMIN 3.3*  --  3.2* 3.2*   PROT 6.9  --  6.5 6.6    137 138 139   K 4.4 3.6 4.1 4.0   CO2 22* 23 24 23    102 104 105   BUN 54* 46* 50* 39*   CREATININE 1.9* 2.0* 1.8* 2.0*   ALKPHOS 112  --  106 114   ALT 33  --  23 19   AST 27  --  16 13   BILITOT 0.9  --  1.2* 1.3*      Coagulation:   Recent Labs   Lab 08/28/23 1722 08/29/23  0335 08/30/23  0449 08/30/23  0619 08/30/23  1348   INR 1.2  --   --   --   --    APTT 26.7   < > 73.8* 34.2* 36.4*    < > = values in this interval not displayed.     LDH:  Recent Labs   Lab 08/28/23 1722   *     Microbiology:  Microbiology Results (last 7 days)       ** No results found for the last 168 hours. **            BMP:   Recent Labs   Lab 08/31/23  0142         K 4.0      CO2 23   BUN 39*   CREATININE 2.0*   CALCIUM 9.2   MG 2.0     Cardiac Markers: No results for input(s): "CKMB", "TROPONINT", "MYOGLOBIN" in the last 72 hours.  Coagulation:   Recent Labs   Lab 08/28/23 1722 08/29/23  0335 08/30/23  1348   INR 1.2  --   --    APTT 26.7   < > 36.4*    < > = values in this interval not displayed.     I have reviewed all pertinent labs within the past 24 hours.    Estimated Creatinine Clearance: 41 mL/min (A) (based on SCr of 2 mg/dL (H)).    Diagnostic Results:  I have reviewed all pertinent imaging results/findings within the past 24 hours.    Assessment and Plan:     73 year old gentleman with HFrEF (EF 10-15%) 2/2 iCMP s/p [facility] [employer] CRT-D placement, CAD s/p CABG x 4 (3/2014 in [place] with LIMA-LAD, SVG-RCA, SVG-PLV, SVG-D2) followed by PCI late same year in 10/2014 with LANA x 3 to RCA and LANA x 3 to left coronary system. " Also has h/o pAF on eliquis, PAM on CPAP, CKD, HTN, HLD, T2DM and PAD.       Patient was transferred to [facility] from the [facility] for higher level of care. He stayed in the [facility] x 11 days and was initially hospitalized for acute CHF and then RHC was done 8/18 (data not available) which showed cardiogenic shock after which he was put on  gtt which was later weaned but his UO dropped and Cr also went back up so he was restarted on  gtt and sent here.  Patient underwent dobutamine stress echo here that showed moderate to severe aortic stenosis.   primary team unable to wean him off dobutamine.  His dimension index on dobutamine stress echo was 0.18 suggestive of severe aortic stenosis however patient did not have any contractile reserve.  This morning patient is CVP was 7, SvO2 was 54, cardiac output was 5.1 cardiac index of 2.2, SVR 1400 on dobutamine 2.5.  After weaning him off dobutamine patient's SvO2 dropped 45.  Now his CVP is 10.  Currently patient is on torsemide and dobutamine 2.5      Moderate aortic stenosis  Patient underwent dobutamine stress echocardiogram was found to have moderate to severe aortic stenosis.  He did not have any contractile reserve.   Intervention cardiology evaluating for TAVR.    Paroxysmal A-fib  Continue anticoagulation. Consider switching eliquis to heparin gtt while in the ICU    Ischemic cardiomyopathy  Patient has known heart failure since 2019 with EF of 18%.  Current echo shows EF of 10% with LV RENZO of 7.9.  Also has moderate to severe aortic stenosis based on dobutamine stress echocardiogram.  However dimensional index is 0.18.  Intervention cardiology declined TAVR in CCU team consulted for advance options  Also on his echo his RV  has a strain of 9% with a TAPSE of 1.5  CRT-D place   2 years ago the advanced options pathway initiated, but was discontinued as CTS declined for significant vascular calcifications on CT. Also, evaluated by Psych who  expressed concerned for advanced options with history of noncompliance with Psych medications (taken PRN). He had been declined at both [place] and [place] programs 2/2 psych issues. We are consulted for LVAD candidacy    Plan  Continue  at 2.5mcg/kg/min.   Patient hesitant about LVAD at this time. CTA TAVR ordered by primary team. We recommend consulting CTS to review the CT when done to determine if calcifications still remains a barrier to VAD.   TAVR eval per primary team.          Danis Purdy MD  Heart Transplant  [facility]

## 2023-08-31 NOTE — ASSESSMENT & PLAN NOTE
-currently in sinus tach 105  -Takes Eliquis at home for pAF and chronic DVT  -Switch Eliquis to heparin gtt for now    - held due to bleeding site.

## 2023-08-31 NOTE — ASSESSMENT & PLAN NOTE
73 year old gentleman with HFrEF (EF 10-15%) 2/2 iCMP s/p [facility] [employer] CRT-D placement, CAD s/p CABG x 4 (3/2014 in [place] with LIMA-LAD, SVG-RCA, SVG-PLV, SVG-D2) followed by PCI late same year in 10/2014 with LANA x 3 to RCA and LANA x 3 to left coronary system. Also has h/o pAF on eliquis, PAM on CPAP, CKD, HTN, HLD, T2DM and PAD.      Results:  Left heart cath 8/29/23:   dLCx  -patent LIMA-LAD and patent SVG-D2  -dLAD stenosis to small vessel distal LAD  -patent native RCA stents  -patent SVG-PDA  -occluded SVG-RCA    Plan:  - Appreciate HTS recs    Recommendations:  Per interventional cardiology:   - Routine post-cath care as per orders  - IVF at  100cc/hr  for 4 hrs  - Continue medical management, Risk factor reduction, Follow-up with outpatient cardiologist    HTS:  Plan  Continue  at 2.5mcg/kg/min.   Patient hesitant about LVAD at this time. CTA TAVR ordered by primary team. We recommend consulting CTS to review the CT when done to determine if calcifications still remains a barrier to VAD.   TAVR eval per primary team.

## 2023-08-31 NOTE — ASSESSMENT & PLAN NOTE
HFrEF due to iCMP  EF 10-15% on echo along with severe MR and mod AS per echo report from prior hospital   initial hemodynamics on  2 mcg/kg/min on arrival  CVP 7  SVO2 57        Plan  - d/c dubutamine,

## 2023-08-31 NOTE — PROGRESS NOTES
[facility]  Cardiology  Progress Note    Patient Name: Ishmael Thrasher  MRN: [id number]  Admission Date: 8/28/2023  Hospital Length of Stay: 3 days  Code Status: Full Code   Attending Physician: Gage Low MD   Primary Care Physician: Kirk, [facility]  Expected Discharge Date: 9/3/2023  Principal Problem:Cardiogenic shock    Subjective:     Hospital Course:   Patient is getting a left heart catheterization on 08/29/2023.  Repeat echo reveals severely reduced systolic function with estimated ejection fraction of 10 to 15%.  Aortic valve:  Moderate to severe stenosis low-flow.  In the mean stress echo was performed to assess aortic valve which showed Aortic Valve:  There is moderate to severe low flow low gradient aortic stenosis. There is no change is the LV stroke volume with dobutamine consistent with a lack of contractile reserve.  No valvular interventions at this time.  Patient is to discharged home      Interval History: No acute events overnight, afebrile, hemodynamically stable.       Review of Systems   Constitutional: Negative for chills and fever.   HENT:  Negative for congestion.    Cardiovascular:  Negative for chest pain and dyspnea on exertion.   Respiratory:  Negative for cough and shortness of breath.    Musculoskeletal:  Negative for back pain.   Gastrointestinal:  Negative for abdominal pain, diarrhea, nausea and vomiting.   Neurological:  Negative for headaches.     Objective:     Vital Signs (Most Recent):  Temp: 97.7 °F (36.5 °C) (08/31/23 0700)  Pulse: 102 (08/31/23 1200)  Resp: (!) 23 (08/31/23 1200)  BP: 92/65 (08/31/23 1200)  SpO2: 99 % (08/31/23 1200) Vital Signs (24h Range):  Temp:  [97.1 °F (36.2 °C)-97.8 °F (36.6 °C)] 97.7 °F (36.5 °C)  Pulse:  [] 102  Resp:  [11-31] 23  SpO2:  [93 %-100 %] 99 %  BP: ()/(51-74) 92/65     Weight: 99.3 kg (219 lb)  Body mass index is 26.66 kg/m².     SpO2: 99 %         Intake/Output Summary (Last  24 hours) at 8/31/2023 1329  Last data filed at 8/31/2023 0500  Gross per 24 hour   Intake 859.86 ml   Output 2025 ml   Net -1165.14 ml       Lines/Drains/Airways       Central Venous Catheter Line  Duration             Percutaneous Central Line Insertion/Assessment - Triple Lumen  08/28/23 1800 Internal Jugular Left 2 days              Peripheral Intravenous Line  Duration                  Peripheral IV - Single Lumen 08/28/23 1700 Anterior;Proximal;Right Forearm 2 days                       Physical Exam  HENT:      Head: Normocephalic.      Nose: Nose normal.      Mouth/Throat:      Mouth: Mucous membranes are moist.   Eyes:      Pupils: Pupils are equal, round, and reactive to light.   Cardiovascular:      Rate and Rhythm: Regular rhythm. Tachycardia present.   Pulmonary:      Effort: Pulmonary effort is normal.   Abdominal:      General: Abdomen is flat.   Musculoskeletal:         General: Normal range of motion.   Skin:     General: Skin is warm.   Neurological:      General: No focal deficit present.      Mental Status: He is alert.   Psychiatric:         Mood and Affect: Mood normal.            Significant Labs: CMP   Recent Labs   Lab 08/29/23  2103 08/30/23  0347 08/31/23  0142    138 139   K 3.6 4.1 4.0    104 105   CO2 23 24 23   * 134* 108   BUN 46* 50* 39*   CREATININE 2.0* 1.8* 2.0*   CALCIUM 9.3 9.4 9.2   PROT  --  6.5 6.6   ALBUMIN  --  3.2* 3.2*   BILITOT  --  1.2* 1.3*   ALKPHOS  --  106 114   AST  --  16 13   ALT  --  23 19   ANIONGAP 12 10 11    and CBC   Recent Labs   Lab 08/30/23  0347 08/31/23  0142   WBC 11.92 11.63   HGB 12.9* 12.7*   HCT 39.2* 39.9*    257       Significant Imaging:       Assessment and Plan:       * Cardiogenic shock  HFrEF due to iCMP  EF 10-15% on echo along with severe MR and mod AS per echo report from prior hospital   initial hemodynamics on  2 mcg/kg/min on arrival  CVP 7  SVO2 57        Plan  - d/c dubutamine,    CAD (coronary artery  disease)  73 year old gentleman with HFrEF (EF 10-15%) 2/2 iCMP s/p [place] CRT-D placement, CAD s/p CABG x 4 (3/2014 in [place] with LIMA-LAD, SVG-RCA, SVG-PLV, SVG-D2) followed by PCI late same year in 10/2014 with LANA x 3 to RCA and LANA x 3 to left coronary system. Also has h/o pAF on eliquis, PAM on CPAP, CKD, HTN, HLD, T2DM and PAD.      Results:  Left heart cath 8/29/23:   dLCx  -patent LIMA-LAD and patent SVG-D2  -dLAD stenosis to small vessel distal LAD  -patent native RCA stents  -patent SVG-PDA  -occluded SVG-RCA    Plan:  - Appreciate HTS recs    Recommendations:  Per interventional cardiology:   - Routine post-cath care as per orders  - IVF at  100cc/hr  for 4 hrs  - Continue medical management, Risk factor reduction, Follow-up with outpatient cardiologist    HTS:  Plan  Continue  at 2.5mcg/kg/min.   Patient hesitant about LVAD at this time. CTA TAVR ordered by primary team. We recommend consulting CTS to review the CT when done to determine if calcifications still remains a barrier to VAD.   TAVR eval per primary team.      Moderate aortic stenosis  SeRepeat Echo 8/29    Summary    Tachycardia was present during the study    Left Ventricle: The left ventricle is severely dilated. Normal wall thickness. There is moderate eccentric hypertrophy. Severe global hypokinesis present. There is severely reduced systolic function with a visually estimated ejection fraction of 10 -15%. No LV apical thrombus present. Unable to assess diastolic functionUnable to assess due to poor image quality.    Right Ventricle: Mild right ventricular enlargement. Wall thickness is normal. Right ventricle wall motion  is normal. Systolic function is moderately reduced. TAPSE is 1.30 cm. Right ventricular global longitudinal strain is -9.0 % and is reduced.    Left Atrium: Left atrium is severely dilated.    Right Atrium: Right atrium is mildly dilated.    Aortic Valve: The aortic valve is a trileaflet valve. There is  moderate aortic valve sclerosis. Moderate annular calcification. There is moderate to severe stenosis low flow . Aortic valve area by VTI is 0.91 cm². Aortic valve peak velocity is 2.33 m/s. Mean gradient is 12 mmHg. The dimensionless index is 0.24. Consider Dobutmine stress to differentiate from pseudosevere AS if clinically inidcated.    Mitral Valve: There is mild to moderate regurgitation.    Pulmonary Artery: The estimated pulmonary artery systolic pressure is 65 mmHg.    IVC/SVC: Elevated venous pressure at 15 mmHg     Dobutamine Stress echo:    Summary      A low dose Dobutamine stress echo was performed to evaluate low flow low gradient aortic stenosis.    Stress ECG: Frequent PACs and frequent PVCs.    ECG Conclusion: The ECG portion of the study is negative for ischemia.    Left Ventricle: The left ventricle is severely dilated measuring 7.9 cm. Severe global hypokinesis present. Severely reduced systolic function with a visually estimated ejection fraction of 10 -15%.    Left Ventricle: The left ventricle is severely dilated. LVIDd is 7.9 cm.    Aortic Valve:  There is moderate to severe low flow low gradient aortic stenosis. Aortic valve area by VTI is 0.82 cm². Following infusion of dobutamine, there was no increase in LVOT or aortic VTI, velocity or gradient. The aortic valve area by VTI at 15 mcg/kg/min of dobutamine is 1.1 cm². Peak velocity 2.9 m/s and mean gradient 19 mm Hg.    Post-stress Impression: There is no change is the LV stroke volume with dobutamine consistent with a lack of contractile reserve    Plan:  - f/u CT cardiac scoring  -         Severe mitral regurgitation  Repeat Echo 8/29    Summary    Tachycardia was present during the study    Left Ventricle: The left ventricle is severely dilated. Normal wall thickness. There is moderate eccentric hypertrophy. Severe global hypokinesis present. There is severely reduced systolic function with a visually estimated ejection fraction of 10  -15%. No LV apical thrombus present. Unable to assess diastolic functionUnable to assess due to poor image quality.    Right Ventricle: Mild right ventricular enlargement. Wall thickness is normal. Right ventricle wall motion  is normal. Systolic function is moderately reduced. TAPSE is 1.30 cm. Right ventricular global longitudinal strain is -9.0 % and is reduced.    Left Atrium: Left atrium is severely dilated.    Right Atrium: Right atrium is mildly dilated.    Aortic Valve: The aortic valve is a trileaflet valve. There is moderate aortic valve sclerosis. Moderate annular calcification. There is moderate to severe stenosis low flow . Aortic valve area by VTI is 0.91 cm². Aortic valve peak velocity is 2.33 m/s. Mean gradient is 12 mmHg. The dimensionless index is 0.24. Consider Dobutmine stress to differentiate from pseudosevere AS if clinically inidcated.    Mitral Valve: There is mild to moderate regurgitation.    Pulmonary Artery: The estimated pulmonary artery systolic pressure is 65 mmHg.    IVC/SVC: Elevated venous pressure at 15 mmHg         PAM (obstructive sleep apnea)  CPAP ordered     Paroxysmal A-fib  -currently in sinus tach 105  -Takes Eliquis at home for pAF and chronic DVT  -Switch Eliquis to heparin gtt for now    - held due to bleeding site.     PTSD (post-traumatic stress disorder)  Resume psych meds    Chronic deep vein thrombosis (DVT) of right lower extremity  Takes Eliquis at home for pAF and chronic DVT  Switch Eliquis to heparin gtt for now    Gout  -Acute gout [facility]  -Uric acid 11.6 at that time    Plan  -continue allopurinol 300 daily (modify dose in AM if needed)  -on colchicine 0.6 mg qd, continue  -was [facility]um [facility], on prednisone taper (will give 5 mg prednisone x 3 more days and then stop)    Ischemic cardiomyopathy  -CAD s/p CABG x 4 (3/2014 in [place] with LIMA-LAD, SVG-RCA, SVG-PLV, SVG-D2) followed by PCI late same  year in 10/2014 with LANA x 3 to RCA and LANA x 3 to left coronary system  -ASA 81 daily and HI statin    Plan  - optimize GDMT upon discharge  - Obtain Hemodynamics after dubutamine   - hold dobutamine, recheck the hemodynamics         VTE Risk Mitigation (From admission, onward)           Ordered     apixaban tablet 5 mg  2 times daily         08/30/23 4488                    David Melendrez DO  Cardiology  [facility]    Staff Attestation:  I have seen the patient, reviewed the Resident's assessment and plan, personally interviewed and examined the patient at bedside and agree with the findings.Total critical care time: Approximately 45 minutes. Due to a high probability of clinically significant, life threatening deterioration, I personally spent this critical care time directly and personally managing the patient. This critical care time included obtaining a history; examining the patient; ordering and review of studies; arranging urgent treatment with development of a management plan; evaluation of patient's response to treatment; frequent reassessment; and, discussions with other providers.   This critical care time was performed to assess and manage the high probability of imminent, life-threatening deterioration that could result in multi-organ failure. It was medically reasonable and necessary. It was exclusive of separately billable procedures and treating other patients and teaching time.    MD [facility] - Cardiology

## 2023-08-31 NOTE — SUBJECTIVE & OBJECTIVE
Interval History: No acute events overnight, afebrile, hemodynamically stable.       Review of Systems   Constitutional: Negative for chills and fever.   HENT:  Negative for congestion.    Cardiovascular:  Negative for chest pain and dyspnea on exertion.   Respiratory:  Negative for cough and shortness of breath.    Musculoskeletal:  Negative for back pain.   Gastrointestinal:  Negative for abdominal pain, diarrhea, nausea and vomiting.   Neurological:  Negative for headaches.     Objective:     Vital Signs (Most Recent):  Temp: 97.7 °F (36.5 °C) (08/31/23 0700)  Pulse: 102 (08/31/23 1200)  Resp: (!) 23 (08/31/23 1200)  BP: 92/65 (08/31/23 1200)  SpO2: 99 % (08/31/23 1200) Vital Signs (24h Range):  Temp:  [97.1 °F (36.2 °C)-97.8 °F (36.6 °C)] 97.7 °F (36.5 °C)  Pulse:  [] 102  Resp:  [11-31] 23  SpO2:  [93 %-100 %] 99 %  BP: ()/(51-74) 92/65     Weight: 99.3 kg (219 lb)  Body mass index is 26.66 kg/m².     SpO2: 99 %         Intake/Output Summary (Last 24 hours) at 8/31/2023 1329  Last data filed at 8/31/2023 0500  Gross per 24 hour   Intake 859.86 ml   Output 2025 ml   Net -1165.14 ml       Lines/Drains/Airways       Central Venous Catheter Line  Duration             Percutaneous Central Line Insertion/Assessment - Triple Lumen  08/28/23 1800 Internal Jugular Left 2 days              Peripheral Intravenous Line  Duration                  Peripheral IV - Single Lumen 08/28/23 1700 Anterior;Proximal;Right Forearm 2 days                       Physical Exam  HENT:      Head: Normocephalic.      Nose: Nose normal.      Mouth/Throat:      Mouth: Mucous membranes are moist.   Eyes:      Pupils: Pupils are equal, round, and reactive to light.   Cardiovascular:      Rate and Rhythm: Regular rhythm. Tachycardia present.   Pulmonary:      Effort: Pulmonary effort is normal.   Abdominal:      General: Abdomen is flat.   Musculoskeletal:         General: Normal range of motion.   Skin:     General: Skin is warm.    Neurological:      General: No focal deficit present.      Mental Status: He is alert.   Psychiatric:         Mood and Affect: Mood normal.            Significant Labs: CMP   Recent Labs   Lab 08/29/23  2103 08/30/23 0347 08/31/23 0142    138 139   K 3.6 4.1 4.0    104 105   CO2 23 24 23   * 134* 108   BUN 46* 50* 39*   CREATININE 2.0* 1.8* 2.0*   CALCIUM 9.3 9.4 9.2   PROT  --  6.5 6.6   ALBUMIN  --  3.2* 3.2*   BILITOT  --  1.2* 1.3*   ALKPHOS  --  106 114   AST  --  16 13   ALT  --  23 19   ANIONGAP 12 10 11    and CBC   Recent Labs   Lab 08/30/23 0347 08/31/23 0142   WBC 11.92 11.63   HGB 12.9* 12.7*   HCT 39.2* 39.9*    257       Significant Imaging:

## 2023-08-31 NOTE — SUBJECTIVE & OBJECTIVE
Interval History: NAEON. Patient reports hesitation with LVAD, as he would rather TAVR if he's a candidate. Diuresing on po torsemide 60mg TID. CT imaging reviewed from   CVP 5, SVO2 55, CO 5, CI 2.1, SVR 1188,  0.88.  UOP 2.2L net negative 1.2L.     Continuous Infusions:   DOBUTamine IV infusion (non-titrating) 2.5 mcg/kg/min (08/31/23 0500)     Scheduled Meds:   [START ON 9/1/2023] allopurinoL  150 mg Oral Daily    apixaban  5 mg Oral BID    aspirin  81 mg Oral Daily    atorvastatin  80 mg Oral Daily    miconazole nitrate 2%   Topical (Top) BID    mupirocin   Nasal BID    spironolactone  25 mg Oral Daily    torsemide  60 mg Oral BID    valsartan  40 mg Oral BID     PRN Meds:acetaminophen, acetaminophen, melatonin, sodium chloride 0.9%    Review of patient's allergies indicates:   Allergen Reactions    Enalapril maleate Swelling and Edema     Other reaction(s): Swelling    Vasotec [enalaprilat] Swelling     Neck swelling    Zoloft [sertraline] Other (See Comments)     Patient states it put him to sleep in the hospital he could not talk nor move    Cyclobenzaprine Hallucinations     Other reaction(s): Swelling  Hallucinations according to patient.    Other reaction(s): Lymphadenopathy, Restlessness, Sensation of being cold, Restlessness, Sensation of being cold    Amitriptyline Hallucinations    Labetalol      Other reaction(s): Pharyngeal swelling    Lisinopril      Other reaction(s): Pharyngeal swelling    Tramadol Nausea Only and Hallucinations     Objective:     Vital Signs (Most Recent):  Temp: 97.7 °F (36.5 °C) (08/31/23 0700)  Pulse: 102 (08/31/23 1200)  Resp: (!) 23 (08/31/23 1200)  BP: 92/65 (08/31/23 1200)  SpO2: 99 % (08/31/23 1200) Vital Signs (24h Range):  Temp:  [97.5 °F (36.4 °C)-97.8 °F (36.6 °C)] 97.7 °F (36.5 °C)  Pulse:  [] 102  Resp:  [11-31] 23  SpO2:  [93 %-100 %] 99 %  BP: ()/(51-74) 92/65     Patient Vitals for the past 72 hrs (Last 3 readings):   Weight   08/30/23 0903 99.3 kg  (219 lb)   08/29/23 0900 98.4 kg (217 lb)   08/28/23 1845 98.7 kg (217 lb 9.5 oz)     Body mass index is 26.66 kg/m².      Intake/Output Summary (Last 24 hours) at 8/31/2023 1331  Last data filed at 8/31/2023 0500  Gross per 24 hour   Intake 859.86 ml   Output 2025 ml   Net -1165.14 ml              Physical Exam  Constitutional:       General: He is not in acute distress.     Appearance: Normal appearance. He is not ill-appearing, toxic-appearing or diaphoretic.   HENT:      Head: Normocephalic and atraumatic.      Nose: Nose normal.      Mouth/Throat:      Mouth: Mucous membranes are moist.   Eyes:      Extraocular Movements: Extraocular movements intact.   Cardiovascular:      Rate and Rhythm: Tachycardia present.      Pulses: Normal pulses.      Heart sounds: Murmur (3/6 systolic murmur hear across the precordium) heard.   Pulmonary:      Effort: Pulmonary effort is normal. No respiratory distress.      Breath sounds: Normal breath sounds. No wheezing or rales.   Abdominal:      General: Abdomen is flat. Bowel sounds are normal. There is no distension.   Musculoskeletal:         General: No swelling or tenderness. Normal range of motion.      Cervical back: Normal range of motion.   Skin:     General: Skin is warm.   Neurological:      Mental Status: He is alert and oriented to person, place, and time. Mental status is at baseline.   Psychiatric:         Mood and Affect: Mood normal.         Behavior: Behavior normal.            Significant Labs:  CBC:  Recent Labs   Lab 08/29/23  0335 08/30/23  0347 08/31/23  0142   WBC 10.56 11.92 11.63   RBC 5.19 5.15 5.24   HGB 12.9* 12.9* 12.7*   HCT 39.8* 39.2* 39.9*    250 257   MCV 77* 76* 76*   MCH 24.9* 25.0* 24.2*   MCHC 32.4 32.9 31.8*     BNP:  Recent Labs   Lab 08/28/23  1722   BNP 2,700*     CMP:  Recent Labs   Lab 08/29/23  0335 08/29/23  2103 08/30/23  0347 08/31/23  0142   * 166* 134* 108   CALCIUM 9.6 9.3 9.4 9.2   ALBUMIN 3.3*  --  3.2* 3.2*  "  PROT 6.9  --  6.5 6.6    137 138 139   K 4.4 3.6 4.1 4.0   CO2 22* 23 24 23    102 104 105   BUN 54* 46* 50* 39*   CREATININE 1.9* 2.0* 1.8* 2.0*   ALKPHOS 112  --  106 114   ALT 33  --  23 19   AST 27  --  16 13   BILITOT 0.9  --  1.2* 1.3*      Coagulation:   Recent Labs   Lab 08/28/23  1722 08/29/23 0335 08/30/23  0449 08/30/23  0619 08/30/23  1348   INR 1.2  --   --   --   --    APTT 26.7   < > 73.8* 34.2* 36.4*    < > = values in this interval not displayed.     LDH:  Recent Labs   Lab 08/28/23 1722   *     Microbiology:  Microbiology Results (last 7 days)       ** No results found for the last 168 hours. **            BMP:   Recent Labs   Lab 08/31/23  0142         K 4.0      CO2 23   BUN 39*   CREATININE 2.0*   CALCIUM 9.2   MG 2.0     Cardiac Markers: No results for input(s): "CKMB", "TROPONINT", "MYOGLOBIN" in the last 72 hours.  Coagulation:   Recent Labs   Lab 08/28/23 1722 08/29/23 0335 08/30/23  1348   INR 1.2  --   --    APTT 26.7   < > 36.4*    < > = values in this interval not displayed.     I have reviewed all pertinent labs within the past 24 hours.    Estimated Creatinine Clearance: 41 mL/min (A) (based on SCr of 2 mg/dL (H)).    Diagnostic Results:  I have reviewed all pertinent imaging results/findings within the past 24 hours.  "

## 2023-08-31 NOTE — PLAN OF CARE
CICU DAILY GOALS       A: Awake    RASS: Goal - RASS Goal: 0-->alert and calm  Actual - RASS (Diaz Agitation-Sedation Scale): alert and calm   Restraint necessity:    B: Breath   SBT: Not intubated   C: Coordinate A & B, analgesics/sedatives   Pain: managed    SAT: Not intubated  D: Delirium   CAM-ICU: Overall CAM-ICU: Negative  E: Early(intubated/ Progressive (non-intubated) Mobility   MOVE Screen: Pass   Activity: Activity Management: Arm raise - L1  FAS: Feeding/Nutrition   Diet order: Diet/Nutrition Received: restrict fluids, 2 gram sodium, low saturated fat/low cholesterol,   Fluid restriction: Fluid Requirement: 1500cc FR   Nutritional Supplement Intake: Quantity 0, Type: Boost  T: Thrombus   DVT prophylaxis: VTE Required Core Measure: Pharmacological prophylaxis initiated/maintained  H: HOB Elevation   Head of Bed (HOB) Positioning: HOB elevated  U: Ulcer Prophylaxis   GI: yes  G: Glucose control   managed    S: Skin   Bundle compliance: yes   Bathing/Skin Care: bath, complete, linen changed, dressed/undressed Date: 8 31 23  B: Bowel Function   no issues   I: Indwelling Catheters   Font[place]ot necessity:     CVC necessity: Yes   IPAD offered: No  D: De-escalation Antibx   No  Plan for the day   Hemodynamic stability  Family/Goals of care/Code Status   Code Status: Full Code     No acute events throughout day, VS and assessment per flow sheet, patient progressing towards goals as tolerated, plan of care reviewed with Ishmael Thrasher and family, all concerns addressed, will continue to monitor.

## 2023-08-31 NOTE — PLAN OF CARE
Problem: Adult Inpatient Plan of Care  Goal: Plan of Care Review  Outcome: Ongoing, Progressing  Goal: Patient-Specific Goal (Individualized)  Outcome: Ongoing, Progressing  Goal: Absence of Hospital-Acquired Illness or Injury  Outcome: Ongoing, Progressing  Goal: Optimal Comfort and Wellbeing  Outcome: Ongoing, Progressing  Goal: Readiness for Transition of Care  Outcome: Ongoing, Progressing     Problem: Infection  Goal: Absence of Infection Signs and Symptoms  Outcome: Ongoing, Progressing     Problem: Impaired Wound Healing  Goal: Optimal Wound Healing  Outcome: Ongoing, Progressing     Problem: Fall Injury Risk  Goal: Absence of Fall and Fall-Related Injury  Outcome: Ongoing, Progressing     Problem: Skin Injury Risk Increased  Goal: Skin Health and Integrity  Outcome: Ongoing, Progressing

## 2023-08-31 NOTE — ASSESSMENT & PLAN NOTE
Patient underwent dobutamine stress echocardiogram was found to have moderate to severe aortic stenosis.  He did not have any contractile reserve.   Intervention cardiology evaluating for TAVR.

## 2023-08-31 NOTE — PROGRESS NOTES
"[facility] - Cardiac Intensive Care  Adult Nutrition  Progress Note    SUMMARY       Recommendations    1. Continue Cardiac Diet, fluid restriction per MD.   2. If PO intake declines recommend Boost Plus BID (flavor per patient preference.)   3. Continue routine weigt measurements to assess any acute changes in weight. 4. RD to monitor and follow-up.    Goals: Meet % EEN/EPN by follow-up date.  Nutrition Goal Status: new  Communication of RD Recs: other (comment) (POC)    Assessment and Plan    Nutrition Problem  Increased nutrient needs (energy/protein)    Related to (etiology):   Increased physiological demands    Signs and Symptoms (as evidenced by):   Potential LVAD      Interventions/Recommendations (treatment strategy):  Collaboration of nutrition care with other providers    Nutrition Diagnosis Status:   New      Reason for Assessment    Reason For Assessment: RD follow-up  Diagnosis: other (see comments) (Cardiogenic shock)  Relevant Medical History: gout, CAD, DVT, Paraoxysmal A-fib, PTSD  Interdisciplinary Rounds: did not attend  General Information Comments: RD follow-up. RD spoke with patient at bedside re-educated on fluid and salt restriction answering all questions/concerns regarding food selections. Denies difficulty chewing/swallowing, no c/o N/V/C/D. Appetite good, eating 100% currently. Wt loss noted but pt appears overall nourished. Wt flux likely r/t fluid build up. RD will follow-up at later date.  Nutrition Discharge Planning: Pending Clinical    Nutrition Risk Screen    Nutrition Risk Screen: no indicators present    Nutrition/Diet History    Patient Reported Diet/Restrictions/Preferences: low salt, heart healthy  Spiritual, Cultural Beliefs, Holiness Practices, Values that Affect Care: no  Food Allergies: NKFA  Factors Affecting Nutritional Intake: None identified at this time    Anthropometrics    Temp: 97.7 °F (36.5 °C)  Height Method: Stated  Height: 6' 4" (193 cm)  Height (inches): " 76 in  Weight Method: Stated  Weight: 99.3 kg (219 lb)  Weight (lb): 219 lb  Ideal Body Weight (IBW), Male: 202 lb  % Ideal Body Weight, Male (lb): 108.42 %  BMI (Calculated): 26.7  BMI Grade: 25 - 29.9 - overweight       Lab/Procedures/Meds    Pertinent Labs Reviewed: reviewed  Pertinent Labs Comments: BUN 39, Creat 2.0, GFR 34.8, TBili 1.3, Direct Bili 0.6  Pertinent Medications Reviewed: reviewed  Pertinent Medications Comments: atorvastatin, colchicine, prednisone, setraline, torsemide, valsartan      Estimated/Assessed Needs    Weight Used For Calorie Calculations: 99.3 kg (219 lb)  Energy Calorie Requirements (kcal): 1987 kcal/d (20 kcal/kg)  Energy Need Method: Kcal/kg  Protein Requirements:  g/d (1.0-1.2 g/kg)  Weight Used For Protein Calculations: 99.3 kg (219 lb)        RDA Method (mL): 1987         Nutrition Prescription Ordered    Current Diet Order: Cardiac  Nutrition Order Comments: 1500 mL FR    Evaluation of Received Nutrient/Fluid Intake    I/O: -1.18 L  Energy Calories Required: meeting needs  Protein Required: meeting needs  Comments: LBM: 8/30  Tolerance: tolerating  % Intake of Estimated Energy Needs: 75 - 100 %  % Meal Intake: 75 - 100 %    Nutrition Risk    Level of Risk/Frequency of Follow-up: moderate (2x/ week)     Monitor and Evaluation    Food and Nutrient Intake: energy intake, food and beverage intake  Food and Nutrient Adminstration: diet order  Knowledge/Beliefs/Attitudes: food and nutrition knowledge/skill, beliefs and attitudes  Physical Activity and Function: nutrition-related ADLs and IADLs, factors affecting access to physical activity  Anthropometric Measurements: weight, weight change, body mass index  Biochemical Data, Medical Tests and Procedures: inflammatory profile, lipid profile, glucose/endocrine profile, gastrointestinal profile, electrolyte and renal panel  Nutrition-Focused Physical Findings: skin, head and eyes, extremities, muscles and bones, overall  appearance     Nutrition Follow-Up    RD Follow-up?: Yes    Aliyah Blackwell Registration Eligible, Provisional LDN

## 2023-08-31 NOTE — ASSESSMENT & PLAN NOTE
-CAD s/p CABG x 4 (3/2014 in [place] with LIMA-LAD, SVG-RCA, SVG-PLV, SVG-D2) followed by PCI late same year in 10/2014 with LANA x 3 to RCA and LANA x 3 to left coronary system  -ASA 81 daily and HI statin    Plan  - optimize GDMT upon discharge  - Obtain Hemodynamics after dubutamine   - hold dobutamine, recheck the hemodynamics

## 2023-08-31 NOTE — ASSESSMENT & PLAN NOTE
SeRepeat Echo 8/29    Summary    Tachycardia was present during the study    Left Ventricle: The left ventricle is severely dilated. Normal wall thickness. There is moderate eccentric hypertrophy. Severe global hypokinesis present. There is severely reduced systolic function with a visually estimated ejection fraction of 10 -15%. No LV apical thrombus present. Unable to assess diastolic functionUnable to assess due to poor image quality.    Right Ventricle: Mild right ventricular enlargement. Wall thickness is normal. Right ventricle wall motion  is normal. Systolic function is moderately reduced. TAPSE is 1.30 cm. Right ventricular global longitudinal strain is -9.0 % and is reduced.    Left Atrium: Left atrium is severely dilated.    Right Atrium: Right atrium is mildly dilated.    Aortic Valve: The aortic valve is a trileaflet valve. There is moderate aortic valve sclerosis. Moderate annular calcification. There is moderate to severe stenosis low flow . Aortic valve area by VTI is 0.91 cm². Aortic valve peak velocity is 2.33 m/s. Mean gradient is 12 mmHg. The dimensionless index is 0.24. Consider Dobutmine stress to differentiate from pseudosevere AS if clinically inidcated.    Mitral Valve: There is mild to moderate regurgitation.    Pulmonary Artery: The estimated pulmonary artery systolic pressure is 65 mmHg.    IVC/SVC: Elevated venous pressure at 15 mmHg     Dobutamine Stress echo:    Summary      A low dose Dobutamine stress echo was performed to evaluate low flow low gradient aortic stenosis.    Stress ECG: Frequent PACs and frequent PVCs.    ECG Conclusion: The ECG portion of the study is negative for ischemia.    Left Ventricle: The left ventricle is severely dilated measuring 7.9 cm. Severe global hypokinesis present. Severely reduced systolic function with a visually estimated ejection fraction of 10 -15%.    Left Ventricle: The left ventricle is severely dilated. LVIDd is 7.9 cm.     Aortic Valve:  There is moderate to severe low flow low gradient aortic stenosis. Aortic valve area by VTI is 0.82 cm². Following infusion of dobutamine, there was no increase in LVOT or aortic VTI, velocity or gradient. The aortic valve area by VTI at 15 mcg/kg/min of dobutamine is 1.1 cm². Peak velocity 2.9 m/s and mean gradient 19 mm Hg.    Post-stress Impression: There is no change is the LV stroke volume with dobutamine consistent with a lack of contractile reserve    Plan:  - f/u CT cardiac scoring  -

## 2023-08-31 NOTE — ASSESSMENT & PLAN NOTE
Patient has known heart failure since 2019 with EF of 18%.  Current echo shows EF of 10% with LV RENZO of 7.9.  Also has moderate to severe aortic stenosis based on dobutamine stress echocardiogram.  However dimensional index is 0.18.  Intervention cardiology declined TAVR in CCU team consulted for advance options  Also on his echo his RV  has a strain of 9% with a TAPSE of 1.5  CRT-D place   2 years ago the advanced options pathway initiated, but was discontinued as CTS declined for significant vascular calcifications on CT. Also, evaluated by Psych who expressed concerned for advanced options with history of noncompliance with Psych medications (taken PRN). He had been declined at both [place] and [place] programs 2/2 psych issues. We are consulted for LVAD candidacy    Plan  Continue  at 2.5mcg/kg/min.   Patient hesitant about LVAD at this time. CTA TAVR ordered by primary team. We recommend consulting CTS to review the CT when done to determine if calcifications still remains a barrier to VAD.   TAVR eval per primary team.

## 2023-08-31 NOTE — NURSING
Nurses Note -- 4 Eyes      8/30/2023   7:28 PM      Skin assessed during: Q Shift Change      [] No Altered Skin Integrity Present    []Prevention Measures Documented      [x] Yes- Altered Skin Integrity Present or Discovered   [] LDA Added if Not in Epic (Describe Wound)   [x] New Altered Skin Integrity was Present on Admit and Documented in LDA   [] Wound Image Taken    Wound Care Consulted? No    Attending Nurse:  Sandra Menchaca RN/Staff Member:  CHRISTOPHER Nelson

## 2023-08-31 NOTE — PLAN OF CARE
Recommendations    1. Continue Cardiac Diet, fluid restriction per MD.   2. If PO intake declines recommend Boost Plus BID (flavor per patient preference.)   3. Continue routine weigt measurements to assess any acute changes in weight. 4. RD to monitor and follow-up.    Goals: Meet % EEN/EPN by follow-up date.  Nutrition Goal Status: new  Communication of RD Recs: other (comment) (POC)

## 2023-08-31 NOTE — NURSING
Nurses Note -- 4 Eyes      8/31/2023   7:47 AM      Skin assessed during: Daily Assessment      [] No Altered Skin Integrity Present    []Prevention Measures Documented      [x] Yes- Altered Skin Integrity Present or Discovered   [x] LDA Added if Not in Epic (Describe Wound)   [] New Altered Skin Integrity was Present on Admit and Documented in LDA   [] Wound Image Taken    Wound Care Consulted? No    Attending Nurse:  Joelle Menchaca RN/Staff Member:  CHRISTOPHER Flores

## 2023-08-31 NOTE — PT/OT/SLP PROGRESS
Physical Therapy      Patient Name:  Ishmael Thrasher   MRN:  [id number]    Patient not seen today secondary to  (pt not seen due to being taken off of Dobutamine and RN check pt responce.). Will follow-up at a later date.    8/31/2023  .

## 2023-09-01 PROBLEM — I25.10 CAD (CORONARY ARTERY DISEASE): Status: RESOLVED | Noted: 2023-01-01 | Resolved: 2023-01-01

## 2023-09-01 NOTE — ASSESSMENT & PLAN NOTE
HFrEF due to iCMP  EF 10-15% on echo along with severe MR and mod AS per echo report from prior hospital   initial hemodynamics on  2 mcg/kg/min on arrival  CVP 7  SVO2 57        Plan  - Patient was stopped on dobutamine 9/1/23 afternoon, now having consistently low MAPS to 50-60s. Asymptomatic during these episodes, but obtained hemodyanmics showing:  CVP 8  SVO2 56  CI 2.3    Lactic Acid 2.3.  - Planning to restart dobutamine, trend lactic acid, follow  up evening HDS.  - Consult for RHC after the weekend for potential home dobutamine.  -Patient does not want LVAD, and significant calcium on ascending aortic valve. Will defer CTS evaluation for now. Patient is not on psychiatric medications at home.

## 2023-09-01 NOTE — ASSESSMENT & PLAN NOTE
Patient has known heart failure since 2019 with EF of 18%.  Current echo shows EF of 10% with LV RENZO of 7.9.  Also has moderate to severe aortic stenosis based on dobutamine stress echocardiogram.  However dimensional index is 0.18.  Intervention cardiology declined TAVR in CCU team consulted for advance options  Also on his echo his RV  has a strain of 9% with a TAPSE of 1.5  CRT-D place   2 years ago the advanced options pathway initiated, but was discontinued as CTS declined for significant vascular calcifications on CT. Also, evaluated by Psych who expressed concerned for advanced options with history of noncompliance with Psych medications (taken PRN). He had been declined at both [place] and [place] programs 2/2 psych issues. We are consulted for LVAD candidacy    Plan  Patient not interested in LVAD. We will sign off.

## 2023-09-01 NOTE — ASSESSMENT & PLAN NOTE
SeRepeat Echo 8/29    Summary    Tachycardia was present during the study    Left Ventricle: The left ventricle is severely dilated. Normal wall thickness. There is moderate eccentric hypertrophy. Severe global hypokinesis present. There is severely reduced systolic function with a visually estimated ejection fraction of 10 -15%. No LV apical thrombus present. Unable to assess diastolic functionUnable to assess due to poor image quality.    Right Ventricle: Mild right ventricular enlargement. Wall thickness is normal. Right ventricle wall motion  is normal. Systolic function is moderately reduced. TAPSE is 1.30 cm. Right ventricular global longitudinal strain is -9.0 % and is reduced.    Left Atrium: Left atrium is severely dilated.    Right Atrium: Right atrium is mildly dilated.    Aortic Valve: The aortic valve is a trileaflet valve. There is moderate aortic valve sclerosis. Moderate annular calcification. There is moderate to severe stenosis low flow . Aortic valve area by VTI is 0.91 cm². Aortic valve peak velocity is 2.33 m/s. Mean gradient is 12 mmHg. The dimensionless index is 0.24. Consider Dobutmine stress to differentiate from pseudosevere AS if clinically inidcated.    Mitral Valve: There is mild to moderate regurgitation.    Pulmonary Artery: The estimated pulmonary artery systolic pressure is 65 mmHg.    IVC/SVC: Elevated venous pressure at 15 mmHg     Dobutamine Stress echo:    Summary      A low dose Dobutamine stress echo was performed to evaluate low flow low gradient aortic stenosis.    Stress ECG: Frequent PACs and frequent PVCs.    ECG Conclusion: The ECG portion of the study is negative for ischemia.    Left Ventricle: The left ventricle is severely dilated measuring 7.9 cm. Severe global hypokinesis present. Severely reduced systolic function with a visually estimated ejection fraction of 10 -15%.    Left Ventricle: The left ventricle is severely dilated. LVIDd is 7.9 cm.     Aortic Valve:  There is moderate to severe low flow low gradient aortic stenosis. Aortic valve area by VTI is 0.82 cm². Following infusion of dobutamine, there was no increase in LVOT or aortic VTI, velocity or gradient. The aortic valve area by VTI at 15 mcg/kg/min of dobutamine is 1.1 cm². Peak velocity 2.9 m/s and mean gradient 19 mm Hg.    Post-stress Impression: There is no change is the LV stroke volume with dobutamine consistent with a lack of contractile reserve    Plan:  - f/u CT cardiac scoring is negative.

## 2023-09-01 NOTE — PROGRESS NOTES
[facility] - Cardiac Int[facility] Care  Heart Transplant  Progress Note    Patient Name: Ishmael Thrasher  MRN: [id number]  Admission Date: 8/28/2023  Hospital Length of Stay: 4 days  Attending Physician: Gage Low MD  Primary Care Provider: [facility] [place]  Principal Problem:Cardiogenic shock    Subjective:     Interval History: NAEON. Patient expressed disinterest in LVAD.  off yesterday AM.    Continuous Infusions:   heparin (porcine) in D5W 12 Units/kg/hr (09/01/23 0701)     Scheduled Meds:   allopurinoL  150 mg Oral Daily    aspirin  81 mg Oral Daily    atorvastatin  80 mg Oral Daily    gabapentin  300 mg Oral BID    miconazole nitrate 2%   Topical (Top) BID    mupirocin   Nasal BID    spironolactone  25 mg Oral Daily    torsemide  60 mg Oral BID    valsartan  40 mg Oral BID     PRN Meds:acetaminophen, acetaminophen, heparin (PORCINE), heparin (PORCINE), melatonin, ondansetron, sodium chloride 0.9%    Review of patient's allergies indicates:   Allergen Reactions    Enalapril maleate Swelling and Edema     Other reaction(s): Swelling    Vasotec [enalaprilat] Swelling     Neck swelling    Zoloft [sertraline] Other (See Comments)     Patient states it put him to sleep in the hospital he could not talk nor move    Cyclobenzaprine Hallucinations     Other reaction(s): Swelling  Hallucinations according to patient.    Other reaction(s): Lymphadenopathy, Restlessness, Sensation of being cold, Restlessness, Sensation of being cold    Amitriptyline Hallucinations    Labetalol      Other reaction(s): Pharyngeal swelling    Lisinopril      Other reaction(s): Pharyngeal swelling    Tramadol Nausea Only and Hallucinations     Objective:     Vital Signs (Most Recent):  Temp: 98.2 °F (36.8 °C) (09/01/23 0701)  Pulse: 109 (09/01/23 1038)  Resp: (!) 29 (09/01/23 1038)  BP: (!) 82/55 (09/01/23 0901)  SpO2: 99 % (09/01/23 1038) Vital Signs (24h Range):  Temp:  [98 °F (36.7 °C)-98.2 °F  (36.8 °C)] 98.2 °F (36.8 °C)  Pulse:  [] 109  Resp:  [5-49] 29  SpO2:  [92 %-100 %] 99 %  BP: ()/(53-71) 82/55     Patient Vitals for the past 72 hrs (Last 3 readings):   Weight   08/30/23 0903 99.3 kg (219 lb)     Body mass index is 26.66 kg/m².      Intake/Output Summary (Last 24 hours) at 9/1/2023 1113  Last data filed at 9/1/2023 0701  Gross per 24 hour   Intake 1060.97 ml   Output 1700 ml   Net -639.03 ml            Physical Exam  Constitutional:       General: He is not in acute distress.     Appearance: Normal appearance. He is not ill-appearing, toxic-appearing or diaphoretic.   HENT:      Head: Normocephalic and atraumatic.      Nose: Nose normal.      Mouth/Throat:      Mouth: Mucous membranes are moist.   Eyes:      Extraocular Movements: Extraocular movements intact.   Cardiovascular:      Rate and Rhythm: Tachycardia present.      Pulses: Normal pulses.      Heart sounds: Murmur (3/6 systolic murmur hear across the precordium) heard.   Pulmonary:      Effort: Pulmonary effort is normal. No respiratory distress.      Breath sounds: Normal breath sounds. No wheezing or rales.   Abdominal:      General: Abdomen is flat. Bowel sounds are normal. There is no distension.   Musculoskeletal:         General: No swelling or tenderness. Normal range of motion.      Cervical back: Normal range of motion.   Skin:     General: Skin is warm.   Neurological:      Mental Status: He is alert and oriented to person, place, and time. Mental status is at baseline.   Psychiatric:         Mood and Affect: Mood normal.         Behavior: Behavior normal.            Significant Labs:  CBC:  Recent Labs   Lab 08/30/23  0347 08/31/23  0142 09/01/23  0300   WBC 11.92 11.63 10.73   RBC 5.15 5.24 5.01   HGB 12.9* 12.7* 12.5*   HCT 39.2* 39.9* 38.1*    257 250   MCV 76* 76* 76*   MCH 25.0* 24.2* 25.0*   MCHC 32.9 31.8* 32.8     BNP:  Recent Labs   Lab 08/28/23  1722   BNP 2,700*     CMP:  Recent Labs   Lab  "08/30/23  0347 08/31/23  0142 09/01/23  0300   * 108 98   CALCIUM 9.4 9.2 9.3   ALBUMIN 3.2* 3.2* 3.1*   PROT 6.5 6.6 6.2    139 137   K 4.1 4.0 4.0   CO2 24 23 23    105 104   BUN 50* 39* 34*   CREATININE 1.8* 2.0* 1.9*   ALKPHOS 106 114 101   ALT 23 19 16   AST 16 13 12   BILITOT 1.2* 1.3* 1.3*      Coagulation:   Recent Labs   Lab 08/28/23  1722 08/29/23  0335 08/30/23  0619 08/30/23  1348 08/31/23  1715 09/01/23  0300   INR 1.2  --   --   --  1.2  --    APTT 26.7   < > 34.2* 36.4*  --  29.7    < > = values in this interval not displayed.     LDH:  No results for input(s): "LDH" in the last 72 hours.  Microbiology:  Microbiology Results (last 7 days)       ** No results found for the last 168 hours. **            BMP:   Recent Labs   Lab 09/01/23  0300   GLU 98      K 4.0      CO2 23   BUN 34*   CREATININE 1.9*   CALCIUM 9.3   MG 1.8     Cardiac Markers: No results for input(s): "CKMB", "TROPONINT", "MYOGLOBIN" in the last 72 hours.  Coagulation:   Recent Labs   Lab 08/31/23  1715 09/01/23  0300   INR 1.2  --    APTT  --  29.7     I have reviewed all pertinent labs within the past 24 hours.    Estimated Creatinine Clearance: 43.1 mL/min (A) (based on SCr of 1.9 mg/dL (H)).    Diagnostic Results:  I have reviewed all pertinent imaging results/findings within the past 24 hours.    Assessment and Plan:     73 year old gentleman with HFrEF (EF 10-15%) 2/2 iCMP s/p [facility] [place] CRT-D placement, CAD s/p CABG x 4 (3/2014 in [place] with LIMA-LAD, SVG-RCA, SVG-PLV, SVG-D2) followed by PCI late same year in 10/2014 with LANA x 3 to RCA and LANA x 3 to left coronary system. Also has h/o pAF on eliquis, PAM on CPAP, CKD, HTN, HLD, T2DM and PAD.       Patient [facility] level [facility]. [facility] and was initially hospitalized for acute CHF and then RHC was done 8/18 (data not available) which showed cardiogenic shock " after which he was put on  gtt which was later weaned but his UO dropped and Cr also went back up so he was restarted on  gtt and sent here.  Patient underwent dobutamine stress echo here that showed moderate to severe aortic stenosis.   primary team unable to wean him off dobutamine.  His dimension index on dobutamine stress echo was 0.18 suggestive of severe aortic stenosis however patient did not have any contractile reserve.  This morning patient is CVP was 7, SvO2 was 54, cardiac output was 5.1 cardiac index of 2.2, SVR 1400 on dobutamine 2.5.  After weaning him off dobutamine patient's SvO2 dropped 45.  Now his CVP is 10.  Currently patient is on torsemide and dobutamine 2.5      Moderate aortic stenosis  Patient underwent dobutamine stress echocardiogram was found to have moderate to severe aortic stenosis.  He did not have any contractile reserve.  CT cardiac score pending       Paroxysmal A-fib  Continue anticoagulation. Consider switching eliquis to heparin gtt while in the ICU    Ischemic cardiomyopathy  Patient has known heart failure since 2019 with EF of 18%.  Current echo shows EF of 10% with LV RENZO of 7.9.  Also has moderate to severe aortic stenosis based on dobutamine stress echocardiogram.  However dimensional index is 0.18.  Intervention cardiology declined TAVR in CCU team consulted for advance options  Also on his echo his RV  has a strain of 9% with a TAPSE of 1.5  CRT-D place   2 years ago the advanced options pathway initiated, but was discontinued as CTS declined for significant vascular calcifications on CT. Also, evaluated by Psych who expressed concerned for advanced options with history of noncompliance with Psych medications (taken PRN). He had been declined at both [place] and [place] programs 2/2 psych issues. We are consulted for LVAD candidacy    Plan  Patient not interested in LVAD. We will sign off.         Danis Purdy MD  Heart Transplant  [facility] - Cardiac  Intensive Care

## 2023-09-01 NOTE — PT/OT/SLP PROGRESS
Physical Therapy      Patient Name:  Ishmael Thrasher   MRN:  [id number]    Patient not seen today secondary to  (Pt on hold due to decreased BP systolic in 50s.). Will follow-up at a later date.    9/1/2023  .

## 2023-09-01 NOTE — SUBJECTIVE & OBJECTIVE
Interval History: Patient was stopped on dobutamine yesterday afternoon, now having consistently low MAPS to 50-60s. Asymptomatic during these episodes, but obtained hemodyanmics showing:  CVP 8  SVO2 56  CI 2.3    Lactic Acid 2.3.  Planning to restart dobutamine, trend lactic acid, follow  up evening HDS.    Review of Systems   Constitutional: Negative for chills, fever, malaise/fatigue and night sweats.   HENT:  Negative for congestion, nosebleeds, sore throat, stridor and tinnitus.    Eyes:  Negative for blurred vision, discharge, double vision, pain, vision loss in left eye, vision loss in right eye, visual disturbance and visual halos.   Cardiovascular:  Negative for chest pain, dyspnea on exertion, leg swelling, near-syncope, orthopnea, palpitations and syncope.   Respiratory:  Negative for cough, hemoptysis, shortness of breath, snoring, sputum production and wheezing.    Endocrine: Negative for cold intolerance, heat intolerance and polydipsia.   Hematologic/Lymphatic: Negative for adenopathy and bleeding problem. Does not bruise/bleed easily.   Skin:  Negative for color change, dry skin, flushing, poor wound healing and suspicious lesions.   Musculoskeletal:  Negative for arthritis, back pain, gout, joint pain and joint swelling.   Gastrointestinal:  Negative for bloating, abdominal pain, constipation and diarrhea.   Genitourinary:  Negative for dysuria, frequency and hematuria.   Neurological:  Negative for dizziness, focal weakness, headaches, light-headedness, loss of balance, numbness and weakness.   Psychiatric/Behavioral:  Negative for altered mental status, hallucinations and hypervigilance. The patient is not nervous/anxious.      Objective:     Vital Signs (Most Recent):  Temp: 98.4 °F (36.9 °C) (09/01/23 1101)  Pulse: 94 (09/01/23 1538)  Resp: 20 (09/01/23 1538)  BP: (!) 66/50 (09/01/23 1538)  SpO2: (!) 92 % (09/01/23 1516) Vital Signs (24h Range):  Temp:  [98 °F (36.7 °C)-98.4 °F (36.9  °C)] 98.4 °F (36.9 °C)  Pulse:  [] 94  Resp:  [5-54] 20  SpO2:  [92 %-100 %] 92 %  BP: ()/(45-67) 66/50     Weight: 99.3 kg (219 lb)  Body mass index is 26.66 kg/m².     SpO2: (!) 92 %         Intake/Output Summary (Last 24 hours) at 9/1/2023 1552  Last data filed at 9/1/2023 1501  Gross per 24 hour   Intake 1340.15 ml   Output 1800 ml   Net -459.85 ml       Lines/Drains/Airways       Central Venous Catheter Line  Duration             Percutaneous Central Line Insertion/Assessment - Triple Lumen  08/28/23 1800 Internal Jugular Left 3 days              Peripheral Intravenous Line  Duration                  Peripheral IV - Single Lumen 08/31/23 1755 20 G Right Forearm <1 day                       Physical Exam  Constitutional:       General: He is not in acute distress.     Appearance: Normal appearance. He is normal weight. He is not toxic-appearing.   HENT:      Head: Normocephalic and atraumatic.   Eyes:      General:         Right eye: No discharge.         Left eye: No discharge.      Extraocular Movements: Extraocular movements intact.      Conjunctiva/sclera: Conjunctivae normal.      Pupils: Pupils are equal, round, and reactive to light.   Cardiovascular:      Rate and Rhythm: Normal rate and regular rhythm.      Pulses: Normal pulses.      Heart sounds: Normal heart sounds. No murmur heard.     No friction rub.   Pulmonary:      Effort: Pulmonary effort is normal. No respiratory distress.      Breath sounds: Normal breath sounds. No wheezing or rales.   Abdominal:      General: Abdomen is flat. Bowel sounds are normal. There is no distension.      Palpations: Abdomen is soft.      Tenderness: There is no abdominal tenderness. There is no guarding.   Musculoskeletal:         General: No swelling, tenderness or deformity. Normal range of motion.      Cervical back: Normal range of motion and neck supple. No rigidity.      Right lower leg: No edema.      Left lower leg: No edema.   Skin:      General: Skin is warm and dry.      Capillary Refill: Capillary refill takes less than 2 seconds.      Coloration: Skin is not jaundiced or pale.      Findings: No bruising.   Neurological:      General: No focal deficit present.      Mental Status: He is alert and oriented to person, place, and time. Mental status is at baseline.   Psychiatric:         Mood and Affect: Mood normal.         Thought Content: Thought content normal.         Judgment: Judgment normal.

## 2023-09-01 NOTE — ASSESSMENT & PLAN NOTE
-CAD s/p CABG x 4 (3/2014 in [place] with LIMA-LAD, SVG-RCA, SVG-PLV, SVG-D2) followed by PCI late same year in 10/2014 with LANA x 3 to RCA and LANA x 3 to left coronary system  -ASA 81 daily and HI statin    Plan  - optimize GDMT upon discharge  - Patient was stopped on dobutamine 9/1/23 afternoon, now having consistently low MAPS to 50-60s. Asymptomatic during these episodes, but obtained hemodyanmics showing:  CVP 8  SVO2 56  CI 2.3    Lactic Acid 2.3.  - Planning to restart dobutamine, trend lactic acid, follow  up evening HDS.  - Consult for [facility] after the weekend for potential home dobutamine.

## 2023-09-01 NOTE — PROGRESS NOTES
[facility]  Cardiology  Progress Note    Patient Name: Ishmael Thrasher  MRN: [id number]  Admission Date: 8/28/2023  Hospital Length of Stay: 4 days  Code Status: Full Code   Attending Physician: Gage Low MD   Primary Care Physician: [facility], [facility] [place]  Expected Discharge Date: 9/3/2023  Principal Problem:Cardiogenic shock    Subjective:     Hospital Course:   Patient is getting a left heart catheterization on 08/29/2023.  Repeat echo reveals severely reduced systolic function with estimated ejection fraction of 10 to 15%.  Aortic valve:  Moderate to severe stenosis low-flow.  In the mean stress echo was performed to assess aortic valve which showed Aortic Valve:  There is moderate to severe low flow low gradient aortic stenosis. There is no change is the LV stroke volume with dobutamine consistent with a lack of contractile reserve.  No valvular interventions at this time.  Patient is to discharged home      Interval History: Patient was stopped on dobutamine yesterday afternoon, now having consistently low MAPS to 50-60s. Asymptomatic during these episodes, but obtained hemodyanmics showing:  CVP 8  SVO2 56  CI 2.3    Lactic Acid 2.3.  Planning to restart dobutamine, trend lactic acid, follow  up evening HDS.    Review of Systems   Constitutional: Negative for chills, fever, malaise/fatigue and night sweats.   HENT:  Negative for congestion, nosebleeds, sore throat, stridor and tinnitus.    Eyes:  Negative for blurred vision, discharge, double vision, pain, vision loss in left eye, vision loss in right eye, visual disturbance and visual halos.   Cardiovascular:  Negative for chest pain, dyspnea on exertion, leg swelling, near-syncope, orthopnea, palpitations and syncope.   Respiratory:  Negative for cough, hemoptysis, shortness of breath, snoring, sputum production and wheezing.    Endocrine: Negative for cold intolerance, heat intolerance and polydipsia.    Hematologic/Lymphatic: Negative for adenopathy and bleeding problem. Does not bruise/bleed easily.   Skin:  Negative for color change, dry skin, flushing, poor wound healing and suspicious lesions.   Musculoskeletal:  Negative for arthritis, back pain, gout, joint pain and joint swelling.   Gastrointestinal:  Negative for bloating, abdominal pain, constipation and diarrhea.   Genitourinary:  Negative for dysuria, frequency and hematuria.   Neurological:  Negative for dizziness, focal weakness, headaches, light-headedness, loss of balance, numbness and weakness.   Psychiatric/Behavioral:  Negative for altered mental status, hallucinations and hypervigilance. The patient is not nervous/anxious.      Objective:     Vital Signs (Most Recent):  Temp: 98.4 °F (36.9 °C) (09/01/23 1101)  Pulse: 94 (09/01/23 1538)  Resp: 20 (09/01/23 1538)  BP: (!) 66/50 (09/01/23 1538)  SpO2: (!) 92 % (09/01/23 1516) Vital Signs (24h Range):  Temp:  [98 °F (36.7 °C)-98.4 °F (36.9 °C)] 98.4 °F (36.9 °C)  Pulse:  [] 94  Resp:  [5-54] 20  SpO2:  [92 %-100 %] 92 %  BP: ()/(45-67) 66/50     Weight: 99.3 kg (219 lb)  Body mass index is 26.66 kg/m².     SpO2: (!) 92 %         Intake/Output Summary (Last 24 hours) at 9/1/2023 1552  Last data filed at 9/1/2023 1501  Gross per 24 hour   Intake 1340.15 ml   Output 1800 ml   Net -459.85 ml       Lines/Drains/Airways       Central Venous Catheter Line  Duration             Percutaneous Central Line Insertion/Assessment - Triple Lumen  08/28/23 1800 Internal Jugular Left 3 days              Peripheral Intravenous Line  Duration                  Peripheral IV - Single Lumen 08/31/23 1755 20 G Right Forearm <1 day                       Physical Exam  Constitutional:       General: He is not in acute distress.     Appearance: Normal appearance. He is normal weight. He is not toxic-appearing.   HENT:      Head: Normocephalic and atraumatic.   Eyes:      General:         Right eye: No discharge.          Left eye: No discharge.      Extraocular Movements: Extraocular movements intact.      Conjunctiva/sclera: Conjunctivae normal.      Pupils: Pupils are equal, round, and reactive to light.   Cardiovascular:      Rate and Rhythm: Normal rate and regular rhythm.      Pulses: Normal pulses.      Heart sounds: Normal heart sounds. No murmur heard.     No friction rub.   Pulmonary:      Effort: Pulmonary effort is normal. No respiratory distress.      Breath sounds: Normal breath sounds. No wheezing or rales.   Abdominal:      General: Abdomen is flat. Bowel sounds are normal. There is no distension.      Palpations: Abdomen is soft.      Tenderness: There is no abdominal tenderness. There is no guarding.   Musculoskeletal:         General: No swelling, tenderness or deformity. Normal range of motion.      Cervical back: Normal range of motion and neck supple. No rigidity.      Right lower leg: No edema.      Left lower leg: No edema.   Skin:     General: Skin is warm and dry.      Capillary Refill: Capillary refill takes less than 2 seconds.      Coloration: Skin is not jaundiced or pale.      Findings: No bruising.   Neurological:      General: No focal deficit present.      Mental Status: He is alert and oriented to person, place, and time. Mental status is at baseline.   Psychiatric:         Mood and Affect: Mood normal.         Thought Content: Thought content normal.         Judgment: Judgment normal.            Assessment and Plan:       * Cardiogenic shock  HFrEF due to iCMP  EF 10-15% on echo along with severe MR and mod AS per echo report from prior hospital   initial hemodynamics on  2 mcg/kg/min on arrival  CVP 7  SVO2 57        Plan  - Patient was stopped on dobutamine 9/1/23 afternoon, now having consistently low MAPS to 50-60s. Asymptomatic during these episodes, but obtained hemodyanmics showing:  CVP 8  SVO2 56  CI 2.3    Lactic Acid 2.3.  - Planning to restart dobutamine, trend lactic  acid, follow  up evening HDS.  - Consult for [facility] after the weekend for potential home dobutamine.  -Patient does not want LVAD, and significant calcium on ascending aortic valve. Will defer CTS evaluation for now. Patient is not on psychiatric medications at home.    Moderate aortic stenosis  SeRepeat Echo 8/29    Summary    Tachycardia was present during the study    Left Ventricle: The left ventricle is severely dilated. Normal wall thickness. There is moderate eccentric hypertrophy. Severe global hypokinesis present. There is severely reduced systolic function with a visually estimated ejection fraction of 10 -15%. No LV apical thrombus present. Unable to assess diastolic functionUnable to assess due to poor image quality.    Right Ventricle: Mild right ventricular enlargement. Wall thickness is normal. Right ventricle wall motion  is normal. Systolic function is moderately reduced. TAPSE is 1.30 cm. Right ventricular global longitudinal strain is -9.0 % and is reduced.    Left Atrium: Left atrium is severely dilated.    Right Atrium: Right atrium is mildly dilated.    Aortic Valve: The aortic valve is a trileaflet valve. There is moderate aortic valve sclerosis. Moderate annular calcification. There is moderate to severe stenosis low flow . Aortic valve area by VTI is 0.91 cm². Aortic valve peak velocity is 2.33 m/s. Mean gradient is 12 mmHg. The dimensionless index is 0.24. Consider Dobutmine stress to differentiate from pseudosevere AS if clinically inidcated.    Mitral Valve: There is mild to moderate regurgitation.    Pulmonary Artery: The estimated pulmonary artery systolic pressure is 65 mmHg.    IVC/SVC: Elevated venous pressure at 15 mmHg     Dobutamine Stress echo:    Summary      A low dose Dobutamine stress echo was performed to evaluate low flow low gradient aortic stenosis.    Stress ECG: Frequent PACs and frequent PVCs.    ECG Conclusion: The ECG portion of the study is negative for  ischemia.    Left Ventricle: The left ventricle is severely dilated measuring 7.9 cm. Severe global hypokinesis present. Severely reduced systolic function with a visually estimated ejection fraction of 10 -15%.    Left Ventricle: The left ventricle is severely dilated. LVIDd is 7.9 cm.    Aortic Valve:  There is moderate to severe low flow low gradient aortic stenosis. Aortic valve area by VTI is 0.82 cm². Following infusion of dobutamine, there was no increase in LVOT or aortic VTI, velocity or gradient. The aortic valve area by VTI at 15 mcg/kg/min of dobutamine is 1.1 cm². Peak velocity 2.9 m/s and mean gradient 19 mm Hg.    Post-stress Impression: There is no change is the LV stroke volume with dobutamine consistent with a lack of contractile reserve    Plan:  - f/u CT cardiac scoring is negative.        Severe mitral regurgitation  Repeat Echo 8/29    Summary    Tachycardia was present during the study    Left Ventricle: The left ventricle is severely dilated. Normal wall thickness. There is moderate eccentric hypertrophy. Severe global hypokinesis present. There is severely reduced systolic function with a visually estimated ejection fraction of 10 -15%. No LV apical thrombus present. Unable to assess diastolic functionUnable to assess due to poor image quality.    Right Ventricle: Mild right ventricular enlargement. Wall thickness is normal. Right ventricle wall motion  is normal. Systolic function is moderately reduced. TAPSE is 1.30 cm. Right ventricular global longitudinal strain is -9.0 % and is reduced.    Left Atrium: Left atrium is severely dilated.    Right Atrium: Right atrium is mildly dilated.    Aortic Valve: The aortic valve is a trileaflet valve. There is moderate aortic valve sclerosis. Moderate annular calcification. There is moderate to severe stenosis low flow . Aortic valve area by VTI is 0.91 cm². Aortic valve peak velocity is 2.33 m/s. Mean gradient is 12 mmHg. The  dimensionless index is 0.24. Consider Dobutmine stress to differentiate from pseudosevere AS if clinically inidcated.    Mitral Valve: There is mild to moderate regurgitation.    Pulmonary Artery: The estimated pulmonary artery systolic pressure is 65 mmHg.    IVC/SVC: Elevated venous pressure at 15 mmHg         PAM (obstructive sleep apnea)  CPAP ordered     Paroxysmal A-fib  -currently in sinus tach 105  -Takes Eliquis at home for pAF and chronic DVT  -Switch Eliquis to heparin gtt for now    - held due to bleeding site.     PTSD (post-traumatic stress disorder)  Resume psych meds    Chronic deep vein thrombosis (DVT) of right lower extremity  Takes Eliquis at home for pAF and chronic DVT  Switch Eliquis to heparin gtt for now    Gout  -Acute gout attack in 8/[facility] with [facility]  -Uric acid 11.6 at that time    Plan  -continue allopurinol 300 daily (modify dose in AM if needed)  -on colchicine 0.6 mg qd, continue  -was [facility] by Rheum in [facility], on prednisone taper (will give 5 mg prednisone x 3 more days and then stop)    Ischemic cardiomyopathy  -CAD s/p CABG x 4 (3/2014 in [place] with LIMA-LAD, SVG-RCA, SVG-PLV, SVG-D2) followed by PCI late same year in 10/2014 with LANA x 3 to RCA and LANA x 3 to left coronary system  -ASA 81 daily and HI statin    Plan  - optimize GDMT upon discharge  - Patient was stopped on dobutamine 9/1/23 afternoon, now having consistently low MAPS to 50-60s. Asymptomatic during these episodes, but obtained hemodyanmics showing:  CVP 8  SVO2 56  CI 2.3    Lactic Acid 2.3.  - Planning to restart dobutamine, trend lactic acid, follow  up evening HDS.  - Consult for [facility] after the weekend for potential home dobutamine.        VTE Risk Mitigation (From admission, onward)         Ordered     apixaban tablet 5 mg  2 times daily         09/01/23 1133                Stone Benton MD  Cardiology  [facility]

## 2023-09-01 NOTE — SUBJECTIVE & OBJECTIVE
Interval History: NAEON. Patient expressed disinterest in LVAD.  off yesterday AM.    Continuous Infusions:   heparin (porcine) in D5W 12 Units/kg/hr (09/01/23 0701)     Scheduled Meds:   allopurinoL  150 mg Oral Daily    aspirin  81 mg Oral Daily    atorvastatin  80 mg Oral Daily    gabapentin  300 mg Oral BID    miconazole nitrate 2%   Topical (Top) BID    mupirocin   Nasal BID    spironolactone  25 mg Oral Daily    torsemide  60 mg Oral BID    valsartan  40 mg Oral BID     PRN Meds:acetaminophen, acetaminophen, heparin (PORCINE), heparin (PORCINE), melatonin, ondansetron, sodium chloride 0.9%    Review of patient's allergies indicates:   Allergen Reactions    Enalapril maleate Swelling and Edema     Other reaction(s): Swelling    Vasotec [enalaprilat] Swelling     Neck swelling    Zoloft [sertraline] Other (See Comments)     Patient states it put him to sleep in the hospital he could not talk nor move    Cyclobenzaprine Hallucinations     Other reaction(s): Swelling  Hallucinations according to patient.    Other reaction(s): Lymphadenopathy, Restlessness, Sensation of being cold, Restlessness, Sensation of being cold    Amitriptyline Hallucinations    Labetalol      Other reaction(s): Pharyngeal swelling    Lisinopril      Other reaction(s): Pharyngeal swelling    Tramadol Nausea Only and Hallucinations     Objective:     Vital Signs (Most Recent):  Temp: 98.2 °F (36.8 °C) (09/01/23 0701)  Pulse: 109 (09/01/23 1038)  Resp: (!) 29 (09/01/23 1038)  BP: (!) 82/55 (09/01/23 0901)  SpO2: 99 % (09/01/23 1038) Vital Signs (24h Range):  Temp:  [98 °F (36.7 °C)-98.2 °F (36.8 °C)] 98.2 °F (36.8 °C)  Pulse:  [] 109  Resp:  [5-49] 29  SpO2:  [92 %-100 %] 99 %  BP: ()/(53-71) 82/55     Patient Vitals for the past 72 hrs (Last 3 readings):   Weight   08/30/23 0903 99.3 kg (219 lb)     Body mass index is 26.66 kg/m².      Intake/Output Summary (Last 24 hours) at 9/1/2023 1113  Last data filed at 9/1/2023  0701  Gross per 24 hour   Intake 1060.97 ml   Output 1700 ml   Net -639.03 ml            Physical Exam  Constitutional:       General: He is not in acute distress.     Appearance: Normal appearance. He is not ill-appearing, toxic-appearing or diaphoretic.   HENT:      Head: Normocephalic and atraumatic.      Nose: Nose normal.      Mouth/Throat:      Mouth: Mucous membranes are moist.   Eyes:      Extraocular Movements: Extraocular movements intact.   Cardiovascular:      Rate and Rhythm: Tachycardia present.      Pulses: Normal pulses.      Heart sounds: Murmur (3/6 systolic murmur hear across the precordium) heard.   Pulmonary:      Effort: Pulmonary effort is normal. No respiratory distress.      Breath sounds: Normal breath sounds. No wheezing or rales.   Abdominal:      General: Abdomen is flat. Bowel sounds are normal. There is no distension.   Musculoskeletal:         General: No swelling or tenderness. Normal range of motion.      Cervical back: Normal range of motion.   Skin:     General: Skin is warm.   Neurological:      Mental Status: He is alert and oriented to person, place, and time. Mental status is at baseline.   Psychiatric:         Mood and Affect: Mood normal.         Behavior: Behavior normal.            Significant Labs:  CBC:  Recent Labs   Lab 08/30/23  0347 08/31/23  0142 09/01/23  0300   WBC 11.92 11.63 10.73   RBC 5.15 5.24 5.01   HGB 12.9* 12.7* 12.5*   HCT 39.2* 39.9* 38.1*    257 250   MCV 76* 76* 76*   MCH 25.0* 24.2* 25.0*   MCHC 32.9 31.8* 32.8     BNP:  Recent Labs   Lab 08/28/23  1722   BNP 2,700*     CMP:  Recent Labs   Lab 08/30/23  0347 08/31/23  0142 09/01/23  0300   * 108 98   CALCIUM 9.4 9.2 9.3   ALBUMIN 3.2* 3.2* 3.1*   PROT 6.5 6.6 6.2    139 137   K 4.1 4.0 4.0   CO2 24 23 23    105 104   BUN 50* 39* 34*   CREATININE 1.8* 2.0* 1.9*   ALKPHOS 106 114 101   ALT 23 19 16   AST 16 13 12   BILITOT 1.2* 1.3* 1.3*      Coagulation:   Recent Labs   Lab  "08/28/23  1722 08/29/23  0335 08/30/23  0619 08/30/23  1348 08/31/23  1715 09/01/23  0300   INR 1.2  --   --   --  1.2  --    APTT 26.7   < > 34.2* 36.4*  --  29.7    < > = values in this interval not displayed.     LDH:  No results for input(s): "LDH" in the last 72 hours.  Microbiology:  Microbiology Results (last 7 days)       ** No results found for the last 168 hours. **            BMP:   Recent Labs   Lab 09/01/23  0300   GLU 98      K 4.0      CO2 23   BUN 34*   CREATININE 1.9*   CALCIUM 9.3   MG 1.8     Cardiac Markers: No results for input(s): "CKMB", "TROPONINT", "MYOGLOBIN" in the last 72 hours.  Coagulation:   Recent Labs   Lab 08/31/23 1715 09/01/23  0300   INR 1.2  --    APTT  --  29.7     I have reviewed all pertinent labs within the past 24 hours.    Estimated Creatinine Clearance: 43.1 mL/min (A) (based on SCr of 1.9 mg/dL (H)).    Diagnostic Results:  I have reviewed all pertinent imaging results/findings within the past 24 hours.  "

## 2023-09-01 NOTE — ASSESSMENT & PLAN NOTE
73 year old gentleman with HFrEF (EF 10-15%) 2/2 iCMP s/p [facility] CRT-D placement, CAD s/p CABG x 4 (3/2014 in [place] with LIMA-LAD, SVG-RCA, SVG-PLV, SVG-D2) followed by PCI late same year in 10/2014 with LANA x 3 to RCA and LANA x 3 to left coronary system. Also has h/o pAF on eliquis, PAM on CPAP, CKD, HTN, HLD, T2DM and PAD.      Results:  Left heart cath 8/29/23:   dLCx  -patent LIMA-LAD and patent SVG-D2  -dLAD stenosis to small vessel distal LAD  -patent native RCA stents  -patent SVG-PDA  -occluded SVG-RCA    Plan:  - Appreciate HTS recs    Recommendations:  Per interventional cardiology:   - Routine post-cath care as per orders  - IVF at  100cc/hr  for 4 hrs  - Continue medical management, Risk factor reduction, Follow-up with outpatient cardiologist    HTS:  Plan  Continue  at 2.5mcg/kg/min.   Patient hesitant about LVAD at this time. CTA TAVR ordered by primary team. We recommend consulting CTS to review the CT when done to determine if calcifications still remains a barrier to VAD.   TAVR eval per primary team.

## 2023-09-01 NOTE — PLAN OF CARE
Vital Signs:   Temp: 97.5 °F (36.4 °C) (09/01 1501)  Pulse: 98 (09/01 1816)  Resp: 36 (09/01 1816)  BP: 78/52 (09/01 1816)  SpO2: 95 % (09/01 1816)        Neuro: A/Ox4. MCALLISTER. FC. PERRL. Tmax 36.9  up to commode x2, unable to amb d/t low BP.     Cardiac: Afib with V pacing. BP /50-80, MAP 53-78 P2/1. Trace BLE edema.     Respiratory: RA. Lungs clr.     Gtts: Dobutamine     Urine Output/Last BM: Voiding, UOP poor. BM x2 this shift. Last Bowel Movement: 09/01/23      Diet:  Adequate PO/fluid intake.     Labs/Accuchecks: BG AC/HS     Skin: Right groin site, CDI. Sacrum LORE      Plan: Restarted Dobutamine, trend Lactic, monitor UOP    Problem: Adult Inpatient Plan of Care  Goal: Plan of Care Review  Outcome: Ongoing, Progressing  Goal: Patient-Specific Goal (Individualized)  Outcome: Ongoing, Progressing  Goal: Absence of Hospital-Acquired Illness or Injury  Outcome: Ongoing, Progressing  Goal: Optimal Comfort and Wellbeing  Outcome: Ongoing, Progressing     Problem: Infection  Goal: Absence of Infection Signs and Symptoms  Outcome: Ongoing, Progressing     Problem: Impaired Wound Healing  Goal: Optimal Wound Healing  Outcome: Ongoing, Progressing     Problem: Fall Injury Risk  Goal: Absence of Fall and Fall-Related Injury  Outcome: Ongoing, Progressing     Problem: Skin Injury Risk Increased  Goal: Skin Health and Integrity  Outcome: Ongoing, Progressing     Problem: Adult Inpatient Plan of Care  Goal: Readiness for Transition of Care  Outcome: Ongoing, Not Progressing

## 2023-09-01 NOTE — ASSESSMENT & PLAN NOTE
Patient underwent dobutamine stress echocardiogram was found to have moderate to severe aortic stenosis.  He did not have any contractile reserve.  CT cardiac score pending

## 2023-09-01 NOTE — NURSING
Notified Dr Benton, MAP 58-60, SBP 75's. Low UOP. Asymptomatic.   Order: lactic, continue to monitor BP.

## 2023-09-02 NOTE — NURSING
Nurses Note -- 4 Eyes      9/1/2023   7:50 PM      Skin assessed during: Q Shift Change      [] No Altered Skin Integrity Present    []Prevention Measures Documented      [x] Yes- Altered Skin Integrity Present or Discovered   [] LDA Added if Not in Epic (Describe Wound)   [] New Altered Skin Integrity was Present on Admit and Documented in LDA   [] Wound Image Taken    Wound Care Consulted? No    Attending Nurse:  CHRISTOPHER Marin    Second RN/Staff Member:  Sherron ZAMORA RN

## 2023-09-02 NOTE — PROGRESS NOTES
[facility] - Card[facility]  Cardiology  Progress Note    Patient Name: Ishmael Thrasher  MRN: [id number]  Admission Date: 8/28/2023  Hospital Length of Stay: 5 days  Code Status: Full Code   Attending Physician: Ravindra Mock MD   Primary Care Physician: Kirk, [facility]  Expected Discharge Date: 9/6/2023  Principal Problem:Cardiogenic shock    Subjective:     Hospital Course:   Patient is getting a left heart catheterization on 08/29/2023.  Repeat echo reveals severely reduced systolic function with estimated ejection fraction of 10 to 15%.  Aortic valve:  Moderate to severe stenosis low-flow.  In the mean stress echo was performed to assess aortic valve which showed Aortic Valve:  There is moderate to severe low flow low gradient aortic stenosis. There is no change is the LV stroke volume with dobutamine consistent with a lack of contractile reserve.  No valvular interventions at this time. RHC done on 9/4/23. Patient is to discharged home with home health.      Interval History: no acute events overnight. Hemodynamics: CVP:9 , Scvo2:48 , CO:3.9 , CI: 1.71, SVR:1157.      Review of Systems   Constitutional: Negative for chills and fever.   HENT:  Negative for congestion.    Cardiovascular:  Negative for chest pain and dyspnea on exertion.   Respiratory:  Negative for cough and shortness of breath.    Musculoskeletal:  Negative for back pain.   Gastrointestinal:  Negative for abdominal pain, diarrhea, nausea and vomiting.   Neurological:  Negative for headaches.     Objective:     Vital Signs (Most Recent):  Temp: 97.4 °F (36.3 °C) (09/02/23 1200)  Pulse: 110 (09/02/23 1200)  Resp: (!) 33 (09/02/23 1200)  BP: 116/67 (09/02/23 1200)  SpO2: 99 % (09/02/23 1200) Vital Signs (24h Range):  Temp:  [97.4 °F (36.3 °C)-98 °F (36.7 °C)] 97.4 °F (36.3 °C)  Pulse:  [] 110  Resp:  [10-37] 33  SpO2:  [80 %-100 %] 99 %  BP: ()/(45-95) 116/67     Weight: 97.9 kg (215 lb 13.3 oz)  Body mass  index is 26.27 kg/m².     SpO2: 99 %         Intake/Output Summary (Last 24 hours) at 9/2/2023 1242  Last data filed at 9/2/2023 1130  Gross per 24 hour   Intake 1460.57 ml   Output 1075 ml   Net 385.57 ml       Lines/Drains/Airways       Central Venous Catheter Line  Duration             Percutaneous Central Line Insertion/Assessment - Triple Lumen  08/28/23 1800 Internal Jugular Left 4 days              Peripheral Intravenous Line  Duration                  Peripheral IV - Single Lumen 08/31/23 1755 20 G Right Forearm 1 day                       Physical Exam  Constitutional:       Appearance: Normal appearance. He is not toxic-appearing.   HENT:      Head: Normocephalic and atraumatic.   Eyes:      General:         Right eye: No discharge.         Left eye: No discharge.      Extraocular Movements: Extraocular movements intact.      Conjunctiva/sclera: Conjunctivae normal.      Pupils: Pupils are equal, round, and reactive to light.   Cardiovascular:      Rate and Rhythm: Normal rate and regular rhythm.      Pulses: Normal pulses.      Heart sounds: Normal heart sounds. No murmur heard.     No friction rub.   Pulmonary:      Effort: Pulmonary effort is normal. No respiratory distress.      Breath sounds: Normal breath sounds. No wheezing or rales.   Abdominal:      General: Abdomen is flat. Bowel sounds are normal. There is no distension.      Palpations: Abdomen is soft.      Tenderness: There is no abdominal tenderness. There is no guarding.   Musculoskeletal:         General: No swelling or tenderness. Normal range of motion.      Cervical back: Normal range of motion and neck supple.      Right lower leg: No edema.      Left lower leg: No edema.   Skin:     General: Skin is warm and dry.      Coloration: Skin is not jaundiced or pale.      Findings: No bruising.   Neurological:      Mental Status: He is alert and oriented to person, place, and time. Mental status is at baseline.   Psychiatric:         Mood  and Affect: Mood normal.         Thought Content: Thought content normal.         Judgment: Judgment normal.            Significant Labs: CMP   Recent Labs   Lab 09/01/23  0300 09/02/23  0021 09/02/23  0423    137 136  136   K 4.0 4.0 3.9  3.9    100 101  101   CO2 23 25 24  24   GLU 98 118* 109  109   BUN 34* 39* 37*  37*   CREATININE 1.9* 2.4* 2.3*  2.3*   CALCIUM 9.3 8.8 9.0  9.0   PROT 6.2 6.2 5.9*   ALBUMIN 3.1* 3.2* 3.0*   BILITOT 1.3* 1.0 0.9   ALKPHOS 101 114 122   AST 12 14 14   ALT 16 14 15   ANIONGAP 10 12 11  11    and CBC   Recent Labs   Lab 09/01/23 0300 09/01/23 2054 09/02/23  0423   WBC 10.73  --  12.11  12.11   HGB 12.5*  --  12.5*  12.5*   HCT 38.1*   < > 38.2*  38.2*     --  252  252    < > = values in this interval not displayed.       Significant Imaging:       Assessment and Plan:         * Cardiogenic shock  HFrEF due to iCMP  EF 10-15% on echo along with severe MR and mod AS per echo report from prior hospital   initial hemodynamics on  2 mcg/kg/min on arrival  CVP 7  SVO2 57        Plan  - Patient was stopped on dobutamine 9/1/23 afternoon, now having consistently low MAPS to 50-60s. Asymptomatic during these episodes, but obtained hemodyanmics showing:  CVP 8  SVO2 56  CI 2.3    Lactic Acid 2.3.  -  restarted dobutamine  - Consult for RHC after the weekend for potential home dobutamine.  - significant calcium on ascending aortic valve. Will defer CTS evaluation for now. Patient is not on psychiatric medications at home.    Moderate aortic stenosis  SeRepeat Echo 8/29    Summary    Tachycardia was present during the study    Left Ventricle: The left ventricle is severely dilated. Normal wall thickness. There is moderate eccentric hypertrophy. Severe global hypokinesis present. There is severely reduced systolic function with a visually estimated ejection fraction of 10 -15%. No LV apical thrombus present. Unable to assess diastolic functionUnable  to assess due to poor image quality.    Right Ventricle: Mild right ventricular enlargement. Wall thickness is normal. Right ventricle wall motion  is normal. Systolic function is moderately reduced. TAPSE is 1.30 cm. Right ventricular global longitudinal strain is -9.0 % and is reduced.    Left Atrium: Left atrium is severely dilated.    Right Atrium: Right atrium is mildly dilated.    Aortic Valve: The aortic valve is a trileaflet valve. There is moderate aortic valve sclerosis. Moderate annular calcification. There is moderate to severe stenosis low flow . Aortic valve area by VTI is 0.91 cm². Aortic valve peak velocity is 2.33 m/s. Mean gradient is 12 mmHg. The dimensionless index is 0.24. Consider Dobutmine stress to differentiate from pseudosevere AS if clinically inidcated.    Mitral Valve: There is mild to moderate regurgitation.    Pulmonary Artery: The estimated pulmonary artery systolic pressure is 65 mmHg.    IVC/SVC: Elevated venous pressure at 15 mmHg     Dobutamine Stress echo:    Summary      A low dose Dobutamine stress echo was performed to evaluate low flow low gradient aortic stenosis.    Stress ECG: Frequent PACs and frequent PVCs.    ECG Conclusion: The ECG portion of the study is negative for ischemia.    Left Ventricle: The left ventricle is severely dilated measuring 7.9 cm. Severe global hypokinesis present. Severely reduced systolic function with a visually estimated ejection fraction of 10 -15%.    Left Ventricle: The left ventricle is severely dilated. LVIDd is 7.9 cm.    Aortic Valve:  There is moderate to severe low flow low gradient aortic stenosis. Aortic valve area by VTI is 0.82 cm². Following infusion of dobutamine, there was no increase in LVOT or aortic VTI, velocity or gradient. The aortic valve area by VTI at 15 mcg/kg/min of dobutamine is 1.1 cm². Peak velocity 2.9 m/s and mean gradient 19 mm Hg.    Post-stress Impression: There is no change is the LV stroke  volume with dobutamine consistent with a lack of contractile reserve    Results:  - f/u CT cardiac scoring is negative.        Severe mitral regurgitation  Repeat Echo 8/29    Summary    Tachycardia was present during the study    Left Ventricle: The left ventricle is severely dilated. Normal wall thickness. There is moderate eccentric hypertrophy. Severe global hypokinesis present. There is severely reduced systolic function with a visually estimated ejection fraction of 10 -15%. No LV apical thrombus present. Unable to assess diastolic functionUnable to assess due to poor image quality.    Right Ventricle: Mild right ventricular enlargement. Wall thickness is normal. Right ventricle wall motion  is normal. Systolic function is moderately reduced. TAPSE is 1.30 cm. Right ventricular global longitudinal strain is -9.0 % and is reduced.    Left Atrium: Left atrium is severely dilated.    Right Atrium: Right atrium is mildly dilated.    Aortic Valve: The aortic valve is a trileaflet valve. There is moderate aortic valve sclerosis. Moderate annular calcification. There is moderate to severe stenosis low flow . Aortic valve area by VTI is 0.91 cm². Aortic valve peak velocity is 2.33 m/s. Mean gradient is 12 mmHg. The dimensionless index is 0.24. Consider Dobutmine stress to differentiate from pseudosevere AS if clinically inidcated.    Mitral Valve: There is mild to moderate regurgitation.    Pulmonary Artery: The estimated pulmonary artery systolic pressure is 65 mmHg.    IVC/SVC: Elevated venous pressure at 15 mmHg         PAM (obstructive sleep apnea)  CPAP ordered     Paroxysmal A-fib  -currently in sinus tach 105  -Takes Eliquis at home for pAF and chronic DVT  -Switch Eliquis to heparin gtt for now    Plan:  - on heparin gtt    PTSD (post-traumatic stress disorder)  Resume psych meds    Chronic deep vein thrombosis (DVT) of right lower extremity  Takes Eliquis at home for pAF and chronic DVT  Switch  Eliquis to heparin gtt for now    Gout  -Acute gout attack in 8/2023 [facility] with [facility] pain  -Uric acid 11.6 at that time    Plan  -continue allopurinol 300 daily (modify dose in AM if needed)  -on colchicine 0.6 mg qd, continue  -was seen by Rheum in [facility], on prednisone taper (will give 5 mg prednisone x 3 more days and then stop)    Ischemic cardiomyopathy  -CAD s/p CABG x 4 (3/2014 in [place] with LIMA-LAD, SVG-RCA, SVG-PLV, SVG-D2) followed by PCI late same year in 10/2014 with LANA x 3 to RCA and LANA x 3 to left coronary system  -ASA 81 daily and HI statin    Plan  - optimize GDMT upon discharge  - Patient was stopped on dobutamine 9/1/23 afternoon, now having consistently low MAPS to 50-60s. Asymptomatic during these episodes, but obtained hemodyanmics showing:  CVP 8  SVO2 56  CI 2.3    Lactic Acid 2.3.  - restarted dobutamine  - Consult for RHC after the weekend for potential home dobutamine.      VTE Risk Mitigation (From admission, onward)         Ordered     apixaban tablet 5 mg  2 times daily         09/01/23 1133                David Melendrez DO  Cardiology  [facility] - [facility]

## 2023-09-02 NOTE — ASSESSMENT & PLAN NOTE
-currently in sinus tach 105  -Takes Eliquis at home for pAF and chronic DVT  -Switch Eliquis to heparin gtt for now    Plan:  - on heparin gtt

## 2023-09-02 NOTE — ASSESSMENT & PLAN NOTE
-CAD s/p CABG x 4 (3/2014 in [place] with LIMA-LAD, SVG-RCA, SVG-PLV, SVG-D2) followed by PCI late same year in 10/2014 with LANA x 3 to RCA and LANA x 3 to left coronary system  -ASA 81 daily and HI statin    Plan  - optimize GDMT upon discharge  - Patient was stopped on dobutamine 9/1/23 afternoon, now having consistently low MAPS to 50-60s. Asymptomatic during these episodes, but obtained hemodyanmics showing:  CVP 8  SVO2 56  CI 2.3    Lactic Acid 2.3.  - restarted dobutamine  - Consult for RHC after the weekend for potential home dobutamine.

## 2023-09-02 NOTE — ASSESSMENT & PLAN NOTE
HFrEF due to iCMP  EF 10-15% on echo along with severe MR and mod AS per echo report from prior hospital   initial hemodynamics on  2 mcg/kg/min on arrival  CVP 7  SVO2 57        Plan  - Patient was stopped on dobutamine 9/1/23 afternoon, now having consistently low MAPS to 50-60s. Asymptomatic during these episodes, but obtained hemodyanmics showing:  CVP 8  SVO2 56  CI 2.3    Lactic Acid 2.3.  -  restarted dobutamine  - Consult for RHC after the weekend for potential home dobutamine.  - significant calcium on ascending aortic valve. Will defer CTS evaluation for now. Patient is not on psychiatric medications at home.

## 2023-09-02 NOTE — PLAN OF CARE
CICU DAILY GOALS   Code Status: Full Code  Plan for the day  No acute events noted this shift, VS and assessment per flow sheet, patient progressing towards goals as tolerated, plan of care reviewed with Ishmael Thrasher and all concerns addressed. Cardiology notified of persistent hypotension, please see provider communication flow sheet for details.      A: Awake    RASS: Goal - RASS Goal: 0-->alert and calm  Actual - RASS (Diaz Agitation-Sedation Scale): alert and calm   Restraint necessity:    B: Breath   Room Air  C: Coordinate A & B, analgesics/sedatives   Pain: managed     D: Delirium   CAM-ICU: Overall CAM-ICU: Negative    E: Early(intubated/ Progressive (non-intubated) Mobility   MOVE Screen: Pass   Activity: Activity Management: Arm raise - L1, Ankle pumps - L1    FAS: Feeding/Nutrition   Diet order: Diet/Nutrition Received: low saturated fat/low cholesterol (Cardiac),   Fluid restriction: Fluid Requirement: 1500mL FR    T: Thrombus   DVT prophylaxis: VTE Required Core Measure: Pharmacological prophylaxis initiated/maintained    H: HOB Elevation   Head of Bed (HOB) Positioning: HOB at 20-30 degrees    U: Ulcer Prophylaxis   GI: yes  G: Glucose control   managed    S: Skin   Bundle compliance: yes   Bathing/Skin Care: bath, complete, linen changed Date: 9/2/2023  B: Bowel Function   no issues     I: Indwelling Catheters   Fontenot necessity:     CVC necessity: Yes     D: De-escalation Antibx   No

## 2023-09-02 NOTE — PLAN OF CARE
Cardiac ICU Care Plan    POC reviewed with Ishmael Thrasher.  Pt updating family over the phone today. Questions and concerns addressed. No acute events today.  continues to infuse for heart failure management.  Pt diuresing well on PO Torsemide.  Plan for RHC Tuesday.  Pt progressing toward goals. See below and flowsheets for full assessment and VS info.       Neuro:  Russell Coma Scale  Best Eye Response: 4-->(E4) spontaneous  Best Motor Response: 6-->(M6) obeys commands  Best Verbal Response: 5-->(V5) oriented  Russell Coma Scale Score: 15  Assessment Qualifiers: patient not sedated/intubated, no eye obstruction present  Pupil PERRLA: no (Pt reports he just had a cataract procedure on his L eye and still needs to get one for his R eye)    24 hr Temp:  [97.4 °F (36.3 °C)-98 °F (36.7 °C)]      CV:  Rhythm: paced rhythm--ventricular paced  DVT prophylaxis: VTE Required Core Measure: Pharmacological prophylaxis initiated/maintained (PO Eliquis)    CVP (mean): 11 mmHg (09/02/23 1505)  9-->11-->11    SVO2 (%): 48 % (09/02/23 0840)    Pulses  Right Radial Pulse: 2+ (normal)  Left Radial Pulse: 2+ (normal)  Right Dorsalis Pedis Pulse: Doppler  Left Dorsalis Pedis Pulse: Doppler  Right Posterior Tibial Pulse: Doppler  Left Posterior Tibial Pulse: Doppler    Resp:  Room air    GI/:  GI prophylaxis: None ordered  Diet/Nutrition Received: 2 gram sodium, low saturated fat/low cholesterol  Last Bowel Movement: 09/01/23  Voiding Characteristics: voids spontaneously without difficulty   Intake/Output Summary (Last 24 hours) at 9/2/2023 1106  Last data filed at 9/2/2023 0905  Gross per 24 hour   Intake 1050.73 ml   Output 675 ml   Net 375.73 ml       Labs/Accuchecks:  Recent Labs   Lab 08/31/23  0142 09/01/23  0300 09/01/23 2054 09/02/23  0423   WBC 11.63 10.73  --  12.11  12.11   RBC 5.24 5.01  --  4.98  4.98   HGB 12.7* 12.5*  --  12.5*  12.5*   HCT 39.9* 38.1* 42 38.2*  38.2*    250  --  252  252      Recent  Labs   Lab 08/28/23  1722 08/29/23  0335 08/30/23  1348 08/31/23  1715 09/01/23  0300 09/01/23  1146   INR 1.2  --   --  1.2  --   --    APTT 26.7   < > 36.4*  --  29.7 40.6*    < > = values in this interval not displayed.      Recent Labs     09/02/23  0423     136   K 3.9  3.9   CO2 24  24     101   BUN 37*  37*   CREATININE 2.3*  2.3*   ALKPHOS 122   ALT 15   AST 14   BILITOT 0.9       Recent Labs   Lab 08/28/23  1722   TROPONINI 0.089*      Recent Labs     09/01/23  1350 09/01/23  2054 09/02/23  0842   PH 7.393 7.405 7.379   PCO2 47.2* 47.7* 48.6*   PO2 30* 36* 27*   HCO3 28.8* 29.8* 28.7*   POCSATURATED 56* 69* 48*   BE 4 5 4       Electrolytes: Electrolytes replaced  Accuchecks: none    Gtts/LDAs:   DOBUTamine IV infusion (non-titrating) 2.5 mcg/kg/min (09/02/23 0905)       Lines/Drains/Airways       Central Venous Catheter Line  Duration             Percutaneous Central Line Insertion/Assessment - Triple Lumen  08/28/23 1800 Internal Jugular Left 4 days              Peripheral Intravenous Line  Duration                  Peripheral IV - Single Lumen 08/31/23 1755 20 G Right Forearm 1 day                    Skin/Wounds  Bathing/Skin Care: bath, complete;dressed/undressed;electrode patches/site rotation;linen changed (09/02/23 0605)  Wounds: As listed in chart. Pressure reduction techniques in use.

## 2023-09-02 NOTE — ASSESSMENT & PLAN NOTE
SeRepeat Echo 8/29    Summary    Tachycardia was present during the study    Left Ventricle: The left ventricle is severely dilated. Normal wall thickness. There is moderate eccentric hypertrophy. Severe global hypokinesis present. There is severely reduced systolic function with a visually estimated ejection fraction of 10 -15%. No LV apical thrombus present. Unable to assess diastolic functionUnable to assess due to poor image quality.    Right Ventricle: Mild right ventricular enlargement. Wall thickness is normal. Right ventricle wall motion  is normal. Systolic function is moderately reduced. TAPSE is 1.30 cm. Right ventricular global longitudinal strain is -9.0 % and is reduced.    Left Atrium: Left atrium is severely dilated.    Right Atrium: Right atrium is mildly dilated.    Aortic Valve: The aortic valve is a trileaflet valve. There is moderate aortic valve sclerosis. Moderate annular calcification. There is moderate to severe stenosis low flow . Aortic valve area by VTI is 0.91 cm². Aortic valve peak velocity is 2.33 m/s. Mean gradient is 12 mmHg. The dimensionless index is 0.24. Consider Dobutmine stress to differentiate from pseudosevere AS if clinically inidcated.    Mitral Valve: There is mild to moderate regurgitation.    Pulmonary Artery: The estimated pulmonary artery systolic pressure is 65 mmHg.    IVC/SVC: Elevated venous pressure at 15 mmHg     Dobutamine Stress echo:    Summary      A low dose Dobutamine stress echo was performed to evaluate low flow low gradient aortic stenosis.    Stress ECG: Frequent PACs and frequent PVCs.    ECG Conclusion: The ECG portion of the study is negative for ischemia.    Left Ventricle: The left ventricle is severely dilated measuring 7.9 cm. Severe global hypokinesis present. Severely reduced systolic function with a visually estimated ejection fraction of 10 -15%.    Left Ventricle: The left ventricle is severely dilated. LVIDd is 7.9 cm.     Aortic Valve:  There is moderate to severe low flow low gradient aortic stenosis. Aortic valve area by VTI is 0.82 cm². Following infusion of dobutamine, there was no increase in LVOT or aortic VTI, velocity or gradient. The aortic valve area by VTI at 15 mcg/kg/min of dobutamine is 1.1 cm². Peak velocity 2.9 m/s and mean gradient 19 mm Hg.    Post-stress Impression: There is no change is the LV stroke volume with dobutamine consistent with a lack of contractile reserve    Results:  - f/u CT cardiac scoring is negative.

## 2023-09-02 NOTE — PROGRESS NOTES
BP soft with MAP 64.  Pt scheduled to receive PO Torsemide, Aldactone, and Valsartan with AM meds.  Notified team of hypotension.  Instructed to administer Torsemide, but hold Aldactone and Valsartan.  Orders implemented as instructed.  Will continue to trend BP.        09/02/23 0915   Vital Signs   Pulse 104   Heart Rate Source Monitor;Continuous   Resp (!) 25   SpO2 100 %   Pulse Oximetry Type Continuous   Device (Oxygen Therapy) room air   BP (!) 85/55   MAP (mmHg) 64   BP Location Left arm   BP Method Automatic   Patient Position Lying

## 2023-09-02 NOTE — SUBJECTIVE & OBJECTIVE
Interval History: no acute events overnight. Hemodynamics: CVP:9 , Scvo2:48 , CO:3.9 , CI: 1.71, SVR:1157.      Review of Systems   Constitutional: Negative for chills and fever.   HENT:  Negative for congestion.    Cardiovascular:  Negative for chest pain and dyspnea on exertion.   Respiratory:  Negative for cough and shortness of breath.    Musculoskeletal:  Negative for back pain.   Gastrointestinal:  Negative for abdominal pain, diarrhea, nausea and vomiting.   Neurological:  Negative for headaches.     Objective:     Vital Signs (Most Recent):  Temp: 97.4 °F (36.3 °C) (09/02/23 1200)  Pulse: 110 (09/02/23 1200)  Resp: (!) 33 (09/02/23 1200)  BP: 116/67 (09/02/23 1200)  SpO2: 99 % (09/02/23 1200) Vital Signs (24h Range):  Temp:  [97.4 °F (36.3 °C)-98 °F (36.7 °C)] 97.4 °F (36.3 °C)  Pulse:  [] 110  Resp:  [10-37] 33  SpO2:  [80 %-100 %] 99 %  BP: ()/(45-95) 116/67     Weight: 97.9 kg (215 lb 13.3 oz)  Body mass index is 26.27 kg/m².     SpO2: 99 %         Intake/Output Summary (Last 24 hours) at 9/2/2023 1242  Last data filed at 9/2/2023 1130  Gross per 24 hour   Intake 1460.57 ml   Output 1075 ml   Net 385.57 ml       Lines/Drains/Airways       Central Venous Catheter Line  Duration             Percutaneous Central Line Insertion/Assessment - Triple Lumen  08/28/23 1800 Internal Jugular Left 4 days              Peripheral Intravenous Line  Duration                  Peripheral IV - Single Lumen 08/31/23 1755 20 G Right Forearm 1 day                       Physical Exam  Constitutional:       Appearance: Normal appearance. He is not toxic-appearing.   HENT:      Head: Normocephalic and atraumatic.   Eyes:      General:         Right eye: No discharge.         Left eye: No discharge.      Extraocular Movements: Extraocular movements intact.      Conjunctiva/sclera: Conjunctivae normal.      Pupils: Pupils are equal, round, and reactive to light.   Cardiovascular:      Rate and Rhythm: Normal rate and  regular rhythm.      Pulses: Normal pulses.      Heart sounds: Normal heart sounds. No murmur heard.     No friction rub.   Pulmonary:      Effort: Pulmonary effort is normal. No respiratory distress.      Breath sounds: Normal breath sounds. No wheezing or rales.   Abdominal:      General: Abdomen is flat. Bowel sounds are normal. There is no distension.      Palpations: Abdomen is soft.      Tenderness: There is no abdominal tenderness. There is no guarding.   Musculoskeletal:         General: No swelling or tenderness. Normal range of motion.      Cervical back: Normal range of motion and neck supple.      Right lower leg: No edema.      Left lower leg: No edema.   Skin:     General: Skin is warm and dry.      Coloration: Skin is not jaundiced or pale.      Findings: No bruising.   Neurological:      Mental Status: He is alert and oriented to person, place, and time. Mental status is at baseline.   Psychiatric:         Mood and Affect: Mood normal.         Thought Content: Thought content normal.         Judgment: Judgment normal.            Significant Labs: CMP   Recent Labs   Lab 09/01/23  0300 09/02/23  0021 09/02/23  0423    137 136  136   K 4.0 4.0 3.9  3.9    100 101  101   CO2 23 25 24  24   GLU 98 118* 109  109   BUN 34* 39* 37*  37*   CREATININE 1.9* 2.4* 2.3*  2.3*   CALCIUM 9.3 8.8 9.0  9.0   PROT 6.2 6.2 5.9*   ALBUMIN 3.1* 3.2* 3.0*   BILITOT 1.3* 1.0 0.9   ALKPHOS 101 114 122   AST 12 14 14   ALT 16 14 15   ANIONGAP 10 12 11  11    and CBC   Recent Labs   Lab 09/01/23  0300 09/01/23 2054 09/02/23  0423   WBC 10.73  --  12.11  12.11   HGB 12.5*  --  12.5*  12.5*   HCT 38.1*   < > 38.2*  38.2*     --  252  252    < > = values in this interval not displayed.       Significant Imaging:

## 2023-09-03 PROBLEM — Z95.0 PACEMAKER: Status: ACTIVE | Noted: 2023-01-01

## 2023-09-03 NOTE — ASSESSMENT & PLAN NOTE
Patient has a pacemaker in placed.  This morning patient's has been tachycardic.    Plan  -have EP interrogate

## 2023-09-03 NOTE — PLAN OF CARE
Cardiac ICU Care Plan    POC reviewed with Ishmael Thrasher.  Pt updating family over the phone today. Questions and concerns addressed.  continues to infuse for heart failure management.  Pt diuresing well on PO Torsemide.  Plan for RHC Tuesday.  Pt progressing toward goals. See below and flowsheets for full assessment and VS info.       Neuro:  [place] [employer] Coma Scale  Best Eye Response: 4-->(E4) spontaneous  Best Motor Response: 6-->(M6) obeys commands  Best Verbal Response: 4-->(V4) confused  Lambert Coma Scale Score: 14  Assessment Qualifiers: patient not sedated/intubated, no eye obstruction present  Pupil PERRLA: no (Pt reports recent cataract surgery in L eye and that he still needs surgery on R eye)    24 hr Temp:  [97.6 °F (36.4 °C)-98 °F (36.7 °C)]      CV:  Rhythm: paced rhythm, ventricular paced, PVCs  DVT prophylaxis: VTE Required Core Measure: Pharmacological prophylaxis initiated/maintained (PO Eliquis BID)    CVP (mean): 11 mmHg (09/03/23 1105)  13-->11-->11       SVO2 (%): (S) 53 % (09/03/23 1015)  31-->53    Pulses  Right Radial Pulse: 2+ (normal)  Left Radial Pulse: 2+ (normal)  Right Dorsalis Pedis Pulse: Doppler  Left Dorsalis Pedis Pulse: Doppler  Right Posterior Tibial Pulse: Doppler  Left Posterior Tibial Pulse: Doppler    Resp:  Room air    GI/:  GI prophylaxis: None ordered  Diet/Nutrition Received: 2 gram sodium, low saturated fat/low cholesterol  Last Bowel Movement: 09/01/23--bowel management regimen initiated today  Voiding Characteristics: voids spontaneously without difficulty   Intake/Output Summary (Last 24 hours) at 9/3/2023 1315  Last data filed at 9/3/2023 1305  Gross per 24 hour   Intake 1569.21 ml   Output 1175 ml   Net 394.21 ml       Labs/Accuchecks:  Recent Labs   Lab 09/01/23  0300 09/01/23  2054 09/02/23  0423 09/03/23  0340   WBC 10.73  --  12.11  12.11 10.75   RBC 5.01  --  4.98  4.98 4.96   HGB 12.5*  --  12.5*  12.5* 12.5*   HCT 38.1* 42 38.2*  38.2* 38.8*   PLT  250  --  252  252 260      Recent Labs   Lab 08/28/23  1722 08/29/23  0335 08/30/23  1348 08/31/23  1715 09/01/23  0300 09/01/23  1146   INR 1.2  --   --  1.2  --   --    APTT 26.7   < > 36.4*  --  29.7 40.6*    < > = values in this interval not displayed.      Recent Labs     09/03/23  0340 09/03/23  1018   * 133*   K 4.4 4.4   CO2 23 24    99   BUN 34* 32*   CREATININE 2.4* 2.0*   ALKPHOS 105  --    ALT 14  --    AST 13  --    BILITOT 1.0  --        Recent Labs   Lab 08/28/23  1722   TROPONINI 0.089*      Recent Labs     09/02/23 2015 09/03/23  0841 09/03/23  1018   PH 7.389 7.381 7.400   PCO2 47.5* 47.5* 44.3   PO2 26* 20* 28*   HCO3 28.7* 28.1* 27.4   POCSATURATED 46* 31* 53*   BE 4 3 3       Electrolytes: N/A - electrolytes WDL  Accuchecks: none    Gtts/LDAs:   DOBUTamine IV infusion (non-titrating) 2.5 mcg/kg/min (09/03/23 1305)       Lines/Drains/Airways       Central Venous Catheter Line  Duration             Percutaneous Central Line Insertion/Assessment - Triple Lumen  08/28/23 1800 Internal Jugular Left 5 days              Peripheral Intravenous Line  Duration                  Peripheral IV - Single Lumen 08/31/23 1755 20 G Right Forearm 2 days                    Skin/Wounds  Bathing/Skin Care: bath, complete;back care;dressed/undressed;electrode patches/site rotation;foot care;linen changed (09/03/23 1345)  Pressure reduction techniques in use.

## 2023-09-03 NOTE — PROGRESS NOTES
[facility] - Cardiac Intensive Care  Cardiology  Progress Note    Patient Name: Ishmael Thrasher  MRN: [id number]  Admission Date: 8/28/2023  Hospital Length of Stay: 6 days  Code Status: Full Code   Attending Physician: Ravindra Mock MD   Primary Care Physician: Kirk, [facility]  Expected Discharge Date: 9/6/2023  Principal Problem:Cardiogenic shock    Subjective:     Hospital Course:   Patient is getting a left heart catheterization on 08/29/2023.  Repeat echo reveals severely reduced systolic function with estimated ejection fraction of 10 to 15%.  Aortic valve:  Moderate to severe stenosis low-flow.  In the mean stress echo was performed to assess aortic valve which showed Aortic Valve:  There is moderate to severe low flow low gradient aortic stenosis. There is no change is the LV stroke volume with dobutamine consistent with a lack of contractile reserve.  No valvular interventions at this time. RHC done on 9/4/23. Patient is to discharged home with home health.      Interval History:no acute events overnight. Hemodynamics: CVP: 13, Scvo2:31 , CO:3.03 , CI:1.33 , SVR:1798.      Review of Systems   Constitutional: Negative for chills and fever.   HENT:  Negative for congestion.    Cardiovascular:  Negative for chest pain and dyspnea on exertion.   Respiratory:  Negative for cough and shortness of breath.    Musculoskeletal:  Negative for back pain.   Gastrointestinal:  Negative for abdominal pain, diarrhea, nausea and vomiting.   Neurological:  Negative for headaches.     Objective:     Vital Signs (Most Recent):  Temp: 97.6 °F (36.4 °C) (09/03/23 0715)  Pulse: (!) 124 (09/03/23 0845)  Resp: 16 (09/03/23 0845)  BP: 99/74 (09/03/23 0845)  SpO2: 98 % (09/03/23 0845) Vital Signs (24h Range):  Temp:  [97.4 °F (36.3 °C)-98 °F (36.7 °C)] 97.6 °F (36.4 °C)  Pulse:  [100-127] 124  Resp:  [11-33] 16  SpO2:  [94 %-100 %] 98 %  BP: ()/(52-74) 99/74     Weight: 97.9 kg (215 lb 13.3 oz)  Body mass  index is 26.27 kg/m².     SpO2: 98 %         Intake/Output Summary (Last 24 hours) at 9/3/2023 0908  Last data filed at 9/3/2023 0715  Gross per 24 hour   Intake 1332.37 ml   Output 1150 ml   Net 182.37 ml       Lines/Drains/Airways       Central Venous Catheter Line  Duration             Percutaneous Central Line Insertion/Assessment - Triple Lumen  08/28/23 1800 Internal Jugular Left 5 days              Peripheral Intravenous Line  Duration                  Peripheral IV - Single Lumen 08/31/23 1755 20 G Right Forearm 2 days                       Physical Exam  Constitutional:       Appearance: Normal appearance. He is not toxic-appearing.   HENT:      Head: Normocephalic and atraumatic.   Eyes:      General: No scleral icterus.  Cardiovascular:      Rate and Rhythm: Regular rhythm. Tachycardia present.      Pulses: Normal pulses.      Heart sounds: Murmur heard.   Pulmonary:      Effort: Pulmonary effort is normal. No respiratory distress.      Breath sounds: Normal breath sounds. No wheezing or rales.   Abdominal:      General: Abdomen is flat. Bowel sounds are normal.      Palpations: Abdomen is soft.   Musculoskeletal:         General: No swelling or tenderness. Normal range of motion.      Cervical back: Normal range of motion and neck supple.      Right lower leg: No edema.      Left lower leg: No edema.   Skin:     General: Skin is warm and dry.      Coloration: Skin is not jaundiced or pale.      Findings: No bruising.   Neurological:      Mental Status: He is alert and oriented to person, place, and time. Mental status is at baseline.            Significant Labs: CMP   Recent Labs   Lab 09/02/23  0021 09/02/23  0423 09/02/23  1804 09/03/23  0340    136  136 136 135*   K 4.0 3.9  3.9 4.5 4.4    101  101 100 101   CO2 25 24  24 23 23   * 109  109 149* 99   BUN 39* 37*  37* 35* 34*   CREATININE 2.4* 2.3*  2.3* 2.7* 2.4*   CALCIUM 8.8 9.0  9.0 8.8 9.1   PROT 6.2 5.9*  --  6.2    ALBUMIN 3.2* 3.0*  --  3.2*   BILITOT 1.0 0.9  --  1.0   ALKPHOS 114 122  --  105   AST 14 14  --  13   ALT 14 15  --  14   ANIONGAP 12 11  11 13 11    and CBC   Recent Labs   Lab 09/02/23  0423 09/03/23  0340   WBC 12.11  12.11 10.75   HGB 12.5*  12.5* 12.5*   HCT 38.2*  38.2* 38.8*     252 260       Significant Imaging:       Assessment and Plan:       * Cardiogenic shock  HFrEF due to iCMP  EF 10-15% on echo along with severe MR and mod AS per echo report from prior hospital   initial hemodynamics on  2 mcg/kg/min on arrival  CVP 7  SVO2 57        Plan  - Patient was stopped on dobutamine 9/1/23 afternoon, now having consistently low MAPS to 50-60s. Asymptomatic during these episodes, but obtained hemodyanmics showing:  CVP 8  SVO2 56  CI 2.3    Lactic Acid 2.3.  -  restarted dobutamine  - Consult for RHC after the weekend for potential home dobutamine.  - significant calcium on ascending aortic valve. Will defer CTS evaluation for now. Patient is not on psychiatric medications at home.    Pacemaker  Patient has a pacemaker in placed.  This morning patient's has been tachycardic.    Plan  -have EP interrogate    Moderate aortic stenosis  SeRepeat Echo 8/29    Summary    Tachycardia was present during the study    Left Ventricle: The left ventricle is severely dilated. Normal wall thickness. There is moderate eccentric hypertrophy. Severe global hypokinesis present. There is severely reduced systolic function with a visually estimated ejection fraction of 10 -15%. No LV apical thrombus present. Unable to assess diastolic functionUnable to assess due to poor image quality.    Right Ventricle: Mild right ventricular enlargement. Wall thickness is normal. Right ventricle wall motion  is normal. Systolic function is moderately reduced. TAPSE is 1.30 cm. Right ventricular global longitudinal strain is -9.0 % and is reduced.    Left Atrium: Left atrium is severely dilated.    Right Atrium:  Right atrium is mildly dilated.    Aortic Valve: The aortic valve is a trileaflet valve. There is moderate aortic valve sclerosis. Moderate annular calcification. There is moderate to severe stenosis low flow . Aortic valve area by VTI is 0.91 cm². Aortic valve peak velocity is 2.33 m/s. Mean gradient is 12 mmHg. The dimensionless index is 0.24. Consider Dobutmine stress to differentiate from pseudosevere AS if clinically inidcated.    Mitral Valve: There is mild to moderate regurgitation.    Pulmonary Artery: The estimated pulmonary artery systolic pressure is 65 mmHg.    IVC/SVC: Elevated venous pressure at 15 mmHg     Dobutamine Stress echo:    Summary      A low dose Dobutamine stress echo was performed to evaluate low flow low gradient aortic stenosis.    Stress ECG: Frequent PACs and frequent PVCs.    ECG Conclusion: The ECG portion of the study is negative for ischemia.    Left Ventricle: The left ventricle is severely dilated measuring 7.9 cm. Severe global hypokinesis present. Severely reduced systolic function with a visually estimated ejection fraction of 10 -15%.    Left Ventricle: The left ventricle is severely dilated. LVIDd is 7.9 cm.    Aortic Valve:  There is moderate to severe low flow low gradient aortic stenosis. Aortic valve area by VTI is 0.82 cm². Following infusion of dobutamine, there was no increase in LVOT or aortic VTI, velocity or gradient. The aortic valve area by VTI at 15 mcg/kg/min of dobutamine is 1.1 cm². Peak velocity 2.9 m/s and mean gradient 19 mm Hg.    Post-stress Impression: There is no change is the LV stroke volume with dobutamine consistent with a lack of contractile reserve    Results:  - f/u CT cardiac scoring is negative.        Severe mitral regurgitation  Repeat Echo 8/29    Summary    Tachycardia was present during the study    Left Ventricle: The left ventricle is severely dilated. Normal wall thickness. There is moderate eccentric hypertrophy. Severe  global hypokinesis present. There is severely reduced systolic function with a visually estimated ejection fraction of 10 -15%. No LV apical thrombus present. Unable to assess diastolic functionUnable to assess due to poor image quality.    Right Ventricle: Mild right ventricular enlargement. Wall thickness is normal. Right ventricle wall motion  is normal. Systolic function is moderately reduced. TAPSE is 1.30 cm. Right ventricular global longitudinal strain is -9.0 % and is reduced.    Left Atrium: Left atrium is severely dilated.    Right Atrium: Right atrium is mildly dilated.    Aortic Valve: The aortic valve is a trileaflet valve. There is moderate aortic valve sclerosis. Moderate annular calcification. There is moderate to severe stenosis low flow . Aortic valve area by VTI is 0.91 cm². Aortic valve peak velocity is 2.33 m/s. Mean gradient is 12 mmHg. The dimensionless index is 0.24. Consider Dobutmine stress to differentiate from pseudosevere AS if clinically inidcated.    Mitral Valve: There is mild to moderate regurgitation.    Pulmonary Artery: The estimated pulmonary artery systolic pressure is 65 mmHg.    IVC/SVC: Elevated venous pressure at 15 mmHg         PAM (obstructive sleep apnea)  CPAP ordered     Paroxysmal A-fib  -currently in sinus tach 105  -Takes Eliquis at home for pAF and chronic DVT  -Switch Eliquis to heparin gtt for now    Plan:  - on heparin gtt    PTSD (post-traumatic stress disorder)  Resume psych meds    Chronic deep vein thrombosis (DVT) of right lower extremity  Takes Eliquis at home for pAF and chronic DVT  Switch Eliquis to heparin gtt for now    Gout  -Acute gout attack in 8/2023 [facility] with [facility]  -Uric acid 11.6 at that time    Plan  -continue allopurinol 300 daily (modify dose in AM if needed)  -on colchicine 0.6 mg qd, continue  -was seen by Rheum in [facility], on prednisone taper (will give 5 mg prednisone x 3 more days and then  stop)    Ischemic cardiomyopathy  -CAD s/p CABG x 4 (3/2014 in [place] with LIMA-LAD, SVG-RCA, SVG-PLV, SVG-D2) followed by PCI late same year in 10/2014 with LANA x 3 to RCA and LANA x 3 to left coronary system  -ASA 81 daily and HI statin    Plan  - optimize GDMT upon discharge  - Patient was stopped on dobutamine 9/1/23 afternoon, now having consistently low MAPS to 50-60s. Asymptomatic during these episodes, but obtained hemodyanmics showing:  CVP 8  SVO2 56  CI 2.3    Lactic Acid 2.3.  - restarted dobutamine  - Consult for RHC after the weekend for potential home dobutamine.      VTE Risk Mitigation (From admission, onward)         Ordered     apixaban tablet 5 mg  2 times daily         09/01/23 1139                David Melendrez DO  Cardiology  [facility] - Cardiac Intensive [facility]

## 2023-09-03 NOTE — PLAN OF CARE
No acute events overnight. Pt progressing toward goals.    Problem: Adult Inpatient Plan of Care  Goal: Plan of Care Review  Outcome: Ongoing, Progressing  Goal: Patient-Specific Goal (Individualized)  Outcome: Ongoing, Progressing  Goal: Absence of Hospital-Acquired Illness or Injury  Outcome: Ongoing, Progressing  Goal: Optimal Comfort and Wellbeing  Outcome: Ongoing, Progressing  Goal: Readiness for Transition of Care  Outcome: Ongoing, Progressing     Problem: Infection  Goal: Absence of Infection Signs and Symptoms  Outcome: Ongoing, Progressing     Problem: Impaired Wound Healing  Goal: Optimal Wound Healing  Outcome: Ongoing, Progressing     Problem: Fall Injury Risk  Goal: Absence of Fall and Fall-Related Injury  Outcome: Ongoing, Progressing     Problem: Skin Injury Risk Increased  Goal: Skin Health and Integrity  Outcome: Ongoing, Progressing     Problem: Adjustment to Illness (Heart Failure)  Goal: Optimal Coping  Outcome: Ongoing, Progressing

## 2023-09-03 NOTE — CARE UPDATE
CCU Care Update Note    8PM     Hemodynamics    MAP 70  Hemoglobin 12.5     CVP:  10  SCVO2:  46  Cardiac Output:  3.9  Cardiac Index:  1.7  SVR:  1220    Lactic acid 1.7    I/Os    No lawson in place, last UOP was 175 at 3pm, before 400cc during the day    Intake/Output Summary (Last 24 hours) at 9/2/2023 2005  Last data filed at 9/2/2023 1815  Gross per 24 hour   Intake 1451.48 ml   Output 1250 ml   Net 201.48 ml       Net IO Since Admission: -1,374.16 mL [09/02/23 2005]    Diuretics: None    Continuous Infusions:      DOBUTamine IV infusion (non-titrating) 2.5 mcg/kg/min (09/02/23 1828)       Plan:  - No changes made, continue current plan of care  - Plan discussed with attending     Garret Gross MD  Cardiovascular Disease PGY-IV  [facility]

## 2023-09-03 NOTE — PROGRESS NOTES
Telemetry alarming VT.  Dr. George and Dr. Melendrez at the bedside.  Pt tachycardic at baseline and ventricular paced, but is more tachycardic this morning with HR in the 120s.  At time of alarms, pt denying any dizziness, lightheadedness, blurry vision, CP, or discomfort.  SVO2 obtained and lower than recent values at 31.  CVP 13 this morning, but pt following fluid restriction and continues to diurese well on scheduled PO Torsemide.  BP remains stable.    AICD interrogated at the bedside per Dr. George.  Plan to continue monitoring heart rate/rhythm and BP for now.       Latest Reference Range & Units 09/01/23 20:54 09/02/23 08:42 09/02/23 20:15 09/03/23 08:41   POC SATURATED O2 95 - 100 % 69 (L) 48 (L) 46 (L) 31 (L)   (L): Data is abnormally low              Hemodynamics rechecked per MD orders.  Results as listed below. Dr. George notified.        09/03/23 1015   Invasive Hemodynamic Monitoring   CVP (mean) (S)  11 mmHg   SVO2 (%) (S)  53 %

## 2023-09-03 NOTE — SUBJECTIVE & OBJECTIVE
Interval History:no acute events overnight. Hemodynamics: CVP: 13, Scvo2:31 , CO:3.03 , CI:1.33 , SVR:1798.      Review of Systems   Constitutional: Negative for chills and fever.   HENT:  Negative for congestion.    Cardiovascular:  Negative for chest pain and dyspnea on exertion.   Respiratory:  Negative for cough and shortness of breath.    Musculoskeletal:  Negative for back pain.   Gastrointestinal:  Negative for abdominal pain, diarrhea, nausea and vomiting.   Neurological:  Negative for headaches.     Objective:     Vital Signs (Most Recent):  Temp: 97.6 °F (36.4 °C) (09/03/23 0715)  Pulse: (!) 124 (09/03/23 0845)  Resp: 16 (09/03/23 0845)  BP: 99/74 (09/03/23 0845)  SpO2: 98 % (09/03/23 0845) Vital Signs (24h Range):  Temp:  [97.4 °F (36.3 °C)-98 °F (36.7 °C)] 97.6 °F (36.4 °C)  Pulse:  [100-127] 124  Resp:  [11-33] 16  SpO2:  [94 %-100 %] 98 %  BP: ()/(52-74) 99/74     Weight: 97.9 kg (215 lb 13.3 oz)  Body mass index is 26.27 kg/m².     SpO2: 98 %         Intake/Output Summary (Last 24 hours) at 9/3/2023 0908  Last data filed at 9/3/2023 0715  Gross per 24 hour   Intake 1332.37 ml   Output 1150 ml   Net 182.37 ml       Lines/Drains/Airways       Central Venous Catheter Line  Duration             Percutaneous Central Line Insertion/Assessment - Triple Lumen  08/28/23 1800 Internal Jugular Left 5 days              Peripheral Intravenous Line  Duration                  Peripheral IV - Single Lumen 08/31/23 1755 20 G Right Forearm 2 days                       Physical Exam  Constitutional:       Appearance: Normal appearance. He is not toxic-appearing.   HENT:      Head: Normocephalic and atraumatic.   Eyes:      General: No scleral icterus.  Cardiovascular:      Rate and Rhythm: Regular rhythm. Tachycardia present.      Pulses: Normal pulses.      Heart sounds: Murmur heard.   Pulmonary:      Effort: Pulmonary effort is normal. No respiratory distress.      Breath sounds: Normal breath sounds. No  wheezing or rales.   Abdominal:      General: Abdomen is flat. Bowel sounds are normal.      Palpations: Abdomen is soft.   Musculoskeletal:         General: No swelling or tenderness. Normal range of motion.      Cervical back: Normal range of motion and neck supple.      Right lower leg: No edema.      Left lower leg: No edema.   Skin:     General: Skin is warm and dry.      Coloration: Skin is not jaundiced or pale.      Findings: No bruising.   Neurological:      Mental Status: He is alert and oriented to person, place, and time. Mental status is at baseline.            Significant Labs: CMP   Recent Labs   Lab 09/02/23  0021 09/02/23  0423 09/02/23  1804 09/03/23  0340    136  136 136 135*   K 4.0 3.9  3.9 4.5 4.4    101  101 100 101   CO2 25 24  24 23 23   * 109  109 149* 99   BUN 39* 37*  37* 35* 34*   CREATININE 2.4* 2.3*  2.3* 2.7* 2.4*   CALCIUM 8.8 9.0  9.0 8.8 9.1   PROT 6.2 5.9*  --  6.2   ALBUMIN 3.2* 3.0*  --  3.2*   BILITOT 1.0 0.9  --  1.0   ALKPHOS 114 122  --  105   AST 14 14  --  13   ALT 14 15  --  14   ANIONGAP 12 11  11 13 11    and CBC   Recent Labs   Lab 09/02/23  0423 09/03/23  0340   WBC 12.11  12.11 10.75   HGB 12.5*  12.5* 12.5*   HCT 38.2*  38.2* 38.8*     252 260       Significant Imaging:

## 2023-09-04 NOTE — PLAN OF CARE
Problem: Adult Inpatient Plan of Care  Goal: Plan of Care Review  Outcome: Ongoing, Progressing  Goal: Patient-Specific Goal (Individualized)  Outcome: Ongoing, Progressing  Goal: Absence of Hospital-Acquired Illness or Injury  Outcome: Ongoing, Progressing  Goal: Optimal Comfort and Wellbeing  Outcome: Ongoing, Progressing  Goal: Readiness for Transition of Care  Outcome: Ongoing, Progressing     Problem: Infection  Goal: Absence of Infection Signs and Symptoms  Outcome: Ongoing, Progressing     Problem: Impaired Wound Healing  Goal: Optimal Wound Healing  Outcome: Ongoing, Progressing     Problem: Fall Injury Risk  Goal: Absence of Fall and Fall-Related Injury  Outcome: Ongoing, Progressing     Problem: Skin Injury Risk Increased  Goal: Skin Health and Integrity  Outcome: Ongoing, Progressing     Problem: Adjustment to Illness (Heart Failure)  Goal: Optimal Coping  Outcome: Ongoing, Progressing     Problem: Cardiac Output Decreased (Heart Failure)  Goal: Optimal Cardiac Output  Outcome: Ongoing, Progressing     Problem: Dysrhythmia (Heart Failure)  Goal: Stable Heart Rate and Rhythm  Outcome: Ongoing, Progressing     Problem: Fluid Imbalance (Heart Failure)  Goal: Fluid Balance  Outcome: Ongoing, Progressing     Problem: Functional Ability Impaired (Heart Failure)  Goal: Optimal Functional Ability  Outcome: Ongoing, Progressing     Problem: Oral Intake Inadequate (Heart Failure)  Goal: Optimal Nutrition Intake  Outcome: Ongoing, Progressing     Problem: Respiratory Compromise (Heart Failure)  Goal: Effective Oxygenation and Ventilation  Outcome: Ongoing, Progressing     Problem: Sleep Disordered Breathing (Heart Failure)  Goal: Effective Breathing Pattern During Sleep  Outcome: Ongoing, Progressing

## 2023-09-04 NOTE — SUBJECTIVE & OBJECTIVE
Heriberto Macdonald states [place] doesn't do first dose so pt needs to be set up and an infusion center     Pls call her back Informed Dr. Lou Snider. About call. I was informed this call has been handled. Interval History: No acute events overnight, afebrile, hemodynamically stable.  Hemodynamics: CVP:10 , Scvo2:45 , CO:4.22 , CI:1.85 , SVR:1326.    Review of Systems   Constitutional: Negative for chills and fever.   HENT:  Negative for congestion.    Cardiovascular:  Negative for chest pain and dyspnea on exertion.   Respiratory:  Negative for cough and shortness of breath.    Musculoskeletal:  Negative for back pain.   Gastrointestinal:  Negative for abdominal pain, diarrhea, nausea and vomiting.   Neurological:  Negative for headaches.     Objective:     Vital Signs (Most Recent):  Temp: 97.2 °F (36.2 °C) (09/04/23 0305)  Pulse: (!) 116 (09/04/23 0826)  Resp: (!) 32 (09/04/23 0826)  BP: 123/69 (09/04/23 0800)  SpO2: 96 % (09/04/23 0826) Vital Signs (24h Range):  Temp:  [97.2 °F (36.2 °C)-98.3 °F (36.8 °C)] 97.2 °F (36.2 °C)  Pulse:  [102-119] 116  Resp:  [11-49] 32  SpO2:  [92 %-100 %] 96 %  BP: ()/() 123/69     Weight: 97.9 kg (215 lb 13.3 oz)  Body mass index is 26.27 kg/m².     SpO2: 96 %         Intake/Output Summary (Last 24 hours) at 9/4/2023 0855  Last data filed at 9/4/2023 0800  Gross per 24 hour   Intake 1348.93 ml   Output 925 ml   Net 423.93 ml       Lines/Drains/Airways       Central Venous Catheter Line  Duration             Percutaneous Central Line Insertion/Assessment - Triple Lumen  08/28/23 1800 Internal Jugular Left 6 days              Peripheral Intravenous Line  Duration                  Peripheral IV - Single Lumen 08/31/23 1755 20 G Right Forearm 3 days                       Physical Exam  Constitutional:       Appearance: Normal appearance. He is not toxic-appearing.   HENT:      Head: Normocephalic and atraumatic.   Eyes:      General: No scleral icterus.  Cardiovascular:      Rate and Rhythm: Regular rhythm. Tachycardia present.      Pulses: Normal pulses.      Heart sounds: Murmur heard.   Pulmonary:      Effort: Pulmonary effort is normal. No respiratory distress.       Leobardo Montgomery called back     Would like to schedule pt to have medication at home and his NP wife will administer it     Pls call to approve Mikey Goldberg called back asking for a response as soon as possible as patient lives over an hour from infusion center Pls call Elías Gunter w/[facility]   '  Pt told her there was going to be a change w/medication         Best number to reach her is [phone] Breath sounds: Normal breath sounds. No wheezing or rales.   Abdominal:      General: Abdomen is flat. Bowel sounds are normal.      Palpations: Abdomen is soft.   Musculoskeletal:         General: No swelling or tenderness. Normal range of motion.      Cervical back: Normal range of motion and neck supple.      Right lower leg: No edema.      Left lower leg: No edema.   Skin:     General: Skin is warm and dry.      Coloration: Skin is not jaundiced or pale.      Findings: No bruising.   Neurological:      Mental Status: He is alert and oriented to person, place, and time. Mental status is at baseline.            Significant Labs: CMP   Recent Labs   Lab 09/03/23  0340 09/03/23  1018 09/04/23  0242   * 133* 134*   K 4.4 4.4 4.5    99 100   CO2 23 24 24   GLU 99 164* 106   BUN 34* 32* 36*   CREATININE 2.4* 2.0* 2.0*   CALCIUM 9.1 9.3 8.8   PROT 6.2  --  6.2   ALBUMIN 3.2*  --  3.2*   BILITOT 1.0  --  1.1*   ALKPHOS 105  --  96   AST 13  --  13   ALT 14  --  15   ANIONGAP 11 10 10    and CBC   Recent Labs   Lab 09/03/23  0340 09/04/23  0242 09/04/23  0826   WBC 10.75 10.46  --    HGB 12.5* 12.0*  --    HCT 38.8* 37.4* 41    244  --        Significant Imaging:      percutaneous

## 2023-09-04 NOTE — NURSING
Nurses Note -- 4 Eyes      9/4/2023   7:00 AM      Skin assessed during: Daily Assessment      [] No Altered Skin Integrity Present    []Prevention Measures Documented      [x] Yes- Altered Skin Integrity Present or Discovered   [x] LDA Added if Not in Epic (Describe Wound)   [] New Altered Skin Integrity was Present on Admit and Documented in LDA   [] Wound Image Taken    Wound Care Consulted? No    Attending Nurse:  Joelle Menchaca RN/Staff Member:  Angel Guerra RN

## 2023-09-04 NOTE — ASSESSMENT & PLAN NOTE
HFrEF due to iCMP  EF 10-15% on echo along with severe MR and mod AS per echo report from prior hospital   initial hemodynamics on  2 mcg/kg/min on arrival CVP 7 SVO2 57    - Patient was stopped on dobutamine 9/1/23 afternoon, now having consistently low MAPS to 50-60s. Asymptomatic during these episodes, but obtained hemodyanmics showing:  CVP 8 SVO2 56 CI 2.3  Lactic Acid 2.3.    Plan:  -  restarted dobutamine 2.5  - Consult for RHC after the weekend for potential home dobutamine.  - significant calcium on ascending aortic valve. Will defer CTS evaluation for now. Patient is not on psychiatric medications at home.

## 2023-09-04 NOTE — PROGRESS NOTES
[facility]  Cardiology  Progress Note    Patient Name: Ishmael Thrasher  MRN: [id number]  Admission Date: 8/28/2023  Hospital Length of Stay: 7 days  Code Status: Full Code   Attending Physician: Ravindra Mock MD   Primary Care Physician: Kirk, [facility]  Expected Discharge Date: 9/6/2023  Principal Problem:Cardiogenic shock    Subjective:     Hospital Course:   Patient is getting a left heart catheterization on 08/29/2023.  Repeat echo reveals severely reduced systolic function with estimated ejection fraction of 10 to 15%.  Aortic valve:  Moderate to severe stenosis low-flow.  In the mean stress echo was performed to assess aortic valve which showed Aortic Valve:  There is moderate to severe low flow low gradient aortic stenosis. There is no change is the LV stroke volume with dobutamine consistent with a lack of contractile reserve.  No valvular interventions at this time. RHC done on 9/4/23. Patient is to discharged home with home health.      Interval History: No acute events overnight, afebrile, hemodynamically stable.  Hemodynamics: CVP:10 , Scvo2:45 , CO:4.22 , CI:1.85 , SVR:1326.    Review of Systems   Constitutional: Negative for chills and fever.   HENT:  Negative for congestion.    Cardiovascular:  Negative for chest pain and dyspnea on exertion.   Respiratory:  Negative for cough and shortness of breath.    Musculoskeletal:  Negative for back pain.   Gastrointestinal:  Negative for abdominal pain, diarrhea, nausea and vomiting.   Neurological:  Negative for headaches.     Objective:     Vital Signs (Most Recent):  Temp: 97.2 °F (36.2 °C) (09/04/23 0305)  Pulse: (!) 116 (09/04/23 0826)  Resp: (!) 32 (09/04/23 0826)  BP: 123/69 (09/04/23 0800)  SpO2: 96 % (09/04/23 0826) Vital Signs (24h Range):  Temp:  [97.2 °F (36.2 °C)-98.3 °F (36.8 °C)] 97.2 °F (36.2 °C)  Pulse:  [102-119] 116  Resp:  [11-49] 32  SpO2:  [92 %-100 %] 96 %  BP: ()/() 123/69      Weight: 97.9 kg (215 lb 13.3 oz)  Body mass index is 26.27 kg/m².     SpO2: 96 %         Intake/Output Summary (Last 24 hours) at 9/4/2023 0855  Last data filed at 9/4/2023 0800  Gross per 24 hour   Intake 1348.93 ml   Output 925 ml   Net 423.93 ml       Lines/Drains/Airways       Central Venous Catheter Line  Duration             Percutaneous Central Line Insertion/Assessment - Triple Lumen  08/28/23 1800 Internal Jugular Left 6 days              Peripheral Intravenous Line  Duration                  Peripheral IV - Single Lumen 08/31/23 1755 20 G Right Forearm 3 days                       Physical Exam  Constitutional:       Appearance: Normal appearance. He is not toxic-appearing.   HENT:      Head: Normocephalic and atraumatic.   Eyes:      General: No scleral icterus.  Cardiovascular:      Rate and Rhythm: Regular rhythm. Tachycardia present.      Pulses: Normal pulses.      Heart sounds: Murmur heard.   Pulmonary:      Effort: Pulmonary effort is normal. No respiratory distress.      Breath sounds: Normal breath sounds. No wheezing or rales.   Abdominal:      General: Abdomen is flat. Bowel sounds are normal.      Palpations: Abdomen is soft.   Musculoskeletal:         General: No swelling or tenderness. Normal range of motion.      Cervical back: Normal range of motion and neck supple.      Right lower leg: No edema.      Left lower leg: No edema.   Skin:     General: Skin is warm and dry.      Coloration: Skin is not jaundiced or pale.      Findings: No bruising.   Neurological:      Mental Status: He is alert and oriented to person, place, and time. Mental status is at baseline.            Significant Labs: CMP   Recent Labs   Lab 09/03/23  0340 09/03/23  1018 09/04/23  0242   * 133* 134*   K 4.4 4.4 4.5    99 100   CO2 23 24 24   GLU 99 164* 106   BUN 34* 32* 36*   CREATININE 2.4* 2.0* 2.0*   CALCIUM 9.1 9.3 8.8   PROT 6.2  --  6.2   ALBUMIN 3.2*  --  3.2*   BILITOT 1.0  --  1.1*   ALKPHOS  105  --  96   AST 13  --  13   ALT 14  --  15   ANIONGAP 11 10 10    and CBC   Recent Labs   Lab 09/03/23  0340 09/04/23  0242 09/04/23  0826   WBC 10.75 10.46  --    HGB 12.5* 12.0*  --    HCT 38.8* 37.4* 41    244  --        Significant Imaging:       Assessment and Plan:       * Cardiogenic shock  HFrEF due to iCMP  EF 10-15% on echo along with severe MR and mod AS per echo report from prior hospital   initial hemodynamics on  2 mcg/kg/min on arrival CVP 7 SVO2 57    - Patient was stopped on dobutamine 9/1/23 afternoon, now having consistently low MAPS to 50-60s. Asymptomatic during these episodes, but obtained hemodyanmics showing:  CVP 8 SVO2 56 CI 2.3  Lactic Acid 2.3.    Plan:  -  restarted dobutamine 2.5  - Consult for RHC after the weekend for potential home dobutamine.  - significant calcium on ascending aortic valve. Will defer CTS evaluation for now. Patient is not on psychiatric medications at home.    Pacemaker  Patient has a pacemaker in placed.  This morning patient's has been tachycardic.  Stable     Moderate aortic stenosis  SeRepeat Echo 8/29    Summary    Tachycardia was present during the study    Left Ventricle: The left ventricle is severely dilated. Normal wall thickness. There is moderate eccentric hypertrophy. Severe global hypokinesis present. There is severely reduced systolic function with a visually estimated ejection fraction of 10 -15%. No LV apical thrombus present. Unable to assess diastolic functionUnable to assess due to poor image quality.    Right Ventricle: Mild right ventricular enlargement. Wall thickness is normal. Right ventricle wall motion  is normal. Systolic function is moderately reduced. TAPSE is 1.30 cm. Right ventricular global longitudinal strain is -9.0 % and is reduced.    Left Atrium: Left atrium is severely dilated.    Right Atrium: Right atrium is mildly dilated.    Aortic Valve: The aortic valve is a trileaflet valve. There is moderate  aortic valve sclerosis. Moderate annular calcification. There is moderate to severe stenosis low flow . Aortic valve area by VTI is 0.91 cm². Aortic valve peak velocity is 2.33 m/s. Mean gradient is 12 mmHg. The dimensionless index is 0.24. Consider Dobutmine stress to differentiate from pseudosevere AS if clinically inidcated.    Mitral Valve: There is mild to moderate regurgitation.    Pulmonary Artery: The estimated pulmonary artery systolic pressure is 65 mmHg.    IVC/SVC: Elevated venous pressure at 15 mmHg     Dobutamine Stress echo:    Summary      A low dose Dobutamine stress echo was performed to evaluate low flow low gradient aortic stenosis.    Stress ECG: Frequent PACs and frequent PVCs.    ECG Conclusion: The ECG portion of the study is negative for ischemia.    Left Ventricle: The left ventricle is severely dilated measuring 7.9 cm. Severe global hypokinesis present. Severely reduced systolic function with a visually estimated ejection fraction of 10 -15%.    Left Ventricle: The left ventricle is severely dilated. LVIDd is 7.9 cm.    Aortic Valve:  There is moderate to severe low flow low gradient aortic stenosis. Aortic valve area by VTI is 0.82 cm². Following infusion of dobutamine, there was no increase in LVOT or aortic VTI, velocity or gradient. The aortic valve area by VTI at 15 mcg/kg/min of dobutamine is 1.1 cm². Peak velocity 2.9 m/s and mean gradient 19 mm Hg.    Post-stress Impression: There is no change is the LV stroke volume with dobutamine consistent with a lack of contractile reserve    Results:  -CT cardiac scoring Aortic valve calcium score is 1484.    Plan:  - No interventions       Severe mitral regurgitation  Repeat Echo 8/29    Summary    Tachycardia was present during the study    Left Ventricle: The left ventricle is severely dilated. Normal wall thickness. There is moderate eccentric hypertrophy. Severe global hypokinesis present. There is severely reduced systolic  function with a visually estimated ejection fraction of 10 -15%. No LV apical thrombus present. Unable to assess diastolic functionUnable to assess due to poor image quality.    Right Ventricle: Mild right ventricular enlargement. Wall thickness is normal. Right ventricle wall motion  is normal. Systolic function is moderately reduced. TAPSE is 1.30 cm. Right ventricular global longitudinal strain is -9.0 % and is reduced.    Left Atrium: Left atrium is severely dilated.    Right Atrium: Right atrium is mildly dilated.    Aortic Valve: The aortic valve is a trileaflet valve. There is moderate aortic valve sclerosis. Moderate annular calcification. There is moderate to severe stenosis low flow . Aortic valve area by VTI is 0.91 cm². Aortic valve peak velocity is 2.33 m/s. Mean gradient is 12 mmHg. The dimensionless index is 0.24. Consider Dobutmine stress to differentiate from pseudosevere AS if clinically inidcated.    Mitral Valve: There is mild to moderate regurgitation.    Pulmonary Artery: The estimated pulmonary artery systolic pressure is 65 mmHg.    IVC/SVC: Elevated venous pressure at 15 mmHg         PAM (obstructive sleep apnea)  CPAP ordered     Paroxysmal A-fib  -currently in sinus tach 105  -Takes Eliquis at home for pAF and chronic DVT  -Switch Eliquis to heparin gtt for now    Plan:  - on heparin gtt    PTSD (post-traumatic stress disorder)  Resume psych meds    Chronic deep vein thrombosis (DVT) of right lower extremity  Takes Eliquis at home for pAF and chronic DVT  Switch Eliquis to heparin gtt for now    Gout  -Acute gout attack in 8/[facility]  -Uric acid 11.6 at that time    Plan  -continue allopurinol 300 daily (modify dose in AM if needed)  -on colchicine 0.6 mg qd, continue  -was seen by Rheum in [facility], on prednisone taper (will give 5 mg prednisone x 3 more days and then stop)    Ischemic cardiomyopathy  -CAD s/p CABG x 4 (3/2014 in [place] with  LIMA-LAD, SVG-RCA, SVG-PLV, SVG-D2) followed by PCI late same year in 10/2014 with LANA x 3 to RCA and LANA x 3 to left coronary system  -ASA 81 daily and HI statin    Plan  - optimize GDMT upon discharge  - Patient was stopped on dobutamine 9/1/23 afternoon, now having consistently low MAPS to 50-60s. Asymptomatic during these episodes, but obtained hemodyanmics showing:  CVP 8  SVO2 56  CI 2.3    Lactic Acid 2.3.    Plan:  - restarted dobutamine 2.5   - Consult for RHC after the weekend for potential home dobutamine.      VTE Risk Mitigation (From admission, onward)         Ordered     apixaban tablet 5 mg  2 times daily         09/01/23 1133                David Melendrez DO  Cardiology  [facility]

## 2023-09-04 NOTE — ASSESSMENT & PLAN NOTE
-CAD s/p CABG x 4 (3/2014 in [place] with LIMA-LAD, SVG-RCA, SVG-PLV, SVG-D2) followed by PCI late same year in 10/2014 with LANA x 3 to RCA and LANA x 3 to left coronary system  -ASA 81 daily and HI statin    Plan  - optimize GDMT upon discharge  - Patient was stopped on dobutamine 9/1/23 afternoon, now having consistently low MAPS to 50-60s. Asymptomatic during these episodes, but obtained hemodyanmics showing:  CVP 8  SVO2 56  CI 2.3    Lactic Acid 2.3.    Plan:  - restarted dobutamine 2.5   - Consult for RHC after the weekend for potential home dobutamine.

## 2023-09-04 NOTE — ASSESSMENT & PLAN NOTE
SeRepeat Echo 8/29    Summary    Tachycardia was present during the study    Left Ventricle: The left ventricle is severely dilated. Normal wall thickness. There is moderate eccentric hypertrophy. Severe global hypokinesis present. There is severely reduced systolic function with a visually estimated ejection fraction of 10 -15%. No LV apical thrombus present. Unable to assess diastolic functionUnable to assess due to poor image quality.    Right Ventricle: Mild right ventricular enlargement. Wall thickness is normal. Right ventricle wall motion  is normal. Systolic function is moderately reduced. TAPSE is 1.30 cm. Right ventricular global longitudinal strain is -9.0 % and is reduced.    Left Atrium: Left atrium is severely dilated.    Right Atrium: Right atrium is mildly dilated.    Aortic Valve: The aortic valve is a trileaflet valve. There is moderate aortic valve sclerosis. Moderate annular calcification. There is moderate to severe stenosis low flow . Aortic valve area by VTI is 0.91 cm². Aortic valve peak velocity is 2.33 m/s. Mean gradient is 12 mmHg. The dimensionless index is 0.24. Consider Dobutmine stress to differentiate from pseudosevere AS if clinically inidcated.    Mitral Valve: There is mild to moderate regurgitation.    Pulmonary Artery: The estimated pulmonary artery systolic pressure is 65 mmHg.    IVC/SVC: Elevated venous pressure at 15 mmHg     Dobutamine Stress echo:    Summary      A low dose Dobutamine stress echo was performed to evaluate low flow low gradient aortic stenosis.    Stress ECG: Frequent PACs and frequent PVCs.    ECG Conclusion: The ECG portion of the study is negative for ischemia.    Left Ventricle: The left ventricle is severely dilated measuring 7.9 cm. Severe global hypokinesis present. Severely reduced systolic function with a visually estimated ejection fraction of 10 -15%.    Left Ventricle: The left ventricle is severely dilated. LVIDd is 7.9 cm.     Aortic Valve:  There is moderate to severe low flow low gradient aortic stenosis. Aortic valve area by VTI is 0.82 cm². Following infusion of dobutamine, there was no increase in LVOT or aortic VTI, velocity or gradient. The aortic valve area by VTI at 15 mcg/kg/min of dobutamine is 1.1 cm². Peak velocity 2.9 m/s and mean gradient 19 mm Hg.    Post-stress Impression: There is no change is the LV stroke volume with dobutamine consistent with a lack of contractile reserve    Results:  -CT cardiac scoring Aortic valve calcium score is 1484.    Plan:  - No interventions

## 2023-09-05 NOTE — INTERVAL H&P NOTE
The patient has been examined and the H&P has been reviewed:    I concur with the findings and no changes have occurred since H&P was written.    Procedure risks, benefits and alternative options discussed and understood by patient/family.          Active Hospital Problems    Diagnosis  POA    *Cardiogenic shock [R57.0]  Unknown    Pacemaker [Z95.0]  Yes    Paroxysmal A-fib [I48.0]  Unknown    PAM (obstructive sleep apnea) [G47.33]  Unknown    Severe mitral regurgitation [I34.0]  Unknown    Moderate aortic stenosis [I35.0]  Unknown    PTSD (post-traumatic stress disorder) [F43.10]  Yes    Chronic deep vein thrombosis (DVT) of right lower extremity [I82.501]  Yes    Gout [M10.9]  Yes    Ischemic cardiomyopathy [I25.5]  Yes      Resolved Hospital Problems    Diagnosis Date Resolved POA    CAD (coronary artery disease) [I25.10] 09/01/2023 Yes

## 2023-09-05 NOTE — ASSESSMENT & PLAN NOTE
I have reviewed discharge instructions with the patient. The patient verbalized understanding. Patient to follow up with PMD as referred and RTED with any changes/concerns. Patient ambulatory from ED in NAD with Rx x 1. Patient advised that they received medications (either in ED or by Rx) which could cause them to be somnolent. Patient advised that they shouldn't drive or operate machinery and should use caution to avoid falls while under the effects (8-12 hours after last dosage) of said medicine. SeRepeat Echo 8/29    Summary    Tachycardia was present during the study    Left Ventricle: The left ventricle is severely dilated. Normal wall thickness. There is moderate eccentric hypertrophy. Severe global hypokinesis present. There is severely reduced systolic function with a visually estimated ejection fraction of 10 -15%. No LV apical thrombus present. Unable to assess diastolic functionUnable to assess due to poor image quality.    Right Ventricle: Mild right ventricular enlargement. Wall thickness is normal. Right ventricle wall motion  is normal. Systolic function is moderately reduced. TAPSE is 1.30 cm. Right ventricular global longitudinal strain is -9.0 % and is reduced.    Left Atrium: Left atrium is severely dilated.    Right Atrium: Right atrium is mildly dilated.    Aortic Valve: The aortic valve is a trileaflet valve. There is moderate aortic valve sclerosis. Moderate annular calcification. There is moderate to severe stenosis low flow . Aortic valve area by VTI is 0.91 cm². Aortic valve peak velocity is 2.33 m/s. Mean gradient is 12 mmHg. The dimensionless index is 0.24. Consider Dobutmine stress to differentiate from pseudosevere AS if clinically inidcated.    Mitral Valve: There is mild to moderate regurgitation.    Pulmonary Artery: The estimated pulmonary artery systolic pressure is 65 mmHg.    IVC/SVC: Elevated venous pressure at 15 mmHg     Dobutamine Stress echo:    Summary      A low dose Dobutamine stress echo was performed to evaluate low flow low gradient aortic stenosis.    Stress ECG: Frequent PACs and frequent PVCs.    ECG Conclusion: The ECG portion of the study is negative for ischemia.    Left Ventricle: The left ventricle is severely dilated measuring 7.9 cm. Severe global hypokinesis present. Severely reduced systolic function with a visually estimated ejection fraction of 10 -15%.    Left Ventricle: The left ventricle is severely dilated. LVIDd is 7.9 cm.     Aortic Valve:  There is moderate to severe low flow low gradient aortic stenosis. Aortic valve area by VTI is 0.82 cm². Following infusion of dobutamine, there was no increase in LVOT or aortic VTI, velocity or gradient. The aortic valve area by VTI at 15 mcg/kg/min of dobutamine is 1.1 cm². Peak velocity 2.9 m/s and mean gradient 19 mm Hg.    Post-stress Impression: There is no change is the LV stroke volume with dobutamine consistent with a lack of contractile reserve    Results:  -CT cardiac scoring Aortic valve calcium score is 1484.    Plan:  - No interventions

## 2023-09-05 NOTE — ASSESSMENT & PLAN NOTE
HFrEF due to iCMP  EF 10-15% on echo along with severe MR and mod AS per echo report from prior hospital   initial hemodynamics on  2 mcg/kg/min on arrival CVP 7 SVO2 57    - Patient was stopped on dobutamine 9/1/23 afternoon, now having consistently low MAPS to 50-60s. Asymptomatic during these episodes, but obtained hemodyanmics showing:  CVP 8 SVO2 56 CI 2.3  Lactic Acid 2.3.    Plan:  -  restarted dobutamine 2.5  - Consult for [facility]HC 9/5 for potential home dobutamine.  - significant calcium on ascending aortic valve. Will defer CTS evaluation for now. Patient is not on psychiatric medications at home.

## 2023-09-05 NOTE — PLAN OF CARE
CICU DAILY GOALS       A: Awake    RASS: Goal - RASS Goal: 0-->alert and calm  Actual - RASS (Diaz Agitation-Sedation Scale): alert and calm   Restraint necessity:    B: Breath   SBT: Not intubated   C: Coordinate A & B, analgesics/sedatives   Pain: managed    SAT: Not intubated  D: Delirium   CAM-ICU: Overall CAM-ICU: Negative  E: Early(intubated/ Progressive (non-intubated) Mobility   MOVE Screen: Pass   Activity: Activity Management: Rolling - L1  FAS: Feeding/Nutrition   Diet order: Diet/Nutrition Received: low saturated fat/low cholesterol,   Fluid restriction: Fluid Requirement: 1500mL FR  T: Thrombus   DVT prophylaxis: VTE Required Core Measure: Pharmacological prophylaxis initiated/maintained  H: HOB Elevation   Head of Bed (HOB) Positioning: HOB at 30-45 degrees  U: Ulcer Prophylaxis   GI: no  G: Glucose control   managed    S: Skin   Bundle compliance: yes   Bathing/Skin Care: bath, complete, linen changed Date: 9/4/23  B: Bowel Function   no issues   I: Indwelling Catheters   Fontenot necessity: No    CVC necessity: Yes  D: De-escalation Antibx   No  Plan for the day   Go for right heart cath. Patient has been NPO since midnight. Continue to monitor hemodynamics.  CVPs were 12,10. Sv02 was 53.   Family/Goals of care/Code Status   Code Status: Full Code     No acute events throughout day, VS and assessment per flow sheet, patient progressing towards goals as tolerated, plan of care reviewed with Ishmael Thrasher and family, all concerns addressed, will continue to monitor.

## 2023-09-05 NOTE — ASSESSMENT & PLAN NOTE
Repeat Echo 8/29    Summary    Tachycardia was present during the study    Left Ventricle: The left ventricle is severely dilated. Normal wall thickness. There is moderate eccentric hypertrophy. Severe global hypokinesis present. There is severely reduced systolic function with a visually estimated ejection fraction of 10 -15%. No LV apical thrombus present. Unable to assess diastolic functionUnable to assess due to poor image quality.    Right Ventricle: Mild right ventricular enlargement. Wall thickness is normal. Right ventricle wall motion  is normal. Systolic function is moderately reduced. TAPSE is 1.30 cm. Right ventricular global longitudinal strain is -9.0 % and is reduced.    Left Atrium: Left atrium is severely dilated.    Right Atrium: Right atrium is mildly dilated.    Aortic Valve: The aortic valve is a trileaflet valve. There is moderate aortic valve sclerosis. Moderate annular calcification. There is moderate to severe stenosis low flow . Aortic valve area by VTI is 0.91 cm². Aortic valve peak velocity is 2.33 m/s. Mean gradient is 12 mmHg. The dimensionless index is 0.24. Consider Dobutmine stress to differentiate from pseudosevere AS if clinically inidcated.    Mitral Valve: There is mild to moderate regurgitation.    Pulmonary Artery: The estimated pulmonary artery systolic pressure is 65 mmHg.    IVC/SVC: Elevated venous pressure at 15 mmHg     Plan:  - no interventions

## 2023-09-05 NOTE — PT/OT/SLP PROGRESS
Physical Therapy Treatment    Patient Name:  Ishmael Thrasher   MRN:  [id number]    Recommendations:     Discharge Recommendations: other (see comments)  Discharge Equipment Recommendations: none  Barriers to discharge: None    Assessment:     Ishmael Thrasher is a 73 y.o. male admitted with a medical diagnosis of Cardiogenic shock.  He presents with the following impairments/functional limitations: impaired balance, decreased safety awareness, impaired endurance, impaired functional mobility, gait instability, decreased lower extremity function pt tolerated treatment better and will benefit from cont skilled PT 3x/wk to progress physically. Pain in B knees will limit functional mobility. Pt will be able to discharge home with further PT needs when medically stable.     Rehab Prognosis: Good; patient would benefit from acute skilled PT services to address these deficits and reach maximum level of function.    Recent Surgery: Procedure(s) (LRB):  ANGIOGRAM, CORONARY ARTERY (N/A)  Bypass graft study 7 Days Post-Op    Plan:     During this hospitalization, patient to be seen 3 x/week to address the identified rehab impairments via gait training, therapeutic activities, therapeutic exercises and progress toward the following goals:    Plan of Care Expires:  09/27/23    Subjective     Chief Complaint: pt c/o pain in B knees with gait.   Patient/Family Comments/goals: to go home.   Pain/Comfort:  Pain Rating 1: 10/10 (B knees with gait)  Pain Addressed 1: Distraction  Pain Rating Post-Intervention 1: 10/10 (B knees)      Objective:     Communicated with nurse  prior to session.  Patient found supine with telemetry, pulse ox (continuous), central line (CVP) upon PT entry to room.     General Precautions: Standard, fall  Orthopedic Precautions:    Braces:    Respiratory Status: Room air     Functional Mobility:  Bed Mobility:   pt needed verbal cues for hand placement and sequencing for functional mobility.   Rolling Left:   supervision  Supine to Sit: supervision    Transfers:     Sit to Stand:  contact guard assistance with hand-held assist from bed and min assist from toilet.     Gait: pt received gait training ~ 14 ft with HHA then 44 ft (58 ft total) with RW and CGA. Pt sat to use toilet between distances gait trained.     Balance: pt stood at sink with SBA and washed hands.       AM-PAC 6 CLICK MOBILITY  Turning over in bed (including adjusting bedclothes, sheets and blankets)?: 3  Sitting down on and standing up from a chair with arms (e.g., wheelchair, bedside commode, etc.): 3  Moving from lying on back to sitting on the side of the bed?: 3  Moving to and from a bed to a chair (including a wheelchair)?: 3  Need to walk in hospital room?: 3  Climbing 3-5 steps with a railing?: 1  Basic Mobility Total Score: 16       Treatment & Education:  Pt received verbal instructions in PT POC and verbally expressed understanding of such.     Patient left up in chair with all lines intact, call button in reach, and RN  present..    GOALS:   Multidisciplinary Problems       Physical Therapy Goals          Problem: Physical Therapy    Goal Priority Disciplines Outcome Goal Variances Interventions   Physical Therapy Goal     PT, PT/OT Ongoing, Progressing     Description: Goals to be met by: 23     Patient will increase functional independence with mobility by performin. Supine to sit with Modified [place]  2. Sit to stand transfer with Stand-by Assistance with AD if needed.   3. Gait  x 150 feet with Stand-by Assistance using AD if needed. .   4. Lower extremity exercise program x10 reps per handout, with assistance as needed to increase strength to increase functional mobility.                          Time Tracking:     PT Received On: 23  PT Start Time: 954     PT Stop Time:   PT Total Time (min): 16 min     Billable Minutes: Gait Training 16 min     Treatment Type: Treatment  PT/PTA: PT     Number of PTA visits  since last PT visit: 0     09/05/2023

## 2023-09-05 NOTE — SUBJECTIVE & OBJECTIVE
Interval History:No acute events overnight, afebrile, hemodynamically stable.   Hemodynamics: CVP:12 , Scvo2:55 , CO:7.51 , CI:3.29 , SVR:586.      Review of Systems   Constitutional: Negative for chills and fever.   HENT:  Negative for congestion.    Cardiovascular:  Negative for chest pain and dyspnea on exertion.   Respiratory:  Negative for cough and shortness of breath.    Musculoskeletal:  Negative for back pain.   Gastrointestinal:  Negative for abdominal pain, diarrhea, nausea and vomiting.   Neurological:  Negative for headaches.     Objective:     Vital Signs (Most Recent):  Temp: 98 °F (36.7 °C) (09/05/23 0800)  Pulse: (!) 111 (09/05/23 1200)  Resp: 18 (09/05/23 1200)  BP: (!) 85/65 (09/05/23 1200)  SpO2: 97 % (09/05/23 1200) Vital Signs (24h Range):  Temp:  [97.3 °F (36.3 °C)-98.1 °F (36.7 °C)] 98 °F (36.7 °C)  Pulse:  [105-118] 111  Resp:  [14-36] 18  SpO2:  [92 %-100 %] 97 %  BP: ()/(42-74) 85/65     Weight: 97.9 kg (215 lb 13.3 oz)  Body mass index is 26.27 kg/m².     SpO2: 97 %         Intake/Output Summary (Last 24 hours) at 9/5/2023 1243  Last data filed at 9/5/2023 1200  Gross per 24 hour   Intake 1449.36 ml   Output 1150 ml   Net 299.36 ml       Lines/Drains/Airways       Central Venous Catheter Line  Duration             Percutaneous Central Line Insertion/Assessment - Triple Lumen  08/28/23 1800 Internal Jugular Left 7 days              Peripheral Intravenous Line  Duration                  Peripheral IV - Single Lumen 09/04/23 1520 Anterior;Right Forearm <1 day                       Physical Exam  Constitutional:       Appearance: Normal appearance. He is not toxic-appearing.   HENT:      Head: Normocephalic and atraumatic.   Eyes:      General: No scleral icterus.  Cardiovascular:      Rate and Rhythm: Regular rhythm. Tachycardia present.      Pulses: Normal pulses.      Heart sounds: Murmur heard.   Pulmonary:      Effort: Pulmonary effort is normal. No respiratory distress.       Breath sounds: Normal breath sounds. No wheezing or rales.   Abdominal:      General: Abdomen is flat. Bowel sounds are normal.      Palpations: Abdomen is soft.   Musculoskeletal:         General: No swelling or tenderness. Normal range of motion.      Cervical back: Normal range of motion and neck supple.      Right lower leg: No edema.      Left lower leg: No edema.   Skin:     General: Skin is warm and dry.      Coloration: Skin is not jaundiced or pale.      Findings: No bruising.   Neurological:      Mental Status: He is alert and oriented to person, place, and time. Mental status is at baseline.            Significant Labs: CMP   Recent Labs   Lab 09/04/23 0242 09/05/23 0321   * 135*   K 4.5 4.6    102   CO2 24 22*    120*   BUN 36* 39*   CREATININE 2.0* 1.9*   CALCIUM 8.8 8.7   PROT 6.2 6.0   ALBUMIN 3.2* 3.1*   BILITOT 1.1* 0.9   ALKPHOS 96 116   AST 13 13   ALT 15 14   ANIONGAP 10 11    and CBC   Recent Labs   Lab 09/04/23 0242 09/04/23  0826 09/05/23 0321   WBC 10.46  --  10.02   HGB 12.0*  --  11.7*   HCT 37.4*   < > 35.5*     --  219    < > = values in this interval not displayed.       Significant Imaging:

## 2023-09-05 NOTE — OP NOTE
Patient was brought to cath lab, draped, and prepped in the usual sterile fashion. Right IJ was accessed via modified seldinger technique with ultrasound guidance and guidewire was introduced into the IJ. Sequential dilation with micropuncture kit and sheath was performed. Hartley Lilian catheter was introduced via the sheath. Measurements were obtained, and sheath was removed. Patient tolerated the procedure well and discharged in stable condition.

## 2023-09-05 NOTE — ASSESSMENT & PLAN NOTE
-CAD s/p CABG x 4 (3/2014 in [place] with LIMA-LAD, SVG-RCA, SVG-PLV, SVG-D2) followed by PCI late same year in 10/2014 with LANA x 3 to RCA and LANA x 3 to left coronary system  -ASA 81 daily and HI statin    Plan  - optimize GDMT upon discharge  - Patient was stopped on dobutamine 9/1/23 afternoon, now having consistently low MAPS to 50-60s. Asymptomatic during these episodes, but obtained hemodyanmics showing:  CVP 8  SVO2 56  CI 2.3    Lactic Acid 2.3.    Plan:  - dobutamine 2.5   - Consult for RHC 9/5

## 2023-09-05 NOTE — PROGRESS NOTES
[facility] - Cardiac [facility]ensive [facility]  Cardiology  Progress Note    Patient Name: Ishmael Thrasher  MRN: [id number]  Admission Date: 8/28/2023  Hospital Length of Stay: 8 days  Code Status: Full Code   Attending Physician: Ravindra Mock MD   Primary Care Physician: Kirk, [facility] [place]  Expected Discharge Date: 9/6/2023  Principal Problem:Cardiogenic shock    Subjective:     Hospital Course:   Patient is getting a left heart catheterization on 08/29/2023.  Repeat echo reveals severely reduced systolic function with estimated ejection fraction of 10 to 15%.  Aortic valve:  Moderate to severe stenosis low-flow.  In the mean stress echo was performed to assess aortic valve which showed Aortic Valve:  There is moderate to severe low flow low gradient aortic stenosis. There is no change is the LV stroke volume with dobutamine consistent with a lack of contractile reserve.  No valvular interventions at this time. RHC done on 9/4/23. Patient is to discharged home with home health.      Interval History:No acute events overnight, afebrile, hemodynamically stable.   Hemodynamics: CVP:12 , Scvo2:55 , CO:7.51 , CI:3.29 , SVR:586.      Review of Systems   Constitutional: Negative for chills and fever.   HENT:  Negative for congestion.    Cardiovascular:  Negative for chest pain and dyspnea on exertion.   Respiratory:  Negative for cough and shortness of breath.    Musculoskeletal:  Negative for back pain.   Gastrointestinal:  Negative for abdominal pain, diarrhea, nausea and vomiting.   Neurological:  Negative for headaches.     Objective:     Vital Signs (Most Recent):  Temp: 98 °F (36.7 °C) (09/05/23 0800)  Pulse: (!) 111 (09/05/23 1200)  Resp: 18 (09/05/23 1200)  BP: (!) 85/65 (09/05/23 1200)  SpO2: 97 % (09/05/23 1200) Vital Signs (24h Range):  Temp:  [97.3 °F (36.3 °C)-98.1 °F (36.7 °C)] 98 °F (36.7 °C)  Pulse:  [105-118] 111  Resp:  [14-36] 18  SpO2:  [92 %-100 %] 97 %  BP: ()/(42-74) 85/65     Weight:  97.9 kg (215 lb 13.3 oz)  Body mass index is 26.27 kg/m².     SpO2: 97 %         Intake/Output Summary (Last 24 hours) at 9/5/2023 1243  Last data filed at 9/5/2023 1200  Gross per 24 hour   Intake 1449.36 ml   Output 1150 ml   Net 299.36 ml       Lines/Drains/Airways       Central Venous Catheter Line  Duration             Percutaneous Central Line Insertion/Assessment - Triple Lumen  08/28/23 1800 Internal Jugular Left 7 days              Peripheral Intravenous Line  Duration                  Peripheral IV - Single Lumen 09/04/23 1520 Anterior;Right Forearm <1 day                       Physical Exam  Constitutional:       Appearance: Normal appearance. He is not toxic-appearing.   HENT:      Head: Normocephalic and atraumatic.   Eyes:      General: No scleral icterus.  Cardiovascular:      Rate and Rhythm: Regular rhythm. Tachycardia present.      Pulses: Normal pulses.      Heart sounds: Murmur heard.   Pulmonary:      Effort: Pulmonary effort is normal. No respiratory distress.      Breath sounds: Normal breath sounds. No wheezing or rales.   Abdominal:      General: Abdomen is flat. Bowel sounds are normal.      Palpations: Abdomen is soft.   Musculoskeletal:         General: No swelling or tenderness. Normal range of motion.      Cervical back: Normal range of motion and neck supple.      Right lower leg: No edema.      Left lower leg: No edema.   Skin:     General: Skin is warm and dry.      Coloration: Skin is not jaundiced or pale.      Findings: No bruising.   Neurological:      Mental Status: He is alert and oriented to person, place, and time. Mental status is at baseline.            Significant Labs: CMP   Recent Labs   Lab 09/04/23  0242 09/05/23  0321   * 135*   K 4.5 4.6    102   CO2 24 22*    120*   BUN 36* 39*   CREATININE 2.0* 1.9*   CALCIUM 8.8 8.7   PROT 6.2 6.0   ALBUMIN 3.2* 3.1*   BILITOT 1.1* 0.9   ALKPHOS 96 116   AST 13 13   ALT 15 14   ANIONGAP 10 11    and CBC   Recent  Labs   Lab 09/04/23  0242 09/04/23  0826 09/05/23  0321   WBC 10.46  --  10.02   HGB 12.0*  --  11.7*   HCT 37.4*   < > 35.5*     --  219    < > = values in this interval not displayed.       Significant Imaging:     Assessment and Plan:         * Cardiogenic shock  HFrEF due to iCMP  EF 10-15% on echo along with severe MR and mod AS per echo report from prior hospital   initial hemodynamics on  2 mcg/kg/min on arrival CVP 7 SVO2 57    - Patient was stopped on dobutamine 9/1/23 afternoon, now having consistently low MAPS to 50-60s. Asymptomatic during these episodes, but obtained hemodyanmics showing:  CVP 8 SVO2 56 CI 2.3  Lactic Acid 2.3.    Plan:  -  restarted dobutamine 2.5  - Consult for RHC 9/5 for potential home dobutamine.  - significant calcium on ascending aortic valve. Will defer CTS evaluation for now. Patient is not on psychiatric medications at home.    Pacemaker  Patient has a pacemaker in placed.  This morning patient's has been tachycardic.  Stable     Moderate aortic stenosis  SeRepeat Echo 8/29    Summary    Tachycardia was present during the study    Left Ventricle: The left ventricle is severely dilated. Normal wall thickness. There is moderate eccentric hypertrophy. Severe global hypokinesis present. There is severely reduced systolic function with a visually estimated ejection fraction of 10 -15%. No LV apical thrombus present. Unable to assess diastolic functionUnable to assess due to poor image quality.    Right Ventricle: Mild right ventricular enlargement. Wall thickness is normal. Right ventricle wall motion  is normal. Systolic function is moderately reduced. TAPSE is 1.30 cm. Right ventricular global longitudinal strain is -9.0 % and is reduced.    Left Atrium: Left atrium is severely dilated.    Right Atrium: Right atrium is mildly dilated.    Aortic Valve: The aortic valve is a trileaflet valve. There is moderate aortic valve sclerosis. Moderate annular  calcification. There is moderate to severe stenosis low flow . Aortic valve area by VTI is 0.91 cm². Aortic valve peak velocity is 2.33 m/s. Mean gradient is 12 mmHg. The dimensionless index is 0.24. Consider Dobutmine stress to differentiate from pseudosevere AS if clinically inidcated.    Mitral Valve: There is mild to moderate regurgitation.    Pulmonary Artery: The estimated pulmonary artery systolic pressure is 65 mmHg.    IVC/SVC: Elevated venous pressure at 15 mmHg     Dobutamine Stress echo:    Summary      A low dose Dobutamine stress echo was performed to evaluate low flow low gradient aortic stenosis.    Stress ECG: Frequent PACs and frequent PVCs.    ECG Conclusion: The ECG portion of the study is negative for ischemia.    Left Ventricle: The left ventricle is severely dilated measuring 7.9 cm. Severe global hypokinesis present. Severely reduced systolic function with a visually estimated ejection fraction of 10 -15%.    Left Ventricle: The left ventricle is severely dilated. LVIDd is 7.9 cm.    Aortic Valve:  There is moderate to severe low flow low gradient aortic stenosis. Aortic valve area by VTI is 0.82 cm². Following infusion of dobutamine, there was no increase in LVOT or aortic VTI, velocity or gradient. The aortic valve area by VTI at 15 mcg/kg/min of dobutamine is 1.1 cm². Peak velocity 2.9 m/s and mean gradient 19 mm Hg.    Post-stress Impression: There is no change is the LV stroke volume with dobutamine consistent with a lack of contractile reserve    Results:  -CT cardiac scoring Aortic valve calcium score is 1484.    Plan:  - No interventions       Severe mitral regurgitation  Repeat Echo 8/29    Summary    Tachycardia was present during the study    Left Ventricle: The left ventricle is severely dilated. Normal wall thickness. There is moderate eccentric hypertrophy. Severe global hypokinesis present. There is severely reduced systolic function with a visually estimated  ejection fraction of 10 -15%. No LV apical thrombus present. Unable to assess diastolic functionUnable to assess due to poor image quality.    Right Ventricle: Mild right ventricular enlargement. Wall thickness is normal. Right ventricle wall motion  is normal. Systolic function is moderately reduced. TAPSE is 1.30 cm. Right ventricular global longitudinal strain is -9.0 % and is reduced.    Left Atrium: Left atrium is severely dilated.    Right Atrium: Right atrium is mildly dilated.    Aortic Valve: The aortic valve is a trileaflet valve. There is moderate aortic valve sclerosis. Moderate annular calcification. There is moderate to severe stenosis low flow . Aortic valve area by VTI is 0.91 cm². Aortic valve peak velocity is 2.33 m/s. Mean gradient is 12 mmHg. The dimensionless index is 0.24. Consider Dobutmine stress to differentiate from pseudosevere AS if clinically inidcated.    Mitral Valve: There is mild to moderate regurgitation.    Pulmonary Artery: The estimated pulmonary artery systolic pressure is 65 mmHg.    IVC/SVC: Elevated venous pressure at 15 mmHg     Plan:  - no interventions      PAM (obstructive sleep apnea)  CPAP ordered     Paroxysmal A-fib  -currently in sinus tach 105  -Takes Eliquis at home for pAF and chronic DVT  -Switch Eliquis to heparin gtt for now    Plan:  - on heparin gtt    PTSD (post-traumatic stress disorder)  Resume psych meds    Chronic deep vein thrombosis (DVT) of right lower extremity  Takes Eliquis at home for pAF and chronic DVT  Switch Eliquis to heparin gtt for now    Gout  -Acute gout attack in 8/2023 [facility] with [facility]  -Uric acid 11.6 at that time    Plan  -continue allopurinol 300 daily (modify dose in AM if needed)  -on colchicine 0.6 mg qd, continue  -was [facility] by Rheum [facility], on prednisone taper (will give 5 mg prednisone x 3 more days and then stop)    Ischemic cardiomyopathy  -CAD s/p CABG x 4 (3/2014 in [place] with LIMA-LAD,  SVG-RCA, SVG-PLV, SVG-D2) followed by PCI late same year in 10/2014 with LANA x 3 to RCA and LANA x 3 to left coronary system  -ASA 81 daily and HI statin    Plan  - optimize GDMT upon discharge  - Patient was stopped on dobutamine 9/1/23 afternoon, now having consistently low MAPS to 50-60s. Asymptomatic during these episodes, but obtained hemodyanmics showing:  CVP 8  SVO2 56  CI 2.3    Lactic Acid 2.3.    Plan:  - dobutamine 2.5   - Consult for C 9/5        VTE Risk Mitigation (From admission, onward)         Ordered     apixaban tablet 5 mg  2 times daily         09/01/23 1133                David Melendrez DO  Cardiology  [facility]

## 2023-09-05 NOTE — PLAN OF CARE
Problem: Physical Therapy  Goal: Physical Therapy Goal  Description: Goals to be met by: 23     Patient will increase functional independence with mobility by performin. Supine to sit with Modified [place]  2. Sit to stand transfer with Stand-by Assistance with AD if needed.   3. Gait  x 150 feet with Stand-by Assistance using AD if needed. .   4. Lower extremity exercise program x10 reps per handout, with assistance as needed to increase strength to increase functional mobility.     Outcome: Ongoing, Progressing   Goals remain appropriate. 2023

## 2023-09-06 NOTE — SUBJECTIVE & OBJECTIVE
Interval History: No acute events overnight, afebrile, hemodynamically stable.  Hemodynamics: CVP:9 , Scvo2:63 , CO:4.5 , CI:1.9 , SVR:1191.      Review of Systems   Constitutional: Negative for chills and fever.   HENT:  Negative for congestion.    Cardiovascular:  Negative for chest pain and dyspnea on exertion.   Respiratory:  Negative for cough and shortness of breath.    Musculoskeletal:  Negative for back pain.   Gastrointestinal:  Negative for abdominal pain, diarrhea, nausea and vomiting.   Neurological:  Negative for headaches.     Objective:     Vital Signs (Most Recent):  Temp: 98 °F (36.7 °C) (09/06/23 1100)  Pulse: (!) 119 (09/06/23 1100)  Resp: (!) 22 (09/06/23 1100)  BP: (!) 91/58 (09/06/23 1100)  SpO2: 97 % (09/06/23 1100) Vital Signs (24h Range):  Temp:  [97.7 °F (36.5 °C)-98.4 °F (36.9 °C)] 98 °F (36.7 °C)  Pulse:  [102-125] 119  Resp:  [10-35] 22  SpO2:  [92 %-100 %] 97 %  BP: ()/(58-72) 91/58     Weight: 97.9 kg (215 lb 13.3 oz)  Body mass index is 26.27 kg/m².     SpO2: 97 %         Intake/Output Summary (Last 24 hours) at 9/6/2023 1157  Last data filed at 9/6/2023 0600  Gross per 24 hour   Intake 1125.09 ml   Output 3175 ml   Net -2049.91 ml       Lines/Drains/Airways       Central Venous Catheter Line  Duration             Percutaneous Central Line Insertion/Assessment - Triple Lumen  08/28/23 1800 Internal Jugular Left 8 days              Peripheral Intravenous Line  Duration                  Peripheral IV - Single Lumen 09/04/23 1520 Anterior;Right Forearm 1 day                       Physical Exam  Constitutional:       Appearance: Normal appearance. He is not toxic-appearing.   HENT:      Head: Normocephalic and atraumatic.   Eyes:      General: No scleral icterus.  Cardiovascular:      Rate and Rhythm: Regular rhythm. Tachycardia present.      Pulses: Normal pulses.      Heart sounds: Murmur heard.   Pulmonary:      Effort: Pulmonary effort is normal. No respiratory distress.       Breath sounds: Normal breath sounds. No wheezing or rales.   Abdominal:      General: Abdomen is flat. Bowel sounds are normal.      Palpations: Abdomen is soft.   Musculoskeletal:         General: No swelling or tenderness. Normal range of motion.      Cervical back: Normal range of motion and neck supple.      Right lower leg: No edema.      Left lower leg: No edema.   Skin:     General: Skin is warm and dry.      Coloration: Skin is not jaundiced or pale.      Findings: No bruising.   Neurological:      Mental Status: He is alert and oriented to person, place, and time. Mental status is at baseline.            Significant Labs: CMP   Recent Labs   Lab 09/05/23  0321 09/06/23 0320   * 136   K 4.6 3.9    103   CO2 22* 22*   * 115*   BUN 39* 34*   CREATININE 1.9* 1.7*   CALCIUM 8.7 8.9   PROT 6.0 6.1   ALBUMIN 3.1* 3.2*   BILITOT 0.9 1.4*   ALKPHOS 116 100   AST 13 14   ALT 14 12   ANIONGAP 11 11    and CBC   Recent Labs   Lab 09/05/23  0321 09/06/23  0320   WBC 10.02 9.58   HGB 11.7* 11.4*   HCT 35.5* 35.1*    213       Significant Imaging:

## 2023-09-06 NOTE — PT/OT/SLP PROGRESS
Physical Therapy Treatment    Patient Name:  Ishmael Thrasher   MRN:  [id number]    Recommendations:     Discharge Recommendations: home  Discharge Equipment Recommendations: none  Barriers to discharge: None    Assessment:     Ishmael Thrasher is a 73 y.o. male admitted with a medical diagnosis of Cardiogenic shock.  He presents with the following impairments/functional limitations: impaired balance, decreased safety awareness, impaired endurance, impaired functional mobility, gait instability pt tolerated treatment better being able to gait train farther. Pt will benefit from cont skilled PT 3x/wk to progress physically. Pt should be able to discharge home with no needs when medically stable.     Rehab Prognosis: Good; patient would benefit from acute skilled PT services to address these deficits and reach maximum level of function.    Recent Surgery: Procedure(s) (LRB):  INSERTION, CATHETER, RIGHT HEART (Right) 1 Day Post-Op    Plan:     During this hospitalization, patient to be seen 3 x/week to address the identified rehab impairments via gait training, therapeutic activities, therapeutic exercises and progress toward the following goals:    Plan of Care Expires:  09/27/23    Subjective     Chief Complaint: pt c/o pain in B knees and head   Patient/Family Comments/goals: to get better and go home.   Pain/Comfort:  Pain Rating 1:  (8 B knees and 7 head)  Pain Addressed 1: Reposition, Distraction  Pain Rating Post-Intervention 1:  (8 B knees and 7 head)      Objective:     Communicated with nurse  prior to session.  Patient found supine with telemetry, central line, pulse ox (continuous), blood pressure cuff (CVP) upon PT entry to room.     General Precautions: Standard, fall  Orthopedic Precautions:    Braces:    Respiratory Status: Room air     Functional Mobility:  Bed Mobility:  pt needed verbal cues for hand placement and sequencing for functional mobility.    Rolling Left:  stand by assistance  Supine to Sit:  stand by assistance    Transfers:     Sit to Stand:  contact guard assistance with rolling walker    Gait: pt received gait training ~ 60 ft with RW and CGA. Pt was followed by chair for safety. RN was present with portable monitor.    Pt white board updated with current therapists name and level of mobility assistance needed.         AM-PAC 6 CLICK MOBILITY  Turning over in bed (including adjusting bedclothes, sheets and blankets)?: 3  Sitting down on and standing up from a chair with arms (e.g., wheelchair, bedside commode, etc.): 3  Moving from lying on back to sitting on the side of the bed?: 3  Moving to and from a bed to a chair (including a wheelchair)?: 3  Need to walk in hospital room?: 3  Climbing 3-5 steps with a railing?: 3  Basic Mobility Total Score: 18       Treatment & Education:  Pt received verbal instructions in PT POC and verbally expressed understanding of such.     Patient left up in chair with all lines intact, call button in reach, and RN  present..    GOALS:   Multidisciplinary Problems       Physical Therapy Goals          Problem: Physical Therapy    Goal Priority Disciplines Outcome Goal Variances Interventions   Physical Therapy Goal     PT, PT/OT Ongoing, Progressing     Description: Goals to be met by: 23     Patient will increase functional independence with mobility by performin. Supine to sit with Modified [place]  2. Sit to stand transfer with Stand-by Assistance with AD if needed.   3. Gait  x 150 feet with Stand-by Assistance using AD if needed. .   4. Lower extremity exercise program x10 reps per handout, with assistance as needed to increase strength to increase functional mobility.                          Time Tracking:     PT Received On: 23  PT Start Time: 1006     PT Stop Time: 1021  PT Total Time (min): 15 min     Billable Minutes: Gait Training 15 min     Treatment Type: Treatment  PT/PTA: PT     Number of PTA visits since last PT visit: 0      09/06/2023

## 2023-09-06 NOTE — PROGRESS NOTES
[facility]  Cardiology  Progress Note    Patient Name: Ishmael Thrasher  MRN: [id number]  Admission Date: 8/28/2023  Hospital Length of Stay: 9 days  Code Status: Full Code   Attending Physician: Ravindra Mock MD   Primary Care Physician: Kirk, [facility]  Expected Discharge Date: 9/6/2023  Principal Problem:Cardiogenic shock    Subjective:     Hospital Course:   Patient is getting a left heart catheterization on 08/29/2023.  Repeat echo reveals severely reduced systolic function with estimated ejection fraction of 10 to 15%.  Aortic valve:  Moderate to severe stenosis low-flow.  In the mean stress echo was performed to assess aortic valve which showed Aortic Valve:  There is moderate to severe low flow low gradient aortic stenosis. There is no change is the LV stroke volume with dobutamine consistent with a lack of contractile reserve.  No valvular interventions at this time. RHC done on 9/4/23. Patient is to discharged home with home health.      Interval History: No acute events overnight, afebrile, hemodynamically stable.  Hemodynamics: CVP:9 , Scvo2:63 , CO:4.5 , CI:1.9 , SVR:1191.      Review of Systems   Constitutional: Negative for chills and fever.   HENT:  Negative for congestion.    Cardiovascular:  Negative for chest pain and dyspnea on exertion.   Respiratory:  Negative for cough and shortness of breath.    Musculoskeletal:  Negative for back pain.   Gastrointestinal:  Negative for abdominal pain, diarrhea, nausea and vomiting.   Neurological:  Negative for headaches.     Objective:     Vital Signs (Most Recent):  Temp: 98 °F (36.7 °C) (09/06/23 1100)  Pulse: (!) 119 (09/06/23 1100)  Resp: (!) 22 (09/06/23 1100)  BP: (!) 91/58 (09/06/23 1100)  SpO2: 97 % (09/06/23 1100) Vital Signs (24h Range):  Temp:  [97.7 °F (36.5 °C)-98.4 °F (36.9 °C)] 98 °F (36.7 °C)  Pulse:  [102-125] 119  Resp:  [10-35] 22  SpO2:  [92 %-100 %] 97 %  BP: ()/(58-72) 91/58     Weight:  97.9 kg (215 lb 13.3 oz)  Body mass index is 26.27 kg/m².     SpO2: 97 %         Intake/Output Summary (Last 24 hours) at 9/6/2023 1157  Last data filed at 9/6/2023 0600  Gross per 24 hour   Intake 1125.09 ml   Output 3175 ml   Net -2049.91 ml       Lines/Drains/Airways       Central Venous Catheter Line  Duration             Percutaneous Central Line Insertion/Assessment - Triple Lumen  08/28/23 1800 Internal Jugular Left 8 days              Peripheral Intravenous Line  Duration                  Peripheral IV - Single Lumen 09/04/23 1520 Anterior;Right Forearm 1 day                       Physical Exam  Constitutional:       Appearance: Normal appearance. He is not toxic-appearing.   HENT:      Head: Normocephalic and atraumatic.   Eyes:      General: No scleral icterus.  Cardiovascular:      Rate and Rhythm: Regular rhythm. Tachycardia present.      Pulses: Normal pulses.      Heart sounds: Murmur heard.   Pulmonary:      Effort: Pulmonary effort is normal. No respiratory distress.      Breath sounds: Normal breath sounds. No wheezing or rales.   Abdominal:      General: Abdomen is flat. Bowel sounds are normal.      Palpations: Abdomen is soft.   Musculoskeletal:         General: No swelling or tenderness. Normal range of motion.      Cervical back: Normal range of motion and neck supple.      Right lower leg: No edema.      Left lower leg: No edema.   Skin:     General: Skin is warm and dry.      Coloration: Skin is not jaundiced or pale.      Findings: No bruising.   Neurological:      Mental Status: He is alert and oriented to person, place, and time. Mental status is at baseline.            Significant Labs: CMP   Recent Labs   Lab 09/05/23  0321 09/06/23  0320   * 136   K 4.6 3.9    103   CO2 22* 22*   * 115*   BUN 39* 34*   CREATININE 1.9* 1.7*   CALCIUM 8.7 8.9   PROT 6.0 6.1   ALBUMIN 3.1* 3.2*   BILITOT 0.9 1.4*   ALKPHOS 116 100   AST 13 14   ALT 14 12   ANIONGAP 11 11    and CBC    Recent Labs   Lab 09/05/23  0321 09/06/23  0320   WBC 10.02 9.58   HGB 11.7* 11.4*   HCT 35.5* 35.1*    213       Significant Imaging:       Assessment and Plan:         * Cardiogenic shock  HFrEF due to iCMP  EF 10-15% on echo along with severe MR and mod AS per echo report from prior hospital   initial hemodynamics on  2 mcg/kg/min on arrival CVP 7 SVO2 57    - Patient was stopped on dobutamine 9/1/23 afternoon, now having consistently low MAPS to 50-60s. Asymptomatic during these episodes, but obtained hemodyanmics showing:  CVP 8 SVO2 56 CI 2.3  Lactic Acid 2.3.    Plan:  -  restarted dobutamine 2.5  - Consult for RHC 9/5 for potential home dobutamine.  - significant calcium on ascending aortic valve. Will defer CTS evaluation for now. Patient is not on psychiatric medications at home.    Pacemaker  Patient has a pacemaker in placed.  This morning patient's has been tachycardic.  Stable     Moderate aortic stenosis  SeRepeat Echo 8/29    Summary    Tachycardia was present during the study    Left Ventricle: The left ventricle is severely dilated. Normal wall thickness. There is moderate eccentric hypertrophy. Severe global hypokinesis present. There is severely reduced systolic function with a visually estimated ejection fraction of 10 -15%. No LV apical thrombus present. Unable to assess diastolic functionUnable to assess due to poor image quality.    Right Ventricle: Mild right ventricular enlargement. Wall thickness is normal. Right ventricle wall motion  is normal. Systolic function is moderately reduced. TAPSE is 1.30 cm. Right ventricular global longitudinal strain is -9.0 % and is reduced.    Left Atrium: Left atrium is severely dilated.    Right Atrium: Right atrium is mildly dilated.    Aortic Valve: The aortic valve is a trileaflet valve. There is moderate aortic valve sclerosis. Moderate annular calcification. There is moderate to severe stenosis low flow . Aortic valve area  by VTI is 0.91 cm². Aortic valve peak velocity is 2.33 m/s. Mean gradient is 12 mmHg. The dimensionless index is 0.24. Consider Dobutmine stress to differentiate from pseudosevere AS if clinically inidcated.    Mitral Valve: There is mild to moderate regurgitation.    Pulmonary Artery: The estimated pulmonary artery systolic pressure is 65 mmHg.    IVC/SVC: Elevated venous pressure at 15 mmHg     Dobutamine Stress echo:    Summary      A low dose Dobutamine stress echo was performed to evaluate low flow low gradient aortic stenosis.    Stress ECG: Frequent PACs and frequent PVCs.    ECG Conclusion: The ECG portion of the study is negative for ischemia.    Left Ventricle: The left ventricle is severely dilated measuring 7.9 cm. Severe global hypokinesis present. Severely reduced systolic function with a visually estimated ejection fraction of 10 -15%.    Left Ventricle: The left ventricle is severely dilated. LVIDd is 7.9 cm.    Aortic Valve:  There is moderate to severe low flow low gradient aortic stenosis. Aortic valve area by VTI is 0.82 cm². Following infusion of dobutamine, there was no increase in LVOT or aortic VTI, velocity or gradient. The aortic valve area by VTI at 15 mcg/kg/min of dobutamine is 1.1 cm². Peak velocity 2.9 m/s and mean gradient 19 mm Hg.    Post-stress Impression: There is no change is the LV stroke volume with dobutamine consistent with a lack of contractile reserve    Results:  -CT cardiac scoring Aortic valve calcium score is 1484.    Plan:  - No interventions       Severe mitral regurgitation  Repeat Echo 8/29    Summary    Tachycardia was present during the study    Left Ventricle: The left ventricle is severely dilated. Normal wall thickness. There is moderate eccentric hypertrophy. Severe global hypokinesis present. There is severely reduced systolic function with a visually estimated ejection fraction of 10 -15%. No LV apical thrombus present. Unable to assess diastolic  functionUnable to assess due to poor image quality.    Right Ventricle: Mild right ventricular enlargement. Wall thickness is normal. Right ventricle wall motion  is normal. Systolic function is moderately reduced. TAPSE is 1.30 cm. Right ventricular global longitudinal strain is -9.0 % and is reduced.    Left Atrium: Left atrium is severely dilated.    Right Atrium: Right atrium is mildly dilated.    Aortic Valve: The aortic valve is a trileaflet valve. There is moderate aortic valve sclerosis. Moderate annular calcification. There is moderate to severe stenosis low flow . Aortic valve area by VTI is 0.91 cm². Aortic valve peak velocity is 2.33 m/s. Mean gradient is 12 mmHg. The dimensionless index is 0.24. Consider Dobutmine stress to differentiate from pseudosevere AS if clinically inidcated.    Mitral Valve: There is mild to moderate regurgitation.    Pulmonary Artery: The estimated pulmonary artery systolic pressure is 65 mmHg.    IVC/SVC: Elevated venous pressure at 15 mmHg     Plan:  - no interventions      PAM (obstructive sleep apnea)  CPAP ordered     Paroxysmal A-fib  -currently in sinus tach 105  -Takes Eliquis at home for pAF and chronic DVT  -Switch Eliquis to heparin gtt for now    Plan:  - on heparin gtt    PTSD (post-traumatic stress disorder)  Resume psych meds    Chronic deep vein thrombosis (DVT) of right lower extremity  Takes Eliquis at home for pAF and chronic DVT  Switch Eliquis to heparin gtt for now    Gout  -Acute gout attack in 8/2023 [facility] with [facility]  -Uric acid 11.6 at that time    Plan  -continue allopurinol 300 daily (modify dose in AM if needed)  -on colchicine 0.6 mg qd, continue  -was [facility] by [facility]um [facility], on prednisone taper (will give 5 mg prednisone x 3 more days and then stop)    Ischemic cardiomyopathy  -CAD s/p CABG x 4 (3/2014 in [place] with LIMA-LAD, SVG-RCA, SVG-PLV, SVG-D2) followed by PCI late same year in 10/2014 with LANA x 3 to RCA  and LANA x 3 to left coronary system  -ASA 81 daily and HI statin    - Patient was stopped on dobutamine 9/1/23 afternoon, now having consistently low MAPS to 50-60s.     Results:  RA: 14 RV: 80/ 5/ 15 PA: 75/ 47/ 52 PWP: 28 .   Cardiac output was 3.0. Cardiac index is 1.3 L/min/m2.   O2 Sat: PA 37%.    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

## 2023-09-06 NOTE — PROGRESS NOTES
"[facility] - [facility]  Adult Nutrition  Progress Note    SUMMARY       Recommendations  1. Continue Cardiac Diet, fluid restriction per MD.   2. If PO intake declines recommend Boost Plus BID - vanilla (flavor per patient preference.)   3. Continue routine weigt measurements to assess any acute changes in weight.   4. RD to monitor and follow-up.    Goals: Meet % EEN/EPN by follow-up date.  Nutrition Goal Status: new  Communication of RD Recs: other (comment) (POC)    Assessment and Plan    Nutrition Problem  Increased nutrient needs (energy/protein)     Related to (etiology):   Increased physiological demands     Signs and Symptoms (as evidenced by):   Potential LVAD       Interventions/Recommendations (treatment strategy):  Collaboration of nutrition care with other providers     Nutrition Diagnosis Status:   Continues     Reason for Assessment    Reason For Assessment: RD follow-up  Diagnosis: other (see comments) (Cardiogenic shock)  Relevant Medical History: gout, CAD, DVT, Paraoxysmal A-fib, PTSD  Interdisciplinary Rounds: attended  General Information Comments: 9/6/23: Spoke w/ pt at bedside. Reports a good appetite at this time, 50-75% meal consumption. discussed fluid restriction, discussed cardiac diet-pt had many questions. RD answered. Pt does not meet criteria for malnutrition at this time.  Nutrition Discharge Planning: Pending Clinical    Nutrition Risk Screen    Nutrition Risk Screen: no indicators present    Nutrition/Diet History    Patient Reported Diet/Restrictions/Preferences: low salt, heart healthy  Spiritual, Cultural Beliefs, [employer] Practices, Values that Affect Care: no  Food Allergies: NKFA  Factors Affecting Nutritional Intake: None identified at this time    Anthropometrics    Temp: 98 °F (36.7 °C)  Height Method: Stated  Height: 6' 4" (193 cm)  Height (inches): 76 in  Weight Method: Bed Scale  Weight: 97.9 kg (215 lb 13.3 oz)  Weight (lb): 215.83 lb  Ideal Body " Weight (IBW), Male: 202 lb  % Ideal Body Weight, Male (lb): 106.85 %  BMI (Calculated): 26.3  BMI Grade: 25 - 29.9 - overweight       Lab/Procedures/Meds    Pertinent Labs Reviewed: reviewed  Pertinent Labs Comments: BUN 39, Creat 2.0, GFR 34.8, TBili 1.3, Direct Bili 0.6  Pertinent Medications Reviewed: reviewed  Pertinent Medications Comments: atorvastatin, colchicine, prednisone, setraline, torsemide, valsartan    Estimated/Assessed Needs    Weight Used For Calorie Calculations: 99.3 kg (219 lb)  Energy Calorie Requirements (kcal): 1987 kcal/d (20 kcal/kg)  Energy Need Method: Kcal/kg  Protein Requirements:  g/d (1.0-1.2 g/kg)  Weight Used For Protein Calculations: 99.3 kg (219 lb)        RDA Method (mL): 1987         Nutrition Prescription Ordered    Current Diet Order: Cardiac  Nutrition Order Comments: 1500 mL FR    Evaluation of Received Nutrient/Fluid Intake    I/O: -1.18 L  Energy Calories Required: meeting needs  Protein Required: meeting needs  Comments:   Tolerance: tolerating  % Intake of Estimated Energy Needs: 50 - 75 %  % Meal Intake: 50 - 75 %    Nutrition Risk    Level of Risk/Frequency of Follow-up: moderate (2x/ week)     Monitor and Evaluation    Food and Nutrient Intake: energy intake, food and beverage intake  Food and Nutrient Adminstration: diet order  Knowledge/Beliefs/Attitudes: food and nutrition knowledge/skill, beliefs and attitudes  Physical Activity and Function: nutrition-related ADLs and IADLs, factors affecting access to physical activity  Anthropometric Measurements: weight, weight change, body mass index  Biochemical Data, Medical Tests and Procedures: inflammatory profile, lipid profile, glucose/endocrine profile, gastrointestinal profile, electrolyte and renal panel  Nutrition-Focused Physical Findings: skin, head and eyes, extremities, muscles and bones, overall appearance     Nutrition Follow-Up    RD Follow-up?: Yes

## 2023-09-06 NOTE — ASSESSMENT & PLAN NOTE
-CAD s/p CABG x 4 (3/2014 in [place] with LIMA-LAD, SVG-RCA, SVG-PLV, SVG-D2) followed by PCI late same year in 10/2014 with LANA x 3 to RCA and LANA x 3 to left coronary system  -ASA 81 daily and HI statin    - Patient was stopped on dobutamine 9/1/23 afternoon, now having consistently low MAPS to 50-60s.     Results:  RA: 14 RV: 80/ 5/ 15 PA: 75/ 47/ 52 PWP: 28 .   Cardiac output was 3.0. Cardiac index is 1.3 L/min/m2.   O2 Sat: PA 37%.    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

## 2023-09-06 NOTE — PLAN OF CARE
Recommendations  1. Continue Cardiac Diet, fluid restriction per MD.   2. If PO intake declines recommend Boost Plus BID - vanilla (flavor per patient preference.)   3. Continue routine weigt measurements to assess any acute changes in weight.   4. RD to monitor and follow-up.     Goals: Meet % EEN/EPN by follow-up date.  Nutrition Goal Status: new  Communication of RD Recs: other (comment) (POC)

## 2023-09-06 NOTE — CARE UPDATE
CCU Hemodynamics:   Parameter   at 2000   MAP 74   CVP 11   SvO2 47   CO  4.2   CI 1.8   SVR 1199        DOBUTamine IV infusion (non-titrating) 5 mcg/kg/min (09/05/23 1800)       Current Heart Failure Medications:  - Lasix 80mg q12h    - Dobutamine 5 mcg/kg/min    Current Mechanical Circulatory Support:  UOP total: 150/hr after shift change    Assessment/Plan:  -will continue with dobutamine gtt and lasix at current rate    Gume Benton MD  Cardiovascular Disease PGY-4  [facility]

## 2023-09-06 NOTE — PLAN OF CARE
CICU DAILY GOALS       A: Awake    RASS: Goal - RASS Goal: 0-->alert and calm  Actual - RASS (Diaz Agitation-Sedation Scale): alert and calm   Restraint necessity:    B: Breath   SBT: Not intubated   C: Coordinate A & B, analgesics/sedatives   Pain: managed    SAT: Not intubated  D: Delirium   CAM-ICU: Overall CAM-ICU: Negative  E: Early(intubated/ Progressive (non-intubated) Mobility   MOVE Screen: Pass   Activity: Activity Management: Rolling - L1  FAS: Feeding/Nutrition   Diet order: Diet/Nutrition Received: low saturated fat/low cholesterol,   Fluid restriction: Fluid Requirement: 1500mL FR  T: Thrombus   DVT prophylaxis: VTE Required Core Measure: Pharmacological prophylaxis initiated/maintained  H: HOB Elevation   Head of Bed (HOB) Positioning: HOB at 30-45 degrees  U: Ulcer Prophylaxis   GI: no  G: Glucose control   managed    S: Skin   Bundle compliance: yes   Bathing/Skin Care: bath, complete, linen changed Date: 9/5/23  B: Bowel Function   no issues   I: Indwelling Catheters   Fontenot necessity:  No   CVC necessity: Yes  D: De-escalation Antibx   No  Plan for the day   Continue to monitor hemodynamics.   CVPs were 11,12,10. Sv02 was 47  Family/Goals of care/Code Status   Code Status: Full Code     No acute events throughout day, VS and assessment per flow sheet, patient progressing towards goals as tolerated, plan of care reviewed with Ishmael Thrasher and family, all concerns addressed, will continue to monitor.

## 2023-09-06 NOTE — NURSING
Nurses Note -- 4 Eyes      9/6/2023   7:01 AM      Skin assessed during: Daily Assessment      [] No Altered Skin Integrity Present    []Prevention Measures Documented      [x] Yes- Altered Skin Integrity Present or Discovered   [x] LDA Added if Not in Epic (Describe Wound)   [] New Altered Skin Integrity was Present on Admit and Documented in LDA   [] Wound Image Taken    Wound Care Consulted? No    Attending Nurse:  Joelle Menchaca RN/Staff Member:  Rubi Gilliam RN

## 2023-09-06 NOTE — PLAN OF CARE
Problem: Physical Therapy  Goal: Physical Therapy Goal  Description: Goals to be met by: 23     Patient will increase functional independence with mobility by performin. Supine to sit with Modified Cooper  2. Sit to stand transfer with Stand-by Assistance with AD if needed.   3. Gait  x 150 feet with Stand-by Assistance using AD if needed. .   4. Lower extremity exercise program x10 reps per handout, with assistance as needed to increase strength to increase functional mobility.     Outcome: Ongoing, Progressing   Goals remain appropriate. 2023

## 2023-09-07 NOTE — SUBJECTIVE & OBJECTIVE
Interval History: No acute events overnight, afebrile, hemodynamically stable.  Hemodynamics: CVP:10 , Scvo2:46 , CO:3.3 , CI:1.4 , SVR:1574.      Review of Systems   Constitutional: Negative for chills and fever.   HENT:  Negative for congestion.    Cardiovascular:  Negative for chest pain and dyspnea on exertion.   Respiratory:  Negative for cough and shortness of breath.    Musculoskeletal:  Negative for back pain.   Gastrointestinal:  Negative for abdominal pain, diarrhea, nausea and vomiting.   Neurological:  Negative for headaches.     Objective:     Vital Signs (Most Recent):  Temp: 98 °F (36.7 °C) (09/07/23 1110)  Pulse: (!) 112 (09/07/23 1110)  Resp: (!) 24 (09/07/23 1110)  BP: (!) 82/67 (09/07/23 1110)  SpO2: 97 % (09/07/23 1110) Vital Signs (24h Range):  Temp:  [98 °F (36.7 °C)-98.5 °F (36.9 °C)] 98 °F (36.7 °C)  Pulse:  [109-131] 112  Resp:  [14-33] 24  SpO2:  [96 %-100 %] 97 %  BP: ()/(51-76) 82/67     Weight: 97.9 kg (215 lb 13.3 oz)  Body mass index is 26.27 kg/m².     SpO2: 97 %         Intake/Output Summary (Last 24 hours) at 9/7/2023 1139  Last data filed at 9/7/2023 1100  Gross per 24 hour   Intake 778.74 ml   Output 1275 ml   Net -496.26 ml       Lines/Drains/Airways       Central Venous Catheter Line  Duration             Percutaneous Central Line Insertion/Assessment - Triple Lumen  08/28/23 1800 Internal Jugular Left 9 days              Peripheral Intravenous Line  Duration                  Peripheral IV - Single Lumen 09/04/23 1520 Anterior;Right Forearm 2 days                       Physical Exam  Constitutional:       Appearance: Normal appearance. He is not toxic-appearing.   HENT:      Head: Normocephalic and atraumatic.   Eyes:      General: No scleral icterus.  Cardiovascular:      Rate and Rhythm: Regular rhythm. Tachycardia present.      Pulses: Normal pulses.      Heart sounds: Murmur heard.   Pulmonary:      Effort: Pulmonary effort is normal. No respiratory distress.       Breath sounds: Normal breath sounds. No wheezing or rales.   Abdominal:      General: Abdomen is flat. Bowel sounds are normal.      Palpations: Abdomen is soft.   Musculoskeletal:         General: No swelling or tenderness. Normal range of motion.      Cervical back: Normal range of motion and neck supple.      Right lower leg: No edema.      Left lower leg: No edema.   Skin:     General: Skin is warm and dry.      Coloration: Skin is not jaundiced or pale.      Findings: No bruising.   Neurological:      Mental Status: He is alert and oriented to person, place, and time. Mental status is at baseline.            Significant Labs: CMP   Recent Labs   Lab 09/06/23  0320 09/06/23  1130 09/06/23  1840 09/07/23  0403     --   --  135*   K 3.9 3.9 4.2 4.4     --   --  103   CO2 22*  --   --  23   *  --   --  106   BUN 34*  --   --  29*   CREATININE 1.7*  --   --  1.5*   CALCIUM 8.9  --   --  9.0   PROT 6.1  --   --  6.1   ALBUMIN 3.2*  --   --  3.1*   BILITOT 1.4*  --   --  2.0*   ALKPHOS 100  --   --  102   AST 14  --   --  13   ALT 12  --   --  14   ANIONGAP 11  --   --  9    and CBC   Recent Labs   Lab 09/06/23 0320 09/07/23  0403   WBC 9.58 12.71*   HGB 11.4* 11.0*   HCT 35.1* 34.1*    204       Significant Imaging:      no

## 2023-09-07 NOTE — PLAN OF CARE
CICU DAILY GOALS       A: Awake    RASS: Goal - RASS Goal: 0-->alert and calm  Actual - RASS (Diaz Agitation-Sedation Scale): alert and calm   Restraint necessity:    B: Breath   SBT: Not intubated   C: Coordinate A & B, analgesics/sedatives   Pain: managed    SAT: Not intubated  D: Delirium   CAM-ICU: Overall CAM-ICU: Negative  E: Early(intubated/ Progressive (non-intubated) Mobility   MOVE Screen: Pass   Activity: Activity Management: Rolling - L1  FAS: Feeding/Nutrition   Diet order: Diet/Nutrition Received: low saturated fat/low cholesterol  T: Thrombus   DVT prophylaxis: VTE Required Core Measure: Pharmacological prophylaxis initiated/maintained  H: HOB Elevation   Head of Bed (HOB) Positioning: HOB at 30-45 degrees  U: Ulcer Prophylaxis   GI: no  G: Glucose control   managed    S: Skin   Bundle compliance: yes   Bathing/Skin Care: bath, complete, linen changed Date: 9/6/23  B: Bowel Function   no issues   I: Indwelling Catheters   Fontenot necessity: No    CVC necessity: Yes  D: De-escalation Antibx   No  Plan for the day   Continue to monitor hemodynamics.  CVPs were 12,10,10. Sv02 was 61  Family/Goals of care/Code Status   Code Status: Full Code     No acute events throughout day, VS and assessment per flow sheet, patient progressing towards goals as tolerated, plan of care reviewed with Ishmael Thrasher and family, all concerns addressed, will continue to monitor.

## 2023-09-07 NOTE — PROGRESS NOTES
[facility] - Cardiac Intensive Care  Cardiology  Progress Note    Patient Name: Ishmael Thrasher  MRN: [id number]  Admission Date: 8/28/2023  Hospital Length of Stay: 10 days  Code Status: Full Code   Attending Physician: Ravindra Mock MD   Primary Care Physician: Kirk, [facility]  Expected Discharge Date: 9/12/2023  Principal Problem:Cardiogenic shock    Subjective:     Hospital Course:   Patient is getting a left heart catheterization on 08/29/2023.  Repeat echo reveals severely reduced systolic function with estimated ejection fraction of 10 to 15%.  Aortic valve:  Moderate to severe stenosis low-flow.  In the mean stress echo was performed to assess aortic valve which showed Aortic Valve:  There is moderate to severe low flow low gradient aortic stenosis. There is no change is the LV stroke volume with dobutamine consistent with a lack of contractile reserve.  No valvular interventions at this time. RHC done on 9/4/23. Patient is to discharged home with home health.      Interval History: No acute events overnight, afebrile, hemodynamically stable.  Hemodynamics: CVP:10 , Scvo2:46 , CO:3.3 , CI:1.4 , SVR:1574.      Review of Systems   Constitutional: Negative for chills and fever.   HENT:  Negative for congestion.    Cardiovascular:  Negative for chest pain and dyspnea on exertion.   Respiratory:  Negative for cough and shortness of breath.    Musculoskeletal:  Negative for back pain.   Gastrointestinal:  Negative for abdominal pain, diarrhea, nausea and vomiting.   Neurological:  Negative for headaches.     Objective:     Vital Signs (Most Recent):  Temp: 98 °F (36.7 °C) (09/07/23 1110)  Pulse: (!) 112 (09/07/23 1110)  Resp: (!) 24 (09/07/23 1110)  BP: (!) 82/67 (09/07/23 1110)  SpO2: 97 % (09/07/23 1110) Vital Signs (24h Range):  Temp:  [98 °F (36.7 °C)-98.5 °F (36.9 °C)] 98 °F (36.7 °C)  Pulse:  [109-131] 112  Resp:  [14-33] 24  SpO2:  [96 %-100 %] 97 %  BP: ()/(51-76) 82/67      Weight: 97.9 kg (215 lb 13.3 oz)  Body mass index is 26.27 kg/m².     SpO2: 97 %         Intake/Output Summary (Last 24 hours) at 9/7/2023 1139  Last data filed at 9/7/2023 1100  Gross per 24 hour   Intake 778.74 ml   Output 1275 ml   Net -496.26 ml       Lines/Drains/Airways       Central Venous Catheter Line  Duration             Percutaneous Central Line Insertion/Assessment - Triple Lumen  08/28/23 1800 Internal Jugular Left 9 days              Peripheral Intravenous Line  Duration                  Peripheral IV - Single Lumen 09/04/23 1520 Anterior;Right Forearm 2 days                       Physical Exam  Constitutional:       Appearance: Normal appearance. He is not toxic-appearing.   HENT:      Head: Normocephalic and atraumatic.   Eyes:      General: No scleral icterus.  Cardiovascular:      Rate and Rhythm: Regular rhythm. Tachycardia present.      Pulses: Normal pulses.      Heart sounds: Murmur heard.   Pulmonary:      Effort: Pulmonary effort is normal. No respiratory distress.      Breath sounds: Normal breath sounds. No wheezing or rales.   Abdominal:      General: Abdomen is flat. Bowel sounds are normal.      Palpations: Abdomen is soft.   Musculoskeletal:         General: No swelling or tenderness. Normal range of motion.      Cervical back: Normal range of motion and neck supple.      Right lower leg: No edema.      Left lower leg: No edema.   Skin:     General: Skin is warm and dry.      Coloration: Skin is not jaundiced or pale.      Findings: No bruising.   Neurological:      Mental Status: He is alert and oriented to person, place, and time. Mental status is at baseline.            Significant Labs: CMP   Recent Labs   Lab 09/06/23  0320 09/06/23  1130 09/06/23  1840 09/07/23  0403     --   --  135*   K 3.9 3.9 4.2 4.4     --   --  103   CO2 22*  --   --  23   *  --   --  106   BUN 34*  --   --  29*   CREATININE 1.7*  --   --  1.5*   CALCIUM 8.9  --   --  9.0   PROT 6.1   --   --  6.1   ALBUMIN 3.2*  --   --  3.1*   BILITOT 1.4*  --   --  2.0*   ALKPHOS 100  --   --  102   AST 14  --   --  13   ALT 12  --   --  14   ANIONGAP 11  --   --  9    and CBC   Recent Labs   Lab 09/06/23  0320 09/07/23  0403   WBC 9.58 12.71*   HGB 11.4* 11.0*   HCT 35.1* 34.1*    204       Significant Imaging:       Assessment and Plan:       * Cardiogenic shock  HFrEF due to iCMP  EF 10-15% on echo along with severe MR and mod AS per echo report from prior hospital   initial hemodynamics on  2 mcg/kg/min on arrival CVP 7 SVO2 57    - Patient was stopped on dobutamine 9/1/23 afternoon, now having consistently low MAPS to 50-60s. Asymptomatic during these episodes, but obtained hemodyanmics showing:  CVP 8 SVO2 56 CI 2.3  Lactic Acid 2.3.    Plan:  -  restarted dobutamine 2.5  - Consult for RHC 9/5 for potential home dobutamine.  - significant calcium on ascending aortic valve. Will defer CTS evaluation for now. Patient is not on psychiatric medications at home.    Pacemaker  Patient has a pacemaker in placed.  This morning patient's has been tachycardic.  Stable     Moderate aortic stenosis  SeRepeat Echo 8/29    Summary    Tachycardia was present during the study    Left Ventricle: The left ventricle is severely dilated. Normal wall thickness. There is moderate eccentric hypertrophy. Severe global hypokinesis present. There is severely reduced systolic function with a visually estimated ejection fraction of 10 -15%. No LV apical thrombus present. Unable to assess diastolic functionUnable to assess due to poor image quality.    Right Ventricle: Mild right ventricular enlargement. Wall thickness is normal. Right ventricle wall motion  is normal. Systolic function is moderately reduced. TAPSE is 1.30 cm. Right ventricular global longitudinal strain is -9.0 % and is reduced.    Left Atrium: Left atrium is severely dilated.    Right Atrium: Right atrium is mildly dilated.    Aortic Valve:  The aortic valve is a trileaflet valve. There is moderate aortic valve sclerosis. Moderate annular calcification. There is moderate to severe stenosis low flow . Aortic valve area by VTI is 0.91 cm². Aortic valve peak velocity is 2.33 m/s. Mean gradient is 12 mmHg. The dimensionless index is 0.24. Consider Dobutmine stress to differentiate from pseudosevere AS if clinically inidcated.    Mitral Valve: There is mild to moderate regurgitation.    Pulmonary Artery: The estimated pulmonary artery systolic pressure is 65 mmHg.    IVC/SVC: Elevated venous pressure at 15 mmHg     Dobutamine Stress echo:    Summary      A low dose Dobutamine stress echo was performed to evaluate low flow low gradient aortic stenosis.    Stress ECG: Frequent PACs and frequent PVCs.    ECG Conclusion: The ECG portion of the study is negative for ischemia.    Left Ventricle: The left ventricle is severely dilated measuring 7.9 cm. Severe global hypokinesis present. Severely reduced systolic function with a visually estimated ejection fraction of 10 -15%.    Left Ventricle: The left ventricle is severely dilated. LVIDd is 7.9 cm.    Aortic Valve:  There is moderate to severe low flow low gradient aortic stenosis. Aortic valve area by VTI is 0.82 cm². Following infusion of dobutamine, there was no increase in LVOT or aortic VTI, velocity or gradient. The aortic valve area by VTI at 15 mcg/kg/min of dobutamine is 1.1 cm². Peak velocity 2.9 m/s and mean gradient 19 mm Hg.    Post-stress Impression: There is no change is the LV stroke volume with dobutamine consistent with a lack of contractile reserve    Results:  -CT cardiac scoring Aortic valve calcium score is 1484.    Plan:  - No interventions       Severe mitral regurgitation  Repeat Echo 8/29    Summary    Tachycardia was present during the study    Left Ventricle: The left ventricle is severely dilated. Normal wall thickness. There is moderate eccentric hypertrophy. Severe  global hypokinesis present. There is severely reduced systolic function with a visually estimated ejection fraction of 10 -15%. No LV apical thrombus present. Unable to assess diastolic functionUnable to assess due to poor image quality.    Right Ventricle: Mild right ventricular enlargement. Wall thickness is normal. Right ventricle wall motion  is normal. Systolic function is moderately reduced. TAPSE is 1.30 cm. Right ventricular global longitudinal strain is -9.0 % and is reduced.    Left Atrium: Left atrium is severely dilated.    Right Atrium: Right atrium is mildly dilated.    Aortic Valve: The aortic valve is a trileaflet valve. There is moderate aortic valve sclerosis. Moderate annular calcification. There is moderate to severe stenosis low flow . Aortic valve area by VTI is 0.91 cm². Aortic valve peak velocity is 2.33 m/s. Mean gradient is 12 mmHg. The dimensionless index is 0.24. Consider Dobutmine stress to differentiate from pseudosevere AS if clinically inidcated.    Mitral Valve: There is mild to moderate regurgitation.    Pulmonary Artery: The estimated pulmonary artery systolic pressure is 65 mmHg.    IVC/SVC: Elevated venous pressure at 15 mmHg     Plan:  - no interventions      PAM (obstructive sleep apnea)  CPAP ordered     Paroxysmal A-fib  -currently in sinus tach 105  -Takes Eliquis at home for pAF and chronic DVT  -Switch Eliquis to heparin gtt for now    Plan:  - on heparin gtt    PTSD (post-traumatic stress disorder)  Resume psych meds    Chronic deep vein thrombosis (DVT) of right lower extremity  Takes Eliquis at home for pAF and chronic DVT  Switch Eliquis to heparin gtt for now    Gout  -Acute gout attack in 8/2023 [facility] with [facility]  -Uric acid 11.6 at that time    Plan  -continue allopurinol 300 daily (modify dose in AM if needed)  -on colchicine 0.6 mg qd, continue  -was [facility], on prednisone taper (will give 5 mg prednisone x 3 more  days and then stop)    Ischemic cardiomyopathy  -CAD s/p CABG x 4 (3/2014 in [place] with LIMA-LAD, SVG-RCA, SVG-PLV, SVG-D2) followed by PCI late same year in 10/2014 with LANA x 3 to RCA and LANA x 3 to left coronary system  -ASA 81 daily and HI statin    - Patient was stopped on dobutamine 9/1/23 afternoon, now having consistently low MAPS to 50-60s.     Results:  RA: 14 RV: 80/ 5/ 15 PA: 75/ 47/ 52 PWP: 28 .   Cardiac output was 3.0. Cardiac index is 1.3 L/min/m2.   O2 Sat: PA 37%.    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

## 2023-09-07 NOTE — PLAN OF CARE
[facility] - [facility]  Discharge Reassessment    Primary Care Provider: Kirk [facility] [place]    Expected Discharge Date: 9/12/2023    Reassessment (most recent)       Discharge Reassessment - 09/07/23 1348          Discharge Reassessment    Assessment Type Discharge Planning Reassessment     Discharge Plan A Home Health     Discharge Plan B Long-term acute care facility (LTAC)     DME Needed Upon Discharge  none     Transition of Care Barriers None     Why the patient remains in the hospital Requires continued medical care                   Per treatment team pt will need home dobutamine at discharge.  SW informed [facility] of the above to which Lulu instructed SW to include dobutamine dosing on the [facility] worksheet.  However, per treatment team the dosing will not be known until after pt's RHC which will not be until after pt finishes diuresing.  SW will continue to follow.      Lata Sierra LMSW  [facility]  [id number]

## 2023-09-07 NOTE — CARE UPDATE
CCU Hemodynamics:   Parameter   at 2000   MAP 72   CVP 12   SvO2 68   CO  6.1   CI 2.6         DOBUTamine IV infusion (non-titrating) 5 mcg/kg/min (09/07/23 0000)       Current Heart Failure Medications:  - Dobutamine 5 mcg/kg/min  -Lasix 80mg BID  -UOP difficult to assess as patient had bowel movement with urine    Assessment/Plan:  -continue dobutamine and lasix at current dose.    Gume Benton MD  Cardiovascular Disease PGY-4  [facility]

## 2023-09-07 NOTE — ASSESSMENT & PLAN NOTE
-CAD s/p CABG x 4 (3/2014 in [place] with LIMA-LAD, SVG-RCA, SVG-PLV, SVG-D2) followed by PCI late same year in 10/2014 with LANA x 3 to RCA and LANA x 3 to left coronary system  -ASA 81 daily and HI statin    - Patient was stopped on dobutamine 9/1/23 afternoon, now having consistently low MAPS to 50-60s.     Results:  RA: 14 RV: 80/ 5/ 15 PA: 75/ 47/ 52 PWP: 28 .   Cardiac output was 3.0. Cardiac index is 1.3 L/min/m2.   O2 Sat: PA 37%.    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

## 2023-09-07 NOTE — PLAN OF CARE
"POC reviewed with Ishmael Thrasher and family. Questions and concerns addressed. CVP: 10,9,10 Svo2: 46. Patient only produced 175 cc of urine after receiving 80 mg of lasix IVP. Bladder scanned and found only 8 cc of urine. MD notified. See below and flowsheets for full assessment and VS info.       Neuro:  Doyline Coma Scale  Best Eye Response: 4-->(E4) spontaneous  Best Motor Response: 6-->(M6) obeys commands  Best Verbal Response: 5-->(V5) oriented  Lambert Coma Scale Score: 15  Assessment Qualifiers: patient not sedated/intubated  Pupil PERRLA: yes    24 hr Temp:  [98 °F (36.7 °C)-98.5 °F (36.9 °C)]     CV:  Rhythm: paced rhythm   DVT prophylaxis: VTE Required Core Measure: Pharmacological prophylaxis initiated/maintained    CVP (mean): 10 mmHg (09/07/23 1600)       SVO2 (%): 46 % (09/07/23 0715)               Pulses  Right Radial Pulse: 2+ (normal)  Left Radial Pulse: 2+ (normal)  Right Dorsalis Pedis Pulse: 1+ (weak)  Left Dorsalis Pedis Pulse: 1+ (weak)  Right Posterior Tibial Pulse: 1+ (weak)  Left Posterior Tibial Pulse: 1+ (weak)    Resp:  Flow (L/min): 2       GI/:  GI prophylaxis: yes  Diet/Nutrition Received: low saturated fat/low cholesterol  Last Bowel Movement: 09/07/23  Voiding Characteristics: voids spontaneously without difficulty   Intake/Output Summary (Last 24 hours) at 9/7/2023 1745  Last data filed at 9/7/2023 1700  Gross per 24 hour   Intake 1418.72 ml   Output 1075 ml   Net 343.72 ml          Labs/Accuchecks:  Recent Labs   Lab 09/05/23  0321 09/06/23  0320 09/07/23  0403   WBC 10.02 9.58 12.71*   RBC 4.60 4.60 4.42*   HGB 11.7* 11.4* 11.0*   HCT 35.5* 35.1* 34.1*    213 204      Recent Labs   Lab 09/01/23  0300 09/01/23  1146   APTT 29.7 40.6*      Recent Labs     09/07/23  0403   *   K 4.4   CO2 23      BUN 29*   CREATININE 1.5*   ALKPHOS 102   ALT 14   AST 13   BILITOT 2.0*     No results for input(s): "CPK", "CPKMB", "MB", "TROPONINI" in the last 168 hours.   Recent " Labs     09/06/23  0735 09/06/23 2013 09/07/23  0805   PH 7.420 7.420 7.438   PCO2 43.3 40.1 39.8   PO2 32* 31* 24*   HCO3 28.1* 26.0 26.9   POCSATURATED 63* 61* 46*   BE 4 2 3       Electrolytes: No replacement orders  Accuchecks: none    Gtts/LDAs:   DOBUTamine IV infusion (non-titrating) 5 mcg/kg/min (09/07/23 1700)       Lines/Drains/Airways       Central Venous Catheter Line  Duration             Percutaneous Central Line Insertion/Assessment - Triple Lumen  08/28/23 1800 Internal Jugular Left 9 days              Peripheral Intravenous Line  Duration                  Peripheral IV - Single Lumen 09/04/23 1520 Anterior;Right Forearm 3 days                    Skin/Wounds  Bathing/Skin Care: bath, complete;back care;dressed/undressed;linen changed (09/07/23 1629)  Wounds: Yes  Wound care consulted: No

## 2023-09-07 NOTE — NURSING
Nurses Note -- 4 Eyes      9/7/2023   5:40 PM      Skin assessed during: Daily Assessment      [] No Altered Skin Integrity Present    []Prevention Measures Documented      [x] Yes- Altered Skin Integrity Present or Discovered   [] LDA Added if Not in Epic (Describe Wound)   [x] New Altered Skin Integrity was Present on Admit and Documented in LDA   [] Wound Image Taken    Wound Care Consulted? No    Attending Nurse:  CHRISTOPHER Henriquez     Second RN/Staff Member:  CHRISTOPHER Barrera

## 2023-09-07 NOTE — NURSING
Nurses Note -- 4 Eyes      9/7/2023   1:42 AM      Skin assessed during: Q Shift Change      [] No Altered Skin Integrity Present    []Prevention Measures Documented      [x] Yes- Altered Skin Integrity Present or Discovered   [] LDA Added if Not in Epic (Describe Wound)   [] New Altered Skin Integrity was Present on Admit and Documented in LDA   [] Wound Image Taken    Wound Care Consulted? No    Attending Nurse:  CHRISTOPHER Venegas     Second RN/Staff Member:  CHRISTOPHER Lai

## 2023-09-08 PROBLEM — I34.0 SEVERE MITRAL REGURGITATION: Status: RESOLVED | Noted: 2023-01-01 | Resolved: 2023-01-01

## 2023-09-08 NOTE — PLAN OF CARE
HTS Hemodynamics:   Parameter   at 1600   CVP 15   SvO2 25   CO  3.25   CI 1.42   SVR 1355   MAP 70      amiodarone in dextrose 5% Stopped (09/08/23 1531)    amiodarone in dextrose 5%      DOBUTamine IV infusion (non-titrating) 5 mcg/kg/min (09/08/23 1400)    EPINEPHrine      furosemide (LASIX) 500 mg in 50 mL infusion (conc: 10 mg/mL) 20 mg/hr (09/08/23 1552)    nitric oxide gas       Current Heart Failure Medications:  - Dobutamine 5 mcg/kg/min  - Starting Epinephrine 0.02 mcg/kg/min  - Starting Mere 10ppm  - Lasix 80 TID, started 80mg push 20mg/hr    UOP: 400cc since 7am     Intake/Output Summary (Last 24 hours) at 9/8/2023 1619  Last data filed at 9/8/2023 1400  Gross per 24 hour   Intake 1180.01 ml   Output 1150 ml   Net 30.01 ml     Assessment/Plan:  - Plan was discussed with on-call attending  - Patient had AFib RVR and symptomatic due to loss of BiV pacing without AV synchrony  - Started on Amiodarone with minimal improvement, patient symptomatic after bolus given.  - Lactate 4.1  - [facility] for [facility] to [facility] 10 days ago. ICM with low EF 10-15%.  - 2 RHC to assess for severe AS and volume status. Previously evaluated for LVAD by HTS but not interested at the time.      Yohannes George MD  Cardiovascular Disease PGY-V  [facility]

## 2023-09-08 NOTE — ASSESSMENT & PLAN NOTE
HFrEF due to iCMP  EF 10-15% on echo along with severe MR and mod AS per echo report from prior hospital   initial hemodynamics on  2 mcg/kg/min on arrival CVP 7 SVO2 57  - Patient was briefly weaned off dobutamine 9/1/23 afternoon, restarted 9/1 with consistently low MAPS to 50-60s. Asymptomatic during episodes. Improved on dobutamine.    Plan:  -  restarted dobutamine 2.5->5  -  RHC 9/5 for potential home dobutamine but still high BiV pressures and will need more diuresis prior to repeat RHC to assess for home dobutamine

## 2023-09-08 NOTE — PLAN OF CARE
CICU DAILY GOALS   Plan for the day   No acute events noted this shift, VS and assessment per flow sheet, patient progressing towards goals as tolerated, plan of care reviewed with Ishmael Thrasher and all concerns addressed. Dobutamine @ 5 mcg/kg/min.    Family/Goals of care/Code Status   Code Status: Full Code    A: Awake    RASS: Goal - RASS Goal: 0-->alert and calm  Actual - RASS (Diaz Agitation-Sedation Scale): alert and calm      B: Breath   Room air    C: Coordinate A & B, analgesics/sedatives   Pain: managed      D: Delirium   CAM-ICU: Overall CAM-ICU: Negative  E: Early(intubated/ Progressive (non-intubated) Mobility   MOVE Screen: Pass   Activity: Activity Management: Arm raise - L1, Ankle pumps - L1  Patient able to reposition independently.    FAS: Feeding/Nutrition   Diet order: Diet/Nutrition Received: low saturated fat/low cholesterol,   Fluid restriction: Fluid Requirement: 1500 mL FR   Nutritional Supplement Intake: Quantity 0, Type: Boost    T: Thrombus   DVT prophylaxis: VTE Required Core Measure: Pharmacological prophylaxis initiated/maintained    H: HOB Elevation   Head of Bed (HOB) Positioning: HOB at 30-45 degrees    U: Ulcer Prophylaxis   GI: no    G: Glucose control   managed      S: Skin   Bundle compliance: yes   Bathing/Skin Care: bath, complete, back care, dressed/undressed, linen changed Date: 9/7/2023    B: Bowel Function   no issues LB 9/7/2023    I: Indwelling Catheters   Fontenot necessity:     CVC necessity: Yes    D: De-escalation Antibx   No

## 2023-09-08 NOTE — PROGRESS NOTES
[facility]iac [facility]  Cardiology  Progress Note    Patient Name: Ishmael Thrasher  MRN: [id number]  Admission Date: 8/28/2023  Hospital Length of Stay: 11 days  Code Status: Full Code   Attending Physician: Rufus Frankel MD   Primary Care Physician: [facility]  Expected Discharge Date: 9/12/2023  Principal Problem:Cardiogenic shock    Subjective:     Interval History: No acute events overnight, afebrile, hemodynamically stable.  Hemodynamics: CVP:10 , Scvo2:44, CO:4.4 , CI:1.94 , SVR:1137.    Objective:     Vital Signs (Most Recent):  Temp: 98.5 °F (36.9 °C) (09/08/23 0305)  Pulse: (!) 115 (09/08/23 0605)  Resp: (!) 21 (09/08/23 0605)  BP: 94/64 (09/08/23 0605)  SpO2: (!) 94 % (09/08/23 0605) Vital Signs (24h Range):  Temp:  [98 °F (36.7 °C)-98.8 °F (37.1 °C)] 98.5 °F (36.9 °C)  Pulse:  [109-127] 115  Resp:  [14-37] 21  SpO2:  [94 %-100 %] 94 %  BP: ()/(52-76) 94/64     Weight: 97.9 kg (215 lb 13.3 oz)  Body mass index is 26.27 kg/m².    Intake/Output Summary (Last 24 hours) at 9/8/2023 0737  Last data filed at 9/8/2023 0605  Gross per 24 hour   Intake 1651.89 ml   Output 1525 ml   Net 126.89 ml     Physical Exam  Constitutional:       Appearance: Normal appearance. He is not toxic-appearing.   HENT:      Head: Normocephalic and atraumatic.   Eyes:      General: No scleral icterus.  Cardiovascular:      Rate and Rhythm: Regular rhythm. Tachycardia present.      Pulses: Normal pulses.      Heart sounds: Murmur heard.   Pulmonary:      Effort: Pulmonary effort is normal. No respiratory distress.      Breath sounds: Normal breath sounds. No wheezing or rales.   Abdominal:      General: Abdomen is flat. Bowel sounds are normal.      Palpations: Abdomen is soft.   Musculoskeletal:         General: No swelling or tenderness. Normal range of motion.      Cervical back: Normal range of motion and neck supple.      Right lower leg: No edema.      Left lower leg: No edema.    Skin:     General: Skin is warm and dry.      Coloration: Skin is not jaundiced or pale.      Findings: No bruising.   Neurological:      Mental Status: He is alert and oriented to person, place, and time. Mental status is at baseline.     Significant Labs:   CMP   Recent Labs   Lab 09/07/23  0403 09/07/23  1815 09/08/23  0353   * 136 136   K 4.4 4.2 4.7    101 104   CO2 23 21* 22*    181* 111*   BUN 29* 26* 28*   CREATININE 1.5* 1.5* 1.7*   CALCIUM 9.0 8.8 9.1   PROT 6.1  --  6.1   ALBUMIN 3.1*  --  3.0*   BILITOT 2.0*  --  2.5*   ALKPHOS 102  --  101   AST 13  --  17   ALT 14  --  14   ANIONGAP 9 14 10     CBC   Recent Labs   Lab 09/07/23  0403 09/08/23  0353   WBC 12.71* 12.63   HGB 11.0* 11.1*   HCT 34.1* 33.9*    202     Significant Imaging: Reviewed  Assessment and Plan:     * Cardiogenic shock  HFrEF due to iCMP  EF 10-15% on echo along with severe MR and mod AS per echo report from prior hospital   initial hemodynamics on  2 mcg/kg/min on arrival CVP 7 SVO2 57  - Patient was briefly weaned off dobutamine 9/1/23 afternoon, restarted 9/1 with consistently low MAPS to 50-60s. Asymptomatic during episodes. Improved on dobutamine.    Plan:  -  restarted dobutamine 2.5->5  -  RHC 9/5 for potential home dobutamine but still high BiV pressures and will need more diuresis prior to repeat RHC to assess for home dobutamine    Pacemaker  Patient has a BiV [employer] pacemaker in place. Sinus tach with BiV pacing.    Moderate aortic stenosis  DSE    A low dose Dobutamine stress echo was performed to evaluate low flow low gradient aortic stenosis.    Aortic Valve:  There is moderate to severe low flow low gradient aortic stenosis. Aortic valve area by VTI is 0.82 cm². Following infusion of dobutamine, there was no increase in LVOT or aortic VTI, velocity or gradient. The aortic valve area by VTI at 15 mcg/kg/min of dobutamine is 1.1 cm². Peak velocity 2.9 m/s and mean gradient 19 mm Hg.     Post-stress Impression: There is no change is the LV stroke volume with dobutamine consistent with a lack of contractile reserve  Results:  -CT cardiac scoring Aortic valve calcium score is 1484.    Plan:  - No interventions.    PAM (obstructive sleep apnea)  CPAP ordered     Paroxysmal A-fib  -currently in sinus tach 105  -Takes Eliquis at home for pAF and chronic DVT  -Switch Eliquis to heparin gtt for now    Plan:  - on heparin gtt    PTSD (post-traumatic stress disorder)  Resume psych meds    Chronic deep vein thrombosis (DVT) of right lower extremity  Takes Eliquis at home for pAF and chronic DVT  Switch Eliquis to heparin gtt for now    Gout  -Acute gout attack [facility] 8/[facility]  -Uric acid 11.6 at that time    Plan  -continue allopurinol 300 daily (modify dose in AM if needed)  -on colchicine 0.6 mg qd, continue  -was [facility], on prednisone taper (will give 5 mg prednisone x 3 more days and then stop)    Ischemic cardiomyopathy  -CAD s/p CABG x 4 (3/2014 in [place] with LIMA-LAD, SVG-RCA, SVG-PLV, SVG-D2) followed by PCI late same year in 10/2014 with LANA x 3 to RCA and LANA x 3 to left coronary system  -ASA 81 daily and HI statin  -Patient was stopped on dobutamine 9/1/23 afternoon, now having consistently low MAPS to 50-60s.     Results:  RA: 14 RV: 80/ 5/ 15 PA: 75/ 47/ 52 PWP: 28 .   Cardiac output was 3.0. Cardiac index is 1.3 L/min/m2.   O2 Sat: PA 37%.    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

## 2023-09-08 NOTE — ASSESSMENT & PLAN NOTE
-CAD s/p CABG x 4 (3/2014 in [place] with LIMA-LAD, SVG-RCA, SVG-PLV, SVG-D2) followed by PCI late same year in 10/2014 with LANA x 3 to RCA and LANA x 3 to left coronary system  -ASA 81 daily and HI statin  -Patient was stopped on dobutamine 9/1/23 afternoon, now having consistently low MAPS to 50-60s.     Results:  RA: 14 RV: 80/ 5/ 15 PA: 75/ 47/ 52 PWP: 28 .   Cardiac output was 3.0. Cardiac index is 1.3 L/min/m2.   O2 Sat: PA 37%.    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

## 2023-09-08 NOTE — SIGNIFICANT EVENT
"1430 - Patient complaining burning mid-sternal chest pain and nausea. Patient's MAP in the 50s and in a-fib with RVR. AICD spikes not visible on cardiac monitor. MD, Yohannes George notified.    1510 - MD at bedside. MD ordered 2g Mag via IVPB, Amiodarone bolus and drip, and lasix bolus and drip. Bladder scan showed 140 cc. SBP 80s, MD ordered to hold off on Amio bolus d/t soft pressures and start load.    1530- MD ordered to start bolus due to MAPs being in 70s.     1542- Pt groaning and having a hard time opening eyes. Unable to obtain manual BP, doppler 54. Ariel MILLER notified. Ordered to place in trendelenburg. Patient became increasingly nauseated and retching. Patient turned to right side and immediately became unresponsive for about 15 seconds. Charge at bedside. MD at bedside. Patient was pulsatile. Patient became responsive, complaining of back pain, aching and cramping 7/10 chest pain with right shoulder pain.     1600 - MD ordered SVO2 and Lactate.    1630- Attending at bedside, Ordered Nitric and Epi gtt. Q4 Hemodynamics with Lactate.    1700-  Patient chest pain is 3/10 with mild nausea and states "feeling better". Vitals are at baseline.      "

## 2023-09-08 NOTE — ASSESSMENT & PLAN NOTE
DSE    A low dose Dobutamine stress echo was performed to evaluate low flow low gradient aortic stenosis.    Aortic Valve:  There is moderate to severe low flow low gradient aortic stenosis. Aortic valve area by VTI is 0.82 cm². Following infusion of dobutamine, there was no increase in LVOT or aortic VTI, velocity or gradient. The aortic valve area by VTI at 15 mcg/kg/min of dobutamine is 1.1 cm². Peak velocity 2.9 m/s and mean gradient 19 mm Hg.    Post-stress Impression: There is no change is the LV stroke volume with dobutamine consistent with a lack of contractile reserve  Results:  -CT cardiac scoring Aortic valve calcium score is 1484.    Plan:  - No interventions.

## 2023-09-08 NOTE — ASSESSMENT & PLAN NOTE
  Mitral Valve: There is mild to moderate regurgitation.    Pulmonary Artery: The estimated pulmonary artery systolic pressure is 65 mmHg.    IVC/SVC: Elevated venous pressure at 15 mmHg     Plan:  - no interventions

## 2023-09-08 NOTE — SIGNIFICANT EVENT
SHOCK CALL 4:20pm 9/8/2023    Mr. Thrasher developed atrial fibrillation with RVR, heart rates to 140s, along with clinical deterioration this afternoon, he was placed on amiodarone whereby he cardioverted with well-controlled rates, however continued to feel poorly with somnolence and nausea, thus repeat hemodynamics and chemistry obtained.  Cardiac output 3.2, cardiac index 1.4, SvO2 25, CVP 15, SVR 1350.  Shock call was placed and discussed case with Dr. Love, Dr. Gonsales, and Dr. Barragan.  He was initially evaluated by the [facility] team for LVAD upon transfer 10 days ago, and he declined LVAD at that time.  This morning on rounds as I discussed potential palliative inotrope therapy as they have had difficulty weaning during this admission, he expressed that he had come to terms with his condition and was now interested in LVAD.  We asked the [facility] team to re-evaluate him this morning, but he deteriorated prior to this.  Upon my chart review he was evaluated in 2021, and was seen by Dr. Truong at that time, and given extensive aortic calcifications he was felt to not be a candidate for LVAD.  Given that his aortic calcifications have likely not improved over the past 2 years, it was felt that he was not a candidate for any advanced options at this time.  Discussed balloon pump however he was noted to have severe extensive bilateral iliac disease that was felt to be a contraindication to IABP placement.    Thus will continue to maximize medical therapy adding nitric oxide for afterload reduction, and epinephrine for additional inotropic support.    Discussed case with his daughter at bedside as well as the patient, and explained current plan of care.    -Rufus Frankel

## 2023-09-08 NOTE — PT/OT/SLP PROGRESS
Physical Therapy      Patient Name:  Ishmael Thrasher   MRN:  [id number]    Patient not seen today secondary to  (pt not seen but can meet POC with subsequent visits.). Will follow-up at a later date.    9/8/2023  .

## 2023-09-08 NOTE — SUBJECTIVE & OBJECTIVE
Interval History: No acute events overnight, afebrile, hemodynamically stable.  Hemodynamics: CVP:10 , Scvo2:44, CO:4.4 , CI:1.94 , SVR:1137.    Objective:     Vital Signs (Most Recent):  Temp: 98.5 °F (36.9 °C) (09/08/23 0305)  Pulse: (!) 115 (09/08/23 0605)  Resp: (!) 21 (09/08/23 0605)  BP: 94/64 (09/08/23 0605)  SpO2: (!) 94 % (09/08/23 0605) Vital Signs (24h Range):  Temp:  [98 °F (36.7 °C)-98.8 °F (37.1 °C)] 98.5 °F (36.9 °C)  Pulse:  [109-127] 115  Resp:  [14-37] 21  SpO2:  [94 %-100 %] 94 %  BP: ()/(52-76) 94/64     Weight: 97.9 kg (215 lb 13.3 oz)  Body mass index is 26.27 kg/m².    Intake/Output Summary (Last 24 hours) at 9/8/2023 0737  Last data filed at 9/8/2023 0605  Gross per 24 hour   Intake 1651.89 ml   Output 1525 ml   Net 126.89 ml     Physical Exam  Constitutional:       Appearance: Normal appearance. He is not toxic-appearing.   HENT:      Head: Normocephalic and atraumatic.   Eyes:      General: No scleral icterus.  Cardiovascular:      Rate and Rhythm: Regular rhythm. Tachycardia present.      Pulses: Normal pulses.      Heart sounds: Murmur heard.   Pulmonary:      Effort: Pulmonary effort is normal. No respiratory distress.      Breath sounds: Normal breath sounds. No wheezing or rales.   Abdominal:      General: Abdomen is flat. Bowel sounds are normal.      Palpations: Abdomen is soft.   Musculoskeletal:         General: No swelling or tenderness. Normal range of motion.      Cervical back: Normal range of motion and neck supple.      Right lower leg: No edema.      Left lower leg: No edema.   Skin:     General: Skin is warm and dry.      Coloration: Skin is not jaundiced or pale.      Findings: No bruising.   Neurological:      Mental Status: He is alert and oriented to person, place, and time. Mental status is at baseline.     Significant Labs:   CMP   Recent Labs   Lab 09/07/23  0403 09/07/23  1815 09/08/23  0353   * 136 136   K 4.4 4.2 4.7    101 104   CO2 23 21* 22*     181* 111*   BUN 29* 26* 28*   CREATININE 1.5* 1.5* 1.7*   CALCIUM 9.0 8.8 9.1   PROT 6.1  --  6.1   ALBUMIN 3.1*  --  3.0*   BILITOT 2.0*  --  2.5*   ALKPHOS 102  --  101   AST 13  --  17   ALT 14  --  14   ANIONGAP 9 14 10     CBC   Recent Labs   Lab 09/07/23  0403 09/08/23  0353   WBC 12.71* 12.63   HGB 11.0* 11.1*   HCT 34.1* 33.9*    202     Significant Imaging: Reviewed

## 2023-09-08 NOTE — NURSING
Nurses Note -- 4 Eyes      9/8/2023   6:35 PM      Skin assessed during: Daily Assessment      [] No Altered Skin Integrity Present    []Prevention Measures Documented      [x] Yes- Altered Skin Integrity Present or Discovered   [] LDA Added if Not in Epic (Describe Wound)   [x] New Altered Skin Integrity was Present on Admit and Documented in LDA   [] Wound Image Taken    Wound Care Consulted? No    Attending Nurse:  CHRISTOPHER Henriquez     Second RN/Staff Member:  CHRISTOPHER Barrera

## 2023-09-09 PROBLEM — N18.32 STAGE 3B CHRONIC KIDNEY DISEASE (CKD): Status: ACTIVE | Noted: 2020-04-07

## 2023-09-09 NOTE — CONSULTS
"[facility]  Nephrology  Consult Note    Patient Name: Ishmael Thrasher  MRN: [id number]  Admission Date: 8/28/2023  Hospital Length of Stay: 12 days  Attending Provider: Rufus Frankel MD   Primary Care Physician: [facility]  Principal Problem:Cardiogenic shock    Inpatient consult to Nephrology  Consult performed by: Elijah Ta MD  Consult ordered by: Seamus Mendez MD        Subjective:     HPI: 73 year old man with HFrEF (10-15%), severe aortic stenosis, severe MR, [employer] CRT-D, CAD sp 4 vessel CABG, LANA x6, DVT on eliquis, pAF, PAM, CKD, DM, HTN, HLD admitted to CCU for cardiogenic shock[facility] with values consistent with cardiogenic shock. He was started on  and transferred to [facility]. On arrival to [facility], he had a CVP of 16, CI 1.63/CO 3.74/ SVR 1303. He was started on a lasix drip at 15 and dobutamine was increased from 2.5 to 5.    In the following days, he had a left heart cath on 8/29 which showed 100% occlusion of the distal LAD, prox Cx to mid Cx and RPDA with 3 patent grafts and three vessel CAD. 9/5 he had a RHC with showed RA 14, RV 80/5, EDP 15, PA 74/45 (52), PCWP 28    Apropos CKD, he has a baseline sCr of 2.0. CKD 3b. During this admission, at time of consultation he is net negative 2.5 liters, though he has been without a lawson this admission. He was on a lasix drop at 20 per hour and given diuril 500 BID prior with 700 cc UOP. Last imaging of the kidneys was in 2020 with a retroperitoneal US of "Right kidney: The right kidney measures 10.9 cm. No cortical thinning. No loss of corticomedullary distinction. Resistive index measures 0.68.  No mass. No renal stone. No hydronephrosis. Left kidney: The left kidney measures 11.5 cm. No cortical thinning. No loss of corticomedullary distinction. Resistive index measures 0.61.  No mass. No renal stone. No " "hydronephrosis.  2.3 x 2.2 x 2.3 cm simple cyst in the upper pole." No UA or check of UPCR in [facility] system since 2020.    At time of consultation, he is on dobutamine, epi, levo.     Nephrology consulted for assistance with diuresis          Review of patient's allergies indicates:   Allergen Reactions    Enalapril maleate Swelling and Edema     Other reaction(s): Swelling    Vasotec [enalaprilat] Swelling     Neck swelling    Zoloft [sertraline] Other (See Comments)     Patient states it put him to sleep in the hospital he could not talk nor move    Cyclobenzaprine Hallucinations     Other reaction(s): Swelling  Hallucinations according to patient.    Other reaction(s): Lymphadenopathy, Restlessness, Sensation of being cold, Restlessness, Sensation of being cold    Amitriptyline Hallucinations    Labetalol      Other reaction(s): Pharyngeal swelling    Lisinopril      Other reaction(s): Pharyngeal swelling    Tramadol Nausea Only and Hallucinations     Current Facility-Administered Medications   Medication Frequency    LIDOcaine (PF) 10 mg/ml (1%) 10 mg/mL (1 %) injection     acetaminophen tablet 650 mg Q6H PRN    acetaminophen tablet 650 mg Q4H PRN    allopurinol split tablet 150 mg Daily    apixaban tablet 5 mg BID    aspirin EC tablet 81 mg Daily    chlorothiazide (DIURIL) 1,000 mg in dextrose 5 % (D5W) 50 mL IVPB Once    DOBUtamine 1000 mg in D5W 250 mL infusion Continuous    EPINEPHrine 5 mg in dextrose 5% 250 mL infusion (premix) Continuous    furosemide (LASIX) 500 mg in 50 mL infusion (conc: 10 mg/mL) Continuous    gabapentin capsule 300 mg BID    melatonin tablet 9 mg Nightly PRN    miconazole nitrate 2% ointment BID    NORepinephrine 4 mg in dextrose 5% 250 mL infusion (premix) Continuous    pantoprazole EC tablet 40 mg Before breakfast    polyethylene glycol packet 17 g Daily PRN    prochlorperazine injection Soln 2.5 mg Q6H PRN    senna-docusate 8.6-50 mg per tablet 1 tablet " Daily    sodium chloride 0.9% flush 3 mL Q6H PRN       Objective:     Vital Signs (Most Recent):  Temp: 97.8 °F (36.6 °C) (09/09/23 1100)  Pulse: (!) 115 (09/09/23 1356)  Resp: (!) 23 (09/09/23 1300)  BP: (!) 80/64 (09/09/23 0305)  SpO2: 98 % (09/09/23 1300) Vital Signs (24h Range):  Temp:  [97.6 °F (36.4 °C)-98.6 °F (37 °C)] 97.8 °F (36.6 °C)  Pulse:  [] 115  Resp:  [11-91] 23  SpO2:  [90 %-100 %] 98 %  BP: ()/(0-68) 80/64  Arterial Line BP: ()/(50-70) 82/57     Weight: 97.9 kg (215 lb 13.3 oz) (09/01/23 2305)  Body mass index is 26.27 kg/m².  Body surface area is 2.29 meters squared.    I/O last 3 completed shifts:  In: 1519.9 [P.O.:720; I.V.:715.1; IV Piggyback:84.8]  Out: 1300 [Urine:1300]     Physical Exam  Constitutional:       General: He is not in acute distress.     Appearance: He is ill-appearing. He is not toxic-appearing.      Comments: Cool to touch   HENT:      Head: Normocephalic and atraumatic.   Cardiovascular:      Rate and Rhythm: Tachycardia present.      Heart sounds: Murmur heard.   Pulmonary:      Effort: Pulmonary effort is normal.      Breath sounds: No wheezing or rales.   Abdominal:      General: Abdomen is flat. There is no distension.      Tenderness: There is no abdominal tenderness.   Musculoskeletal:      Right lower leg: Edema present.      Left lower leg: Edema present.   Neurological:      General: No focal deficit present.      Mental Status: He is alert.          Significant Labs:  All labs within the past 24 hours have been reviewed.     Significant Imaging:  Labs: Reviewed    Assessment/Plan:     Cardiac/Vascular  * Cardiogenic shock  73 year old man with an extensive cardiac history, but in summary with end stage heart failure and not a candidate for advanced options admitted to [facility] with cardiogenic shock     Plan/Recommendation  -Consent for dialysis obtained 9/9/23  -Appears to be at this baseline renal function, has a history of CKD 3b and at time of  consultation is making urine on diuril 500 bid and lasix 20 gtt  -Discussed with cardiology, okay to increase lasix to 40 gtt and increase diuril to 1 g bid  -Recommend placing lawson to follow UOP accurately  -If worsening hemodynamics, can start SCUF to assist with volume management, though if not a candidate for advanced options RRT will not change the outcome.  -Recommend pallative consult    Renal/  Stage 3b chronic kidney disease (CKD)  See cardiogenic shock        Thank you for your consult. I will follow-up with patient. Please contact us if you have any additional questions.    Elijah Ta MD  Nephrology  [facility]

## 2023-09-09 NOTE — PROCEDURES
"Ishmael Thrasher is a 73 y.o. male patient.    Temp: 98.6 °F (37 °C) (09/08/23 2345)  Pulse: 102 (09/09/23 0054)  Resp: 16 (09/09/23 0054)  BP: (!) 80/51 (09/08/23 2345)  SpO2: 99 % (09/09/23 0054)  Weight: 97.9 kg (215 lb 13.3 oz) (09/01/23 2305)  Height: 6' 4" (193 cm) (09/01/23 2305)  Mallampati Scale: Class III  ASA Classification: Class 3    Arterial Line    Date/Time: 9/9/2023 1:54 AM  Location procedure was performed: [facility] CARDIOLOGY    Performed by: Stone Benton MD  Authorized by: Stone Benton MD  Preparation: Patient was prepped and draped in the usual sterile fashion.  Indications: hemodynamic monitoring  Location: left radial    Anesthesia:  Local Anesthetic: lidocaine 1% without epinephrine  Luke's test normal: yes  Needle gauge: 20  Number of attempts: 1  Estimated blood loss (mL): 1  Post-procedure: line sutured and dressing applied  Post-procedure CMS: normal  Patient tolerance: Patient tolerated the procedure well with no immediate complications          9/9/2023    "

## 2023-09-09 NOTE — EICU
Intervention Initiated From:  Bedside    Shailesh intervened regarding:  Time-Out    Nurse Notified:  No    Doctor Notified:  No    Comments: A-line insertion done per Dr. Stone Benton with charge nurse at bedside. See flowsheet for time out.

## 2023-09-09 NOTE — SIGNIFICANT EVENT
After discussion with patient we obtained a second opinion in regards to his candidacy for durable mechanical circulatory support. Formal request was made with Anton[facility] Rosenthal and case discussed with Dr. Navarro.  After review of his case and discussion with their surgical team, they also felt that he was too high risk for advanced options.  Decision was discussed with both patient and family at bedside.  I discussed his overall poor prognosis and they wished for all available measures to be performed at this time.      -Rufus Frankel

## 2023-09-09 NOTE — ASSESSMENT & PLAN NOTE
HFrEF due to iCMP  EF 10-15% on echo along with severe MR and mod AS per echo report from prior hospital   initial hemodynamics on  2 mcg/kg/min on arrival CVP 7 SVO2 57  - Patient was briefly weaned off dobutamine 9/1/23 afternoon, restarted 9/1 with consistently low MAPS to 50-60s. Asymptomatic during episodes. Improved on dobutamine.    Results:  Hemodynamics 5;30AM : CVP:16 , Scvo2:50 , CO:4.4 , CI:1.95 , SVR:900.      Plan:  - q4hr hemodynamics   - dobutamine 5, epi 0.06, decrease levo w/ MAP goals >60   - d/c amio   - will discuss cause with outside facility for possible cardiac interventions

## 2023-09-09 NOTE — SUBJECTIVE & OBJECTIVE
Review of patient's allergies indicates:   Allergen Reactions    Enalapril maleate Swelling and Edema     Other reaction(s): Swelling    Vasotec [enalaprilat] Swelling     Neck swelling    Zoloft [sertraline] Other (See Comments)     Patient states it put him to sleep in the hospital he could not talk nor move    Cyclobenzaprine Hallucinations     Other reaction(s): Swelling  Hallucinations according to patient.    Other reaction(s): Lymphadenopathy, Restlessness, Sensation of being cold, Restlessness, Sensation of being cold    Amitriptyline Hallucinations    Labetalol      Other reaction(s): Pharyngeal swelling    Lisinopril      Other reaction(s): Pharyngeal swelling    Tramadol Nausea Only and Hallucinations     Current Facility-Administered Medications   Medication Frequency    LIDOcaine (PF) 10 mg/ml (1%) 10 mg/mL (1 %) injection     acetaminophen tablet 650 mg Q6H PRN    acetaminophen tablet 650 mg Q4H PRN    allopurinol split tablet 150 mg Daily    apixaban tablet 5 mg BID    aspirin EC tablet 81 mg Daily    chlorothiazide (DIURIL) 1,000 mg in dextrose 5 % (D5W) 50 mL IVPB Once    DOBUtamine 1000 mg in D5W 250 mL infusion Continuous    EPINEPHrine 5 mg in dextrose 5% 250 mL infusion (premix) Continuous    furosemide (LASIX) 500 mg in 50 mL infusion (conc: 10 mg/mL) Continuous    gabapentin capsule 300 mg BID    melatonin tablet 9 mg Nightly PRN    miconazole nitrate 2% ointment BID    NORepinephrine 4 mg in dextrose 5% 250 mL infusion (premix) Continuous    pantoprazole EC tablet 40 mg Before breakfast    polyethylene glycol packet 17 g Daily PRN    prochlorperazine injection Soln 2.5 mg Q6H PRN    senna-docusate 8.6-50 mg per tablet 1 tablet Daily    sodium chloride 0.9% flush 3 mL Q6H PRN       Objective:     Vital Signs (Most Recent):  Temp: 97.8 °F (36.6 °C) (09/09/23 1100)  Pulse: (!) 115 (09/09/23 1356)  Resp: (!) 23 (09/09/23 1300)  BP: (!) 80/64 (09/09/23 0305)  SpO2: 98 % (09/09/23 1300) Vital  Signs (24h Range):  Temp:  [97.6 °F (36.4 °C)-98.6 °F (37 °C)] 97.8 °F (36.6 °C)  Pulse:  [] 115  Resp:  [11-91] 23  SpO2:  [90 %-100 %] 98 %  BP: ()/(0-68) 80/64  Arterial Line BP: ()/(50-70) 82/57     Weight: 97.9 kg (215 lb 13.3 oz) (09/01/23 2305)  Body mass index is 26.27 kg/m².  Body surface area is 2.29 meters squared.    I/O last 3 completed shifts:  In: 1519.9 [P.O.:720; I.V.:715.1; IV Piggyback:84.8]  Out: 1300 [Urine:1300]     Physical Exam  Constitutional:       General: He is not in acute distress.     Appearance: He is ill-appearing. He is not toxic-appearing.      Comments: Cool to touch   HENT:      Head: Normocephalic and atraumatic.   Cardiovascular:      Rate and Rhythm: Tachycardia present.      Heart sounds: Murmur heard.   Pulmonary:      Effort: Pulmonary effort is normal.      Breath sounds: No wheezing or rales.   Abdominal:      General: Abdomen is flat. There is no distension.      Tenderness: There is no abdominal tenderness.   Musculoskeletal:      Right lower leg: Edema present.      Left lower leg: Edema present.   Neurological:      General: No focal deficit present.      Mental Status: He is alert.          Significant Labs:  All labs within the past 24 hours have been reviewed.     Significant Imaging:  Labs: Reviewed

## 2023-09-09 NOTE — PROGRESS NOTES
[facility] - [facility]  Cardiology  Progress Note    Patient Name: Ishmael Thrasher  MRN: [id number]  Admission Date: 8/28/2023  Hospital Length of Stay: 12 days  Code Status: Full Code   Attending Physician: Rufus Frankel MD   Primary Care Physician: [facility]  Expected Discharge Date: 9/12/2023  Principal Problem:Cardiogenic shock    Subjective:     Hospital Course:   Patient is getting a left heart catheterization on 08/29/2023.  Repeat echo reveals severely reduced systolic function with estimated ejection fraction of 10 to 15%.  Aortic valve:  Moderate to severe stenosis low-flow.  In the mean stress echo was performed to assess aortic valve which showed Aortic Valve:  There is moderate to severe low flow low gradient aortic stenosis. There is no change is the LV stroke volume with dobutamine consistent with a lack of contractile reserve.  No valvular interventions at this time. RHC done on 9/4/23 revealing continuing hypervolemia with attempting to diuresis. 9/9/23 patient developed afib with RVR along with clinical deterioration shock call was placed with no intervention this time.  Attempted to reach out to outside facilities possible cardiac interventions.      Interval History:  Hemodynamically unstable last night.  Patient is currently on dobutamine 5, epinephrine 0.06, Barney 0.06, amnio p 0.5.  Patient is having difficulty producing urine despite being on Lasix GTT 20.     5:30 AM: Hemodynamics: CVP:16 , Scvo2:50 , CO:4.4 , CI:1.95 , SVR:900.      ROS  Objective:     Vital Signs (Most Recent):  Temp: 97.6 °F (36.4 °C) (09/09/23 0700)  Pulse: 110 (09/09/23 0645)  Resp: 20 (09/09/23 0645)  BP: (!) 80/64 (09/09/23 0305)  SpO2: 97 % (09/09/23 0840) Vital Signs (24h Range):  Temp:  [97.6 °F (36.4 °C)-98.6 °F (37 °C)] 97.6 °F (36.4 °C)  Pulse:  [] 110  Resp:  [11-91] 20  SpO2:  [90 %-100 %] 97 %  BP: ()/(0-68) 80/64  Arterial Line BP: (71-91)/(50-61) 86/59      Weight: 97.9 kg (215 lb 13.3 oz)  Body mass index is 26.27 kg/m².     SpO2: 97 %         Intake/Output Summary (Last 24 hours) at 9/9/2023 1208  Last data filed at 9/9/2023 0800  Gross per 24 hour   Intake 666.84 ml   Output 500 ml   Net 166.84 ml       Lines/Drains/Airways       Central Venous Catheter Line  Duration             Percutaneous Central Line Insertion/Assessment - Triple Lumen  08/28/23 1800 Internal Jugular Left 11 days              Arterial Line  Duration             Arterial Line 09/08/23 2026 Left Radial <1 day              Peripheral Intravenous Line  Duration                  Peripheral IV - Single Lumen 09/08/23 1526 22 G Anterior;Left;Proximal Forearm <1 day                       Physical Exam  Constitutional:       Appearance: He is ill-appearing.   HENT:      Head: Normocephalic and atraumatic.   Eyes:      General: No scleral icterus.  Cardiovascular:      Rate and Rhythm: Regular rhythm. Tachycardia present.      Pulses: Normal pulses.      Heart sounds: Murmur heard.   Pulmonary:      Effort: Pulmonary effort is normal. No respiratory distress.      Breath sounds: Normal breath sounds. No wheezing or rales.   Abdominal:      General: Abdomen is flat. Bowel sounds are normal.      Palpations: Abdomen is soft.   Musculoskeletal:         General: No swelling or tenderness. Normal range of motion.      Cervical back: Normal range of motion and neck supple.      Right lower leg: No edema.      Left lower leg: No edema.      Comments: Art line in in the left radial artery.   Skin:     General: Skin is warm and dry.      Coloration: Skin is not jaundiced or pale.      Findings: No bruising.   Neurological:      Mental Status: He is alert and oriented to person, place, and time. Mental status is at baseline.            Significant Labs: CMP   Recent Labs   Lab 09/08/23  0353 09/08/23  1432 09/08/23  2219 09/09/23  0228    134* 133* 132*   K 4.7 4.0 4.3 4.6    101 99 100   CO2 22*  23 20* 16*   * 141* 204* 188*   BUN 28* 27* 30* 31*   CREATININE 1.7* 1.8* 2.1* 2.2*   CALCIUM 9.1 8.8 8.9 9.1   PROT 6.1  --  6.3 6.3  6.3   ALBUMIN 3.0*  --  3.0* 3.1*  3.1*   BILITOT 2.5*  --  2.5* 2.6*  2.6*   ALKPHOS 101  --  124 128  128   AST 17  --  101* 693*  703*   ALT 14  --  90* 586*  594*   ANIONGAP 10 10 14 16    and CBC   Recent Labs   Lab 09/08/23  0353 09/08/23  0814 09/09/23  0228   WBC 12.63  --  13.91*   HGB 11.1*  --  11.8*   HCT 33.9*   < > 36.0*     --  213    < > = values in this interval not displayed.       Significant Imaging:       Assessment and Plan:         * Cardiogenic shock  HFrEF due to iCMP  EF 10-15% on echo along with severe MR and mod AS per echo report from prior hospital   initial hemodynamics on  2 mcg/kg/min on arrival CVP 7 SVO2 57  - Patient was briefly weaned off dobutamine 9/1/23 afternoon, restarted 9/1 with consistently low MAPS to 50-60s. Asymptomatic during episodes. Improved on dobutamine.    Results:  Hemodynamics 5;30AM : CVP:16 , Scvo2:50 , CO:4.4 , CI:1.95 , SVR:900.      Plan:  - q4hr hemodynamics   - dobutamine 5, epi 0.06, decrease levo w/ MAP goals >60   - d/c amio   - will discuss cause with outside facility for possible cardiac interventions    Pacemaker  Patient has a BiV [employer] pacemaker in place. Sinus tach with BiV pacing.    Moderate aortic stenosis  DSE    A low dose Dobutamine stress echo was performed to evaluate low flow low gradient aortic stenosis.    Aortic Valve:  There is moderate to severe low flow low gradient aortic stenosis. Aortic valve area by VTI is 0.82 cm². Following infusion of dobutamine, there was no increase in LVOT or aortic VTI, velocity or gradient. The aortic valve area by VTI at 15 mcg/kg/min of dobutamine is 1.1 cm². Peak velocity 2.9 m/s and mean gradient 19 mm Hg.    Post-stress Impression: There is no change is the LV stroke volume with dobutamine consistent with a lack of contractile  reserve  Results:  -CT cardiac scoring Aortic valve calcium score is 1484.    Plan:  - No interventions.    PAM (obstructive sleep apnea)  CPAP ordered     Paroxysmal A-fib  -currently in sinus tach 105  -Takes Eliquis at home for pAF and chronic DVT  -Switch Eliquis to heparin gtt for now    Plan:  - on heparin gtt    PTSD (post-traumatic stress disorder)  Resume psych meds    Chronic deep vein thrombosis (DVT) of right lower extremity  Takes Eliquis at home for pAF and chronic DVT  Switch Eliquis to heparin gtt for now    Gout  -Acute gout attack in 8/2023 [facility]  -Uric acid 11.6 at that time    Plan  -continue allopurinol 300 daily (modify dose in AM if needed)  -on colchicine 0.6 mg qd, continue  -was seen by [facility]um in [facility], on prednisone taper (will give 5 mg prednisone x 3 more days and then stop)    Ischemic cardiomyopathy  -CAD s/p CABG x 4 (3/2014 in [place] with LIMA-LAD, SVG-RCA, SVG-PLV, SVG-D2) followed by PCI late same year in 10/2014 with LANA x 3 to RCA and LANA x 3 to left coronary system  -ASA 81 daily and HI statin  -Patient was stopped on dobutamine 9/1/23 afternoon, now having consistently low MAPS to 50-60s.     Results:  RA: 14 RV: 80/ 5/ 15 PA: 75/ 47/ 52 PWP: 28 .   Cardiac output was 3.0. Cardiac index is 1.3 L/min/m2.   O2 Sat: PA 37%.    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

## 2023-09-09 NOTE — CARE UPDATE
CCU Hemodynamics:   Parameter   at 2030   MAP 55   CVP 15   SvO2 53   CO  4.9   CI 2.1         amiodarone in dextrose 5% Stopped (09/08/23 1531)    amiodarone in dextrose 5% 0.5 mg/min (09/08/23 2034)    DOBUTamine IV infusion (non-titrating) 5 mcg/kg/min (09/08/23 2100)    EPINEPHrine 0.02 mcg/kg/min (09/08/23 1800)    furosemide (LASIX) 500 mg in 50 mL infusion (conc: 10 mg/mL) 20 mg/hr (09/08/23 1800)    nitric oxide gas  10PPM     Assessment/Plan:  -Patient with a-line placed showing lower MAP and SVR. Downtitrating Nitric Oxide for goal map >60.  - Plan was discussed with on-call attending    Gume Benton MD  Cardiovascular Disease PGY-4  [facility]

## 2023-09-09 NOTE — ASSESSMENT & PLAN NOTE
-CAD s/p CABG x 4 (3/2014 in [place] with LIMA-LAD, SVG-RCA, SVG-PLV, SVG-D2) followed by PCI late same year in 10/2014 with LANA x 3 to RCA and LANA x 3 to left coronary system  -ASA 81 daily and HI statin  -Patient was stopped on dobutamine 9/1/23 afternoon, now having consistently low MAPS to 50-60s.     Results:  RA: 14 RV: 80/ 5/ 15 PA: 75/ 47/ 52 PWP: 28 .   Cardiac output was 3.0. Cardiac index is 1.3 L/min/m2.   O2 Sat: PA 37%.    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

## 2023-09-09 NOTE — NURSING
Nurses Note -- 4 Eyes      9/8/2023   7: 05 PM      Skin assessed during: Q Shift Change      [] No Altered Skin Integrity Present    []Prevention Measures Documented      [x] Yes- Altered Skin Integrity Present or Discovered   [] LDA Added if Not in Epic (Describe Wound)   [x] New Altered Skin Integrity was Present on Admit and Documented in LDA   [] Wound Image Taken    Wound Care Consulted? Yes    Attending Nurse:  CHRISTOPHER Marin    Second RN/Staff Member:  CHRISTOPHER Barrera

## 2023-09-09 NOTE — CARE UPDATE
CCU Hemodynamics:   Parameter   at 0230   MAP 55   CVP 13   SvO2 50   CO  4.8   CI 2.1         amiodarone in dextrose 5% 0.5 mg/min (09/09/23 0205)    DOBUTamine IV infusion (non-titrating) 2.5 mcg/kg/min (09/09/23 0205)    EPINEPHrine 0.04 mcg/kg/min (09/09/23 0205)    furosemide (LASIX) 500 mg in 50 mL infusion (conc: 10 mg/mL) 20 mg/hr (09/09/23 0205)    NORepinephrine bitartrate-D5W 0.02 mcg/kg/min (09/09/23 0303)     UOP total: 100mL whole shift    Assessment/Plan:  -Worsening Lactic Acid  -Increase Epi to 0.06  -Start Levophed and titrate to MAP 65  -Statin held for worsening shock liver  - Plan was discussed with on-call attending    Gume Benton MD  Cardiovascular Disease PGY-4  [facility]

## 2023-09-09 NOTE — CARE UPDATE
HTS Hemodynamics:   Parameter   at 2230   MAP 62   CVP 13   SvO2 50   CO  4.8   CI 2.1           amiodarone in dextrose 5% 0.5 mg/min (09/08/23 2205)    DOBUTamine IV infusion (non-titrating) 5 mcg/kg/min (09/08/23 2205)    EPINEPHrine 0.02 mcg/kg/min (09/08/23 2205)    furosemide (LASIX) 500 mg in 50 mL infusion (conc: 10 mg/mL) 20 mg/hr (09/08/23 2205)   Lactic acid 2.07 -> 2.78    Assessment/Plan:  -Epinephrine increased to 0.02 -> 0.04  -Dobutamine 5 -> 2.5.  -MAP goal 60-65  -Lactate in 4 hours  - Plan was discussed with on-call attending    Gume Benton MD  Cardiovascular Disease PGY-4  [facility]

## 2023-09-09 NOTE — ASSESSMENT & PLAN NOTE
73 year old man with an extensive cardiac history, but in summary with end stage heart failure and not a candidate for advanced options [facility] with cardiogenic shock     Plan/Recommendation  -Consent for dialysis obtained 9/9/23  -Appears to be at this baseline renal function, has a history of CKD 3b and at time of consultation is making urine on diuril 500 bid and lasix 20 gtt  -Discussed with cardiology, okay to increase lasix to 40 gtt and increase diuril to 1 g bid  -Recommend placing lawson to follow UOP accurately  -If worsening hemodynamics, can start SCUF to assist with volume management, though if not a candidate for advanced options RRT will not change the outcome.  -Recommend pallative consult

## 2023-09-09 NOTE — PROCEDURES
"Ishmael Thrasher is a 73 y.o. male patient.    Temp: 97.8 °F (36.6 °C) (09/09/23 1100)  Pulse: (!) 115 (09/09/23 1356)  Resp: (!) 23 (09/09/23 1300)  BP: (!) 80/64 (09/09/23 0305)  SpO2: 98 % (09/09/23 1300)  Weight: 97.9 kg (215 lb 13.3 oz) (09/01/23 2305)  Height: 6' 4" (193 cm) (09/01/23 2305)  Mallampati Scale: Class III  ASA Classification: Class 3    Central Line    Date/Time: 9/9/2023 4:21 PM    Performed by: Stone Benton MD  Authorized by: Stoen Benton MD    Location procedure was performed:  [facility] CARDIOLOGY  Assisting Provider:  David Melendrez, DO  Consent Done ?:  Yes  Indications:  Hemodialysis, vascular access, hemodynamic monitoring and med administration  Anesthesia:  Local infiltration  Preparation:  Skin prepped with ChloraPrep  Location:  Right internal jugular  Catheter type:  Trialysis  Catheter size:  13 Fr  Ultrasound guidance: Yes    Vessel Caliber:  Large  Manometry: Yes    Number of attempts:  1  Securement:  Line sutured, chlorhexidine patch and sterile dressing applied  XRay:  Placement verified by x-ray  Adverse Events:  NoneTermination Site: superior vena cava      9/9/2023    "

## 2023-09-09 NOTE — PLAN OF CARE
CICU DAILY GOALS   Plan for the day   Patient no longer progressing towards goal. Shift transpired as followed;     1905- Cardiology Fellow notified of hypotension on automatic cuff pressure. Doppler obtained with result of 66 on R arm and 66 on L arm as well. Verbal orders received to decrease nitric to 5 PPM to minimize vasodilation.     1931- Cardiology Fellow notified of 2.7 Lactate Acid by RRT. SvO2 53 and CVP 15. Consent obtained for arterial line placement.     2035- Cardiology Fellow at bedside for rounding, notified of unrelenting nausea not relieved by zofran. Orders for compazine placed and repeat hemodynamics in 2 hours.     2140- Cardiology Fellow notified of MAPs persistently in the mid to low 50s. Nitric discontinued.     2218- Cardiology Fellow notified of worsening hemodynamics. SvO2 50 Lactate Acid 2.76 and CVP 13. Fellow also notified of 100mL UOP so far this shift only.     2233- Cardiology Fellow at bedside for assessment. Verbal orders received to increase Epi to 0.04 mcg/kg/min and decrease Dobutamine to 2.5 mcg/kg/min. Repeat hemodynamics @ 0200.    0026- Patient noted to have attempted to turn onto left side for comfort. Patient was unable to maintain position left-side lying as MAPs dropped to 52. Patient returned to supine position and Cardiology notified of shift. No new orders received at this time, patient able to recover MAP to >60 after some time.     0216-Repeat hemodynamics relied to Cardiology Fellow, SvO2 50, Lactate 4.21 and CVP 15. Orders recievd to increase Epi to 0.06mcg/kg/min and Levo to maintain a MAP goal > 65.     0410- Cardiology Fellow at bedside for assessment. Clarified if new hemodynamics should be obtained. Verbal orders received to obtain new hemodynamics at 0700.     Dobutamine @ 2.5 mcg/kg/min , Lasix @ 20 mg/hr, Amio @ 0.5 mg/min , Epi @ 0.06 mcg/kg/min and Levo @ 0.06 mcg/kg/min. All questions and concerns answered. Call light within reach, please see  flowsheets for full assessment and vital signs.      Family/Goals of care/Code Status   Code Status: Full Code    A: Awake    RASS: Goal - RASS Goal: 0-->alert and calm  Actual - RASS (Diaz Agitation-Sedation Scale): drowsy     B: Breath   4L Nasal Cannula    C: Coordinate A & B, analgesics/sedatives   Pain: managed     D: Delirium   CAM-ICU: Overall CAM-ICU: Negative    E: Early(intubated/ Progressive (non-intubated) Mobility   MOVE Screen: Pass   Activity: Activity Management: Arm raise - L1, Ankle pumps - L1    FAS: Feeding/Nutrition   Diet order: Diet/Nutrition Received: low saturated fat/low cholesterol,   Fluid restriction: Fluid Requirement: 1500 mL FR   Nutritional Supplement Intake: Quantity 0, Type: Boost    T: Thrombus   DVT prophylaxis: VTE Required Core Measure: Pharmacological prophylaxis initiated/maintained    H: HOB Elevation   Head of Bed (HOB) Positioning: HOB at 20-30 degrees    U: Ulcer Prophylaxis   GI: no  G: Glucose control   managed    S: Skin   Bundle compliance: yes   Bathing/Skin Care: bath, complete, back care, dressed/undressed, linen changed Date: 9/8/2023    B: Bowel Function   no issues     I: Indwelling Catheters   Fontenot necessity:     CVC necessity: Yes    D: De-escalation Antibx   No

## 2023-09-09 NOTE — SUBJECTIVE & OBJECTIVE
Interval History:  Hemodynamically unstable last night.  Patient is currently on dobutamine 5, epinephrine 0.06, Barney 0.06, amnio p 0.5.  Patient is having difficulty producing urine despite being on Lasix GTT 20.     5:30 AM: Hemodynamics: CVP:16 , Scvo2:50 , CO:4.4 , CI:1.95 , SVR:900.      ROS  Objective:     Vital Signs (Most Recent):  Temp: 97.6 °F (36.4 °C) (09/09/23 0700)  Pulse: 110 (09/09/23 0645)  Resp: 20 (09/09/23 0645)  BP: (!) 80/64 (09/09/23 0305)  SpO2: 97 % (09/09/23 0840) Vital Signs (24h Range):  Temp:  [97.6 °F (36.4 °C)-98.6 °F (37 °C)] 97.6 °F (36.4 °C)  Pulse:  [] 110  Resp:  [11-91] 20  SpO2:  [90 %-100 %] 97 %  BP: ()/(0-68) 80/64  Arterial Line BP: (71-91)/(50-61) 86/59     Weight: 97.9 kg (215 lb 13.3 oz)  Body mass index is 26.27 kg/m².     SpO2: 97 %         Intake/Output Summary (Last 24 hours) at 9/9/2023 1208  Last data filed at 9/9/2023 0800  Gross per 24 hour   Intake 666.84 ml   Output 500 ml   Net 166.84 ml       Lines/Drains/Airways       Central Venous Catheter Line  Duration             Percutaneous Central Line Insertion/Assessment - Triple Lumen  08/28/23 1800 Internal Jugular Left 11 days              Arterial Line  Duration             Arterial Line 09/08/23 2026 Left Radial <1 day              Peripheral Intravenous Line  Duration                  Peripheral IV - Single Lumen 09/08/23 1526 22 G Anterior;Left;Proximal Forearm <1 day                       Physical Exam  Constitutional:       Appearance: He is ill-appearing.   HENT:      Head: Normocephalic and atraumatic.   Eyes:      General: No scleral icterus.  Cardiovascular:      Rate and Rhythm: Regular rhythm. Tachycardia present.      Pulses: Normal pulses.      Heart sounds: Murmur heard.   Pulmonary:      Effort: Pulmonary effort is normal. No respiratory distress.      Breath sounds: Normal breath sounds. No wheezing or rales.   Abdominal:      General: Abdomen is flat. Bowel sounds are normal.       Palpations: Abdomen is soft.   Musculoskeletal:         General: No swelling or tenderness. Normal range of motion.      Cervical back: Normal range of motion and neck supple.      Right lower leg: No edema.      Left lower leg: No edema.      Comments: Art line in in the left radial artery.   Skin:     General: Skin is warm and dry.      Coloration: Skin is not jaundiced or pale.      Findings: No bruising.   Neurological:      Mental Status: He is alert and oriented to person, place, and time. Mental status is at baseline.            Significant Labs: CMP   Recent Labs   Lab 09/08/23  0353 09/08/23  1432 09/08/23 2219 09/09/23 0228    134* 133* 132*   K 4.7 4.0 4.3 4.6    101 99 100   CO2 22* 23 20* 16*   * 141* 204* 188*   BUN 28* 27* 30* 31*   CREATININE 1.7* 1.8* 2.1* 2.2*   CALCIUM 9.1 8.8 8.9 9.1   PROT 6.1  --  6.3 6.3  6.3   ALBUMIN 3.0*  --  3.0* 3.1*  3.1*   BILITOT 2.5*  --  2.5* 2.6*  2.6*   ALKPHOS 101  --  124 128  128   AST 17  --  101* 693*  703*   ALT 14  --  90* 586*  594*   ANIONGAP 10 10 14 16    and CBC   Recent Labs   Lab 09/08/23  0353 09/08/23  0814 09/09/23 0228   WBC 12.63  --  13.91*   HGB 11.1*  --  11.8*   HCT 33.9*   < > 36.0*     --  213    < > = values in this interval not displayed.       Significant Imaging:

## 2023-09-09 NOTE — HPI
"73 year old man with HFrEF (10-15%), severe aortic stenosis, severe MR, [facility] [facility] CRT-D, CAD sp 4 vessel CABG, LANA x6, DVT on eliquis, pAF, PAM, CKD, DM, HTN, HLD admitted to CCU for cardiogenic shock, traferred [facility]. [facility] with values consistent with cardiogenic shock. He was started on  and [place] [facility] [facility] had a CVP of 16, CI 1.63/CO 3.74/ SVR 1303. He was started on a lasix drip at 15 and dobutamine was increased from 2.5 to 5.    In the following days, he had a left heart cath on 8/29 which showed 100% occlusion of the distal LAD, prox Cx to mid Cx and RPDA with 3 patent grafts and three vessel CAD. 9/5 he had a RHC with showed RA 14, RV 80/5, EDP 15, PA 74/45 (52), PCWP 28    Apropos CKD, he has a baseline sCr of 2.0. CKD 3b. During this admission, at time of consultation he is net negative 2.5 liters, though he has been without a lawson this admission. He was on a lasix drop at 20 per hour and given diuril 500 BID prior with 700 cc UOP. Last imaging of the kidneys was in 2020 with a retroperitoneal US of "Right kidney: The right kidney measures 10.9 cm. No cortical thinning. No loss of corticomedullary distinction. Resistive index measures 0.68.  No mass. No renal stone. No hydronephrosis. Left kidney: The left kidney measures 11.5 cm. No cortical thinning. No loss of corticomedullary distinction. Resistive index measures 0.61.  No mass. No renal stone. No hydronephrosis.  2.3 x 2.2 x 2.3 cm simple cyst in the upper pole." No UA or check of UPCR in [facility] system since 2020.    At time of consultation, he is on dobutamine, epi, levo.     Nephrology consulted for assistance with diuresis  "

## 2023-09-10 PROBLEM — K72.00 SHOCK LIVER: Status: ACTIVE | Noted: 2023-01-01

## 2023-09-10 NOTE — PROGRESS NOTES
09/09/23 2220   Treatment   Treatment Type SLED   Treatment Status New start   Dialysis Machine Number [id number]   Dialyzer Time (hours) 0   BVP (Liters) 0 L   Solutions Labeled and Current  Yes   Access Temporary Cath;Right;IJ   Catheter Dressing Intact  Yes   Alarms Engaged Yes   CRRT Comments CRRT started per MD orders   $ CRRT Charges   $ CRRT Charges Initial Setup   Prescription   Time (Hours) Continuous   Dialysate K + (mEq/L) 4   Dialysate CA + (mEq/L) 2.25   Dialysate HCO3 - (Bicarb) (mEq/L) 30   Dialysate Na + (mEq/L) 140   Cartridge Type Other  (r300)   Dialysate Flow Rate (mL/min) 200   UF Goal Rate 400 mL/hr

## 2023-09-10 NOTE — ASSESSMENT & PLAN NOTE
HFrEF due to iCMP  EF 10-15% on echo along with severe MR and mod AS per echo report from prior hospital   initial hemodynamics on  2 mcg/kg/min on arrival CVP 7 SVO2 57  - Patient was briefly weaned off dobutamine 9/1/23 afternoon, restarted 9/1 with consistently low MAPS to 50-60s. Asymptomatic during episodes. Improved on dobutamine.    Results:  Hemodynamics 5;30AM : CVP:16 , Scvo2:50 , CO:4.4 , CI:1.95 , SVR:900.  Hemodynamics 6:05AM:  CVP:18 , Scvo2:53 , CO:4.83 , CI:2.12 , SVR:878.       Plan:  - q4hr hemodynamics   - dobutamine 5, epi 0.06, levo 0.16 decrease levo w/ MAP goals >60     Discussed case with arvind german. No interventions at this time.

## 2023-09-10 NOTE — PROGRESS NOTES
[facility]  Cardiology  Progress Note    Patient Name: Ishmael Thrasher  MRN: [id number]  Admission Date: 8/28/2023  Hospital Length of Stay: 13 days  Code Status: Full Code   Attending Physician: Rufus Frankel MD   Primary Care Physician: [facility]  Expected Discharge Date: 9/12/2023  Principal Problem:Cardiogenic shock    Subjective:     Hospital Course:   Patient is getting a left heart catheterization on 08/29/2023.  Repeat echo reveals severely reduced systolic function with estimated ejection fraction of 10 to 15%.  Aortic valve:  Moderate to severe stenosis low-flow.  In the mean stress echo was performed to assess aortic valve which showed Aortic Valve:  There is moderate to severe low flow low gradient aortic stenosis. There is no change is the LV stroke volume with dobutamine consistent with a lack of contractile reserve.  No valvular interventions at this time. RHC done on 9/4/23 revealing continuing hypervolemia with attempting to diuresis. 9/9/23 patient developed afib with RVR along with clinical deterioration shock call was placed with no intervention this time.  Attempted to reach out to outside facilities possible cardiac interventions.      Interval History: Began SLED last night. developed afib w/RVR, given amiodarone bolus      Review of Systems   Constitutional: Negative for chills and fever.   HENT:  Negative for congestion.    Cardiovascular:  Negative for chest pain and dyspnea on exertion.   Respiratory:  Negative for cough and shortness of breath.    Musculoskeletal:  Negative for back pain.   Gastrointestinal:  Negative for abdominal pain, diarrhea, nausea and vomiting.   Neurological:  Negative for headaches.     Objective:     Vital Signs (Most Recent):  Temp: 97.6 °F (36.4 °C) (09/10/23 0305)  Pulse: (!) 123 (09/10/23 0605)  Resp: (!) 22 (09/10/23 0605)  BP: 93/63 (09/10/23 0350)  SpO2: 99 % (09/10/23 0605) Vital Signs (24h  Range):  Temp:  [97.6 °F (36.4 °C)-97.9 °F (36.6 °C)] 97.6 °F (36.4 °C)  Pulse:  [] 123  Resp:  [11-26] 22  SpO2:  [92 %-100 %] 99 %  BP: ()/(52-63) 93/63  Arterial Line BP: ()/(51-70) 87/64     Weight: 97.9 kg (215 lb 13.3 oz)  Body mass index is 26.27 kg/m².     SpO2: 99 %         Intake/Output Summary (Last 24 hours) at 9/10/2023 0916  Last data filed at 9/10/2023 0819  Gross per 24 hour   Intake 2885.37 ml   Output 3560 ml   Net -674.63 ml       Lines/Drains/Airways       Central Venous Catheter Line  Duration             Percutaneous Central Line Insertion/Assessment - Triple Lumen  08/28/23 1800 Internal Jugular Left 12 days    Trialysis (Dialysis) Catheter 09/09/23 1601 right internal jugular <1 day              Drain  Duration                  Urethral Catheter 09/09/23 1800 <1 day              Arterial Line  Duration             Arterial Line 09/08/23 2026 Left Radial 1 day              Peripheral Intravenous Line  Duration                  Peripheral IV - Single Lumen 09/08/23 1526 22 G Anterior;Left;Proximal Forearm 1 day         Peripheral IV - Single Lumen 09/09/23 1230 20 G Anterior;Distal;Left Upper Arm <1 day         Peripheral IV - Single Lumen 09/09/23 1230 20 G Anterior;Proximal;Right Forearm <1 day                       Physical Exam  Constitutional:       Appearance: He is ill-appearing.   HENT:      Head: Normocephalic and atraumatic.   Eyes:      General: No scleral icterus.  Neck:      Comments: Right trialysis catheter in placed  Left Central Line in placed  Cardiovascular:      Rate and Rhythm: Regular rhythm. Tachycardia present.      Pulses: Normal pulses.      Heart sounds: Murmur heard.   Pulmonary:      Effort: Pulmonary effort is normal. No respiratory distress.      Breath sounds: Normal breath sounds. No wheezing or rales.   Abdominal:      General: Abdomen is flat. Bowel sounds are normal.      Palpations: Abdomen is soft.   Musculoskeletal:         General: No  swelling or tenderness. Normal range of motion.      Cervical back: Normal range of motion and neck supple.      Comments: Art line in in the left radial artery.   Skin:     General: Skin is warm and dry.      Coloration: Skin is not jaundiced or pale.      Findings: No bruising.   Neurological:      Mental Status: He is alert and oriented to person, place, and time. Mental status is at baseline.            Significant Labs: CMP   Recent Labs   Lab 09/08/23 2219 09/09/23 0228 09/09/23 2146 09/10/23  0231   * 132* 130* 133*  133*   K 4.3 4.6 3.9 4.2  4.2   CL 99 100 97 101  101   CO2 20* 16* 17* 18*  18*   * 188* 195* 122*  122*   BUN 30* 31* 38* 20  20   CREATININE 2.1* 2.2* 2.9* 1.6*  1.6*   CALCIUM 8.9 9.1 8.9 8.6*  8.6*   PROT 6.3 6.3  6.3  --  6.3   ALBUMIN 3.0* 3.1*  3.1* 2.9* 3.0*  3.0*   BILITOT 2.5* 2.6*  2.6*  --  2.9*   ALKPHOS 124 128  128  --  147*   * 693*  703*  --  1,699*   ALT 90* 586*  594*  --  1,610*   ANIONGAP 14 16 16 14  14    and CBC   Recent Labs   Lab 09/09/23  0228 09/10/23  0231   WBC 13.91* 18.37*   HGB 11.8* 11.3*   HCT 36.0* 33.8*    183       Significant Imaging:       Assessment and Plan:     Brief HPI:     * Cardiogenic shock  HFrEF due to iCMP  EF 10-15% on echo along with severe MR and mod AS per echo report from prior hospital   initial hemodynamics on  2 mcg/kg/min on arrival CVP 7 SVO2 57  - Patient was briefly weaned off dobutamine 9/1/23 afternoon, restarted 9/1 with consistently low MAPS to 50-60s. Asymptomatic during episodes. Improved on dobutamine.    Results:  Hemodynamics 5;30AM : CVP:16 , Scvo2:50 , CO:4.4 , CI:1.95 , SVR:900.  Hemodynamics 6:05AM:  CVP:18 , Scvo2:53 , CO:4.83 , CI:2.12 , SVR:878.       Plan:  - q4hr hemodynamics   - dobutamine 5, epi 0.06, levo 0.16 decrease levo w/ MAP goals >60     Discussed case with arvind german. No interventions at this time.       Shock liver  Transaminitis  Patients liver  enzymes  elevated. Recent Labs     09/08/23  0353 09/08/23  2219 09/09/23  0228 09/10/23  0231   BILITOT 2.5* 2.5* 2.6*  2.6* 2.9*   AST 17 101* 693*  703* 1,699*   ALT 14 90* 586*  594* 1,610*   ALKPHOS 101 124 128  128 147*    . Liver enzymes uptrending. In setting of hypotension, cardiogenic shock.     Plan:  - on dubutamine, epinephrine, levo, lasix.           Pacemaker  Patient has a BiV [employer] pacemaker in place. Sinus tach with BiV pacing.    Moderate aortic stenosis  DSE    A low dose Dobutamine stress echo was performed to evaluate low flow low gradient aortic stenosis.    Aortic Valve:  There is moderate to severe low flow low gradient aortic stenosis. Aortic valve area by VTI is 0.82 cm². Following infusion of dobutamine, there was no increase in LVOT or aortic VTI, velocity or gradient. The aortic valve area by VTI at 15 mcg/kg/min of dobutamine is 1.1 cm². Peak velocity 2.9 m/s and mean gradient 19 mm Hg.    Post-stress Impression: There is no change is the LV stroke volume with dobutamine consistent with a lack of contractile reserve  Results:  -CT cardiac scoring Aortic valve calcium score is 1484.    Plan:  - No interventions.    PAM (obstructive sleep apnea)  CPAP ordered     Paroxysmal A-fib  -currently in sinus tach 105  -Takes Eliquis at home for pAF and chronic DVT  -Switch Eliquis to heparin gtt for now    Plan:  - on heparin gtt    PTSD (post-traumatic stress disorder)  Resume psych meds    Chronic deep vein thrombosis (DVT) of right lower extremity  Takes Eliquis at home for pAF and chronic DVT  Switch Eliquis to heparin gtt for now    Gout  -Acute gout attack in 8/2023 [facility] with [facility]  -Uric acid 11.6 at that time    Plan  -continue allopurinol 300 daily (modify dose in AM if needed)  -on colchicine 0.6 mg qd, continue  -was [facility]um [facility], on prednisone taper (will give 5 mg prednisone x 3 more days and then stop)    Ischemic cardiomyopathy  -CAD  s/p CABG x 4 (3/2014 in [place] with LIMA-LAD, SVG-RCA, SVG-PLV, SVG-D2) followed by PCI late same year in 10/2014 with LANA x 3 to RCA and LANA x 3 to left coronary system  -ASA 81 daily and HI statin  -Patient was stopped on dobutamine 9/1/23 afternoon, now having consistently low MAPS to 50-60s.     Results:  RA: 14 RV: 80/ 5/ 15 PA: 75/ 47/ 52 PWP: 28 .   Cardiac output was 3.0. Cardiac index is 1.3 L/min/m2.   O2 Sat: PA 37%.    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

## 2023-09-10 NOTE — CARE UPDATE
Hemodynamics Update    Event: developed afib w/RVR, given amiodarone bolus    Vitals:    09/10/23 0605   BP:    Pulse: (!) 123   Resp: (!) 22   Temp:        Hemodynamics:   Parameter      CVP 18   SvO2 56       CO 5.3   CI 2.3        Plan:  - titrate levophed to goal MAP > 60  - avoid amiodarone bolus as it has previously caused worsening cardiogenic shock  - Repeat hemodynamics as scheduled  - Plan was discussed with attending staff     Bowen Epstein MD  Cardiology  Pager: [phone]

## 2023-09-10 NOTE — ASSESSMENT & PLAN NOTE
Plan:  - Nephrology consulted  - continue dialysis to remove fluid  - nursing mention patient had episode lots during session.  if clotting occurs again, can start on low dose heparin.

## 2023-09-10 NOTE — ASSESSMENT & PLAN NOTE
-CAD s/p CABG x 4 (3/2014 in [place] with LIMA-LAD, SVG-RCA, SVG-PLV, SVG-D2) followed by PCI late same year in 10/2014 with LANA x 3 to RCA and LANA x 3 to left coronary system  -ASA 81 daily and HI statin  -Patient was stopped on dobutamine 9/1/23 afternoon, now having consistently low MAPS to 50-60s.     Results:  RA: 14 RV: 80/ 5/ 15 PA: 75/ 47/ 52 PWP: 28 .   Cardiac output was 3.0. Cardiac index is 1.3 L/min/m2.   O2 Sat: PA 37%.    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

## 2023-09-10 NOTE — ASSESSMENT & PLAN NOTE
73 year old man with an extensive cardiac history, but in summary with end stage heart failure and not a candidate for advanced options admitted to [facility] with cardiogenic shock     Started on RT on 9/9/2023 for inadequate response to medical diuresis. However unable to tolerate high enough UF to effectively remove volume.     Intake/Output Summary (Last 24 hours) at 9/10/2023 0938  Last data filed at 9/10/2023 0819  Gross per 24 hour   Intake 2885.37 ml   Output 3560 ml   Net -674.63 ml         Plan/Recommendation  -Consent for dialysis obtained 9/9/23  -Will continue SLED/SCUF to assist with volume mangement  -Recommend pallative consult

## 2023-09-10 NOTE — NURSING
Nurses Note -- 4 Eyes      9/9/2023   7: 05 PM      Skin assessed during: Q Shift Change      [] No Altered Skin Integrity Present    []Prevention Measures Documented      [x] Yes- Altered Skin Integrity Present or Discovered   [] LDA Added if Not in Epic (Describe Wound)   [x] New Altered Skin Integrity was Present on Admit and Documented in LDA   [] Wound Image Taken    Wound Care Consulted? Yes    Attending Nurse:  CHRISTOPHER Marin    Second RN/Staff Member:  CHRISTOPHER Bean

## 2023-09-10 NOTE — PROGRESS NOTES
"[facility] - [facility]  Nephrology  Progress Note    Patient Name: Ishmael Thrasher  MRN: [id number]  Admission Date: 8/28/2023  Hospital Length of Stay: 13 days  Attending Provider: Rufus Frankel MD   Primary Care Physician: [facility]  Principal Problem:Cardiogenic shock    Subjective:     HPI: 73 year old man with HFrEF (10-15%), severe aortic stenosis, severe MR, [facility] CRT-D, CAD sp 4 vessel CABG, LANA x6, DVT on eliquis, pAF, PAM, CKD, DM, HTN, HLD admitted to CCU [facility] cardiogenic shock[facility] with values consistent with cardiogenic shock. He was started on  and transferred to [facility]. On arrival to [facility], he had a CVP of 16, CI 1.63/CO 3.74/ SVR 1303. He was started on a lasix drip at 15 and dobutamine was increased from 2.5 to 5.    In the following days, he had a left heart cath on 8/29 which showed 100% occlusion of the distal LAD, prox Cx to mid Cx and RPDA with 3 patent grafts and three vessel CAD. 9/5 he had a RHC with showed RA 14, RV 80/5, EDP 15, PA 74/45 (52), PCWP 28    Apropos CKD, he has a baseline sCr of 2.0. CKD 3b. During this admission, at time of consultation he is net negative 2.5 liters, though he has been without a lawson this admission. He was on a lasix drop at 20 per hour and given diuril 500 BID prior with 700 cc UOP. Last imaging of the kidneys was in 2020 with a retroperitoneal US of "Right kidney: The right kidney measures 10.9 cm. No cortical thinning. No loss of corticomedullary distinction. Resistive index measures 0.68.  No mass. No renal stone. No hydronephrosis. Left kidney: The left kidney measures 11.5 cm. No cortical thinning. No loss of corticomedullary distinction. Resistive index measures 0.61.  No mass. No renal stone. No hydronephrosis.  2.3 x 2.2 x 2.3 cm simple cyst in the upper pole." No UA or check of UPCR in ochnser system since " 2020.    At time of consultation, he is on dobutamine, epi, levo.     Nephrology consulted for assistance with diuresis      Interval History: worsening clinical status, worse hemodynamics and required start of SLED to assist with volume removal, however unable to tolerate higher UF, net even in last 24 hours.     Cardiology reached out to other advanced heart failure treatment options and is not a candidate.     Review of patient's allergies indicates:   Allergen Reactions    Enalapril maleate Swelling and Edema     Other reaction(s): Swelling    Vasotec [enalaprilat] Swelling     Neck swelling    Zoloft [sertraline] Other (See Comments)     Patient states it put him to sleep in the hospital he could not talk nor move    Cyclobenzaprine Hallucinations     Other reaction(s): Swelling  Hallucinations according to patient.    Other reaction(s): Lymphadenopathy, Restlessness, Sensation of being cold, Restlessness, Sensation of being cold    Amitriptyline Hallucinations    Labetalol      Other reaction(s): Pharyngeal swelling    Lisinopril      Other reaction(s): Pharyngeal swelling    Tramadol Nausea Only and Hallucinations     Current Facility-Administered Medications   Medication Frequency    0.9%  NaCl infusion (CRRT USE ONLY) Continuous    acetaminophen tablet 650 mg Q6H PRN    acetaminophen tablet 650 mg Q4H PRN    allopurinol split tablet 150 mg Daily    apixaban tablet 5 mg BID    aspirin EC tablet 81 mg Daily    DOBUtamine 1000 mg in D5W 250 mL infusion Continuous    EPINEPHrine 5 mg in dextrose 5% 250 mL infusion (premix) Continuous    furosemide (LASIX) 500 mg in 50 mL infusion (conc: 10 mg/mL) Continuous    gabapentin capsule 300 mg BID    magnesium sulfate 2g in water 50mL IVPB (premix) PRN    melatonin tablet 9 mg Nightly PRN    miconazole nitrate 2% ointment BID    NORepinephrine 32 mg in dextrose 5 % (D5W) 250 mL infusion Continuous    NORepinephrine 4 mg in dextrose 5% 250 mL  infusion (premix) Continuous    pantoprazole EC tablet 40 mg Before breakfast    polyethylene glycol packet 17 g Daily PRN    prochlorperazine injection Soln 2.5 mg Q6H PRN    senna-docusate 8.6-50 mg per tablet 1 tablet Daily    sodium chloride 0.9% flush 3 mL Q6H PRN    sodium phosphate 20.01 mmol in dextrose 5 % (D5W) 250 mL IVPB PRN    sodium phosphate 30 mmol in dextrose 5 % (D5W) 250 mL IVPB PRN    sodium phosphate 39.99 mmol in dextrose 5 % (D5W) 250 mL IVPB PRN       Objective:     Vital Signs (Most Recent):  Temp: 97.6 °F (36.4 °C) (09/10/23 0305)  Pulse: (!) 123 (09/10/23 0605)  Resp: (!) 22 (09/10/23 0605)  BP: 93/63 (09/10/23 0350)  SpO2: 99 % (09/10/23 0605) Vital Signs (24h Range):  Temp:  [97.6 °F (36.4 °C)-97.9 °F (36.6 °C)] 97.6 °F (36.4 °C)  Pulse:  [] 123  Resp:  [11-26] 22  SpO2:  [92 %-100 %] 99 %  BP: ()/(52-63) 93/63  Arterial Line BP: ()/(51-70) 87/64     Weight: 97.9 kg (215 lb 13.3 oz) (09/01/23 2305)  Body mass index is 26.27 kg/m².  Body surface area is 2.29 meters squared.    I/O last 3 completed shifts:  In: 3629.4 [P.O.:480; I.V.:2962.2; IV Piggyback:187.2]  Out: 3282 [Urine:685; Other:2597]     Physical Exam  Constitutional:       General: He is not in acute distress.     Appearance: He is ill-appearing. He is not toxic-appearing.      Comments: Cool to touch   HENT:      Head: Normocephalic and atraumatic.   Cardiovascular:      Rate and Rhythm: Tachycardia present.      Heart sounds: Murmur heard.   Pulmonary:      Effort: Pulmonary effort is normal.      Breath sounds: No wheezing or rales.   Abdominal:      General: Abdomen is flat. There is no distension.      Tenderness: There is no abdominal tenderness.   Musculoskeletal:      Right lower leg: Edema present.      Left lower leg: Edema present.   Neurological:      General: No focal deficit present.      Mental Status: He is alert.          Significant Labs:  All labs within the past 24 hours have been  reviewed.     Significant Imaging:  Labs: Reviewed    Assessment/Plan:     Cardiac/Vascular  * Cardiogenic shock  73 year old man with an extensive cardiac history, but in summary with end stage heart failure and not a candidate for advanced options admitted to [facility] with cardiogenic shock     Started on RT on 9/9/2023 for inadequate response to medical diuresis. However unable to tolerate high enough UF to effectively remove volume.     Intake/Output Summary (Last 24 hours) at 9/10/2023 0938  Last data filed at 9/10/2023 0819  Gross per 24 hour   Intake 2885.37 ml   Output 3560 ml   Net -674.63 ml         Plan/Recommendation  -Consent for dialysis obtained 9/9/23  -Will continue SLED/SCUF to assist with volume mangement  -Recommend pallative consult        Thank you for your consult. I will follow-up with patient. Please contact us if you have any additional questions.    Elijah Ta MD  Nephrology  [facility]

## 2023-09-10 NOTE — SUBJECTIVE & OBJECTIVE
Interval History: Began SLED last night. developed afib w/RVR, given amiodarone bolus      Review of Systems   Constitutional: Negative for chills and fever.   HENT:  Negative for congestion.    Cardiovascular:  Negative for chest pain and dyspnea on exertion.   Respiratory:  Negative for cough and shortness of breath.    Musculoskeletal:  Negative for back pain.   Gastrointestinal:  Negative for abdominal pain, diarrhea, nausea and vomiting.   Neurological:  Negative for headaches.     Objective:     Vital Signs (Most Recent):  Temp: 98.1 °F (36.7 °C) (09/10/23 1100)  Pulse: (!) 122 (09/10/23 1200)  Resp: (!) 21 (09/10/23 1200)  BP: (!) 68/63 (09/10/23 0900)  SpO2: 99 % (09/10/23 1200) Vital Signs (24h Range):  Temp:  [97.6 °F (36.4 °C)-98.1 °F (36.7 °C)] 98.1 °F (36.7 °C)  Pulse:  [] 122  Resp:  [15-26] 21  SpO2:  [92 %-100 %] 99 %  BP: ()/(52-63) 68/63  Arterial Line BP: ()/(51-71) 74/52     Weight: 97.9 kg (215 lb 13.3 oz)  Body mass index is 26.27 kg/m².     SpO2: 99 %         Intake/Output Summary (Last 24 hours) at 9/10/2023 1228  Last data filed at 9/10/2023 1200  Gross per 24 hour   Intake 3033.09 ml   Output 4262 ml   Net -1228.91 ml       Lines/Drains/Airways       Central Venous Catheter Line  Duration             Percutaneous Central Line Insertion/Assessment - Triple Lumen  08/28/23 1800 Internal Jugular Left 12 days    Trialysis (Dialysis) Catheter 09/09/23 1601 right internal jugular <1 day              Drain  Duration                  Urethral Catheter 09/09/23 1800 <1 day              Arterial Line  Duration             Arterial Line 09/08/23 2026 Left Radial 1 day              Peripheral Intravenous Line  Duration                  Peripheral IV - Single Lumen 09/08/23 1526 22 G Anterior;Left;Proximal Forearm 1 day         Peripheral IV - Single Lumen 09/09/23 1230 20 G Anterior;Distal;Left Upper Arm <1 day         Peripheral IV - Single Lumen 09/09/23 1230 20 G  Anterior;Proximal;Right Forearm <1 day                       Physical Exam  Constitutional:       Appearance: He is ill-appearing.   HENT:      Head: Normocephalic and atraumatic.   Eyes:      General: No scleral icterus.  Neck:      Comments: Right trialysis catheter in placed  Left Central Line in placed  Cardiovascular:      Rate and Rhythm: Regular rhythm. Tachycardia present.      Pulses: Normal pulses.      Heart sounds: Murmur heard.   Pulmonary:      Effort: Pulmonary effort is normal. No respiratory distress.      Breath sounds: Normal breath sounds. No wheezing or rales.   Abdominal:      General: Abdomen is flat. Bowel sounds are normal.      Palpations: Abdomen is soft.   Musculoskeletal:         General: No swelling or tenderness. Normal range of motion.      Cervical back: Normal range of motion and neck supple.      Comments: Art line in in the left radial artery.   Skin:     General: Skin is warm and dry.      Coloration: Skin is not jaundiced or pale.      Findings: No bruising.   Neurological:      Mental Status: He is alert and oriented to person, place, and time. Mental status is at baseline.            Significant Labs: CMP   Recent Labs   Lab 09/08/23  2219 09/09/23  0228 09/09/23  2146 09/10/23  0231   * 132* 130* 133*  133*   K 4.3 4.6 3.9 4.2  4.2   CL 99 100 97 101  101   CO2 20* 16* 17* 18*  18*   * 188* 195* 122*  122*   BUN 30* 31* 38* 20  20   CREATININE 2.1* 2.2* 2.9* 1.6*  1.6*   CALCIUM 8.9 9.1 8.9 8.6*  8.6*   PROT 6.3 6.3  6.3  --  6.3   ALBUMIN 3.0* 3.1*  3.1* 2.9* 3.0*  3.0*   BILITOT 2.5* 2.6*  2.6*  --  2.9*   ALKPHOS 124 128  128  --  147*   * 693*  703*  --  1,699*   ALT 90* 586*  594*  --  1,610*   ANIONGAP 14 16 16 14  14    and CBC   Recent Labs   Lab 09/09/23  0228 09/10/23  0231   WBC 13.91* 18.37*   HGB 11.8* 11.3*   HCT 36.0* 33.8*    183       Significant Imaging:

## 2023-09-10 NOTE — PLAN OF CARE
"CICU Care Plan  1905-Care assumed of patient. Mr. Thrasher AO x 4 opens eyes spontaneously follows commands and moves all extremities. Dobutamine @ 5 mcg/kg/min, and Lasix @ 40mg/h. Shift Change CVP 14, patient MAPs between 56-59, and noted in atrial fibrilation/flutter. Levo restarted @ 0.02 mcg/kg/min, MAPs improved to 60-62.    1940-Cardiology notified of CVP, need to restart Levo and atrial rhythm. Orders placed for 150mg/100mL Amio bolus.     2022- RN reached out to Nephrology for clarification of plan of care of HD. Updated Nephrologist regarding patient current status needing increased vasopressor support and irregular rhythm. Plan for lasix gtt for now pending call from Cardiology to address need for possible HD.     2045-Cardiology Fellow notified of SvO2 of 39 and redraw 42. Patient also hypotensive post bolus MAP 49-52. Levo titrated up to achieve MAP >60.    2118- Levo order modified to 32 mg/ 250mL and orders received for Dialysis. Labs collected.     2217- Cardiology Fellow notified of Epic Sepsis Panel Alert. No new orders at this time.     2220- SLED started by Dialysis RN per orders from Nephrology.     0102-Cardiology Fellow notified of  with intermittent pacing. No new orders at this time.      No acute events noted for remainder of shift. POC reviewed with Ishmael Thrasher and family. Pt verbalized understanding. Questions and concerns addressed. Security password create this shift" [email or web address]". Levo @ 0.14 mcg/kg/min, Epi @ 0.06 mcg/kg/min and Lasix @ 40mg/hr. SLED treatment currently infusing UF rate 350. Pt progressing toward goals. Security measures in place, plan of care to continue. See below and flowsheets for full assessment and VS info.       Neuro:  Lambert Coma Scale  Best Eye Response: 4-->(E4) spontaneous  Best Motor Response: 6-->(M6) obeys commands  Best Verbal Response: 5-->(V5) oriented  Lambert Coma Scale Score: 15  Assessment Qualifiers: patient not sedated/intubated, no eye " "obstruction present  Pupil PERRLA: yes     24 hr Temp:  [97.6 °F (36.4 °C)-98.6 °F (37 °C)]     CV:   Rhythm: atrial rhythm, paced rhythm (intermittently paced)  BP goals:   MAP >  60    Resp:      Oxygen Concentration (%): 36    Plan: N/A    GI/:     Diet/Nutrition Received: 2 gram sodium, low saturated fat/low cholesterol  Last Bowel Movement: 09/07/23  Voiding Characteristics: voids spontaneously without difficulty    Intake/Output Summary (Last 24 hours) at 9/9/2023 2324  Last data filed at 9/9/2023 2305  Gross per 24 hour   Intake 1624.05 ml   Output 520 ml   Net 1104.05 ml     Unmeasured Output  Urine Occurrence: 1  Stool Occurrence: 1  Emesis Occurrence: 0  Pad Count: 0  Nutritional Supplement Intake: Quantity 0, Type: Boost    Labs/Accuchecks:  Recent Labs   Lab 09/09/23 0228   WBC 13.91*   RBC 4.61   HGB 11.8*   HCT 36.0*         Recent Labs   Lab 09/09/23 0228 09/09/23  2146   * 130*   K 4.6 3.9   CO2 16* 17*    97   BUN 31* 38*   CREATININE 2.2* 2.9*   ALKPHOS 128  128  --    *  594*  --    *  703*  --    BILITOT 2.6*  2.6*  --     No results for input(s): "PROTIME", "INR", "APTT", "HEPANTIXA" in the last 168 hours. No results for input(s): "CPK", "CPKMB", "TROPONINI", "MB" in the last 168 hours.     Latest Reference Range & Units 09/10/23 02:31   WBC 3.90 - 12.70 K/uL 18.37 (H)   RBC 4.60 - 6.20 M/uL 4.57 (L)   Hemoglobin 14.0 - 18.0 g/dL 11.3 (L)   Hematocrit 40.0 - 54.0 % 33.8 (L)   MCV 82 - 98 fL 74 (L)   MCH 27.0 - 31.0 pg 24.7 (L)   MCHC 32.0 - 36.0 g/dL 33.4   RDW 11.5 - 14.5 % 18.4 (H)   Platelets 150 - 450 K/uL 183   MPV 9.2 - 12.9 fL 10.6   Gran % 38.0 - 73.0 % 84.0 (H)   Lymph % 18.0 - 48.0 % 6.6 (L)   Mono % 4.0 - 15.0 % 5.9   Eosinophil % 0.0 - 8.0 % 2.0   Basophil % 0.0 - 1.9 % 0.4   Immature Granulocytes 0.0 - 0.5 % 1.1 (H)   Gran # (ANC) 1.8 - 7.7 K/uL 15.4 (H)   Lymph # 1.0 - 4.8 K/uL 1.2   Mono # 0.3 - 1.0 K/uL 1.1 (H)   Eos # 0.0 - 0.5 K/uL 0.4 "   Baso # 0.00 - 0.20 K/uL 0.07   Immature Grans (Abs) 0.00 - 0.04 K/uL 0.20 (H)   nRBC 0 /100 WBC 0   Differential Method  Automated   Sodium 136 - 145 mmol/L  136 - 145 mmol/L 133 (L)  133 (L)   Potassium 3.5 - 5.1 mmol/L  3.5 - 5.1 mmol/L 4.2  4.2   Chloride 95 - 110 mmol/L  95 - 110 mmol/L 101  101   CO2 23 - 29 mmol/L  23 - 29 mmol/L 18 (L)  18 (L)   Anion Gap 8 - 16 mmol/L  8 - 16 mmol/L 14  14   BUN 8 - 23 mg/dL  8 - 23 mg/dL 20  20   Creatinine 0.5 - 1.4 mg/dL  0.5 - 1.4 mg/dL 1.6 (H)  1.6 (H)   eGFR >60 mL/min/1.73 m^2  >60 mL/min/1.73 m^2 45.5 !  45.5 !   Glucose 70 - 110 mg/dL  70 - 110 mg/dL 122 (H)  122 (H)   Calcium 8.7 - 10.5 mg/dL  8.7 - 10.5 mg/dL 8.6 (L)  8.6 (L)   Phosphorus 2.7 - 4.5 mg/dL 3.4   Magnesium  1.6 - 2.6 mg/dL 2.1   Alkaline Phosphatase 55 - 135 U/L 147 (H)   PROTEIN TOTAL 6.0 - 8.4 g/dL 6.3   Albumin 3.5 - 5.2 g/dL  3.5 - 5.2 g/dL 3.0 (L)  3.0 (L)   BILIRUBIN TOTAL 0.1 - 1.0 mg/dL 2.9 (H)   Bilirubin Direct 0.1 - 0.3 mg/dL 1.2 (H)   AST 10 - 40 U/L 1,699 (H)   ALT 10 - 44 U/L 1,610 (H)   (H): Data is abnormally high  (L): Data is abnormally low  !: Data is abnormal    Electrolytes: N/A - electrolytes WDL  Accuchecks: ACHS    Gtts:   sodium chloride 0.9% 200 mL/hr at 09/09/23 2222    DOBUTamine IV infusion (non-titrating) 5 mcg/kg/min (09/09/23 2205)    EPINEPHrine 0.06 mcg/kg/min (09/09/23 2205)    furosemide (LASIX) 500 mg in 50 mL infusion (conc: 10 mg/mL) 40 mg/hr (09/09/23 2205)    NORepinephrine bitartrate-D5W 0.14 mcg/kg/min (09/09/23 2138)    NORepinephrine bitartrate-D5W Stopped (09/09/23 2137)       LDA/Wounds:  Lines/Drains/Airways       Central Venous Catheter Line  Duration             Percutaneous Central Line Insertion/Assessment - Triple Lumen  08/28/23 1800 Internal Jugular Left 12 days    Trialysis (Dialysis) Catheter 09/09/23 1601 right internal jugular <1 day              Drain  Duration                  Urethral Catheter 09/09/23 1800 <1 day              Arterial  Line  Duration             Arterial Line 09/08/23 2026 Left Radial 1 day              Peripheral Intravenous Line  Duration                  Peripheral IV - Single Lumen 09/08/23 1526 22 G Anterior;Left;Proximal Forearm 1 day         Peripheral IV - Single Lumen 09/09/23 1230 20 G Anterior;Distal;Left Upper Arm <1 day         Peripheral IV - Single Lumen 09/09/23 1230 20 G Anterior;Proximal;Right Forearm <1 day                  Wounds: Yes  Wound care consulted: Yes

## 2023-09-10 NOTE — SUBJECTIVE & OBJECTIVE
Interval History: worsening clinical status, worse hemodynamics and required start of SLED to assist with volume removal, however unable to tolerate higher UF, net even in last 24 hours.     Cardiology reached out to other advanced heart failure treatment options and is not a candidate.     Review of patient's allergies indicates:   Allergen Reactions    Enalapril maleate Swelling and Edema     Other reaction(s): Swelling    Vasotec [enalaprilat] Swelling     Neck swelling    Zoloft [sertraline] Other (See Comments)     Patient states it put him to sleep in the hospital he could not talk nor move    Cyclobenzaprine Hallucinations     Other reaction(s): Swelling  Hallucinations according to patient.    Other reaction(s): Lymphadenopathy, Restlessness, Sensation of being cold, Restlessness, Sensation of being cold    Amitriptyline Hallucinations    Labetalol      Other reaction(s): Pharyngeal swelling    Lisinopril      Other reaction(s): Pharyngeal swelling    Tramadol Nausea Only and Hallucinations     Current Facility-Administered Medications   Medication Frequency    0.9%  NaCl infusion (CRRT USE ONLY) Continuous    acetaminophen tablet 650 mg Q6H PRN    acetaminophen tablet 650 mg Q4H PRN    allopurinol split tablet 150 mg Daily    apixaban tablet 5 mg BID    aspirin EC tablet 81 mg Daily    DOBUtamine 1000 mg in D5W 250 mL infusion Continuous    EPINEPHrine 5 mg in dextrose 5% 250 mL infusion (premix) Continuous    furosemide (LASIX) 500 mg in 50 mL infusion (conc: 10 mg/mL) Continuous    gabapentin capsule 300 mg BID    magnesium sulfate 2g in water 50mL IVPB (premix) PRN    melatonin tablet 9 mg Nightly PRN    miconazole nitrate 2% ointment BID    NORepinephrine 32 mg in dextrose 5 % (D5W) 250 mL infusion Continuous    NORepinephrine 4 mg in dextrose 5% 250 mL infusion (premix) Continuous    pantoprazole EC tablet 40 mg Before breakfast    polyethylene glycol packet 17 g Daily PRN    prochlorperazine injection  Soln 2.5 mg Q6H PRN    senna-docusate 8.6-50 mg per tablet 1 tablet Daily    sodium chloride 0.9% flush 3 mL Q6H PRN    sodium phosphate 20.01 mmol in dextrose 5 % (D5W) 250 mL IVPB PRN    sodium phosphate 30 mmol in dextrose 5 % (D5W) 250 mL IVPB PRN    sodium phosphate 39.99 mmol in dextrose 5 % (D5W) 250 mL IVPB PRN       Objective:     Vital Signs (Most Recent):  Temp: 97.6 °F (36.4 °C) (09/10/23 0305)  Pulse: (!) 123 (09/10/23 0605)  Resp: (!) 22 (09/10/23 0605)  BP: 93/63 (09/10/23 0350)  SpO2: 99 % (09/10/23 0605) Vital Signs (24h Range):  Temp:  [97.6 °F (36.4 °C)-97.9 °F (36.6 °C)] 97.6 °F (36.4 °C)  Pulse:  [] 123  Resp:  [11-26] 22  SpO2:  [92 %-100 %] 99 %  BP: ()/(52-63) 93/63  Arterial Line BP: ()/(51-70) 87/64     Weight: 97.9 kg (215 lb 13.3 oz) (09/01/23 2305)  Body mass index is 26.27 kg/m².  Body surface area is 2.29 meters squared.    I/O last 3 completed shifts:  In: 3629.4 [P.O.:480; I.V.:2962.2; IV Piggyback:187.2]  Out: 3282 [Urine:685; Other:2597]     Physical Exam  Constitutional:       General: He is not in acute distress.     Appearance: He is ill-appearing. He is not toxic-appearing.      Comments: Cool to touch   HENT:      Head: Normocephalic and atraumatic.   Cardiovascular:      Rate and Rhythm: Tachycardia present.      Heart sounds: Murmur heard.   Pulmonary:      Effort: Pulmonary effort is normal.      Breath sounds: No wheezing or rales.   Abdominal:      General: Abdomen is flat. There is no distension.      Tenderness: There is no abdominal tenderness.   Musculoskeletal:      Right lower leg: Edema present.      Left lower leg: Edema present.   Neurological:      General: No focal deficit present.      Mental Status: He is alert.          Significant Labs:  All labs within the past 24 hours have been reviewed.     Significant Imaging:  Labs: Reviewed

## 2023-09-10 NOTE — PROGRESS NOTES
09/10/23 0230   Treatment   Treatment Type SLED   Treatment Status Daily equipment check   Dialysis Machine Number [id number]   Dialyzer Time (hours) 4.13   BVP (Liters) 36.3 L   Solutions Labeled and Current  Yes   Access Right;IJ;Temporary Cath   Catheter Dressing Intact  Yes   Alarms Engaged Yes   CRRT Comments Daily check and maintenance performed   $ CRRT Charges   $ CRRT Daily Assessment Complete   $ CRRT Daily Maintenance Complete

## 2023-09-10 NOTE — PROGRESS NOTES
09/10/23 1030 09/10/23 1130   Treatment   Treatment Type SLED SLED   Treatment Status Clotting;Blood lost Restart   Dialysis Machine Number [id number]   Dialyzer Time (hours) 12  --    Access  --  Temporary Cath;Right;IJ   Catheter Dressing Intact   --  Yes   Alarms Engaged  --  Yes   CRRT Comments unable to rinse back venous side CRRT restarted without issue

## 2023-09-10 NOTE — ASSESSMENT & PLAN NOTE
Transaminitis  Patients liver enzymes  elevated. Recent Labs     09/08/23  0353 09/08/23  2219 09/09/23  0228 09/10/23  0231   BILITOT 2.5* 2.5* 2.6*  2.6* 2.9*   AST 17 101* 693*  703* 1,699*   ALT 14 90* 586*  594* 1,610*   ALKPHOS 101 124 128  128 147*    . Liver enzymes uptrending. In setting of hypotension, cardiogenic shock.     Plan:  - on dubutamine, epinephrine, levo, lasix.

## 2023-09-11 NOTE — ASSESSMENT & PLAN NOTE
HFrEF due to iCMP  EF 10-15% on echo along with severe MR and mod AS per echo report from prior hospital   initial hemodynamics on  2 mcg/kg/min on arrival CVP 7 SVO2 57  - Patient was briefly weaned off dobutamine 9/1/23 afternoon, restarted 9/1 with consistently low MAPS to 50-60s. Asymptomatic during episodes. Improved on dobutamine.    Results:  Hemodynamics 5;30AM : CVP:16 , Scvo2:50 , CO:4.4 , CI:1.95 , SVR:900.  Hemodynamics 6:05AM:  CVP:18 , Scvo2:53 , CO:4.83 , CI:2.12 , SVR:878.       Plan:  - q4hr hemodynamics   - dobutamine 5, epi , levo,  MAP goals >60     Discussed case with arvind german. No interventions at this time.   Comfort care measures.

## 2023-09-11 NOTE — PLAN OF CARE
[facility] - Cardiac Intensive Care  Discharge Reassessment    Primary Care Provider: [facility]    Expected Discharge Date: 9/12/2023    Reassessment (most recent)       Discharge Reassessment - 09/11/23 1353          Discharge Reassessment    Assessment Type Discharge Planning Reassessment     Did the patient's condition or plan change since previous assessment? Yes     Communicated RENZO with patient/caregiver Date not available/Unable to determine     Discharge Plan A Comfort care/withdrawal     Discharge Plan B Home Health     DME Needed Upon Discharge  none     Transition of Care Barriers None     Why the patient remains in the hospital Requires continued medical care                 Per treatment team pt is now intubated and sedated, maxed out on pressors, and is DNR.  SW will continue to follow.      Lata Sierra LMSW  [facility]  [id number]

## 2023-09-11 NOTE — ANESTHESIA PROCEDURE NOTES
Ad Hoc Intubation    Date/Time: 9/10/2023 10:24 PM    Performed by: Corrine Moreno MD  Authorized by: Wilmar Foss MD    Indications:  Respiratory distress  Diagnosis:  CHF  Patient Location:  ICU  Timeout:  9/10/2023 10:22 PM  Procedure Start Time:  9/10/2023 10:22 PM  Procedure End Time:  9/10/2023 10:24 PM  Staff:     Anesthesiologist Present: Yes    Intubation:     Induction:  Intravenous    Intubated:  Postinduction    Mask Ventilation:  N/a    Attempts:  1    Attempted By:  Resident anesthesiologist    Method of Intubation:  Video laryngoscopy    Blade:  Scott 3    Laryngeal View Grade: Grade I - full view of chords      Difficult Airway Encountered?: No      Complications:  None    Airway Device:  Oral endotracheal tube    Airway Device Size:  7.5    Style/Cuff Inflation:  Cuffed (inflated to minimal occlusive pressure)    Tube secured:  23    Secured at:  The lips    Placement Verified By:  Colorimetric ETCO2 device    Complicating Factors:  None    Findings Post-Intubation:  BS equal bilateral and atraumatic/condition of teeth unchanged

## 2023-09-11 NOTE — PT/OT/SLP PROGRESS
Physical Therapy      Patient Name:  Ishmael Thrasher   MRN:  [id number]    Patient not seen today secondary to  (pt not seen due to intubated 9/10 and sedation.). MD will re-consult if services needed. .    9/11/2023  .

## 2023-09-11 NOTE — CARE UPDATE
Pt did not tolerate CRRT overnight. Requiring vaso .04, phenylephrine 5 mcg, levo 3 mcg, and epi 2 mcg. Please contact nephrology If change in patient status.       Orquidea Stein NP   Nephrology

## 2023-09-11 NOTE — PLAN OF CARE
Cardiac ICU Care Plan    POC reviewed with Ishmael Thrasher and family. Questions and concerns addressed. Pt made DNR this AM. Family asked to keep vasopressors on but to keep patient as comfortable as possible. Both daughter and wife expressed they would like for him to be pain free and asked for defibrillator to be turned off. Ajay MILLER notified. MD ordered magnet to be placed over defibrillator. Charge nurse notified of situation. [facility] allowed pt to have open visitation. MAP remained in 50s for most of day while maxed on pressor. Team aware. See below and flowsheets for full assessment and VS info.       Neuro:  Eunice Coma Scale  Best Eye Response: 1-->(E1) none  Best Motor Response: 1-->(M1) none  Best Verbal Response: 1-->(V1) none  Eunice Coma Scale Score: 3  Assessment Qualifiers: patient intubated, patient chemically sedated or paralyzed  Pupil PERRLA: yes    24 hr Temp:  [32 °F (0 °C)-97.4 °F (36.3 °C)]      CV:  Rhythm: paced rhythm, atrial rhythm   DVT prophylaxis: VTE Required Core Measure: Pharmacological prophylaxis initiated/maintained    CVP (mean): 29 mmHg (09/11/23 0630)       SVO2 (%): 51 % (09/10/23 1958)               Pulses  Right Radial Pulse: 1+ (weak), palpation  Left Radial Pulse: 1+ (weak), palpation  Right Dorsalis Pedis Pulse: 1+ (weak), palpation  Left Dorsalis Pedis Pulse: 1+ (weak), palpation  Right Posterior Tibial Pulse: 1+ (weak), palpation  Left Posterior Tibial Pulse: 1+ (weak), palpation    Resp:  Flow (L/min): 15  Vent Mode: A/C  Set Rate: 15 BPM  Oxygen Concentration (%): 40  Vt Set: 500 mL  PEEP/CPAP: 5 cmH20    GI/:  GI prophylaxis: no  Diet/Nutrition Received: NPO  Last Bowel Movement: 09/09/23  Voiding Characteristics: oliguria   Intake/Output Summary (Last 24 hours) at 9/11/2023 1822  Last data filed at 9/11/2023 1705  Gross per 24 hour   Intake 47194.98 ml   Output 1561 ml   Net 9799.98 ml     Treatment Type: Slow low efficiency dialysis  UF Rate: 200  "cc/hr      Labs/Accuchecks:  Recent Labs   Lab 09/10/23  1713 09/10/23  2302 09/11/23  0246   WBC 17.01* 16.76* 16.56*   RBC 4.47* 4.56* 4.36*   HGB 11.3* 11.4* 10.9*   HCT 32.6* 36.7* 36.4*   * 116* 85*      Recent Labs   Lab 09/10/23  1147   APTT 37.2*      Recent Labs     09/11/23  0246   *  132*   K 4.7  4.7   CO2 10*  10*   CL 95  95   BUN 8  8   CREATININE 1.3  1.3   ALKPHOS 174*   ALT 2,036*   AST 2,275*   BILITOT 3.8*     No results for input(s): "CPK", "CPKMB", "MB", "TROPONINI" in the last 168 hours.   Recent Labs     09/11/23  0245 09/11/23  0433 09/11/23  0438   PH 7.050* 7.077* 7.023*   PCO2 43.9 34.4* 48.0*   PO2 43 210* 46   HCO3 12.2* 10.1* 12.5*   POCSATURATED 58* 99 60*   BE -18 -20 -18       Electrolytes: Contraindicated  Accuchecks: none    Gtts/LDAs:   DOBUTamine IV infusion (non-titrating) 2.5 mcg/kg/min (09/11/23 1705)    EPINEPHrine 2 mcg/kg/min (09/11/23 1809)    fentanyl 250 mcg/hr (09/11/23 1705)    furosemide (LASIX) 500 mg in 50 mL infusion (conc: 10 mg/mL) Stopped (09/10/23 1046)    NORepinephrine bitartrate-D5W 3 mcg/kg/min (09/11/23 1705)    phenylephrine 5 mcg/kg/min (09/11/23 1735)    vasopressin 0.04 Units/min (09/11/23 1705)       Lines/Drains/Airways       Central Venous Catheter Line  Duration             Percutaneous Central Line Insertion/Assessment - Triple Lumen  08/28/23 1800 Internal Jugular Left 14 days    Trialysis (Dialysis) Catheter 09/09/23 1601 right internal jugular 2 days              Drain  Duration                  NG/OG Tube 09/10/23 2345 Center mouth <1 day              Airway  Duration                  Airway - Non-Surgical 09/10/23 2232 Endotracheal Tube <1 day       Airway Anesthesia 09/10/23 <1 day              Arterial Line  Duration             Arterial Line 09/08/23 2026 Left Radial 2 days              Peripheral Intravenous Line  Duration                  Peripheral IV - Single Lumen 09/08/23 1526 22 G Anterior;Left;Proximal Forearm 3 " days         Peripheral IV - Single Lumen 09/09/23 1230 20 G Anterior;Distal;Left Upper Arm 2 days         Peripheral IV - Single Lumen 09/09/23 1230 20 G Anterior;Proximal;Right Forearm 2 days                    Skin/Wounds  Bathing/Skin Care: patient refused (09/10/23 1800)  Wounds: Yes  Wound care consulted: No

## 2023-09-11 NOTE — NURSING
2010-Cardiology Fellow at bedside for rounds. Notified Fellow of need for increasing vasopressor support. Will continue UF at 400 to maximize fluid removal.     2108- Cardiology Fellow notified of persistent hypotension. UF titrated down and orders for vasopressin placed.    2141- Charge RN updated on patient status. En route to bedside for additional assistance. Cardiology Fellow at bedside as well for assessment. Patient with increase WOB and complaining of shortness of breath. CRRT rinsed back after UF of 200 did not improve hypotension. Levo increased to 0.3 mcg/kg/min. Vaso @ 0.04 mcg/min and Epi @ 0.06 mcg/kg/min. Dr. Frankel phoned at bedside verbal orders to decrease dobutamine to 2.5 mcg/kg/min and intubate patient. Patient able to verbally consent to intubation and Anesthesia was notified.     2209- Dialysis notified of patient status and CRRT being rinsed back.     2232-Patient intubated by Anesthesia with 4mg of versed, 8 mg of etomidate, and 200 mg of Succ. Color changed noted and breath sounds auscultated.     2233-BP 49/35 on arterial line, compressions started, 2mg IV push Epi administered and ROSC achieved at 2233. Jaydon and Fentanyl gtt started. Family enroute to bedside.     0048-Cardiology Fellow notified of VBG, orders received for 1 amp of bicarb.    0218-Cardiology at bedside for assessment and rounds. Notified of repeat VBG. 1 amp of bicarb ordered.    0228- Family discussion about patient status and prognosis with Cardiology Fellow. Patient made DNR at this time.

## 2023-09-11 NOTE — NURSING
Nurses Note -- 4 Eyes      9/10/2023  7:05 PM      Skin assessed during: Q Shift Change      [x] No Altered Skin Integrity Present    [x]Prevention Measures Documented      [] Yes- Altered Skin Integrity Present or Discovered   [] LDA Added if Not in Epic (Describe Wound)   [] New Altered Skin Integrity was Present on Admit and Documented in LDA   [] Wound Image Taken    Wound Care Consulted? Yes    Attending Nurse:  CHRISTOPHER Marin    Second RN/Staff Member:  CHRISTOPHER Bean

## 2023-09-11 NOTE — SUBJECTIVE & OBJECTIVE
"Interval History: Per nephro notes," Pt did not tolerate CRRT overnight. Required vaso .04, phenylephrine 5 mcg, levo 3 mcg, and epi 2 mcg."    ROS  Objective:     Vital Signs (Most Recent):  Temp: 96.7 °F (35.9 °C) (09/11/23 1105)  Pulse: 92 (09/11/23 1705)  Resp: 16 (09/11/23 1705)  BP: (!) 89/55 (09/11/23 0824)  SpO2: (!) 92 % (09/11/23 1155) Vital Signs (24h Range):  Temp:  [32 °F (0 °C)-97.4 °F (36.3 °C)] 96.7 °F (35.9 °C)  Pulse:  [] 92  Resp:  [10-45] 16  SpO2:  [53 %-100 %] 92 %  BP: ()/(55-68) 89/55  Arterial Line BP: ()/(45-72) 62/48     Weight: 97.9 kg (215 lb 13.3 oz)  Body mass index is 26.27 kg/m².     SpO2: (!) 92 %         Intake/Output Summary (Last 24 hours) at 9/11/2023 1730  Last data filed at 9/11/2023 1705  Gross per 24 hour   Intake 28397.36 ml   Output 1885 ml   Net 9754.36 ml       Lines/Drains/Airways       Central Venous Catheter Line  Duration             Percutaneous Central Line Insertion/Assessment - Triple Lumen  08/28/23 1800 Internal Jugular Left 13 days    Trialysis (Dialysis) Catheter 09/09/23 1601 right internal jugular 2 days              Drain  Duration                  NG/OG Tube 09/10/23 2345 Center mouth <1 day              Airway  Duration                  Airway - Non-Surgical 09/10/23 2232 Endotracheal Tube <1 day       Airway Anesthesia 09/10/23 <1 day              Arterial Line  Duration             Arterial Line 09/08/23 2026 Left Radial 2 days              Peripheral Intravenous Line  Duration                  Peripheral IV - Single Lumen 09/08/23 1526 22 G Anterior;Left;Proximal Forearm 3 days         Peripheral IV - Single Lumen 09/09/23 1230 20 G Anterior;Distal;Left Upper Arm 2 days         Peripheral IV - Single Lumen 09/09/23 1230 20 G Anterior;Proximal;Right Forearm 2 days                       Physical Exam  Constitutional:       Appearance: He is ill-appearing.   HENT:      Head: Normocephalic and atraumatic.   Eyes:      General: No scleral " icterus.  Neck:      Comments: Right trialysis catheter in placed  Left Central Line in placed  Cardiovascular:      Rate and Rhythm: Regular rhythm. Tachycardia present.      Pulses: Normal pulses.   Pulmonary:      Effort: Pulmonary effort is normal. No respiratory distress.      Breath sounds: Normal breath sounds. No wheezing or rales.   Abdominal:      General: Abdomen is flat. Bowel sounds are normal.      Palpations: Abdomen is soft.   Musculoskeletal:         General: No swelling or tenderness. Normal range of motion.      Cervical back: Normal range of motion and neck supple.   Skin:     General: Skin is warm and dry.      Coloration: Skin is not jaundiced or pale.      Findings: No bruising.      Comments: Art line in left radial   Neurological:      Mental Status: He is alert and oriented to person, place, and time. Mental status is at baseline.            Significant Labs: All pertinent lab results from the last 24 hours have been reviewed.    Significant Imaging:  None in the last 24 hrs.  Imaging for NG/OG tube placement confirmation done.

## 2023-09-11 NOTE — PLAN OF CARE
Cardiac ICU Care Plan    POC reviewed with Sergiojuan Thrasher and family. Questions and concerns addressed. No acute events today. Pt progressing toward goals. Will continue to monitor. See below and flowsheets for full assessment and VS info.       Neuro:  [place] Coma Scale  Best Eye Response: 4-->(E4) spontaneous  Best Motor Response: 6-->(M6) obeys commands  Best Verbal Response: 5-->(V5) oriented  [place] Coma Scale Score: 15  Assessment Qualifiers: patient not sedated/intubated, no eye obstruction present  Pupil PERRLA: yes    24 hr Temp:  [97.6 °F (36.4 °C)-98.1 °F (36.7 °C)]      CV:  Rhythm: atrial rhythm, paced rhythm (intermittent pacing)   DVT prophylaxis: VTE Required Core Measure: Pharmacological prophylaxis initiated/maintained    CVP (mean): 14 mmHg (09/10/23 1958)       SVO2 (%): 51 % (09/10/23 1958)               Pulses  Right Radial Pulse: 1+ (weak), palpation  Left Radial Pulse: 1+ (weak), palpation  Right Dorsalis Pedis Pulse: 1+ (weak), palpation  Left Dorsalis Pedis Pulse: 1+ (weak), palpation  Right Posterior Tibial Pulse: 1+ (weak), palpation  Left Posterior Tibial Pulse: 1+ (weak), palpation    Resp:  Flow (L/min): 3  Oxygen Concentration (%): 32    GI/:  GI prophylaxis: yes  Diet/Nutrition Received: 2 gram sodium, low saturated fat/low cholesterol  Last Bowel Movement: 09/09/23  Voiding Characteristics: voids spontaneously without difficulty  [REMOVED]      Urethral Catheter 09/09/23 1800-Reason for Continuing Urinary Catheterization: Critically ill in ICU and requiring hourly monitoring of intake/output   Intake/Output Summary (Last 24 hours) at 9/10/2023 2011  Last data filed at 9/10/2023 1905  Gross per 24 hour   Intake 5632.01 ml   Output 6261 ml   Net -628.99 ml     Treatment Type: Slow low efficiency dialysis  UF Rate: 400 cc/hr  Nutritional Supplement Intake: Quantity , Type:     Labs/Accuchecks:  Recent Labs   Lab 09/09/23  0228 09/10/23  0231 09/10/23  1713   WBC 13.91* 18.37*  "17.01*   RBC 4.61 4.57* 4.47*   HGB 11.8* 11.3* 11.3*   HCT 36.0* 33.8* 32.6*    183 144*      Recent Labs   Lab 09/10/23  1147   APTT 37.2*      Recent Labs     09/10/23  0231 09/10/23  1339 09/10/23  1713   *  133*   < > 137   K 4.2  4.2   < > 4.6   CO2 18*  18*   < > 21*     101   < > 101   BUN 20  20   < > 7*   CREATININE 1.6*  1.6*   < > 0.9   ALKPHOS 147*  --   --    ALT 1,610*  --   --    AST 1,699*  --   --    BILITOT 2.9*  --   --     < > = values in this interval not displayed.     No results for input(s): "CPK", "CPKMB", "MB", "TROPONINI" in the last 168 hours.   Recent Labs     09/10/23  1717 09/10/23  1722 09/10/23  1952   PH 7.458* 7.514* 7.460*   PCO2 32.2* 25.0* 35.3   PO2 20* 207* 25*   HCO3 22.8* 20.1* 25.1   POCSATURATED 37* 100 51*   BE -1 -3 1       Electrolytes: Electrolytes replaced  Accuchecks: ACHS    Gtts/LDAs:   sodium chloride 0.9% 200 mL/hr at 09/10/23 1940    DOBUTamine IV infusion (non-titrating) 5 mcg/kg/min (09/10/23 1939)    EPINEPHrine 0.08 mcg/kg/min (09/10/23 1905)    furosemide (LASIX) 500 mg in 50 mL infusion (conc: 10 mg/mL) Stopped (09/10/23 1046)    heparin (porcine) in D5W      NORepinephrine bitartrate-D5W 0.18 mcg/kg/min (09/10/23 1905)       Lines/Drains/Airways       Central Venous Catheter Line  Duration             Percutaneous Central Line Insertion/Assessment - Triple Lumen  08/28/23 1800 Internal Jugular Left 13 days    Trialysis (Dialysis) Catheter 09/09/23 1601 right internal jugular 1 day              Arterial Line  Duration             Arterial Line 09/08/23 2026 Left Radial 1 day              Peripheral Intravenous Line  Duration                  Peripheral IV - Single Lumen 09/08/23 1526 22 G Anterior;Left;Proximal Forearm 2 days         Peripheral IV - Single Lumen 09/09/23 1230 20 G Anterior;Distal;Left Upper Arm 1 day         Peripheral IV - Single Lumen 09/09/23 1230 20 G Anterior;Proximal;Right Forearm 1 day              "       Skin/Wounds  Bathing/Skin Care: patient refused (09/10/23 1800)  Wounds: Yes  Wound care consulted: Yes

## 2023-09-11 NOTE — NURSING
1645 patient stated SOB and feels like water in his lung. Respiratory pattern regular, unlabored, SPO2 96 % under RA, start NC O2L  1655 respiratory pattern deep, using axillary muscle, MAP down 56 increased to Levo 0. 18  CP x-ray, ABG VBG, lab, breathing treatment, hydromorphone  ordered by MD  1258 patient stated minimize SOB

## 2023-09-11 NOTE — ACP (ADVANCE CARE PLANNING)
Advance Directives:   Living Will: No    Do Not Resuscitate Status: Yes      Decision Making:  Patient unable to communicate due to disease severity/cognitive impairment  Goals of Care: The family endorses that what is most important right now is to focus on comfort and QOL. Daughter Carlotta and god daughter at bedside with understanding of critical illness    Accordingly, we have decided that the best plan to meet the patient's goals includes continuing with treatment until more family is able to come in the morning but would like status to be changed to DNR.      Nurse Amelia notified as well.     Nilsa PICHARDO MD  Cardiology Fellow  [facility]

## 2023-09-11 NOTE — CODE/ RAPID DOCUMENTATION
Levo drip increased to 0.5 mcg/kg/min. Vaso drip at 0.04 units/min. Epi drip at 0.06 mcg/kg/min, and Dobutamine at 2.5 mcg/kg/min

## 2023-09-11 NOTE — PLAN OF CARE
"CICU Care Plan    POC reviewed with Ishmael Thrasher and family throughout course of shift. Questions and concerns addressed. Patient intubated and coded for 1min this evening before achieving ROSC, please see previous notes. Unable to continue CRRT for fluid removal as patient is max on Epi, Levo, Vaso, and Jaydno. Sedation of Fentanyl 250mcg/h. Cardiology Fellow updated throughout shift of lab values, increasing pressor support and patient status. Patient was made a DNR this evening by Family. Security measures in place, plan of care to continue. See below and flowsheets for full assessment and VS info.     Neuro:  [place] Coma Scale  Best Eye Response: 2-->(E2) to pain  Best Motor Response: 4-->(M4) withdraws from pain  Best Verbal Response: 1-->(V1) none  [place] Coma Scale Score: 7  Assessment Qualifiers: patient intubated, patient chemically sedated or paralyzed  Pupil PERRLA: yes     24 hr Temp:  [32 °F (0 °C)-98.1 °F (36.7 °C)]     CV:   Rhythm: atrial rhythm, paced rhythm (intermittent pacing)  BP goals:   MAP > 65    Resp:      Vent Mode: A/C  Set Rate: 15 BPM  Oxygen Concentration (%): 40  Vt Set: 500 mL  PEEP/CPAP: 5 cmH20    Plan:  Optimize oxygenation    GI/:     Diet/Nutrition Received: NPO  Last Bowel Movement: 09/09/23  Voiding Characteristics: oliguria    Intake/Output Summary (Last 24 hours) at 9/11/2023 0643  Last data filed at 9/11/2023 0505  Gross per 24 hour   Intake 8129.81 ml   Output 5070 ml   Net 3059.81 ml     Unmeasured Output  Urine Occurrence: 1  Stool Occurrence: 1  Emesis Occurrence: 0  Pad Count: 0  NPO    Labs/Accuchecks:  Recent Labs   Lab 09/11/23  0246   WBC 16.56*   RBC 4.36*   HGB 10.9*   HCT 36.4*   PLT 85*      Recent Labs   Lab 09/11/23  0246   *  132*   K 4.7  4.7   CO2 10*  10*   CL 95  95   BUN 8  8   CREATININE 1.3  1.3   ALKPHOS 174*   ALT 2,036*   AST 2,275*   BILITOT 3.8*      Recent Labs   Lab 09/10/23  1147   APTT 37.2*    No results for input(s): "CPK", " ""CPKMB", "TROPONINI", "MB" in the last 168 hours.    Electrolytes: No replacement orders  Accuchecks: ACHS    Gtts:   sodium chloride 0.9% Stopped (09/10/23 2213)    DOBUTamine IV infusion (non-titrating) 2.5 mcg/kg/min (09/11/23 0505)    EPINEPHrine 2 mcg/kg/min (09/11/23 0602)    fentanyl 250 mcg/hr (09/11/23 0505)    furosemide (LASIX) 500 mg in 50 mL infusion (conc: 10 mg/mL) Stopped (09/10/23 1046)    NORepinephrine bitartrate-D5W 3 mcg/kg/min (09/11/23 0614)    phenylephrine 5 mcg/kg/min (09/11/23 0505)    vasopressin 0.04 Units/min (09/11/23 0505)       LDA/Wounds:  Lines/Drains/Airways       Central Venous Catheter Line  Duration             Percutaneous Central Line Insertion/Assessment - Triple Lumen  08/28/23 1800 Internal Jugular Left 13 days    Trialysis (Dialysis) Catheter 09/09/23 1601 right internal jugular 1 day              Airway  Duration                  Airway - Non-Surgical 09/10/23 2232 Endotracheal Tube <1 day       Airway Anesthesia 09/10/23 <1 day              Arterial Line  Duration             Arterial Line 09/08/23 2026 Left Radial 2 days              Peripheral Intravenous Line  Duration                  Peripheral IV - Single Lumen 09/08/23 1526 22 G Anterior;Left;Proximal Forearm 2 days         Peripheral IV - Single Lumen 09/09/23 1230 20 G Anterior;Distal;Left Upper Arm 1 day         Peripheral IV - Single Lumen 09/09/23 1230 20 G Anterior;Proximal;Right Forearm 1 day                  Wounds: Yes  Wound care consulted: Yes   "

## 2023-09-11 NOTE — PROGRESS NOTES
"[facility] - Cardiac Int[facility] Care  Cardiology  Progress Note    Patient Name: Ishmael Thrasher  MRN: [id number]  Admission Date: 8/28/2023  Hospital Length of Stay: 14 days  Code Status: DNR   Attending Physician: Rufus Frankel MD   Primary Care Physician: Kirk, [facility]  Expected Discharge Date: 9/12/2023  Principal Problem:Cardiogenic shock    Subjective:     Hospital Course:   Patient is getting a left heart catheterization on 08/29/2023.  Repeat echo reveals severely reduced systolic function with estimated ejection fraction of 10 to 15%.  Aortic valve:  Moderate to severe stenosis low-flow.  In the mean stress echo was performed to assess aortic valve which showed Aortic Valve:  There is moderate to severe low flow low gradient aortic stenosis. There is no change is the LV stroke volume with dobutamine consistent with a lack of contractile reserve.  No valvular interventions at this time. RHC done on 9/4/23 revealing continuing hypervolemia with attempting to diuresis. 9/9/23 patient developed afib with RVR along with clinical deterioration shock call was placed with no intervention this time.  Dr. Frankel, contacted [facility] for possible cardiac interventions however it was noted that no interventions can be provided at this time.  Patient began dialysis on 9/9 and is on multiple pressors. PT not tolerating CRRT well. Pt would like to max on pressors and keep him on comfort care.      Interval History: Per nephro notes," Pt did not tolerate CRRT overnight. Required vaso .04, phenylephrine 5 mcg, levo 3 mcg, and epi 2 mcg."    ROS  Objective:     Vital Signs (Most Recent):  Temp: 96.7 °F (35.9 °C) (09/11/23 1105)  Pulse: 92 (09/11/23 1705)  Resp: 16 (09/11/23 1705)  BP: (!) 89/55 (09/11/23 0824)  SpO2: (!) 92 % (09/11/23 1155) Vital Signs (24h Range):  Temp:  [32 °F (0 °C)-97.4 °F (36.3 °C)] 96.7 °F (35.9 °C)  Pulse:  [] 92  Resp:  [10-45] 16  SpO2:  [53 %-100 %] 92 " %  BP: ()/(55-68) 89/55  Arterial Line BP: ()/(45-72) 62/48     Weight: 97.9 kg (215 lb 13.3 oz)  Body mass index is 26.27 kg/m².     SpO2: (!) 92 %         Intake/Output Summary (Last 24 hours) at 9/11/2023 1730  Last data filed at 9/11/2023 1705  Gross per 24 hour   Intake 48969.36 ml   Output 1885 ml   Net 9754.36 ml       Lines/Drains/Airways       Central Venous Catheter Line  Duration             Percutaneous Central Line Insertion/Assessment - Triple Lumen  08/28/23 1800 Internal Jugular Left 13 days    Trialysis (Dialysis) Catheter 09/09/23 1601 right internal jugular 2 days              Drain  Duration                  NG/OG Tube 09/10/23 2345 Center mouth <1 day              Airway  Duration                  Airway - Non-Surgical 09/10/23 2232 Endotracheal Tube <1 day       Airway Anesthesia 09/10/23 <1 day              Arterial Line  Duration             Arterial Line 09/08/23 2026 Left Radial 2 days              Peripheral Intravenous Line  Duration                  Peripheral IV - Single Lumen 09/08/23 1526 22 G Anterior;Left;Proximal Forearm 3 days         Peripheral IV - Single Lumen 09/09/23 1230 20 G Anterior;Distal;Left Upper Arm 2 days         Peripheral IV - Single Lumen 09/09/23 1230 20 G Anterior;Proximal;Right Forearm 2 days                       Physical Exam  Constitutional:       Appearance: He is ill-appearing.   HENT:      Head: Normocephalic and atraumatic.   Eyes:      General: No scleral icterus.  Neck:      Comments: Right trialysis catheter in placed  Left Central Line in placed  Cardiovascular:      Rate and Rhythm: Regular rhythm. Tachycardia present.      Pulses: Normal pulses.   Pulmonary:      Effort: Pulmonary effort is normal. No respiratory distress.      Breath sounds: Normal breath sounds. No wheezing or rales.   Abdominal:      General: Abdomen is flat. Bowel sounds are normal.      Palpations: Abdomen is soft.   Musculoskeletal:         General: No swelling or  tenderness. Normal range of motion.      Cervical back: Normal range of motion and neck supple.   Skin:     General: Skin is warm and dry.      Coloration: Skin is not jaundiced or pale.      Findings: No bruising.      Comments: Art line in left radial   Neurological:      Mental Status: He is alert and oriented to person, place, and time. Mental status is at baseline.            Significant Labs: All pertinent lab results from the last 24 hours have been reviewed.    Significant Imaging:  None in the last 24 hrs.  Imaging for NG/OG tube placement confirmation done.     Assessment and Plan:       * Cardiogenic shock  HFrEF due to iCMP  EF 10-15% on echo along with severe MR and mod AS per echo report from prior hospital   initial hemodynamics on  2 mcg/kg/min on arrival CVP 7 SVO2 57  - Patient was briefly weaned off dobutamine 9/1/23 afternoon, restarted 9/1 with consistently low MAPS to 50-60s. Asymptomatic during episodes. Improved on dobutamine.    Results:  Hemodynamics 5;30AM : CVP:16 , Scvo2:50 , CO:4.4 , CI:1.95 , SVR:900.  Hemodynamics 6:05AM:  CVP:18 , Scvo2:53 , CO:4.83 , CI:2.12 , SVR:878.       Plan:  - q4hr hemodynamics   - dobutamine 5, epi , levo,  MAP goals >60     Discussed case with arvind german. No interventions at this time.   Comfort care measures.    Shock liver  Transaminitis  Patients liver enzymes  elevated. Recent Labs     09/08/23  0353 09/08/23  2219 09/09/23  0228 09/10/23  0231   BILITOT 2.5* 2.5* 2.6*  2.6* 2.9*   AST 17 101* 693*  703* 1,699*   ALT 14 90* 586*  594* 1,610*   ALKPHOS 101 124 128  128 147*    . Liver enzymes uptrending. In setting of hypotension, cardiogenic shock.     Plan:  - on dubutamine, epinephrine, levo, lasix.           Pacemaker  Patient has a BiV [employer] pacemaker in place. Sinus tach with BiV pacing.    Moderate aortic stenosis  DSE    A low dose Dobutamine stress echo was performed to evaluate low flow low gradient aortic stenosis.     Aortic Valve:  There is moderate to severe low flow low gradient aortic stenosis. Aortic valve area by VTI is 0.82 cm². Following infusion of dobutamine, there was no increase in LVOT or aortic VTI, velocity or gradient. The aortic valve area by VTI at 15 mcg/kg/min of dobutamine is 1.1 cm². Peak velocity 2.9 m/s and mean gradient 19 mm Hg.    Post-stress Impression: There is no change is the LV stroke volume with dobutamine consistent with a lack of contractile reserve  Results:  -CT cardiac scoring Aortic valve calcium score is 1484.    Plan:  - No interventions.    PAM (obstructive sleep apnea)  CPAP ordered     Paroxysmal A-fib  -currently in sinus tach 105  -Takes Eliquis at home for pAF and chronic DVT  -Switch Eliquis to heparin gtt for now    Plan:  - on heparin gtt    PTSD (post-traumatic stress disorder)  Resume psych meds    Chronic deep vein thrombosis (DVT) of right lower extremity  Takes Eliquis at home for pAF and chronic DVT  Switch Eliquis to heparin gtt for now    Gout  -Acute gout attack in 8/2023 [facility] with [facility]  -Uric acid 11.6 at that time    Plan  -continue allopurinol 300 daily (modify dose in AM if needed)  -on colchicine 0.6 mg qd, continue  -was seen by Rheum [facility], on prednisone taper (will give 5 mg prednisone x 3 more days and then stop)    Ischemic cardiomyopathy  -CAD s/p CABG x 4 (3/2014 in [place] with LIMA-LAD, SVG-RCA, SVG-PLV, SVG-D2) followed by PCI late same year in 10/2014 with LANA x 3 to RCA and LANA x 3 to left coronary system  -ASA 81 daily and HI statin  -Patient was stopped on dobutamine 9/1/23 afternoon, now having consistently low MAPS to 50-60s.     Results:  RA: 14 RV: 80/ 5/ 15 PA: 75/ 47/ 52 PWP: 28 .   Cardiac output was 3.0. Cardiac index is 1.3 L/min/m2.   O2 Sat: PA 37%.    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  if clotting occurs again, can start on low dose heparin.         VTE Risk Mitigation (From admission, onward)    None          Mary Arevalo MD  Cardiology  [facility] - [facility]

## 2023-09-11 NOTE — ASSESSMENT & PLAN NOTE
-CAD s/p CABG x 4 (3/2014 in [place] with LIMA-LAD, SVG-RCA, SVG-PLV, SVG-D2) followed by PCI late same year in 10/2014 with LANA x 3 to RCA and LANA x 3 to left coronary system  -ASA 81 daily and HI statin  -Patient was stopped on dobutamine 9/1/23 afternoon, now having consistently low MAPS to 50-60s.     Results:  RA: 14 RV: 80/ 5/ 15 PA: 75/ 47/ 52 PWP: 28 .   Cardiac output was 3.0. Cardiac index is 1.3 L/min/m2.   O2 Sat: PA 37%.    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

## 2023-09-11 NOTE — PROGRESS NOTES
2200H Primary  nurse rinse back CRRT d/t patient was unable to tolerate tx.    Primary team will hold off further CRRT for now as per primary nurse. Advised to call if anything changes. Dr. Ta of nephrology informed.

## 2023-09-11 NOTE — EICU
Intervention Initiated From:  Bedside    Shailesh intervened regarding:  Respiratory      Comments: Elert from bedside staff for emergent intubation per Dr. Moreno.  After being intubated pt went into PEA. Cpr for 1 minute before ROSC. Code documentation  is charted as per information provided by bedside staff.

## 2023-09-11 NOTE — ADDENDUM NOTE
Addendum  created 09/11/23 0021 by Wilmar Foss MD    Attestation recorded in Intraprocedure, Intraprocedure Attestations filed

## 2023-09-12 LAB — POCT GLUCOSE: 171 MG/DL (ref 70–110)

## 2023-09-12 NOTE — PLAN OF CARE
[facility] - Cardiac Intensive Care  Discharge Final Note    Primary Care Provider: [facility]    Expected Discharge Date: 2023    Final Discharge Note (most recent)       Final Note - 23 0845          Final Note    Assessment Type Final Discharge Note     Anticipated Discharge Disposition                  Lata Sierra LMSW  [facility]  [id number]

## 2023-09-12 NOTE — PROGRESS NOTES
MD George called to bedside around 2320, patient decompensating swiftly and wife and daughter at bedside. Pronounced  by MD George on 2023.  notified. See MD George's note for further details.

## 2023-09-12 NOTE — DISCHARGE SUMMARY
[facility]  Cardiology  Discharge Summary      Patient Name: Ishmael Thrasher  MRN: [id number]  Admission Date: 8/28/2023  Hospital Length of Stay: 15 days  Discharge Date and Time:  09/11/2023 11:26 PM  Attending Physician: Rufus Frankel MD    Discharging Provider: Yohannes George MD      Hospital Course:  73 year old gentleman with HFrEF (EF 10-15%) 2/2 iCMP s/p [facility] CRT-D placement, CAD s/p CABG x 4 (3/2014 in [place] with LIMA-LAD, SVG-RCA, SVG-PLV, SVG-D2) followed by PCI late same year in 10/2014 with LANA x 3 to RCA and LANA x 3 to left coronary system. Also has h/o pAF on eliquis, PAM on CPAP, CKD, HTN, HLD, T2DM and PAD.       Patient was transferred to LTAC[facility] from the [facility] for higher level of care. He stayed in the [facility] x 11 days and was initially hospitalized for acute CHF and then RHC was done 8/18 (data not available) which showed cardiogenic shock after which he was put on  gtt which was later weaned but his UOP dropped and Cr also went back up so he was restarted on  gtt and transferred here. His echo there showed moderate AS and severe MR and was sent here for evaluation regarding valvular heart disease.     Patient had several RHC to assess volume status and if he would tolerate weaning dobutamine. Volume overloaded and continued diuresis on dobutamine. Friday 9/8/23 patient had persistent AFib -140s with rising lactate. Worsening cardiogenic shock after loss of BiV synchrony and not tolerant of amiodarone IV. Shock call 4:20pm 9/8/2023 after clinical deterioration to assess for any other treatment options. He cardioverted with well-controlled rates, however continued to feel poorly with somnolence and nausea, thus repeat hemodynamics and chemistry obtained. Cardiac output 3.2, cardiac index 1.4, SvO2 25, CVP 15, SVR 1350.  Shock call was placed and discussed case with Dr. Love, Dr. Gonsales, and Dr. Barragan. He was initially  evaluated by the [facility] team for LVAD upon transfer 10 days ago, and he declined LVAD at that time.  This morning on rounds as I discussed potential palliative inotrope therapy as they have had difficulty weaning during this admission, he expressed that he had come to terms with his condition and was now interested in LVAD.  We asked the [facility] team to re-evaluate him this morning, but he deteriorated prior to this. Upon chart review he was evaluated in , and was seen by Dr. Truong at that time, and given extensive aortic calcifications he was felt to not be a candidate for LVAD.  Given that his aortic calcifications have likely not improved over the past 2 years, it was felt that he was not a candidate for any advanced options at this time.  Discussed balloon pump however he was noted to have severe extensive bilateral iliac disease that was felt to be a contraindication to IABP placement. Thus will continue to maximize medical therapy adding nitric oxide for afterload reduction, and epinephrine for additional inotropic support.    23 we obtained a second opinion in regards to his candidacy for durable mechanical circulatory support. Formal request was made with Antony Rosenthal and case discussed with Dr. Navarro.  After review of his case and discussion with their surgical team, they also felt that he was too high risk for advanced options.    Over the weekend, patient continued to decline with increasing pressor requirements, attempted CRRT but was not tolerated. Patient had PEA arrest 9/10, intubated and maxed out on vaso, levo, epi, and phenylephrine.     Patient's wife and daughter at bedside. Patient passed on 23. Family requested for ventilator to be removed after he passed.  and staff notified. Condolences offered to the family.       Discharged Condition:     Time of death is 2023  at 23:20         Cause of Death: cardiogenic shock      Signed:  Yohannes George,  MD  Cardiovascular Disease PGY-V  [facility]

## 2023-09-12 NOTE — SIGNIFICANT EVENT
Death Note    I was called to bedside on 9/11/2023 at 23:20. Nursing at beside, nursing supervisor notified.  has been notified. Family at bedside.     Patient is not responding to verbal or tactile stimuli. No heart or breath sounds on auscultation. No respirations. No palpable pulses. Patient does not have a pupillary or corneal reflex. Patient's pupils are fixed and dilated.      Time of death is 9/11/2023  at 23:20        Cause of Death: cardiogenic shock        Signing Physician:    Yohannes George MD   Cardiology Fellow, PGY-V  [facility] Contact  Email: [email or web address]  Cell: [phone]

## 2023-09-14 LAB
BACTERIA BLD CULT: NORMAL
BACTERIA BLD CULT: NORMAL

## 2024-04-26 NOTE — ASSESSMENT & PLAN NOTE
Repeat Echo 8/29    Summary    Tachycardia was present during the study    Left Ventricle: The left ventricle is severely dilated. Normal wall thickness. There is moderate eccentric hypertrophy. Severe global hypokinesis present. There is severely reduced systolic function with a visually estimated ejection fraction of 10 -15%. No LV apical thrombus present. Unable to assess diastolic functionUnable to assess due to poor image quality.    Right Ventricle: Mild right ventricular enlargement. Wall thickness is normal. Right ventricle wall motion  is normal. Systolic function is moderately reduced. TAPSE is 1.30 cm. Right ventricular global longitudinal strain is -9.0 % and is reduced.    Left Atrium: Left atrium is severely dilated.    Right Atrium: Right atrium is mildly dilated.    Aortic Valve: The aortic valve is a trileaflet valve. There is moderate aortic valve sclerosis. Moderate annular calcification. There is moderate to severe stenosis low flow . Aortic valve area by VTI is 0.91 cm². Aortic valve peak velocity is 2.33 m/s. Mean gradient is 12 mmHg. The dimensionless index is 0.24. Consider Dobutmine stress to differentiate from pseudosevere AS if clinically inidcated.    Mitral Valve: There is mild to moderate regurgitation.    Pulmonary Artery: The estimated pulmonary artery systolic pressure is 65 mmHg.    IVC/SVC: Elevated venous pressure at 15 mmHg        No restrictions

## 2025-04-09 ENCOUNTER — TELEPHONE (OUTPATIENT)
Dept: CARDIOLOGY | Facility: CLINIC | Age: 75
End: 2025-04-09
Payer: OTHER GOVERNMENT

## 2025-04-09 NOTE — TELEPHONE ENCOUNTER
Wife called us about adding up the diagnostic of PTSD to the death certificate due to difficulties getting her 's [facility] from [facility] [employer]. She had some paperwork and stated that she was just in front of the hospital. Dr George was notified and stated that PTSD cannot be added to the death certificate but that it is well known that PTSD will cause cardiac complications. I called her to update her but could not reach her.  When wife came up I updated her on dr George response. Her paperwork consisted in psych record showing that pt had PTSD, copy of the death certificate and an article about PTSD and cardiac disease. I told her that the dx of PTSD is already showing up in the chart. I also told her that the death certificate only includes the immediate cause of the death such as the heart stopping but does not go in details about the long term causes. She verbalized understanding.

## (undated) DEVICE — SHEATH INTRODUCER 7FR 11CM

## (undated) DEVICE — GUIDEWIRE SUPRA CORE 035 190CM

## (undated) DEVICE — DEVICE PERCLOSE SUT CLSR 6FR

## (undated) DEVICE — KIT MICROINTRO 4F .018X40X7CM

## (undated) DEVICE — DRESSING LEUKOPLAST FLEX 1X3IN

## (undated) DEVICE — CATH SWAN GANZ STND 7FR

## (undated) DEVICE — SHEATH INTRODUCER 6FR 11CM

## (undated) DEVICE — KIT CUSTOM MANIFOLD

## (undated) DEVICE — KIT PROBE COVER WITH GEL

## (undated) DEVICE — GUIDEWIRE EMERALD 150CM PTFE

## (undated) DEVICE — LINE 60IN PRESSURE MON.

## (undated) DEVICE — SET MICROPUNCTURE 5FR 501NT

## (undated) DEVICE — COVER TABLE 44X90 STERILE

## (undated) DEVICE — CATH INFINITI 4F JL4 .042X100

## (undated) DEVICE — GUIDEWIRE STD .035X180CM ANG

## (undated) DEVICE — CATH IMA INFINITI 4FRX100CM

## (undated) DEVICE — STOPCOCK 3-WAY

## (undated) DEVICE — CATH INFINITI JUDKINS JR4

## (undated) DEVICE — CATH MPA2 INFINITI 4FR 100CM

## (undated) DEVICE — OMNIPAQUE 350MG 150ML VIAL

## (undated) DEVICE — PAD DEFIB CADENCE ADULT R2

## (undated) DEVICE — TRAY CATH LAB OMC

## (undated) DEVICE — SHEATH INTRODUCER 4FR 11CM

## (undated) DEVICE — TRANSDUCER ADULT DISP

## (undated) DEVICE — SEE MEDLINE ITEM 156894

## (undated) DEVICE — COVER PROBE US 5.5X58L NON LTX

## (undated) DEVICE — SNAP CAP 18 DOME COVERS